# Patient Record
Sex: MALE | Race: BLACK OR AFRICAN AMERICAN | NOT HISPANIC OR LATINO | Employment: FULL TIME | URBAN - METROPOLITAN AREA
[De-identification: names, ages, dates, MRNs, and addresses within clinical notes are randomized per-mention and may not be internally consistent; named-entity substitution may affect disease eponyms.]

---

## 2017-01-09 ENCOUNTER — OFFICE VISIT (OUTPATIENT)
Dept: CARDIOLOGY | Facility: HOSPITAL | Age: 52
End: 2017-01-09

## 2017-01-09 ENCOUNTER — APPOINTMENT (OUTPATIENT)
Dept: LAB | Facility: HOSPITAL | Age: 52
End: 2017-01-09

## 2017-01-09 VITALS
HEIGHT: 69 IN | TEMPERATURE: 97.3 F | BODY MASS INDEX: 28.14 KG/M2 | DIASTOLIC BLOOD PRESSURE: 99 MMHG | SYSTOLIC BLOOD PRESSURE: 139 MMHG | RESPIRATION RATE: 22 BRPM | OXYGEN SATURATION: 97 % | HEART RATE: 70 BPM | WEIGHT: 190 LBS

## 2017-01-09 DIAGNOSIS — N18.3 CKD (CHRONIC KIDNEY DISEASE), STAGE 3 (MODERATE): Primary | ICD-10-CM

## 2017-01-09 DIAGNOSIS — I50.22 CHRONIC SYSTOLIC CONGESTIVE HEART FAILURE (HCC): ICD-10-CM

## 2017-01-09 DIAGNOSIS — I10 ESSENTIAL HYPERTENSION: ICD-10-CM

## 2017-01-09 DIAGNOSIS — I42.8 NONISCHEMIC CARDIOMYOPATHY (HCC): ICD-10-CM

## 2017-01-09 LAB
ANION GAP SERPL CALCULATED.3IONS-SCNC: 7 MMOL/L (ref 3–11)
BUN BLD-MCNC: 29 MG/DL (ref 9–23)
BUN/CREAT SERPL: 14.5 (ref 7–25)
CALCIUM SPEC-SCNC: 9.7 MG/DL (ref 8.7–10.4)
CHLORIDE SERPL-SCNC: 105 MMOL/L (ref 99–109)
CO2 SERPL-SCNC: 34 MMOL/L (ref 20–31)
CREAT BLD-MCNC: 2 MG/DL (ref 0.6–1.3)
GFR SERPL CREATININE-BSD FRML MDRD: 43 ML/MIN/1.73
GLUCOSE BLD-MCNC: 114 MG/DL (ref 70–100)
POTASSIUM BLD-SCNC: 4.6 MMOL/L (ref 3.5–5.5)
SODIUM BLD-SCNC: 146 MMOL/L (ref 132–146)

## 2017-01-09 PROCEDURE — 36415 COLL VENOUS BLD VENIPUNCTURE: CPT | Performed by: NURSE PRACTITIONER

## 2017-01-09 PROCEDURE — 80048 BASIC METABOLIC PNL TOTAL CA: CPT | Performed by: NURSE PRACTITIONER

## 2017-01-09 RX ORDER — TORSEMIDE 20 MG/1
20 TABLET ORAL DAILY
COMMUNITY
End: 2017-07-10 | Stop reason: SDUPTHER

## 2017-01-09 NOTE — PROGRESS NOTES
[facility]  Heart [facility] Valve Center      Encounter Date:01/09/2017     Walt Gao  [address] [place] [facility] [address]  [phone]    1965    Mindi Chandra MD    Walt Gao is a 51 y.o. male.      Subjective:     Chief Complaint:  Follow-up (NICM/SHF)       HPI     A with a history of systolic heart failure, nonischemic, chronic kidney disease stage III, hypertension.  Last seen by cardiology 10/18/2016 with elevated creatinine 2.8, up from 2.4.  At that time patient was instructed to continue torsemide 10 mg daily but if he noted he was short of breath he can increased to 20 mg daily, but to use sparingly with his kidney function.  Patient is being followed by nephrology.  Patient states since that office visit he increased his torsemide 20 mg daily.  He has seen nephrology with no change in medications.  Patient denies chest pain, pressure, palpitations, worsening dyspnea or fatigue, edema, weight gain, orthopnea.  He is able to do his daily work in physical activity requirements without significant limitations.  Reports intermittent mild-mod dyspnea and fatigue with exertion.  Improved rapidly with rest.        No Known Allergies      Current Outpatient Prescriptions:   •  amLODIPine (NORVASC) 5 MG tablet, Take 1 tablet by mouth daily., Disp: 30 tablet, Rfl: 3  •  aspirin 81 MG chewable tablet, Chew 81 mg. 1-2 times daily prior to Bidil, Disp: , Rfl:   •  carvedilol (COREG) 25 MG tablet, Take 1 tablet by mouth 2 (two) times a day with meals for 360 days., Disp: 180 tablet, Rfl: 3  •  isosorbide-hydrALAZINE (BIDIL) 20-37.5 MG per tablet, Take 1 tablet by mouth 3 (three) times a day for 360 days., Disp: 270 tablet, Rfl: 3  •  potassium chloride (K-DUR) 10 MEQ CR tablet, Take 1 tablet by mouth daily for 360 days., Disp: 90 tablet, Rfl: 3  •  torsemide (DEMADEX) 20 MG tablet, Take 20 mg by mouth Daily., Disp: , Rfl:     The following portions of the patient's  history were reviewed and updated as appropriate: allergies, current medications, past family history, past medical history, past social history, past surgical history and problem list.    Review of Systems   Constitution: Positive for chills and weight gain. Negative for decreased appetite, diaphoresis, fever, weakness, malaise/fatigue, night sweats and weight loss (5 lbs).   HENT: Positive for congestion. Negative for headaches and nosebleeds.    Eyes: Negative for blurred vision, visual disturbance and visual halos.   Cardiovascular: Negative for chest pain, claudication, cyanosis, dyspnea on exertion, irregular heartbeat, leg swelling, near-syncope, orthopnea, palpitations, paroxysmal nocturnal dyspnea and syncope.   Respiratory: Positive for snoring and wheezing. Negative for cough, hemoptysis, shortness of breath, sleep disturbances due to breathing and sputum production.    Endocrine: Negative for cold intolerance, heat intolerance, polydipsia, polyphagia and polyuria.   Hematologic/Lymphatic: Does not bruise/bleed easily.   Skin: Positive for dry skin and itching. Negative for rash.   Musculoskeletal: Negative for falls, joint pain, joint swelling, muscle weakness and myalgias.   Gastrointestinal: Negative for bloating, abdominal pain, constipation, diarrhea, dysphagia, heartburn, melena, nausea and vomiting.   Genitourinary: Positive for nocturia. Negative for dysuria, flank pain and hematuria.   Neurological: Positive for dizziness. Negative for difficulty with concentration, excessive daytime sleepiness and loss of balance.   Psychiatric/Behavioral: Negative for altered mental status and depression. The patient is not nervous/anxious.    Allergic/Immunologic: Negative for environmental allergies.       Objective:     Vitals:    01/09/17 1018 01/09/17 1019   BP: 150/96 139/99   BP Location: Right arm Right arm   Patient Position: Sitting Standing   Cuff Size: Large Adult    Pulse: 71 70   Resp: 22    Temp:  "97.3 °F (36.3 °C)    TempSrc: Temporal Artery     SpO2: 97%    Weight: 190 lb (86.2 kg)    Height: 69\" (175.3 cm)          Physical Exam   Constitutional: He is oriented to person, place, and time. He appears well-developed and well-nourished. No distress.   HENT:   Head: Normocephalic and atraumatic.   Mouth/Throat: Oropharynx is clear and moist.   Eyes: Conjunctivae are normal. Pupils are equal, round, and reactive to light. No scleral icterus.   Neck: No hepatojugular reflux and no JVD present. Carotid bruit is not present.   Cardiovascular: Normal rate, regular rhythm, normal heart sounds and intact distal pulses.  Exam reveals no friction rub.    No murmur heard.  Pulmonary/Chest: Effort normal and breath sounds normal.   Abdominal: Soft. Bowel sounds are normal. He exhibits no distension. There is no tenderness.   Musculoskeletal: He exhibits no edema.   Lymphadenopathy:     He has no cervical adenopathy.   Neurological: He is alert and oriented to person, place, and time.   Skin: Skin is warm, dry and intact. No rash noted. No cyanosis or erythema. No pallor.   Psychiatric: He has a normal mood and affect. His behavior is normal. Thought content normal.   Vitals reviewed.      Lab and Diagnostic Review:  Lab on 10/18/2016   Component Date Value Ref Range Status   • Glucose 10/18/2016 94  70 - 100 mg/dL Final   • BUN 10/18/2016 42* 9 - 23 mg/dL Final   • Creatinine 10/18/2016 2.80* 0.60 - 1.30 mg/dL Final   • Sodium 10/18/2016 142  132 - 146 mmol/L Final   • Potassium 10/18/2016 4.1  3.5 - 5.5 mmol/L Final   • Chloride 10/18/2016 104  99 - 109 mmol/L Final   • CO2 10/18/2016 28.0  20.0 - 31.0 mmol/L Final   • Calcium 10/18/2016 9.5  8.7 - 10.4 mg/dL Final   • eGFR   Amer 10/18/2016 29* >60 mL/min/1.73 Final   • BUN/Creatinine Ratio 10/18/2016 15.0  7.0 - 25.0 Final   • Anion Gap 10/18/2016 10.0  3.0 - 11.0 mmol/L Final     Results for orders placed or performed in visit on 01/09/17   Basic Metabolic " Panel   Result Value Ref Range    Glucose 114 (H) 70 - 100 mg/dL    BUN 29 (H) 9 - 23 mg/dL    Creatinine 2.00 (H) 0.60 - 1.30 mg/dL    Sodium 146 132 - 146 mmol/L    Potassium 4.6 3.5 - 5.5 mmol/L    Chloride 105 99 - 109 mmol/L    CO2 34.0 (H) 20.0 - 31.0 mmol/L    Calcium 9.7 8.7 - 10.4 mg/dL    eGFR  African Amer 43 (L) >60 mL/min/1.73    BUN/Creatinine Ratio 14.5 7.0 - 25.0    Anion Gap 7.0 3.0 - 11.0 mmol/L       Assessment and Plan:         1. Chronic systolic congestive heart failure  NYHA II-III (dyspnea/fatigue)  Scheduled for repeat echocardiogram in February prior to follow-up visit with Mariel Hinojosa.  Currently not on an Ace or ARB due to chronic kidney disease.  Tolerating torsemide 20 mg daily    Review signs and symptoms heart failure worsening [facility] heart failure center when to call.  Follow-up in 6 months or as determined by cardiology and as needed    2. Nonischemic cardiomyopathy      3. Essential hypertension  Elevated initially.  Continue to monitor at this time    4. CKD (chronic kidney disease), stage 3 (moderate)    - Basic Metabolic Panel  Creatinine improved to 0.0 (2.8)      *Please note that portions of this note were completed with a voice recognition program. Efforts were made to edit the dictations, but occasionally words are mistranscribed.

## 2017-01-09 NOTE — MR AVS SNAPSHOT
Walt MEDEROS Murray   1/9/2017 9:30 AM   Office Visit    Dept Phone:  [phone]   Encounter #:  [id number]    Provider:  AFTAB Keller   Department:  [facility]                Your Full Care Plan              Today's Medication Changes          These changes are accurate as of: 1/9/17 10:41 AM.  If you have any questions, ask your nurse or doctor.               Medication(s)that have changed:     torsemide 20 MG tablet   Commonly known as:  DEMADEX   Take 20 mg by mouth Daily.   What changed:  Another medication with the same name was removed. Continue taking this medication, and follow the directions you see here.   Changed by:  AFTAB Keller                  Your Updated Medication List          This list is accurate as of: 1/9/17 10:41 AM.  Always use your most recent med list.                amLODIPine 5 MG tablet   Commonly known as:  NORVASC   Take 1 tablet by mouth daily.       aspirin 81 MG chewable tablet       carvedilol 25 MG tablet   Commonly known as:  COREG   Take 1 tablet by mouth 2 (two) times a day with meals for 360 days.       isosorbide-hydrALAZINE 20-37.5 MG per tablet   Commonly known as:  BIDIL   Take 1 tablet by mouth 3 (three) times a day for 360 days.       potassium chloride 10 MEQ CR tablet   Commonly known as:  K-DUR   Take 1 tablet by mouth daily for 360 days.       torsemide 20 MG tablet   Commonly known as:  DEMADEX               We Performed the Following     Basic Metabolic Panel       You Were Diagnosed With        Codes Comments    CKD (chronic kidney disease), stage 3 (moderate)    -  Primary ICD-10-CM: N18.3  ICD-9-CM: 585.3       Instructions     None    Patient Instructions History      Upcoming Appointments     Visit Type Date Time Department    FOLLOW UP 1/9/2017  9:30 AM [facility] [place]    PHYSICAL 1/16/2017  9:00 AM [facility] [place]    [facility] ECHO 2D COMPLETE WITH CONTRAST VT 2/21/2017  9:30 AM [facility] NONINVASIVE LAB    FOLLOW UP 2017 10:45 AM [facility] CARD [facility]    FOLLOW UP 7/10/2017  9:30 AM [facility] [place]      [employer] Signup     [facility] allows you to send messages to your doctor, view your test results, renew your prescriptions, schedule appointments, and more. To sign up, go to [employer] and click on the Sign Up Now link in the New User? box. Enter your [email or web address] Activation Code exactly as it appears below along with the last four digits of your Social Security Number and your Date of Birth () to complete the sign-up process. If you do not sign up before the expiration date, you must request a new code.    [email or web address] Activation Code: [email or web address]  Expires: 2017 10:41 AM    If you have questions, you can email [email or web address] or call [phone] to talk to our [email or web address] staff. Remember, [email or web address] is NOT to be used for urgent needs. For medical emergencies, dial 911.               Other Info from Your Visit           Your Appointments     2017  9:00 AM EST   Physical with Mindi Chandra MD   [facility] PRIMARY CARE (--)    [address] [place] [address] [address]  [facility] [address] [phone]           Arrive 15 minutes prior to appointment.            2017  9:30 AM EST   ECHOCARDIOGRAM 2D COMPLETE WITH CONTRAST VISIT with KATHERYN ECHO/VASC CART RM1   [facility] NONINVASIVE LAB ([place])    [address] [place] [address] [phone]           Bring your insurance cards with you. Wear comfortable clothing and shoes.            2017 10:45 AM EST   Follow Up with Chandrakant Delgado MD   [facility] CARDIOLOGY (--)    [address] [place] [address] [address]  [facility] [address] [phone]           Arrive 15 minutes prior to appointment.            Jul 10, 2017  9:30 AM EDT   Follow Up with AFTAB Keller   [facility] HEART AND  "[facility] (--)    [address] [address] [phone]           Arrive 15 minutes prior to appointment.              Allergies     No Known Allergies      Reason for Visit     Follow-up           Vital Signs     Blood Pressure Pulse Temperature Respirations Height Weight    139/99 (BP Location: Right arm, Patient Position: Standing) 70 97.3 °F (36.3 °C) (Temporal Artery ) 22 69\" (175.3 cm) 190 lb (86.2 kg)    Oxygen Saturation Body Mass Index Smoking Status             97% 28.06 kg/m2 Never Smoker         Problems and Diagnoses Noted     Chronic kidney disease    -  Primary        "

## 2017-01-10 ENCOUNTER — TELEPHONE (OUTPATIENT)
Dept: CARDIOLOGY | Facility: HOSPITAL | Age: 52
End: 2017-01-10

## 2017-01-10 NOTE — TELEPHONE ENCOUNTER
Reviewed recent lab results [facility]:  BUN 30, creatinine 2.1, sodium 136, potassium 4.6, carbon dioxide 19, chloride 99, calcium 9.5, glucose 117, albumen 4.6, WBC 6.2, hematocrit 38.2, hemoglobin 13.7.    Creatinine 1/9/2017 2.0, baseline.  ----- Message from Beverley Lucero sent at 1/9/2017  3:14 PM EST -----  Requested.    ----- Message -----     From: AFTAB Keller     Sent: 1/9/2017   2:26 PM       To: Beverley Lucero    Request last Labs from [facility] (trending labs if possible) and last note for review if possible.

## 2017-01-29 DIAGNOSIS — I10 ESSENTIAL HYPERTENSION: ICD-10-CM

## 2017-01-30 RX ORDER — AMLODIPINE BESYLATE 5 MG/1
TABLET ORAL
Qty: 30 TABLET | Refills: 5 | Status: SHIPPED | OUTPATIENT
Start: 2017-01-30 | End: 2017-07-10 | Stop reason: SDUPTHER

## 2017-01-30 NOTE — TELEPHONE ENCOUNTER
Last seen on 1/9/17 and will follow up on 7/10/17. Refills for amlodipine 5mg daily sent to [address]    Natasha Montaño PharmD

## 2017-02-21 ENCOUNTER — HOSPITAL ENCOUNTER (OUTPATIENT)
Dept: CARDIOLOGY | Facility: HOSPITAL | Age: 52
Discharge: HOME | End: 2017-02-21
Admitting: NURSE PRACTITIONER

## 2017-02-21 ENCOUNTER — OFFICE VISIT (OUTPATIENT)
Dept: CARDIOLOGY | Facility: CLINIC | Age: 52
End: 2017-02-21

## 2017-02-21 VITALS
WEIGHT: 197.6 LBS | SYSTOLIC BLOOD PRESSURE: 138 MMHG | BODY MASS INDEX: 29.27 KG/M2 | HEIGHT: 69 IN | DIASTOLIC BLOOD PRESSURE: 90 MMHG | HEART RATE: 74 BPM

## 2017-02-21 DIAGNOSIS — I42.8 NONISCHEMIC CARDIOMYOPATHY (HCC): ICD-10-CM

## 2017-02-21 DIAGNOSIS — I50.22 CHRONIC SYSTOLIC CONGESTIVE HEART FAILURE (HCC): Primary | ICD-10-CM

## 2017-02-21 DIAGNOSIS — I10 ESSENTIAL HYPERTENSION: ICD-10-CM

## 2017-02-21 DIAGNOSIS — I50.22 CHRONIC SYSTOLIC CONGESTIVE HEART FAILURE (HCC): ICD-10-CM

## 2017-02-21 PROCEDURE — 99214 OFFICE O/P EST MOD 30 MIN: CPT | Performed by: INTERNAL MEDICINE

## 2017-02-21 PROCEDURE — 25010000002 SULFUR HEXAFLUORIDE MICROSPH 60.7-25 MG RECONSTITUTED SUSPENSION: Performed by: NURSE PRACTITIONER

## 2017-02-21 PROCEDURE — C8929 TTE W OR WO FOL WCON,DOPPLER: HCPCS

## 2017-02-21 PROCEDURE — 93306 TTE W/DOPPLER COMPLETE: CPT | Performed by: INTERNAL MEDICINE

## 2017-02-21 RX ORDER — HYDRALAZINE HYDROCHLORIDE 25 MG/1
37.5 TABLET, FILM COATED ORAL 3 TIMES DAILY
Qty: 405 TABLET | Refills: 3 | Status: SHIPPED | OUTPATIENT
Start: 2017-02-21 | End: 2018-02-28 | Stop reason: SDUPTHER

## 2017-02-21 RX ORDER — ISOSORBIDE DINITRATE 20 MG/1
20 TABLET ORAL 2 TIMES DAILY
Qty: 180 TABLET | Refills: 3 | Status: SHIPPED | OUTPATIENT
Start: 2017-02-21 | End: 2018-02-27 | Stop reason: SDUPTHER

## 2017-02-21 RX ADMIN — SULFUR HEXAFLUORIDE 2 ML: KIT at 14:15

## 2017-02-21 NOTE — ASSESSMENT & PLAN NOTE
The patient has experienced normalization of his LV function with medical therapy.  Presently he has functional class I symptoms.  We should continue medical therapy including carvedilol, afterload reduction with isosorbide and hydralazine.  The patient cannot afford BiDil and therefore we will prescribe the individual component separately.

## 2017-02-21 NOTE — PROGRESS NOTES
Encounter Date:02/21/2017    Patient ID: Walt Gao is a 51 y.o. male who resides in [place], [place].     CC/Reason for visit:  Follow-up (Non-ischemic cardiomyopathy)          Walt Gao returns to the office today in follow-up with his nonischemic cardiomyopathy and systolic heart failure.  Patient's been doing well from a cardiovascular standpoint and denies exertional dyspnea, orthopnea, or PND.  He states his blood pressures have been controlled with systolics ranging in the 120-130.  To his visit, he underwent an echocardiogram which shows normalization of his LV function.  Finally, he is having difficulty affording BiDil and asked for an alternative.     Review of Systems   Constitution: Negative for weakness and malaise/fatigue.   Eyes: Negative for vision loss in left eye and vision loss in right eye.   Cardiovascular: Positive for chest pain. Negative for dyspnea on exertion, near-syncope, orthopnea, palpitations, paroxysmal nocturnal dyspnea and syncope.   Respiratory: Positive for shortness of breath and snoring.    Musculoskeletal: Negative for myalgias.   Neurological: Negative for brief paralysis, excessive daytime sleepiness, focal weakness, numbness and paresthesias.   All other systems reviewed and are negative.      The patient's past medical, social, and family history reviewed in the patient's electronic medical record.    Allergies  Review of patient's allergies indicates no known allergies.     Current Outpatient Prescriptions:   •  amLODIPine (NORVASC) 5 MG tablet, TAKE ONE TABLET BY MOUTH DAILY, Disp: 30 tablet, Rfl: 5  •  carvedilol (COREG) 25 MG tablet, Take 1 tablet by mouth 2 (two) times a day with meals for 360 days., Disp: 180 tablet, Rfl: 3  •  isosorbide-hydrALAZINE (BIDIL) 20-37.5 MG per tablet, Take 1 tablet by mouth 3 (three) times a day for 360 days., Disp: 270 tablet, Rfl: 3  •  potassium chloride (K-DUR) 10 MEQ CR tablet, Take 1 tablet by mouth daily for 360 days.,  "Disp: 90 tablet, Rfl: 3  •  torsemide (DEMADEX) 20 MG tablet, Take 20 mg by mouth Daily., Disp: , Rfl:   •  aspirin 81 MG chewable tablet, Chew 81 mg Daily. 1-2 times daily prior to Bidil, Disp: , Rfl:   •  hydrALAZINE (APRESOLINE) 25 MG tablet, Take 1.5 tablets by mouth 3 (Three) Times a Day for 360 days., Disp: 405 tablet, Rfl: 3  •  isosorbide dinitrate (ISORDIL) 20 MG tablet, Take 1 tablet by mouth 2 (Two) Times a Day., Disp: 180 tablet, Rfl: 3  No current facility-administered medications for this visit.     Facility-Administered Medications Ordered in Other Visits:   •  Sulfur Hexafluoride Microsph 60.7-25 MG reconstituted suspension 2 mL, 2 mL, Intravenous, Once, AFTAB [place]          Blood pressure 138/90, pulse 74, height 69\" (175.3 cm), weight 197 lb 9.6 oz (89.6 kg).  Body mass index is 29.18 kg/(m^2).    Physical Exam   Constitutional: He is oriented to person, place, and time. He appears well-developed and well-nourished.   HENT:   Head: Normocephalic and atraumatic.   Eyes: Pupils are equal, round, and reactive to light. No scleral icterus.   Neck: No JVD present. Carotid bruit is not present. No thyromegaly present.   Cardiovascular: Normal rate, regular rhythm, S1 normal and S2 normal.  Exam reveals no gallop.    No murmur heard.  Pulmonary/Chest: Effort normal and breath sounds normal.   Abdominal: Soft. There is no tenderness.   Neurological: He is alert and oriented to person, place, and time.   Skin: Skin is warm and dry. No cyanosis. Nails show no clubbing.   Psychiatric: He has a normal mood and affect. His behavior is normal.       Data Review:   Procedures         Problem List Items Addressed This Visit        Cardiology Problems    Chronic systolic congestive heart failure - Primary    Overview     · Echo (8/30/2016): Moderate concentric hypertrophy.  EF = 23%. Left ventricular diastolic dysfunction (grade II). Left atrial cavity size is moderate-to-severely dilated. Moderate " MR  · Echo (2/21/2017):  LVEF 50%.  Moderate LVH.         Current Assessment & Plan     The patient has experienced normalization of his LV function with medical therapy.  Presently he has functional class I symptoms.  We should continue medical therapy including carvedilol, afterload reduction with isosorbide and hydralazine.  The patient cannot afford BiDil and therefore we will prescribe the individual component separately.         Relevant Medications    hydrALAZINE (APRESOLINE) 25 MG tablet    isosorbide dinitrate (ISORDIL) 20 MG tablet    Nonischemic cardiomyopathy    Overview     · Cardiac catheterization revealing nonobstructive CAD, 2013  · LVEF 30% on heart catheterization, 2013  · Echo (8/29/16): Moderate LVH. Estimated EF = 23%. Left ventricular diastolic dysfunction (grade II) consistent with pseudonormalization.  Moderate mitral valve regurgitation  · Echo (2/21/2017):  LVEF 50%         Relevant Medications    hydrALAZINE (APRESOLINE) 25 MG tablet    isosorbide dinitrate (ISORDIL) 20 MG tablet    Essential hypertension    Current Assessment & Plan     · Blood pressure is presently controlled         Relevant Medications    hydrALAZINE (APRESOLINE) 25 MG tablet               · Discontinue Bidil   · Begin Isosorbide 20 mg and Hydralazine 37.5 mg tid.  · Return to the clinic in 1 year or as needed.      I agree with the above as scribed.  My edits have been integrated into the note.    Chandrakant Delgado MD  2/21/2017     Scribed for Chandrakant Delgado MD by Lisa Castaneda. 2/21/2017  4:41 PM

## 2017-02-22 LAB
BH CV ECHO MEAS - AO MAX PG: 10 MMHG
BH CV ECHO MEAS - AO MEAN PG: 4.7 MMHG
BH CV ECHO MEAS - AO V2 MAX: 158 CM/SEC
BH CV ECHO MEAS - AO V2 MEAN: 98.6 CM/SEC
BH CV ECHO MEAS - AO V2 VTI: 30.2 CM
BH CV ECHO MEAS - BSA(HAYCOCK): 2.1 M^2
BH CV ECHO MEAS - BSA: 2.1 M^2
BH CV ECHO MEAS - BZI_BMI: 29.1 KILOGRAMS/M^2
BH CV ECHO MEAS - BZI_METRIC_HEIGHT: 175.3 CM
BH CV ECHO MEAS - BZI_METRIC_WEIGHT: 89.4 KG
BH CV ECHO MEAS - CONTRAST EF (2CH): 58.8 ML/M^2
BH CV ECHO MEAS - CONTRAST EF 4CH: 54.8 ML/M^2
BH CV ECHO MEAS - EDV(CUBED): 155.7 ML
BH CV ECHO MEAS - EDV(MOD-SP2): 153 ML
BH CV ECHO MEAS - EDV(MOD-SP4): 186 ML
BH CV ECHO MEAS - EDV(TEICH): 140.1 ML
BH CV ECHO MEAS - EF(CUBED): 79.8 %
BH CV ECHO MEAS - EF(MOD-SP2): 58.8 %
BH CV ECHO MEAS - EF(MOD-SP4): 54.8 %
BH CV ECHO MEAS - EF(TEICH): 71.8 %
BH CV ECHO MEAS - ESV(CUBED): 31.4 ML
BH CV ECHO MEAS - ESV(MOD-SP2): 63 ML
BH CV ECHO MEAS - ESV(MOD-SP4): 84 ML
BH CV ECHO MEAS - ESV(TEICH): 39.5 ML
BH CV ECHO MEAS - FS: 41.4 %
BH CV ECHO MEAS - IVS/LVPW: 0.98
BH CV ECHO MEAS - IVSD: 1.3 CM
BH CV ECHO MEAS - LA DIMENSION: 3.7 CM
BH CV ECHO MEAS - LAT PEAK E' VEL: 8.3 CM/SEC
BH CV ECHO MEAS - LV DIASTOLIC VOL/BSA (35-75): 90.6 ML/M^2
BH CV ECHO MEAS - LV MASS(C)D: 301.1 GRAMS
BH CV ECHO MEAS - LV MASS(C)DI: 146.7 GRAMS/M^2
BH CV ECHO MEAS - LV SYSTOLIC VOL/BSA (12-30): 40.9 ML/M^2
BH CV ECHO MEAS - LVIDD: 5.4 CM
BH CV ECHO MEAS - LVIDS: 3.2 CM
BH CV ECHO MEAS - LVLD AP2: 8.7 CM
BH CV ECHO MEAS - LVLD AP4: 8.7 CM
BH CV ECHO MEAS - LVLS AP2: 7.3 CM
BH CV ECHO MEAS - LVLS AP4: 7.2 CM
BH CV ECHO MEAS - LVOT AREA (M): 2.8 CM^2
BH CV ECHO MEAS - LVOT AREA: 3 CM^2
BH CV ECHO MEAS - LVOT DIAM: 1.9 CM
BH CV ECHO MEAS - LVPWD: 1.3 CM
BH CV ECHO MEAS - MED PEAK E' VEL: 10.3 CM/SEC
BH CV ECHO MEAS - MV A MAX VEL: 85.9 CM/SEC
BH CV ECHO MEAS - MV DEC TIME: 0.24 SEC
BH CV ECHO MEAS - MV E MAX VEL: 68.6 CM/SEC
BH CV ECHO MEAS - MV E/A: 0.8
BH CV ECHO MEAS - PA ACC SLOPE: 721.5 CM/SEC^2
BH CV ECHO MEAS - PA ACC TIME: 0.12 SEC
BH CV ECHO MEAS - PA MAX PG: 4.6 MMHG
BH CV ECHO MEAS - PA PR(ACCEL): 25.1 MMHG
BH CV ECHO MEAS - PA V2 MAX: 107.5 CM/SEC
BH CV ECHO MEAS - RAP SYSTOLE: 3 MMHG
BH CV ECHO MEAS - RVDD: 3.2 CM
BH CV ECHO MEAS - RVSP: 24 MMHG
BH CV ECHO MEAS - SI(CUBED): 60.6 ML/M^2
BH CV ECHO MEAS - SI(MOD-SP2): 43.9 ML/M^2
BH CV ECHO MEAS - SI(MOD-SP4): 49.7 ML/M^2
BH CV ECHO MEAS - SI(TEICH): 49 ML/M^2
BH CV ECHO MEAS - SV(CUBED): 124.3 ML
BH CV ECHO MEAS - SV(MOD-SP2): 90 ML
BH CV ECHO MEAS - SV(MOD-SP4): 102 ML
BH CV ECHO MEAS - SV(TEICH): 100.5 ML
BH CV ECHO MEAS - TAPSE (>1.6): 2 CM2
BH CV ECHO MEAS - TR MAX VEL: 217.2 CM/SEC
BH CV XLRA - RV BASE: 4.2 CM
BH CV XLRA - RV LENGTH: 7.7 CM
BH CV XLRA - RV MID: 3.3 CM
BH CV XLRA - TDI S': 11.1 CM/SEC
LEFT ATRIUM VOLUME INDEX: 30.2 ML/M2
LEFT ATRIUM VOLUME: 62 CM3
LV EF 2D ECHO EST: 55 %

## 2017-07-10 ENCOUNTER — OFFICE VISIT (OUTPATIENT)
Dept: CARDIOLOGY | Facility: HOSPITAL | Age: 52
End: 2017-07-10

## 2017-07-10 ENCOUNTER — APPOINTMENT (OUTPATIENT)
Dept: LAB | Facility: HOSPITAL | Age: 52
End: 2017-07-10

## 2017-07-10 VITALS
OXYGEN SATURATION: 96 % | DIASTOLIC BLOOD PRESSURE: 118 MMHG | HEART RATE: 75 BPM | HEIGHT: 69 IN | BODY MASS INDEX: 30.18 KG/M2 | TEMPERATURE: 97.8 F | SYSTOLIC BLOOD PRESSURE: 168 MMHG | RESPIRATION RATE: 18 BRPM | WEIGHT: 203.8 LBS

## 2017-07-10 DIAGNOSIS — I10 ESSENTIAL HYPERTENSION: Primary | ICD-10-CM

## 2017-07-10 DIAGNOSIS — N18.30 CHRONIC RENAL IMPAIRMENT, STAGE 3 (MODERATE) (HCC): ICD-10-CM

## 2017-07-10 DIAGNOSIS — I42.8 NONISCHEMIC CARDIOMYOPATHY (HCC): ICD-10-CM

## 2017-07-10 LAB
ANION GAP SERPL CALCULATED.3IONS-SCNC: 8 MMOL/L (ref 3–11)
BUN BLD-MCNC: 17 MG/DL (ref 9–23)
BUN/CREAT SERPL: 10.6 (ref 7–25)
CALCIUM SPEC-SCNC: 10.2 MG/DL (ref 8.7–10.4)
CHLORIDE SERPL-SCNC: 104 MMOL/L (ref 99–109)
CO2 SERPL-SCNC: 28 MMOL/L (ref 20–31)
CREAT BLD-MCNC: 1.6 MG/DL (ref 0.6–1.3)
GFR SERPL CREATININE-BSD FRML MDRD: 55 ML/MIN/1.73
GLUCOSE BLD-MCNC: 104 MG/DL (ref 70–100)
POTASSIUM BLD-SCNC: 4.8 MMOL/L (ref 3.5–5.5)
SODIUM BLD-SCNC: 140 MMOL/L (ref 132–146)

## 2017-07-10 PROCEDURE — 36415 COLL VENOUS BLD VENIPUNCTURE: CPT | Performed by: NURSE PRACTITIONER

## 2017-07-10 PROCEDURE — 80048 BASIC METABOLIC PNL TOTAL CA: CPT | Performed by: NURSE PRACTITIONER

## 2017-07-10 PROCEDURE — 99213 OFFICE O/P EST LOW 20 MIN: CPT | Performed by: NURSE PRACTITIONER

## 2017-07-10 RX ORDER — POTASSIUM CHLORIDE 750 MG/1
10 TABLET, FILM COATED, EXTENDED RELEASE ORAL DAILY
Qty: 90 TABLET | Refills: 2 | Status: SHIPPED | OUTPATIENT
Start: 2017-07-10 | End: 2018-02-27 | Stop reason: SDUPTHER

## 2017-07-10 RX ORDER — CARVEDILOL 25 MG/1
25 TABLET ORAL 2 TIMES DAILY WITH MEALS
Qty: 180 TABLET | Refills: 2 | Status: SHIPPED | OUTPATIENT
Start: 2017-07-10 | End: 2018-02-27 | Stop reason: SDUPTHER

## 2017-07-10 RX ORDER — AMLODIPINE BESYLATE 5 MG/1
5 TABLET ORAL DAILY
Qty: 90 TABLET | Refills: 2 | Status: SHIPPED | OUTPATIENT
Start: 2017-07-10 | End: 2018-02-27 | Stop reason: SDUPTHER

## 2017-07-10 RX ORDER — TORSEMIDE 20 MG/1
20 TABLET ORAL DAILY
Qty: 90 TABLET | Refills: 2 | Status: SHIPPED | OUTPATIENT
Start: 2017-07-10 | End: 2018-02-27 | Stop reason: SDUPTHER

## 2017-07-10 NOTE — PROGRESS NOTES
[facility]  [facility]      Encounter Date:07/10/2017     Walt Gao  [address] [place] [facility] [id number]  [phone]    1965    Mindi Chandra MD    Walt Gao is a 52 y.o. male.      Subjective:     Chief Complaint:  Follow-up (NICM, HTN)       Hypertension   This is a chronic problem. The current episode started 1 to 4 weeks ago. The problem has been waxing and waning since onset. Condition status: elevated today.  Has been without meds today.  Needs refills, but also forgot to take meds. Pertinent negatives include no blurred vision, chest pain, headaches, malaise/fatigue, orthopnea, palpitations, PND or shortness of breath. Risk factors for coronary artery disease include family history, obesity and male gender. Treatments tried: norvasc, Coreg, Hydralazine, Isordil, Demadex. The current treatment provides significant (when taking medications as scheduled) improvement. Hypertensive end-organ damage includes kidney disease and heart failure (hx of NICM, Last EF improved 2/2017).        No problems updated.    Past Surgical History:   Procedure Laterality Date   • HERNIA REPAIR      as child       No Known Allergies      Current Outpatient Prescriptions:   •  amLODIPine (NORVASC) 5 MG tablet, Take 1 tablet by mouth Daily., Disp: 90 tablet, Rfl: 2  •  aspirin 81 MG chewable tablet, Chew 81 mg Daily. 1-2 times daily prior to Bidil, Disp: , Rfl:   •  carvedilol (COREG) 25 MG tablet, Take 1 tablet by mouth 2 (Two) Times a Day With Meals for 360 days., Disp: 180 tablet, Rfl: 2  •  hydrALAZINE (APRESOLINE) 25 MG tablet, Take 1.5 tablets by mouth 3 (Three) Times a Day for 360 days., Disp: 405 tablet, Rfl: 3  •  isosorbide dinitrate (ISORDIL) 20 MG tablet, Take 1 tablet by mouth 2 (Two) Times a Day., Disp: 180 tablet, Rfl: 3  •  potassium chloride (K-DUR) 10 MEQ CR tablet, Take 1 tablet by mouth Daily for 360 days., Disp: 90 tablet, Rfl: 2  •  torsemide  (DEMADEX) 20 MG tablet, Take 1 tablet by mouth Daily., Disp: 90 tablet, Rfl: 2    The following portions of the patient's history were reviewed and updated as appropriate: allergies, current medications, past family history, past medical history, past social history, past surgical history and problem list.    Review of Systems   Constitution: Negative for chills, decreased appetite, diaphoresis, fever, weakness, malaise/fatigue, night sweats, weight gain and weight loss.   HENT: Negative for congestion, headaches and nosebleeds.    Eyes: Negative for blurred vision, visual disturbance and visual halos.   Cardiovascular: Positive for leg swelling. Negative for chest pain, claudication, cyanosis, dyspnea on exertion, irregular heartbeat, near-syncope, orthopnea, palpitations, paroxysmal nocturnal dyspnea and syncope.   Respiratory: Negative for cough, hemoptysis, shortness of breath, sleep disturbances due to breathing, snoring, sputum production and wheezing.    Endocrine: Negative for cold intolerance, heat intolerance, polydipsia, polyphagia and polyuria.   Hematologic/Lymphatic: Does not bruise/bleed easily.   Skin: Negative for dry skin, itching and rash.   Musculoskeletal: Positive for muscle cramps. Negative for falls, joint pain, joint swelling, muscle weakness and myalgias.   Gastrointestinal: Negative for bloating, abdominal pain, constipation, diarrhea, dysphagia, heartburn, melena, nausea and vomiting.   Genitourinary: Negative for dysuria, flank pain, hematuria and nocturia.   Neurological: Negative for difficulty with concentration, excessive daytime sleepiness, dizziness and loss of balance.   Psychiatric/Behavioral: Negative for altered mental status and depression. The patient is not nervous/anxious.    Allergic/Immunologic: Positive for environmental allergies.       Objective:     Vitals:    07/10/17 0919 07/10/17 0922 07/10/17 0923 07/10/17 0925   BP: (!) 168/117 (!) 171/120 (!) 182/130 (!) 168/118  "  BP Location: Right arm Left arm Left arm Left arm   Patient Position: Sitting Sitting Standing Sitting   Pulse: 65 73 75    Resp: 18      Temp: 97.8 °F (36.6 °C)      TempSrc: Temporal Artery       SpO2: 96%      Weight: 203 lb 12.8 oz (92.4 kg)      Height: 69\" (175.3 cm)            Physical Exam   Constitutional: He is oriented to person, place, and time. He appears well-developed and well-nourished. No distress.   HENT:   Head: Normocephalic and atraumatic.   Mouth/Throat: Oropharynx is clear and moist.   Eyes: Conjunctivae are normal. Pupils are equal, round, and reactive to light. No scleral icterus.   Neck: No hepatojugular reflux and no JVD present. Carotid bruit is not present. No tracheal deviation present. No thyromegaly present.   Cardiovascular: Normal rate, regular rhythm, normal heart sounds and intact distal pulses.  Exam reveals no friction rub.    No murmur heard.  Pulmonary/Chest: Effort normal and breath sounds normal.   Abdominal: Soft. Bowel sounds are normal. He exhibits no distension. There is no tenderness.   Musculoskeletal: He exhibits no edema.   Lymphadenopathy:     He has no cervical adenopathy.   Neurological: He is alert and oriented to person, place, and time.   Skin: Skin is warm, dry and intact. No rash noted. No cyanosis or erythema. No pallor.   Psychiatric: He has a normal mood and affect. His behavior is normal. Thought content normal.   Vitals reviewed.      Lab and Diagnostic Review:  Echocardiogram 2/22/17: EF 55%, mild concentric hypertrophy, diastolic dysfunction, grade 1    01/09/17  Glucose 70 - 100 mg/dL 114 (H)   BUN 9 - 23 mg/dL 29 (H)   Creatinine 0.60 - 1.30 mg/dL 2.00 (H)   Sodium 132 - 146 mmol/L 146   Potassium 3.5 - 5.5 mmol/L 4.6   Chloride 99 - 109 mmol/L 105   CO2 20.0 - 31.0 mmol/L 34.0 (H)   Calcium 8.7 - 10.4 mg/dL 9.7   eGFR  African Amer >60 mL/min/1.73 43 (L)   BUN/Creatinine Ratio 7.0 - 25.0 14.5   Anion Gap 3.0 - 11.0 mmol/L 7.0       Assessment and " Plan:         1. Essential hypertension  Elevated today, has not taken meds    -refill amLODIPine (NORVASC) 5 MG tablet; Take 1 tablet by mouth Daily.  Dispense: 90 tablet; Refill: 2  - refill carvedilol (COREG) 25 MG tablet; Take 1 tablet by mouth 2 (Two) Times a Day With Meals for 360 days.  Dispense: 180 tablet; Refill: 2    2. Nonischemic cardiomyopathy  EF improved, NYHA II    - potassium chloride (K-DUR) 10 MEQ CR tablet; Take 1 tablet by mouth Daily for 360 days.  Dispense: 90 tablet; Refill: 2  - torsemide (DEMADEX) 20 MG tablet; Take 1 tablet by mouth Daily.  Dispense: 90 tablet; Refill: 2    Discussed s/s HF worsening.  Discussed importance of Medication compliance,  Encouraged decrease caffeine and ETOH office.    3. Chronic renal impairment, stage 3 (moderate)    - Basic Metabolic Panel    Encouraged to f/u with nephrology as scheduled    F/u with cardiology as scheduled.  Discussed role of H&V center and when to call.  Will f/u as needed or as determined by cardiology          *Please note that portions of this note were completed with a voice recognition program. Efforts were made to edit the dictations, but occasionally words are mistranscribed.

## 2018-02-27 ENCOUNTER — LAB (OUTPATIENT)
Dept: LAB | Facility: HOSPITAL | Age: 53
End: 2018-02-27

## 2018-02-27 ENCOUNTER — OFFICE VISIT (OUTPATIENT)
Dept: CARDIOLOGY | Facility: CLINIC | Age: 53
End: 2018-02-27

## 2018-02-27 VITALS
BODY MASS INDEX: 29.24 KG/M2 | HEIGHT: 69 IN | HEART RATE: 88 BPM | SYSTOLIC BLOOD PRESSURE: 136 MMHG | WEIGHT: 197.4 LBS | DIASTOLIC BLOOD PRESSURE: 70 MMHG

## 2018-02-27 DIAGNOSIS — N18.30 CHRONIC RENAL IMPAIRMENT, STAGE 3 (MODERATE) (HCC): ICD-10-CM

## 2018-02-27 DIAGNOSIS — E78.2 MIXED HYPERLIPIDEMIA: ICD-10-CM

## 2018-02-27 DIAGNOSIS — I50.22 CHRONIC SYSTOLIC CONGESTIVE HEART FAILURE (HCC): Primary | ICD-10-CM

## 2018-02-27 DIAGNOSIS — I42.8 NONISCHEMIC CARDIOMYOPATHY (HCC): ICD-10-CM

## 2018-02-27 DIAGNOSIS — I10 ESSENTIAL HYPERTENSION: ICD-10-CM

## 2018-02-27 LAB
ALBUMIN SERPL-MCNC: 4.6 G/DL (ref 3.2–4.8)
ALBUMIN/GLOB SERPL: 1.4 G/DL (ref 1.5–2.5)
ALP SERPL-CCNC: 34 U/L (ref 25–100)
ALT SERPL W P-5'-P-CCNC: 26 U/L (ref 7–40)
ANION GAP SERPL CALCULATED.3IONS-SCNC: 8 MMOL/L (ref 3–11)
ARTICHOKE IGE QN: 132 MG/DL (ref 0–130)
AST SERPL-CCNC: 21 U/L (ref 0–33)
BILIRUB SERPL-MCNC: 0.2 MG/DL (ref 0.3–1.2)
BUN BLD-MCNC: 24 MG/DL (ref 9–23)
BUN/CREAT SERPL: 15 (ref 7–25)
CALCIUM SPEC-SCNC: 9.4 MG/DL (ref 8.7–10.4)
CHLORIDE SERPL-SCNC: 101 MMOL/L (ref 99–109)
CHOLEST SERPL-MCNC: 185 MG/DL (ref 0–200)
CO2 SERPL-SCNC: 28 MMOL/L (ref 20–31)
CREAT BLD-MCNC: 1.6 MG/DL (ref 0.6–1.3)
GFR SERPL CREATININE-BSD FRML MDRD: 55 ML/MIN/1.73
GLOBULIN UR ELPH-MCNC: 3.3 GM/DL
GLUCOSE BLD-MCNC: 128 MG/DL (ref 70–100)
HDLC SERPL-MCNC: 44 MG/DL (ref 40–60)
POTASSIUM BLD-SCNC: 4.2 MMOL/L (ref 3.5–5.5)
PROT SERPL-MCNC: 7.9 G/DL (ref 5.7–8.2)
SODIUM BLD-SCNC: 137 MMOL/L (ref 132–146)
TRIGL SERPL-MCNC: 121 MG/DL (ref 0–150)

## 2018-02-27 PROCEDURE — 80061 LIPID PANEL: CPT

## 2018-02-27 PROCEDURE — 80053 COMPREHEN METABOLIC PANEL: CPT

## 2018-02-27 PROCEDURE — 99214 OFFICE O/P EST MOD 30 MIN: CPT | Performed by: NURSE PRACTITIONER

## 2018-02-27 PROCEDURE — 36415 COLL VENOUS BLD VENIPUNCTURE: CPT

## 2018-02-27 RX ORDER — ASPIRIN 81 MG/1
81 TABLET, CHEWABLE ORAL DAILY
Qty: 90 TABLET | Refills: 3 | Status: SHIPPED | OUTPATIENT
Start: 2018-02-27 | End: 2019-02-27

## 2018-02-27 RX ORDER — ISOSORBIDE DINITRATE 20 MG/1
20 TABLET ORAL 2 TIMES DAILY
Qty: 180 TABLET | Refills: 3 | Status: SHIPPED | OUTPATIENT
Start: 2018-02-27 | End: 2019-02-27

## 2018-02-27 RX ORDER — CARVEDILOL 25 MG/1
25 TABLET ORAL 2 TIMES DAILY WITH MEALS
Qty: 180 TABLET | Refills: 2 | Status: SHIPPED | OUTPATIENT
Start: 2018-02-27 | End: 2019-02-22

## 2018-02-27 RX ORDER — TORSEMIDE 20 MG/1
20 TABLET ORAL DAILY
Qty: 90 TABLET | Refills: 3 | Status: SHIPPED | OUTPATIENT
Start: 2018-02-27 | End: 2019-02-27

## 2018-02-27 RX ORDER — POTASSIUM CHLORIDE 750 MG/1
10 TABLET, FILM COATED, EXTENDED RELEASE ORAL DAILY
Qty: 90 TABLET | Refills: 3 | Status: SHIPPED | OUTPATIENT
Start: 2018-02-27 | End: 2019-02-22

## 2018-02-27 RX ORDER — AMLODIPINE BESYLATE 5 MG/1
5 TABLET ORAL DAILY
Qty: 90 TABLET | Refills: 3 | Status: SHIPPED | OUTPATIENT
Start: 2018-02-27 | End: 2019-05-10 | Stop reason: SDUPTHER

## 2018-02-27 NOTE — ASSESSMENT & PLAN NOTE
· Stable NYHA class II symptoms  · Continue carvedilol 25 mg twice a day  · Continue Demadex 20 mg daily along with potassium daily  · Continue aspirin 81 mg daily

## 2018-02-27 NOTE — ASSESSMENT & PLAN NOTE
· Stable NYHA class II symptoms  · Continue carvedilol 25 mg twice a day  · Continue Demadex 20 mg daily along with potassium 20 mEq daily  · Continue isosorbide 20 mg twice a day  · Defer hydralazine for now but will prescribe if patient becomes symptomatic or blood pressure elevates

## 2018-02-27 NOTE — PROGRESS NOTES
Encounter Date:02/27/2018    Patient ID: Walt Gao is a 52 y.o. male who is a  that resides in [place], [place]    CC/Reason for visit:  Congestive Heart Failure            Walt Gao returns today for follow-up for his nonischemic cardiomyopathy, systolic heart failure and hypertension.  Since his last visit he has done well.  His breathing is stable and he is able to work full time without any physical limitations.  At his last visit he was having difficulty affording by DL and asked for an alternative.  He was prescribed isosorbide and hydralazine however for reasons that remain unclear he is not taking the hydralazine but is compliant with the isosorbide..  His blood pressure appears fairly well controlled today with a systolic of 136.  He is currently not on statin therapy and cannot recall the last time his cholesterol levels were checked.  He did follow-up in the heart failure clinic with just 1 Donovan UGALDE last summer.  Labs were drawn and his creatinine has 3 main stable with a baseline of 1.6.  He takes daily Demadex along with potassium.  He admits he does not weigh himself daily.  He does try to follow a low-sodium diet.  His last echocardiogram was one year ago that showed normalization of his LV function with an LVEF of 50%.    Review of Systems   Respiratory: Positive for snoring.    Musculoskeletal: Positive for muscle cramps.   Genitourinary: Positive for frequency.   Allergic/Immunologic: Positive for environmental allergies.   All other systems reviewed and are negative.      The patient's past medical, social, family history and ROS reviewed in the patient's electronic medical record.    Allergies  Review of patient's allergies indicates no known allergies.    Outpatient Prescriptions Marked as Taking for the 2/27/18 encounter (Office Visit) with [facility]   Medication Sig Dispense Refill   • amLODIPine (NORVASC) 5 MG tablet Take 1 tablet by mouth Daily.  "90 tablet 3   • aspirin 81 MG chewable tablet Chew 1 tablet Daily. 1-2 times daily prior to Bidil 90 tablet 3   • carvedilol (COREG) 25 MG tablet Take 1 tablet by mouth 2 (Two) Times a Day With Meals for 360 days. 180 tablet 2   • isosorbide dinitrate (ISORDIL) 20 MG tablet Take 1 tablet by mouth 2 (Two) Times a Day. 180 tablet 3   • potassium chloride (K-DUR) 10 MEQ CR tablet Take 1 tablet by mouth Daily for 360 days. 90 tablet 3   • torsemide (DEMADEX) 20 MG tablet Take 1 tablet by mouth Daily. 90 tablet 3   • [DISCONTINUED] amLODIPine (NORVASC) 5 MG tablet Take 1 tablet by mouth Daily. 90 tablet 2   • [DISCONTINUED] aspirin 81 MG chewable tablet Chew 81 mg Daily. 1-2 times daily prior to Bidil     • [DISCONTINUED] carvedilol (COREG) 25 MG tablet Take 1 tablet by mouth 2 (Two) Times a Day With Meals for 360 days. 180 tablet 2   • [DISCONTINUED] isosorbide dinitrate (ISORDIL) 20 MG tablet Take 1 tablet by mouth 2 (Two) Times a Day. 180 tablet 3   • [DISCONTINUED] potassium chloride (K-DUR) 10 MEQ CR tablet Take 1 tablet by mouth Daily for 360 days. 90 tablet 2   • [DISCONTINUED] torsemide (DEMADEX) 20 MG tablet Take 1 tablet by mouth Daily. 90 tablet 2         Blood pressure 136/70, pulse 88, height 175.3 cm (69\"), weight 89.5 kg (197 lb 6.4 oz).  Body mass index is 29.15 kg/(m^2).    Physical Exam   Constitutional: He is oriented to person, place, and time. He appears well-developed and well-nourished.   HENT:   Head: Normocephalic and atraumatic.   Eyes: Pupils are equal, round, and reactive to light. No scleral icterus.   Neck: No JVD present. Carotid bruit is not present. No thyromegaly present.   Cardiovascular: Normal rate, regular rhythm, S1 normal and S2 normal.  Exam reveals no gallop.    No murmur heard.  Pulmonary/Chest: Effort normal and breath sounds normal.   Abdominal: Soft. There is no hepatosplenomegaly. There is no tenderness.   Neurological: He is alert and oriented to person, place, and time. "   Skin: Skin is warm and dry. No cyanosis. Nails show no clubbing.   Psychiatric: He has a normal mood and affect. His behavior is normal.       Data Review:   Procedures       Problem List Items Addressed This Visit        Cardiovascular and Mediastinum    Chronic systolic congestive heart failure - Primary    Overview     · Echo (8/30/2016): Moderate concentric hypertrophy.  EF = 23%. Left ventricular diastolic dysfunction (grade II). Left atrial cavity size is moderate-to-severely dilated. Moderate MR  · Echo (2/21/2017):  LVEF 50%.  Moderate LVH.         Current Assessment & Plan     · Stable NYHA class II symptoms  · Continue carvedilol 25 mg twice a day  · Continue Demadex 20 mg daily along with potassium 20 mEq daily  · Continue isosorbide 20 mg twice a day  · Defer hydralazine for now but will prescribe if patient becomes symptomatic or blood pressure elevates           Relevant Medications    isosorbide dinitrate (ISORDIL) 20 MG tablet    carvedilol (COREG) 25 MG tablet    amLODIPine (NORVASC) 5 MG tablet    aspirin 81 MG chewable tablet    Essential hypertension    Current Assessment & Plan     · Hypertension is controlled  · Continue amlodipine 5 mg daily         Relevant Medications    torsemide (DEMADEX) 20 MG tablet    carvedilol (COREG) 25 MG tablet    amLODIPine (NORVASC) 5 MG tablet    Nonischemic cardiomyopathy    Overview     · Cardiac catheterization revealing nonobstructive CAD, 2013  · LVEF 30% on heart catheterization, 2013  · Echo (8/29/16): Moderate LVH. Estimated EF = 23%. Left ventricular diastolic dysfunction (grade II) consistent with pseudonormalization.  Moderate mitral valve regurgitation  · Echo (2/21/2017):  LVEF 50%         Current Assessment & Plan     · Stable NYHA class II symptoms  · Continue carvedilol 25 mg twice a day  · Continue Demadex 20 mg daily along with potassium daily  · Continue aspirin 81 mg daily         Relevant Medications    potassium chloride (K-DUR) 10 MEQ CR  tablet    isosorbide dinitrate (ISORDIL) 20 MG tablet    torsemide (DEMADEX) 20 MG tablet    carvedilol (COREG) 25 MG tablet    amLODIPine (NORVASC) 5 MG tablet    Mixed hyperlipidemia    Overview               Current Assessment & Plan     · Currently not on statin therapy  · Obtain CMP and lipid profile today         Relevant Orders    Comprehensive Metabolic Panel    Lipid Panel       Genitourinary    Chronic renal impairment, stage 3 (moderate)    Overview                Current Assessment & Plan     · Obtain CMP today         Relevant Medications    torsemide (DEMADEX) 20 MG tablet    Other Relevant Orders    Comprehensive Metabolic Panel        Patient is doing well from a cardiovascular standpoint.  He has stable NYHA class II heart failure symptoms.  Since his blood pressure appears controlled I will not prescribe the hydralazine for now however if he becomes symptomatic or his blood pressure elevates we will add in hydralazine at 25 mg 3 times a day.  We will continue his current medications.  I will obtain a CMP and lipid profile today.  Follow-up 12 months or sooner if needed       · Continue current medications  · Defer hydralazine for now but will consider addition if becomes symptomatic or SBP greater than 140  · Obtain CMP and lipid profile today  · Follow-up 12 months or sooner if needed    Deanna Crowley, APRN  2/27/2018

## 2018-02-28 DIAGNOSIS — I50.22 CHRONIC SYSTOLIC CONGESTIVE HEART FAILURE (HCC): ICD-10-CM

## 2018-02-28 DIAGNOSIS — E78.5 HYPERLIPIDEMIA LDL GOAL <70: Primary | ICD-10-CM

## 2018-02-28 DIAGNOSIS — I42.8 NONISCHEMIC CARDIOMYOPATHY (HCC): ICD-10-CM

## 2018-02-28 RX ORDER — ATORVASTATIN CALCIUM 20 MG/1
20 TABLET, FILM COATED ORAL DAILY
Qty: 90 TABLET | Refills: 0 | Status: SHIPPED | OUTPATIENT
Start: 2018-02-28 | End: 2019-03-05 | Stop reason: SDUPTHER

## 2018-02-28 RX ORDER — HYDRALAZINE HYDROCHLORIDE 25 MG/1
TABLET, FILM COATED ORAL
Qty: 405 TABLET | Refills: 2 | Status: SHIPPED | OUTPATIENT
Start: 2018-02-28 | End: 2019-03-05 | Stop reason: SDUPTHER

## 2018-02-28 RX ORDER — ATORVASTATIN CALCIUM 20 MG/1
20 TABLET, FILM COATED ORAL DAILY
Qty: 90 TABLET | Refills: 0 | Status: SHIPPED | OUTPATIENT
Start: 2018-02-28 | End: 2018-02-28 | Stop reason: SDUPTHER

## 2019-03-04 NOTE — PROGRESS NOTES
Encounter Date:03/05/2019    Patient ID: Walt Gao is a 53 y.o. male who is a  that resides in [place], [place]    CC/Reason for visit:  Chronic systolic congestive heart failure           Problem List Items Addressed This Visit        Cardiovascular and Mediastinum    Chronic systolic congestive heart failure (CMS/HCC)    Overview     · Echo (8/30/2016): Moderate concentric hypertrophy.  EF = 23%. Left ventricular diastolic dysfunction (grade II). Left atrial cavity size is moderate-to-severely dilated. Moderate MR  · Echo (2/21/2017):  LVEF 55%.  Moderate LVH.         Current Assessment & Plan     · Stable NYHA class I-II symptoms         Relevant Medications    amLODIPine (NORVASC) 5 MG tablet    carvedilol (COREG) 25 MG tablet    isosorbide dinitrate (ISORDIL) 20 MG tablet    hydrALAZINE (APRESOLINE) 25 MG tablet    Nonischemic cardiomyopathy (CMS/HCC) - Primary    Overview     · Cardiac catheterization revealing nonobstructive CAD, 2013  · LVEF 30% on heart catheterization, 2013  · Echo (8/29/16): Moderate LVH. Estimated EF = 23%. Left ventricular diastolic dysfunction (grade II) consistent with pseudonormalization.  Moderate mitral valve regurgitation  · Echo (2/21/2017):  LVEF 50%         Current Assessment & Plan     · Stable NYHA class I-II symptoms  · Continue Coreg 25 mg twice daily  · Continue Demadex 20 mg daily along with potassium 10 mEq daily  · Continue hydralazine and isosorbide         Relevant Medications    amLODIPine (NORVASC) 5 MG tablet    carvedilol (COREG) 25 MG tablet    isosorbide dinitrate (ISORDIL) 20 MG tablet    hydrALAZINE (APRESOLINE) 25 MG tablet    Essential hypertension    Current Assessment & Plan     · Hypertension is controlled  · Continue hydralazine 25 mg 3 times daily  · Continue amlodipine 5 mg daily  · Continue Coreg 25 mg twice daily         Relevant Medications    amLODIPine (NORVASC) 5 MG tablet    carvedilol (COREG) 25 MG tablet    torsemide  (DEMADEX) 20 MG tablet    hydrALAZINE (APRESOLINE) 25 MG tablet    Mixed hyperlipidemia    Overview               Current Assessment & Plan     · Continue Lipitor 20 mg daily  · Obtain CMP and Lipids today         Relevant Medications    atorvastatin (LIPITOR) 20 MG tablet       Genitourinary    Chronic renal impairment, stage 3 (moderate) (CMS/HCC)    Overview                Current Assessment & Plan     · Obtain CMP to reassess renal function         Relevant Medications    torsemide (DEMADEX) 20 MG tablet        The patient has stable NYHA class I-II heart failure symptoms.  He has no signs or symptoms to suggest angina.  We will continue his current medications.  There is some discrepancy on whether he is taking his hydralazine or not.  I will have our nurse contact his pharmacy.  If he is currently not taking hydralazine we can defer as his blood pressure is well controlled.  I will send him for CMP and lipid profile today.  He will follow-up 12 months or sooner if needed       · CMP and lipid profile today  · Contact pharmacy for updated medication list follow-up 12 months or sooner if needed        Walt Gao returns today for follow-up of his nonischemic cardiomyopathy, chronic systolic heart failure and cardiac risk factors.  Since his last visit he has done well.  He denies chest pain, dyspnea, orthopnea, palpitations or syncope.  He continues to work full-time as a  at [employer] in [place].  He has known chronic stage III kidney disease.  He has not had a recent CMP to check renal function since last visit 1 year ago.  His creatinine was stable at his baseline of 1.6 at that time.  He does not follow nephrology.  He is taking low dose Lipitor and tolerating without any reports of myalgias.    Review of Systems   Constitution: Negative for weakness and malaise/fatigue.   Eyes: Negative for vision loss in left eye and vision loss in right eye.   Cardiovascular: Negative for chest pain,  dyspnea on exertion, near-syncope, orthopnea, palpitations, paroxysmal nocturnal dyspnea and syncope.   Musculoskeletal: Negative for myalgias.   Neurological: Negative for brief paralysis, excessive daytime sleepiness, focal weakness, numbness and paresthesias.   All other systems reviewed and are negative.      The patient's past medical, social, family history and ROS reviewed in the patient's electronic medical record.    Allergies  Patient has no known allergies.    Outpatient Medications Marked as Taking for the 3/5/19 encounter (Office Visit) with Jessica Crowley APRN   Medication Sig Dispense Refill   • amLODIPine (NORVASC) 5 MG tablet Take 1 tablet by mouth Daily. 90 tablet 3   • aspirin 81 MG EC tablet Take 1 tablet by mouth Daily. 90 tablet 3   • atorvastatin (LIPITOR) 20 MG tablet Take 1 tablet by mouth Daily. 90 tablet 0   • carvedilol (COREG) 25 MG tablet Take 1 tablet by mouth 2 (Two) Times a Day With Meals. 180 tablet 3   • hydrALAZINE (APRESOLINE) 25 MG tablet Take 1 tablet by mouth 3 (Three) Times a Day. 260 tablet 2   • isosorbide dinitrate (ISORDIL) 20 MG tablet Take 1 tablet by mouth 2 (Two) Times a Day. 90 tablet 3   • potassium chloride (K-DUR) 10 MEQ CR tablet Take 1 tablet by mouth Daily. 90 tablet 3   • torsemide (DEMADEX) 20 MG tablet Take 1 tablet by mouth Daily. 90 tablet 3   • [DISCONTINUED] amLODIPine (NORVASC) 5 MG tablet Take 5 mg by mouth Daily.     • [DISCONTINUED] aspirin 81 MG EC tablet Take 81 mg by mouth Daily.     • [DISCONTINUED] atorvastatin (LIPITOR) 20 MG tablet Take 1 tablet by mouth Daily. 90 tablet 0   • [DISCONTINUED] carvedilol (COREG) 25 MG tablet Take 25 mg by mouth 2 (Two) Times a Day With Meals.     • [DISCONTINUED] hydrALAZINE (APRESOLINE) 25 MG tablet TAKE ONE AND A HALF TABLET BY MOUTH THREE TIMES A  tablet 2   • [DISCONTINUED] isosorbide dinitrate (ISORDIL) 20 MG tablet Take 20 mg by mouth 2 (Two) Times a Day.     • [DISCONTINUED] potassium chloride  "(K-DUR) 10 MEQ CR tablet Take 10 mEq by mouth 2 (Two) Times a Day.     • [DISCONTINUED] torsemide (DEMADEX) 20 MG tablet Take 20 mg by mouth Daily.             Blood pressure 124/74, pulse 82, height 177.8 cm (70\"), weight 85.4 kg (188 lb 3.2 oz).  Body mass index is 27 kg/m².  There were no vitals filed for this visit.    Physical Exam   Constitutional: He is oriented to person, place, and time. He appears well-developed and well-nourished.   HENT:   Head: Normocephalic and atraumatic.   Eyes: Pupils are equal, round, and reactive to light. No scleral icterus.   Neck: No JVD present. Carotid bruit is not present. No thyromegaly present.   Cardiovascular: Normal rate and regular rhythm. Exam reveals no gallop.   No murmur heard.  Pulmonary/Chest: Effort normal and breath sounds normal.   Abdominal: Soft. He exhibits no distension. There is no hepatosplenomegaly.   Musculoskeletal: He exhibits no edema.   Neurological: He is alert and oriented to person, place, and time.   Skin: Skin is warm and dry.   Psychiatric: He has a normal mood and affect. His behavior is normal.       Data Review:     Procedures    Lab Results   Component Value Date    CHOL 185 02/27/2018    TRIG 121 02/27/2018    HDL 44 02/27/2018     (H) 02/27/2018    AST 21 02/27/2018    ALT 26 02/27/2018       Lab Results   Component Value Date    HGBA1C 5.50 08/30/2016           Deanna Crowley, AFTAB  3/5/2019  "

## 2019-03-05 ENCOUNTER — OFFICE VISIT (OUTPATIENT)
Dept: CARDIOLOGY | Facility: CLINIC | Age: 54
End: 2019-03-05

## 2019-03-05 ENCOUNTER — LAB (OUTPATIENT)
Dept: LAB | Facility: HOSPITAL | Age: 54
End: 2019-03-05

## 2019-03-05 VITALS
HEART RATE: 82 BPM | SYSTOLIC BLOOD PRESSURE: 124 MMHG | BODY MASS INDEX: 26.94 KG/M2 | HEIGHT: 70 IN | WEIGHT: 188.2 LBS | DIASTOLIC BLOOD PRESSURE: 74 MMHG

## 2019-03-05 DIAGNOSIS — I42.8 NONISCHEMIC CARDIOMYOPATHY (HCC): Primary | ICD-10-CM

## 2019-03-05 DIAGNOSIS — N18.30 CHRONIC RENAL IMPAIRMENT, STAGE 3 (MODERATE) (HCC): ICD-10-CM

## 2019-03-05 DIAGNOSIS — I10 ESSENTIAL HYPERTENSION: ICD-10-CM

## 2019-03-05 DIAGNOSIS — I50.22 CHRONIC SYSTOLIC CONGESTIVE HEART FAILURE (HCC): ICD-10-CM

## 2019-03-05 DIAGNOSIS — E78.2 MIXED HYPERLIPIDEMIA: ICD-10-CM

## 2019-03-05 DIAGNOSIS — E78.5 HYPERLIPIDEMIA LDL GOAL <70: ICD-10-CM

## 2019-03-05 DIAGNOSIS — E78.5 HYPERLIPIDEMIA LDL GOAL <70: Primary | ICD-10-CM

## 2019-03-05 LAB
ALBUMIN SERPL-MCNC: 4.41 G/DL (ref 3.2–4.8)
ALBUMIN/GLOB SERPL: 1.6 G/DL (ref 1.5–2.5)
ALP SERPL-CCNC: 28 U/L (ref 25–100)
ALT SERPL W P-5'-P-CCNC: 22 U/L (ref 7–40)
ANION GAP SERPL CALCULATED.3IONS-SCNC: 11 MMOL/L (ref 3–11)
ARTICHOKE IGE QN: 123 MG/DL (ref 0–130)
AST SERPL-CCNC: 24 U/L (ref 0–33)
BILIRUB SERPL-MCNC: 0.3 MG/DL (ref 0.3–1.2)
BUN BLD-MCNC: 26 MG/DL (ref 9–23)
BUN/CREAT SERPL: 14.1 (ref 7–25)
CALCIUM SPEC-SCNC: 9.2 MG/DL (ref 8.7–10.4)
CHLORIDE SERPL-SCNC: 106 MMOL/L (ref 99–109)
CHOLEST SERPL-MCNC: 188 MG/DL (ref 0–200)
CO2 SERPL-SCNC: 25 MMOL/L (ref 20–31)
CREAT BLD-MCNC: 1.85 MG/DL (ref 0.6–1.3)
GFR SERPL CREATININE-BSD FRML MDRD: 47 ML/MIN/1.73
GLOBULIN UR ELPH-MCNC: 2.7 GM/DL
GLUCOSE BLD-MCNC: 97 MG/DL (ref 70–100)
HDLC SERPL-MCNC: 47 MG/DL (ref 40–60)
POTASSIUM BLD-SCNC: 4.2 MMOL/L (ref 3.5–5.5)
PROT SERPL-MCNC: 7.1 G/DL (ref 5.7–8.2)
SODIUM BLD-SCNC: 142 MMOL/L (ref 132–146)
TRIGL SERPL-MCNC: 276 MG/DL (ref 0–150)

## 2019-03-05 PROCEDURE — 80061 LIPID PANEL: CPT

## 2019-03-05 PROCEDURE — 99214 OFFICE O/P EST MOD 30 MIN: CPT | Performed by: NURSE PRACTITIONER

## 2019-03-05 PROCEDURE — 80053 COMPREHEN METABOLIC PANEL: CPT

## 2019-03-05 RX ORDER — TORSEMIDE 20 MG/1
20 TABLET ORAL DAILY
Qty: 90 TABLET | Refills: 3 | Status: SHIPPED | OUTPATIENT
Start: 2019-03-05 | End: 2020-03-10 | Stop reason: SDUPTHER

## 2019-03-05 RX ORDER — TORSEMIDE 20 MG/1
20 TABLET ORAL DAILY
COMMUNITY
End: 2019-03-05 | Stop reason: SDUPTHER

## 2019-03-05 RX ORDER — POTASSIUM CHLORIDE 750 MG/1
10 TABLET, FILM COATED, EXTENDED RELEASE ORAL 2 TIMES DAILY
COMMUNITY
End: 2019-03-05 | Stop reason: SDUPTHER

## 2019-03-05 RX ORDER — ASPIRIN 81 MG/1
81 TABLET ORAL DAILY
COMMUNITY
End: 2019-03-05 | Stop reason: SDUPTHER

## 2019-03-05 RX ORDER — CARVEDILOL 25 MG/1
25 TABLET ORAL 2 TIMES DAILY WITH MEALS
Qty: 180 TABLET | Refills: 3 | Status: SHIPPED | OUTPATIENT
Start: 2019-03-05 | End: 2020-03-10 | Stop reason: SDUPTHER

## 2019-03-05 RX ORDER — ISOSORBIDE DINITRATE 20 MG/1
20 TABLET ORAL 2 TIMES DAILY
COMMUNITY
End: 2019-03-05 | Stop reason: SDUPTHER

## 2019-03-05 RX ORDER — POTASSIUM CHLORIDE 750 MG/1
10 TABLET, FILM COATED, EXTENDED RELEASE ORAL DAILY
Qty: 90 TABLET | Refills: 3 | Status: SHIPPED | OUTPATIENT
Start: 2019-03-05 | End: 2020-03-10 | Stop reason: SDUPTHER

## 2019-03-05 RX ORDER — ISOSORBIDE DINITRATE 20 MG/1
20 TABLET ORAL 2 TIMES DAILY
Qty: 90 TABLET | Refills: 3 | Status: SHIPPED | OUTPATIENT
Start: 2019-03-05 | End: 2019-11-15 | Stop reason: SDUPTHER

## 2019-03-05 RX ORDER — ATORVASTATIN CALCIUM 20 MG/1
20 TABLET, FILM COATED ORAL DAILY
Qty: 90 TABLET | Refills: 0 | Status: SHIPPED | OUTPATIENT
Start: 2019-03-05 | End: 2020-03-10 | Stop reason: SDUPTHER

## 2019-03-05 RX ORDER — CARVEDILOL 25 MG/1
25 TABLET ORAL 2 TIMES DAILY WITH MEALS
COMMUNITY
End: 2019-03-05 | Stop reason: SDUPTHER

## 2019-03-05 RX ORDER — HYDRALAZINE HYDROCHLORIDE 25 MG/1
25 TABLET, FILM COATED ORAL 3 TIMES DAILY
Qty: 260 TABLET | Refills: 2 | Status: SHIPPED | OUTPATIENT
Start: 2019-03-05 | End: 2019-07-09 | Stop reason: SDUPTHER

## 2019-03-05 RX ORDER — ASPIRIN 81 MG/1
81 TABLET ORAL DAILY
Qty: 90 TABLET | Refills: 3 | Status: SHIPPED | OUTPATIENT
Start: 2019-03-05 | End: 2022-04-05

## 2019-03-05 RX ORDER — AMLODIPINE BESYLATE 5 MG/1
5 TABLET ORAL DAILY
Qty: 90 TABLET | Refills: 3 | Status: SHIPPED | OUTPATIENT
Start: 2019-03-05 | End: 2020-03-10 | Stop reason: SDUPTHER

## 2019-03-05 RX ORDER — AMLODIPINE BESYLATE 5 MG/1
5 TABLET ORAL DAILY
COMMUNITY
End: 2019-03-05 | Stop reason: SDUPTHER

## 2019-03-05 NOTE — ASSESSMENT & PLAN NOTE
· Stable NYHA class I-II symptoms  · Continue Coreg 25 mg twice daily  · Continue Demadex 20 mg daily along with potassium 10 mEq daily  · Continue hydralazine and isosorbide

## 2019-03-05 NOTE — ASSESSMENT & PLAN NOTE
· Hypertension is controlled  · Continue hydralazine 25 mg 3 times daily  · Continue amlodipine 5 mg daily  · Continue Coreg 25 mg twice daily

## 2019-03-06 DIAGNOSIS — R79.89 ELEVATED SERUM CREATININE: Primary | ICD-10-CM

## 2019-05-10 DIAGNOSIS — I10 ESSENTIAL HYPERTENSION: ICD-10-CM

## 2019-05-10 RX ORDER — AMLODIPINE BESYLATE 5 MG/1
TABLET ORAL
Qty: 90 TABLET | Refills: 3 | Status: SHIPPED | OUTPATIENT
Start: 2019-05-10 | End: 2020-03-10 | Stop reason: SDUPTHER

## 2019-07-09 DIAGNOSIS — I42.8 NONISCHEMIC CARDIOMYOPATHY (HCC): ICD-10-CM

## 2019-07-09 DIAGNOSIS — I50.22 CHRONIC SYSTOLIC CONGESTIVE HEART FAILURE (HCC): ICD-10-CM

## 2019-07-09 RX ORDER — HYDRALAZINE HYDROCHLORIDE 25 MG/1
25 TABLET, FILM COATED ORAL 3 TIMES DAILY
Qty: 260 TABLET | Refills: 2 | Status: SHIPPED | OUTPATIENT
Start: 2019-07-09 | End: 2020-03-10 | Stop reason: SDUPTHER

## 2019-07-09 RX ORDER — HYDRALAZINE HYDROCHLORIDE 25 MG/1
TABLET, FILM COATED ORAL
Qty: 405 TABLET | Refills: 1 | OUTPATIENT
Start: 2019-07-09

## 2019-11-15 RX ORDER — ISOSORBIDE DINITRATE 20 MG/1
20 TABLET ORAL 2 TIMES DAILY
Qty: 180 TABLET | Refills: 3 | Status: SHIPPED | OUTPATIENT
Start: 2019-11-15 | End: 2020-03-10 | Stop reason: SDUPTHER

## 2020-03-10 ENCOUNTER — OFFICE VISIT (OUTPATIENT)
Dept: CARDIOLOGY | Facility: CLINIC | Age: 55
End: 2020-03-10

## 2020-03-10 ENCOUNTER — LAB (OUTPATIENT)
Dept: LAB | Facility: HOSPITAL | Age: 55
End: 2020-03-10

## 2020-03-10 VITALS
OXYGEN SATURATION: 98 % | BODY MASS INDEX: 26.8 KG/M2 | DIASTOLIC BLOOD PRESSURE: 88 MMHG | SYSTOLIC BLOOD PRESSURE: 150 MMHG | HEIGHT: 70 IN | WEIGHT: 187.2 LBS | HEART RATE: 77 BPM

## 2020-03-10 DIAGNOSIS — I50.22 CHRONIC SYSTOLIC CONGESTIVE HEART FAILURE (HCC): ICD-10-CM

## 2020-03-10 DIAGNOSIS — N18.30 CHRONIC RENAL IMPAIRMENT, STAGE 3 (MODERATE) (HCC): ICD-10-CM

## 2020-03-10 DIAGNOSIS — I42.8 NONISCHEMIC CARDIOMYOPATHY (HCC): ICD-10-CM

## 2020-03-10 DIAGNOSIS — I10 ESSENTIAL HYPERTENSION: ICD-10-CM

## 2020-03-10 DIAGNOSIS — N18.30 CHRONIC RENAL IMPAIRMENT, STAGE 3 (MODERATE) (HCC): Primary | ICD-10-CM

## 2020-03-10 LAB
ALBUMIN SERPL-MCNC: 4.4 G/DL (ref 3.5–5.2)
ALBUMIN/GLOB SERPL: 1.3 G/DL
ALP SERPL-CCNC: 27 U/L (ref 39–117)
ALT SERPL W P-5'-P-CCNC: 18 U/L (ref 1–41)
ANION GAP SERPL CALCULATED.3IONS-SCNC: 14.4 MMOL/L (ref 5–15)
AST SERPL-CCNC: 20 U/L (ref 1–40)
BILIRUB SERPL-MCNC: 0.2 MG/DL (ref 0.2–1.2)
BUN BLD-MCNC: 17 MG/DL (ref 6–20)
BUN/CREAT SERPL: 10.6 (ref 7–25)
CALCIUM SPEC-SCNC: 9.2 MG/DL (ref 8.6–10.5)
CHLORIDE SERPL-SCNC: 101 MMOL/L (ref 98–107)
CHOLEST SERPL-MCNC: 193 MG/DL (ref 0–200)
CO2 SERPL-SCNC: 24.6 MMOL/L (ref 22–29)
CREAT BLD-MCNC: 1.61 MG/DL (ref 0.76–1.27)
DEPRECATED RDW RBC AUTO: 43.4 FL (ref 37–54)
ERYTHROCYTE [DISTWIDTH] IN BLOOD BY AUTOMATED COUNT: 12.4 % (ref 12.3–15.4)
GFR SERPL CREATININE-BSD FRML MDRD: 54 ML/MIN/1.73
GLOBULIN UR ELPH-MCNC: 3.3 GM/DL
GLUCOSE BLD-MCNC: 104 MG/DL (ref 65–99)
HCT VFR BLD AUTO: 39.3 % (ref 37.5–51)
HDLC SERPL-MCNC: 53 MG/DL (ref 40–60)
HGB BLD-MCNC: 13.6 G/DL (ref 13–17.7)
LDLC SERPL CALC-MCNC: 113 MG/DL (ref 0–100)
LDLC/HDLC SERPL: 2.13 {RATIO}
MCH RBC QN AUTO: 33.5 PG (ref 26.6–33)
MCHC RBC AUTO-ENTMCNC: 34.6 G/DL (ref 31.5–35.7)
MCV RBC AUTO: 96.8 FL (ref 79–97)
PLATELET # BLD AUTO: 311 10*3/MM3 (ref 140–450)
PMV BLD AUTO: 9.6 FL (ref 6–12)
POTASSIUM BLD-SCNC: 4.2 MMOL/L (ref 3.5–5.2)
PROT SERPL-MCNC: 7.7 G/DL (ref 6–8.5)
RBC # BLD AUTO: 4.06 10*6/MM3 (ref 4.14–5.8)
SODIUM BLD-SCNC: 140 MMOL/L (ref 136–145)
TRIGL SERPL-MCNC: 136 MG/DL (ref 0–150)
VLDLC SERPL-MCNC: 27.2 MG/DL (ref 5–40)
WBC NRBC COR # BLD: 6.28 10*3/MM3 (ref 3.4–10.8)

## 2020-03-10 PROCEDURE — 80053 COMPREHEN METABOLIC PANEL: CPT

## 2020-03-10 PROCEDURE — 85027 COMPLETE CBC AUTOMATED: CPT

## 2020-03-10 PROCEDURE — 80061 LIPID PANEL: CPT

## 2020-03-10 PROCEDURE — 99214 OFFICE O/P EST MOD 30 MIN: CPT | Performed by: INTERNAL MEDICINE

## 2020-03-10 PROCEDURE — 36415 COLL VENOUS BLD VENIPUNCTURE: CPT

## 2020-03-10 RX ORDER — CARVEDILOL 25 MG/1
25 TABLET ORAL 2 TIMES DAILY WITH MEALS
Qty: 180 TABLET | Refills: 3 | Status: SHIPPED | OUTPATIENT
Start: 2020-03-10 | End: 2021-03-16 | Stop reason: SDUPTHER

## 2020-03-10 RX ORDER — POTASSIUM CHLORIDE 750 MG/1
10 TABLET, FILM COATED, EXTENDED RELEASE ORAL DAILY
Qty: 90 TABLET | Refills: 3 | Status: SHIPPED | OUTPATIENT
Start: 2020-03-10 | End: 2021-03-16 | Stop reason: SDUPTHER

## 2020-03-10 RX ORDER — ISOSORBIDE DINITRATE 20 MG/1
20 TABLET ORAL 2 TIMES DAILY
Qty: 180 TABLET | Refills: 3 | Status: SHIPPED | OUTPATIENT
Start: 2020-03-10 | End: 2021-03-16 | Stop reason: SDUPTHER

## 2020-03-10 RX ORDER — HYDRALAZINE HYDROCHLORIDE 50 MG/1
50 TABLET, FILM COATED ORAL 3 TIMES DAILY
Qty: 270 TABLET | Refills: 3 | Status: SHIPPED | OUTPATIENT
Start: 2020-03-10 | End: 2021-03-16 | Stop reason: SDUPTHER

## 2020-03-10 RX ORDER — AMLODIPINE BESYLATE 5 MG/1
5 TABLET ORAL DAILY
Qty: 90 TABLET | Refills: 3 | Status: SHIPPED | OUTPATIENT
Start: 2020-03-10 | End: 2020-08-04 | Stop reason: SDUPTHER

## 2020-03-10 RX ORDER — TORSEMIDE 20 MG/1
20 TABLET ORAL DAILY
Qty: 90 TABLET | Refills: 3 | Status: SHIPPED | OUTPATIENT
Start: 2020-03-10 | End: 2021-03-16 | Stop reason: SDUPTHER

## 2020-03-10 RX ORDER — ATORVASTATIN CALCIUM 20 MG/1
20 TABLET, FILM COATED ORAL DAILY
Qty: 90 TABLET | Refills: 3 | Status: SHIPPED | OUTPATIENT
Start: 2020-03-10 | End: 2021-03-16 | Stop reason: SDUPTHER

## 2020-03-10 NOTE — ASSESSMENT & PLAN NOTE
· HFrEF with normalized LVEF and no heart failure symptoms  · Continue carvedilol  · Increase hydralazine to 50 mg 3 times daily  · No spironolactone due to renal insufficiency

## 2020-03-10 NOTE — PROGRESS NOTES
Let him know that his renal function is stable, but I would still like him to establish care with a kidney doctor

## 2020-03-10 NOTE — PROGRESS NOTES
[facility] at [facility]  Follow-up note    Encounter Date:03/10/2020    Patient ID: Walt Gao is a 55 y.o. male who resides in [place], [place]. She works for [employer]    CC/Reason for visit:  Chronic systolic chf and Hypertension           Problem List Items Addressed This Visit        Cardiology Problems    Chronic systolic congestive heart failure (CMS/HCC)    Overview     · Echo (8/30/2016): Moderate concentric hypertrophy.  EF = 23%. Left ventricular diastolic dysfunction (grade II). Left atrial cavity size is moderate-to-severely dilated. Moderate MR  · Echo (2/21/2017):  LVEF 55%.  Moderate LVH.         Relevant Medications    carvedilol (COREG) 25 MG tablet    torsemide (DEMADEX) 20 MG tablet    hydrALAZINE (APRESOLINE) 50 MG tablet    isosorbide dinitrate (ISORDIL) 20 MG tablet    potassium chloride (K-DUR) 10 MEQ CR tablet    Nonischemic cardiomyopathy (CMS/HCC)    Overview     · Cardiac catheterization revealing nonobstructive CAD, 2013  · LVEF 30% on heart catheterization, 2013  · Echo (8/29/16): Moderate LVH. Estimated EF = 23%. Left ventricular diastolic dysfunction (grade II) consistent with pseudonormalization.  Moderate mitral valve regurgitation  · Echo (2/21/2017):  LVEF 50%         Relevant Medications    carvedilol (COREG) 25 MG tablet    isosorbide dinitrate (ISORDIL) 20 MG tablet    Essential hypertension    Relevant Medications    carvedilol (COREG) 25 MG tablet    torsemide (DEMADEX) 20 MG tablet    hydrALAZINE (APRESOLINE) 50 MG tablet       Other    Chronic renal impairment, stage 3 (moderate) (CMS/HCC) - Primary    Overview                Relevant Medications    torsemide (DEMADEX) 20 MG tablet    Other Relevant Orders    Comprehensive Metabolic Panel    Lipid Panel    Ambulatory Referral to Nephrology    CBC (No Diff)               · Obtain CMP, lipids, CBC today  · Increase hydralazine to 50 mg 3 times daily  · Refer to nephrology for chronic kidney  disease  Return in about 1 year (around 3/10/2021) for Follow-up with [facility] only.            Walt Gao is a 55 y.o. male who returns to my office for follow-up of his nonischemic cardiomyopathy, systolic heart failure, and cardiac risk factors.  The patient has had a stable clinical course since his last visit.  He denies any heart failure symptoms or anginal symptoms.  He has not been checking his blood pressure at home.  He states he is gained about 15 pounds over the last year.      Review of Systems   Constitution: Negative for malaise/fatigue.   Eyes: Negative for vision loss in left eye and vision loss in right eye.   Cardiovascular: Negative for chest pain, dyspnea on exertion, near-syncope, orthopnea, palpitations, paroxysmal nocturnal dyspnea and syncope.   Respiratory: Positive for snoring.    Musculoskeletal: Negative for myalgias.   Neurological: Negative for brief paralysis, excessive daytime sleepiness, focal weakness, numbness, paresthesias and weakness.   All other systems reviewed and are negative.      The patient's past medical, social, family history and ROS reviewed in the patient's electronic medical record.    Allergies  Patient has no known allergies.    Outpatient Medications Marked as Taking for the 3/10/20 encounter (Office Visit) with Chandrakant Delgado IV, MD   Medication Sig Dispense Refill   • amLODIPine (NORVASC) 5 MG tablet Take 1 tablet by mouth Daily. 90 tablet 3   • aspirin 81 MG EC tablet Take 1 tablet by mouth Daily. 90 tablet 3   • atorvastatin (LIPITOR) 20 MG tablet Take 1 tablet by mouth Daily. 90 tablet 3   • carvedilol (COREG) 25 MG tablet Take 1 tablet by mouth 2 (Two) Times a Day With Meals. 180 tablet 3   • hydrALAZINE (APRESOLINE) 50 MG tablet Take 1 tablet by mouth 3 (Three) Times a Day. 270 tablet 3   • isosorbide dinitrate (ISORDIL) 20 MG tablet Take 1 tablet by mouth 2 (Two) Times a Day. 180 tablet 3   • potassium chloride (K-DUR) 10 MEQ CR tablet Take 1  "tablet by mouth Daily. 90 tablet 3   • torsemide (DEMADEX) 20 MG tablet Take 1 tablet by mouth Daily. 90 tablet 3   • [DISCONTINUED] atorvastatin (LIPITOR) 20 MG tablet Take 1 tablet by mouth Daily. 90 tablet 0   • [DISCONTINUED] carvedilol (COREG) 25 MG tablet Take 1 tablet by mouth 2 (Two) Times a Day With Meals. 180 tablet 3   • [DISCONTINUED] hydrALAZINE (APRESOLINE) 25 MG tablet Take 1 tablet by mouth 3 (Three) Times a Day. 260 tablet 2   • [DISCONTINUED] isosorbide dinitrate (ISORDIL) 20 MG tablet Take 1 tablet by mouth 2 (Two) Times a Day. 180 tablet 3   • [DISCONTINUED] potassium chloride (K-DUR) 10 MEQ CR tablet Take 1 tablet by mouth Daily. 90 tablet 3   • [DISCONTINUED] torsemide (DEMADEX) 20 MG tablet Take 1 tablet by mouth Daily. 90 tablet 3           Blood pressure 150/88, pulse 77, height 177.8 cm (70\"), weight 84.9 kg (187 lb 3.2 oz), SpO2 98 %.  Body mass index is 26.86 kg/m².  Vitals:    03/10/20 0947   Patient Position: Sitting       Physical Exam   Constitutional: He is oriented to person, place, and time. He appears well-developed and well-nourished.   HENT:   Head: Normocephalic and atraumatic.   Eyes: Pupils are equal, round, and reactive to light. No scleral icterus.   Neck: No JVD present. Carotid bruit is not present. No thyromegaly present.   Cardiovascular: Normal rate, regular rhythm, S1 normal and S2 normal. Exam reveals no gallop.   No murmur heard.  Pulmonary/Chest: Effort normal and breath sounds normal.   Abdominal: Soft. He exhibits no mass. There is no hepatosplenomegaly. There is no tenderness.   Neurological: He is alert and oriented to person, place, and time.   Skin: Skin is warm and dry. No cyanosis. Nails show no clubbing.   Psychiatric: He has a normal mood and affect. His behavior is normal.       Data Review (reviewed with patient):     Procedures    Lab Results   Component Value Date    CHOL 188 03/05/2019    TRIG 276 (H) 03/05/2019    HDL 47 03/05/2019     " 03/05/2019    AST 24 03/05/2019    ALT 22 03/05/2019         WONG Delgado MD FACC, [facility]  Interventional and General Cardiology

## 2020-08-04 ENCOUNTER — TELEPHONE (OUTPATIENT)
Dept: CARDIOLOGY | Facility: CLINIC | Age: 55
End: 2020-08-04

## 2020-08-04 DIAGNOSIS — I10 ESSENTIAL HYPERTENSION: ICD-10-CM

## 2020-08-04 RX ORDER — AMLODIPINE BESYLATE 5 MG/1
5 TABLET ORAL DAILY
Qty: 90 TABLET | Refills: 3 | Status: SHIPPED | OUTPATIENT
Start: 2020-08-04 | End: 2021-03-16 | Stop reason: SDUPTHER

## 2020-08-04 NOTE — TELEPHONE ENCOUNTER
Nephrology referral placed after last office visit. I called the patient to follow up if he had seen them but had to leave a message. Called [facility] and they said they had received the referral from our office but the patient no showed the appt. They will need the patient to call and reschedule. Will relay to the patient when he returns call.

## 2020-10-23 DIAGNOSIS — I50.22 CHRONIC SYSTOLIC CONGESTIVE HEART FAILURE (HCC): ICD-10-CM

## 2020-10-23 DIAGNOSIS — I42.8 NONISCHEMIC CARDIOMYOPATHY (HCC): ICD-10-CM

## 2020-10-23 RX ORDER — HYDRALAZINE HYDROCHLORIDE 25 MG/1
TABLET, FILM COATED ORAL
Qty: 260 TABLET | Refills: 1 | Status: SHIPPED | OUTPATIENT
Start: 2020-10-23 | End: 2021-03-16

## 2021-03-15 NOTE — PROGRESS NOTES
"[facility]      Identification: Walt Gao is a 56 y.o. male who resides in [place], [place]. He works for [employer]    Reason for visit:  Systolic heart failure  Nonischemic cardiomyopathy  Hypertension      Subjective      Walt Gao presents to [facility] for followup.    History of Present Illness  The patient reports that he occasionally experiences exertional chest pain when he forgets to take one of his medications that is a green pill. However, the patient reports that if he takes all of his medications as prescribed, he does not experience this chest pain. He denies any dyspnea and also notes that he was unable to have the test that was ordered for his kidneys due to the pandemic, but is hopeful that he can have this test soon. The patient reports that he did not take any of his medications this morning since he did not eat breakfast.    The patient is scheduled to receive his COVID vaccination today, 03/16/2021.    The patient works as a . He reports that he transitioned to a new job in 01/2021 that involves less strenuous physical activity.    Review of Systems   Cardiovascular: Negative.    Respiratory: Negative.        Objective     BP (!) 146/102 (BP Location: Left arm, Patient Position: Sitting)   Pulse 86   Temp 97.1 °F (36.2 °C)   Ht 177.8 cm (70\")   Wt 87.5 kg (193 lb)   SpO2 96%   BMI 27.69 kg/m²       Constitutional:       Appearance: Well-developed.   Eyes:      General: No scleral icterus.     Pupils: Pupils are equal, round, and reactive to light.   HENT:      Head: Normocephalic and atraumatic.   Neck:      Thyroid: No thyromegaly.      Vascular: No carotid bruit or JVD.   Pulmonary:      Effort: Pulmonary effort is normal.      Breath sounds: Normal breath sounds.   Cardiovascular:      Normal rate. Regular rhythm.      Murmurs: There is no murmur.      No gallop.   Abdominal:      Palpations: Abdomen is soft. There is no abdominal " mass.      Tenderness: There is no abdominal tenderness.   Musculoskeletal:      Extremities: No clubbing present.Skin:     General: Skin is warm and dry. There is no cyanosis.   Neurological:      Mental Status: Alert and oriented to person, place, and time.   Psychiatric:         Behavior: Behavior normal.         Result Review :    Lab Results   Component Value Date    GLUCOSE 120 (H) 03/16/2021    BUN 25 (H) 03/16/2021    CREATININE 1.85 (H) 03/16/2021    EGFRIFAFRI 46 (L) 03/16/2021    BCR 13.5 03/16/2021    K 4.4 03/16/2021    CO2 28.3 03/16/2021    CALCIUM 9.3 03/16/2021    ALBUMIN 4.70 03/16/2021    AST 23 03/16/2021    ALT 21 03/16/2021     Lab Results   Component Value Date    WBC 5.65 03/16/2021    HGB 13.9 03/16/2021    HCT 40.7 03/16/2021    MCV 96.9 03/16/2021     03/16/2021     Lab Results   Component Value Date    CHOL 177 03/16/2021    TRIG 77 03/16/2021    HDL 54 03/16/2021     (H) 03/16/2021             Assessment/Plan     Problem List Items Addressed This Visit        Cardiology Problems    Chronic systolic congestive heart failure (CMS/HCC) (Chronic)    Overview     · Echo (8/30/2016): Moderate concentric hypertrophy.  EF 23%. Left ventricular diastolic dysfunction (grade II). Left atrial cavity size is moderate-to-severely dilated. Moderate MR  · Echo (2/21/2017):  LVEF 55%.  Moderate LVH.         Current Assessment & Plan     Stage C HFrEF with normalized LV systolic function  NYHA class I symptoms  Obtain lab work today  Continue beta-blocker, BiDil combination  Consider Entresto if renal function allows         Relevant Medications    carvedilol (COREG) 25 MG tablet    torsemide (DEMADEX) 20 MG tablet    isosorbide dinitrate (ISORDIL) 20 MG tablet    potassium chloride 10 MEQ CR tablet    hydrALAZINE (APRESOLINE) 50 MG tablet    amLODIPine (NORVASC) 5 MG tablet    Other Relevant Orders    Comprehensive Metabolic Panel (Completed)    CBC (No Diff) (Completed)    Ambulatory  Referral to Internal Medicine    Essential hypertension (Chronic)    Overview     • Target blood pressure <130/80 mmHg         Current Assessment & Plan     · Uncontrolled but patient did not take medicines this morning         Relevant Medications    carvedilol (COREG) 25 MG tablet    torsemide (DEMADEX) 20 MG tablet    hydrALAZINE (APRESOLINE) 50 MG tablet    amLODIPine (NORVASC) 5 MG tablet    Other Relevant Orders    Comprehensive Metabolic Panel (Completed)    CBC (No Diff) (Completed)    Ambulatory Referral to Internal Medicine    Mixed hyperlipidemia - Primary (Chronic)    Current Assessment & Plan     · Obtain CMP and lipids today         Relevant Medications    atorvastatin (LIPITOR) 20 MG tablet    Other Relevant Orders    Comprehensive Metabolic Panel (Completed)    Lipid Panel (Completed)    Ambulatory Referral to Internal Medicine       Other    Chronic renal impairment, stage 3 (moderate) (CMS/HCC) (Chronic)    Current Assessment & Plan     · Obtain CMP today         Relevant Medications    torsemide (DEMADEX) 20 MG tablet    Other Relevant Orders    Comprehensive Metabolic Panel (Completed)    CBC (No Diff) (Completed)    Ambulatory Referral to Internal Medicine               · Obtain CMP, CBC, lipids today  · Consider adding Entresto if creatinine will allow    Follow-up   Return in about 1 year (around 3/16/2022) for Follow-up with [facility] only.      Scribed for Chandrakant Delgado IV, MD by Roro Rutherford.  03/16/21   14:39 EDT    I have personally performed the services described in this document as scribed by the above individual, and it is both accurate and complete.  Chandrakant Delgado IV, MD  3/17/2021  09:21 EDT        Larry Delgado MD, [facility], [facility]  3/17/2021

## 2021-03-16 ENCOUNTER — OFFICE VISIT (OUTPATIENT)
Dept: CARDIOLOGY | Facility: CLINIC | Age: 56
End: 2021-03-16

## 2021-03-16 ENCOUNTER — LAB (OUTPATIENT)
Dept: LAB | Facility: HOSPITAL | Age: 56
End: 2021-03-16

## 2021-03-16 VITALS
HEART RATE: 86 BPM | HEIGHT: 70 IN | OXYGEN SATURATION: 96 % | TEMPERATURE: 97.1 F | WEIGHT: 193 LBS | BODY MASS INDEX: 27.63 KG/M2 | SYSTOLIC BLOOD PRESSURE: 146 MMHG | DIASTOLIC BLOOD PRESSURE: 102 MMHG

## 2021-03-16 DIAGNOSIS — N18.31 CHRONIC RENAL IMPAIRMENT, STAGE 3A (HCC): ICD-10-CM

## 2021-03-16 DIAGNOSIS — E78.2 MIXED HYPERLIPIDEMIA: Primary | ICD-10-CM

## 2021-03-16 DIAGNOSIS — I50.22 CHRONIC SYSTOLIC CONGESTIVE HEART FAILURE (HCC): ICD-10-CM

## 2021-03-16 DIAGNOSIS — E78.2 MIXED HYPERLIPIDEMIA: ICD-10-CM

## 2021-03-16 DIAGNOSIS — I10 ESSENTIAL HYPERTENSION: ICD-10-CM

## 2021-03-16 LAB
ALBUMIN SERPL-MCNC: 4.7 G/DL (ref 3.5–5.2)
ALBUMIN/GLOB SERPL: 1.2 G/DL
ALP SERPL-CCNC: 29 U/L (ref 39–117)
ALT SERPL W P-5'-P-CCNC: 21 U/L (ref 1–41)
ANION GAP SERPL CALCULATED.3IONS-SCNC: 9.7 MMOL/L (ref 5–15)
AST SERPL-CCNC: 23 U/L (ref 1–40)
BILIRUB SERPL-MCNC: 0.4 MG/DL (ref 0–1.2)
BUN SERPL-MCNC: 25 MG/DL (ref 6–20)
BUN/CREAT SERPL: 13.5 (ref 7–25)
CALCIUM SPEC-SCNC: 9.3 MG/DL (ref 8.6–10.5)
CHLORIDE SERPL-SCNC: 99 MMOL/L (ref 98–107)
CHOLEST SERPL-MCNC: 177 MG/DL (ref 0–200)
CO2 SERPL-SCNC: 28.3 MMOL/L (ref 22–29)
CREAT SERPL-MCNC: 1.85 MG/DL (ref 0.76–1.27)
DEPRECATED RDW RBC AUTO: 45.5 FL (ref 37–54)
ERYTHROCYTE [DISTWIDTH] IN BLOOD BY AUTOMATED COUNT: 12.9 % (ref 12.3–15.4)
GFR SERPL CREATININE-BSD FRML MDRD: 46 ML/MIN/1.73
GLOBULIN UR ELPH-MCNC: 3.9 GM/DL
GLUCOSE SERPL-MCNC: 120 MG/DL (ref 65–99)
HCT VFR BLD AUTO: 40.7 % (ref 37.5–51)
HDLC SERPL-MCNC: 54 MG/DL (ref 40–60)
HGB BLD-MCNC: 13.9 G/DL (ref 13–17.7)
LDLC SERPL CALC-MCNC: 109 MG/DL (ref 0–100)
LDLC/HDLC SERPL: 1.99 {RATIO}
MCH RBC QN AUTO: 33.1 PG (ref 26.6–33)
MCHC RBC AUTO-ENTMCNC: 34.2 G/DL (ref 31.5–35.7)
MCV RBC AUTO: 96.9 FL (ref 79–97)
PLATELET # BLD AUTO: 353 10*3/MM3 (ref 140–450)
PMV BLD AUTO: 9.3 FL (ref 6–12)
POTASSIUM SERPL-SCNC: 4.4 MMOL/L (ref 3.5–5.2)
PROT SERPL-MCNC: 8.6 G/DL (ref 6–8.5)
RBC # BLD AUTO: 4.2 10*6/MM3 (ref 4.14–5.8)
SODIUM SERPL-SCNC: 137 MMOL/L (ref 136–145)
TRIGL SERPL-MCNC: 77 MG/DL (ref 0–150)
VLDLC SERPL-MCNC: 14 MG/DL (ref 5–40)
WBC # BLD AUTO: 5.65 10*3/MM3 (ref 3.4–10.8)

## 2021-03-16 PROCEDURE — 80061 LIPID PANEL: CPT

## 2021-03-16 PROCEDURE — 99214 OFFICE O/P EST MOD 30 MIN: CPT | Performed by: INTERNAL MEDICINE

## 2021-03-16 PROCEDURE — 85027 COMPLETE CBC AUTOMATED: CPT

## 2021-03-16 PROCEDURE — 80053 COMPREHEN METABOLIC PANEL: CPT

## 2021-03-16 RX ORDER — ATORVASTATIN CALCIUM 20 MG/1
20 TABLET, FILM COATED ORAL DAILY
Qty: 90 TABLET | Refills: 3 | Status: SHIPPED | OUTPATIENT
Start: 2021-03-16 | End: 2022-04-05

## 2021-03-16 RX ORDER — TORSEMIDE 20 MG/1
20 TABLET ORAL DAILY
Qty: 90 TABLET | Refills: 3 | Status: SHIPPED | OUTPATIENT
Start: 2021-03-16 | End: 2022-04-05

## 2021-03-16 RX ORDER — POTASSIUM CHLORIDE 750 MG/1
10 TABLET, FILM COATED, EXTENDED RELEASE ORAL DAILY
Qty: 90 TABLET | Refills: 3 | Status: SHIPPED | OUTPATIENT
Start: 2021-03-16 | End: 2022-04-05

## 2021-03-16 RX ORDER — AMLODIPINE BESYLATE 5 MG/1
5 TABLET ORAL DAILY
Qty: 90 TABLET | Refills: 3 | Status: SHIPPED | OUTPATIENT
Start: 2021-03-16 | End: 2022-04-05

## 2021-03-16 RX ORDER — ISOSORBIDE DINITRATE 20 MG/1
20 TABLET ORAL 2 TIMES DAILY
Qty: 180 TABLET | Refills: 3 | Status: SHIPPED | OUTPATIENT
Start: 2021-03-16 | End: 2022-04-05

## 2021-03-16 RX ORDER — HYDRALAZINE HYDROCHLORIDE 50 MG/1
50 TABLET, FILM COATED ORAL 3 TIMES DAILY
Qty: 270 TABLET | Refills: 3 | Status: SHIPPED | OUTPATIENT
Start: 2021-03-16 | End: 2022-04-05

## 2021-03-16 RX ORDER — CARVEDILOL 25 MG/1
25 TABLET ORAL 2 TIMES DAILY WITH MEALS
Qty: 180 TABLET | Refills: 3 | Status: SHIPPED | OUTPATIENT
Start: 2021-03-16 | End: 2022-04-05

## 2021-03-16 NOTE — ASSESSMENT & PLAN NOTE
Stage C HFrEF with normalized LV systolic function  NYHA class I symptoms  Obtain lab work today  Continue beta-blocker, BiDil combination  Consider Entresto if renal function allows

## 2021-04-12 ENCOUNTER — OFFICE VISIT (OUTPATIENT)
Dept: FAMILY MEDICINE CLINIC | Facility: CLINIC | Age: 56
End: 2021-04-12

## 2021-04-12 VITALS
TEMPERATURE: 97.8 F | OXYGEN SATURATION: 98 % | HEART RATE: 76 BPM | DIASTOLIC BLOOD PRESSURE: 110 MMHG | RESPIRATION RATE: 16 BRPM | BODY MASS INDEX: 29.53 KG/M2 | SYSTOLIC BLOOD PRESSURE: 162 MMHG | WEIGHT: 199.4 LBS | HEIGHT: 69 IN

## 2021-04-12 DIAGNOSIS — I10 ESSENTIAL HYPERTENSION: Chronic | ICD-10-CM

## 2021-04-12 DIAGNOSIS — Z12.11 SCREENING FOR COLON CANCER: ICD-10-CM

## 2021-04-12 DIAGNOSIS — Z12.5 SCREENING FOR PROSTATE CANCER: ICD-10-CM

## 2021-04-12 DIAGNOSIS — N18.30 CHRONIC RENAL IMPAIRMENT, STAGE 3 (MODERATE), UNSPECIFIED WHETHER STAGE 3A OR 3B CKD (HCC): ICD-10-CM

## 2021-04-12 DIAGNOSIS — Z00.00 WELL ADULT EXAM: Primary | ICD-10-CM

## 2021-04-12 PROCEDURE — 85025 COMPLETE CBC W/AUTO DIFF WBC: CPT | Performed by: FAMILY MEDICINE

## 2021-04-12 PROCEDURE — 84443 ASSAY THYROID STIM HORMONE: CPT | Performed by: FAMILY MEDICINE

## 2021-04-12 PROCEDURE — 80053 COMPREHEN METABOLIC PANEL: CPT | Performed by: FAMILY MEDICINE

## 2021-04-12 PROCEDURE — 99386 PREV VISIT NEW AGE 40-64: CPT | Performed by: FAMILY MEDICINE

## 2021-04-12 PROCEDURE — 99203 OFFICE O/P NEW LOW 30 MIN: CPT | Performed by: FAMILY MEDICINE

## 2021-04-12 PROCEDURE — G0103 PSA SCREENING: HCPCS | Performed by: FAMILY MEDICINE

## 2021-04-12 PROCEDURE — 83036 HEMOGLOBIN GLYCOSYLATED A1C: CPT | Performed by: FAMILY MEDICINE

## 2021-04-12 NOTE — PROGRESS NOTES
Walt Gao is a 56 y.o. male who presents today to establish care and complete annual exam.    Chief Complaint   Patient presents with   • Establish Care        Pt was last established with a PCP 10+years ago; pt cannot recall practice or provider name. Pt reffered by [facility] for which he is being followed for congestive heart failure. Pt was referred to [facility] last year for stage 3 kidney failure but did not schedule due to pandemic; he is currently willing to see them. Pt has not seen a dentist in 2-3 years, pt saw his opthalmologist last year, and has never seen a dermatologist. Pt states that his diet is currently not healthy, consisting mostly of red meats and some vegetables. He states he tries to avoid junk food or added salts and sugars. Pt states his physical activity is mostly the walking he gets around his job as a  and currently has no established exercise routine. He gets 6-8 hours of good sleep/night. Last colonoscopy was 10-12 years ago; no abnormal results or polyps found. Pt cannot recall ever having his PSA checked. Pt will have completed his COVID-19 series by today (March-2nd injection today). Pt states he has his Tdap last year at Dr. Delgado's office. Pt states he was given a pneumonia shot by his employer 2 years ago. He states he has never had his Shingles vaccine and is interested in this today. He states he is currently sexually active with female partner in a monogamous relationship; does not use protection.    Pt is on multiple medications for BP control and congestive heart failure management. He states he is adherent to these daily and without issue. He has not taken his BP medication today because he hasn't eaten yet; states he feels well today. He states he feels well controlled and only notices feeling sluggish if he doesn't take his medication on time. He states he has a BP cuff at home but has not checked his BP in 1 year.            Review of Systems   Constitutional: Negative for fever and unexpected weight loss.   HENT: Negative for hearing loss.    Eyes: Negative for visual disturbance.   Respiratory: Negative for chest tightness, shortness of breath and wheezing.    Cardiovascular: Negative for chest pain and palpitations.   Gastrointestinal: Negative for abdominal pain, blood in stool, diarrhea, nausea and vomiting.   Genitourinary: Negative for difficulty urinating and hematuria.   Musculoskeletal: Negative for arthralgias and myalgias.   Skin: Negative for rash.   Neurological: Negative for dizziness, syncope, weakness, light-headedness, numbness, headache and confusion.        PHQ-9 Depression Screening  Little interest or pleasure in doing things? 0   Feeling down, depressed, or hopeless? 0   Trouble falling or staying asleep, or sleeping too much?     Feeling tired or having little energy?     Poor appetite or overeating?     Feeling bad about yourself - or that you are a failure or have let yourself or your family down?     Trouble concentrating on things, such as reading the newspaper or watching television?     Moving or speaking so slowly that other people could have noticed? Or the opposite - being so fidgety or restless that you have been moving around a lot more than usual?     Thoughts that you would be better off dead, or of hurting yourself in some way?     PHQ-9 Total Score 0   If you checked off any problems, how difficult have these problems made it for you to do your work, take care of things at home, or get along with other people?         Past Medical History:   Diagnosis Date   • CHF (congestive heart failure) (CMS/HCC)    • NICM (nonischemic cardiomyopathy) (CMS/HCC)         Past Surgical History:   Procedure Laterality Date   • HERNIA REPAIR      as child        Family History   Problem Relation Age of Onset   • Coronary artery disease Other    • Hypertension Mother    • Diabetes Father    • Hypertension  Father    • Kidney disease Father    • Hyperlipidemia Father    • Other Sister         pre diabetes   • Other Maternal Grandmother         unknown   • Other Maternal Grandfather         unknown   • Heart disease Paternal Grandmother    • Heart disease Paternal Grandfather    • No Known Problems Sister    • No Known Problems Daughter    • No Known Problems Son         Social History     Socioeconomic History   • Marital status: Single     Spouse name: Not on file   • Number of children: Not on file   • Years of education: Not on file   • Highest education level: Not on file   Tobacco Use   • Smoking status: Never Smoker   • Smokeless tobacco: Never Used   Vaping Use   • Vaping Use: Never used   Substance and Sexual Activity   • Alcohol use: Yes     Comment: ON WEEKENDS, 6 PACK.    • Drug use: No   • Sexual activity: Yes     Partners: Female     Birth control/protection: Post-menopausal     Comment: 1 partner in the last 9 years        Current Outpatient Medications on File Prior to Visit   Medication Sig Dispense Refill   • amLODIPine (NORVASC) 5 MG tablet Take 1 tablet by mouth Daily. 90 tablet 3   • aspirin 81 MG EC tablet Take 1 tablet by mouth Daily. 90 tablet 3   • atorvastatin (LIPITOR) 20 MG tablet Take 1 tablet by mouth Daily. 90 tablet 3   • carvedilol (COREG) 25 MG tablet Take 1 tablet by mouth 2 (Two) Times a Day With Meals. 180 tablet 3   • hydrALAZINE (APRESOLINE) 50 MG tablet Take 1 tablet by mouth 3 (Three) Times a Day. 270 tablet 3   • isosorbide dinitrate (ISORDIL) 20 MG tablet Take 1 tablet by mouth 2 (Two) Times a Day. 180 tablet 3   • potassium chloride 10 MEQ CR tablet Take 1 tablet by mouth Daily. 90 tablet 3   • torsemide (DEMADEX) 20 MG tablet Take 1 tablet by mouth Daily. 90 tablet 3     No current facility-administered medications on file prior to visit.       No Known Allergies     Visit Vitals  BP (!) 162/110 (BP Location: Left arm, Patient Position: Sitting, Cuff Size: Adult)   Pulse 76  "  Temp 97.8 °F (36.6 °C) (Temporal)   Resp 16   Ht 175.3 cm (69\")   Wt 90.4 kg (199 lb 6.4 oz)   SpO2 98%   BMI 29.45 kg/m²      Body mass index is 29.45 kg/m².    Physical Exam  Constitutional:       General: He is not in acute distress.     Appearance: Normal appearance. He is normal weight. He is not ill-appearing, toxic-appearing or diaphoretic.   HENT:      Head: Normocephalic and atraumatic.      Right Ear: External ear normal.      Left Ear: External ear normal.      Nose: Nose normal.      Mouth/Throat:      Mouth: Mucous membranes are moist.   Eyes:      Extraocular Movements: Extraocular movements intact.   Cardiovascular:      Rate and Rhythm: Normal rate and regular rhythm.      Pulses: Normal pulses.      Heart sounds: Normal heart sounds. No murmur heard.     Pulmonary:      Effort: Pulmonary effort is normal. No respiratory distress.      Breath sounds: Normal breath sounds. No wheezing, rhonchi or rales.   Abdominal:      General: Bowel sounds are normal.      Palpations: Abdomen is soft. There is no mass.      Tenderness: There is no abdominal tenderness. There is no guarding.      Hernia: No hernia is present.   Musculoskeletal:         General: No signs of injury. Normal range of motion.      Cervical back: Normal range of motion and neck supple. No rigidity or tenderness.      Right lower leg: No edema.      Left lower leg: No edema.   Skin:     General: Skin is warm and dry.      Capillary Refill: Capillary refill takes less than 2 seconds.      Findings: No rash.   Neurological:      General: No focal deficit present.      Mental Status: He is alert and oriented to person, place, and time.   Psychiatric:         Mood and Affect: Mood normal.         Behavior: Behavior normal.          Results for orders placed or performed in visit on 03/16/21   Comprehensive Metabolic Panel    Specimen: Blood   Result Value Ref Range    Glucose 120 (H) 65 - 99 mg/dL    BUN 25 (H) 6 - 20 mg/dL    Creatinine " 1.85 (H) 0.76 - 1.27 mg/dL    Sodium 137 136 - 145 mmol/L    Potassium 4.4 3.5 - 5.2 mmol/L    Chloride 99 98 - 107 mmol/L    CO2 28.3 22.0 - 29.0 mmol/L    Calcium 9.3 8.6 - 10.5 mg/dL    Total Protein 8.6 (H) 6.0 - 8.5 g/dL    Albumin 4.70 3.50 - 5.20 g/dL    ALT (SGPT) 21 1 - 41 U/L    AST (SGOT) 23 1 - 40 U/L    Alkaline Phosphatase 29 (L) 39 - 117 U/L    Total Bilirubin 0.4 0.0 - 1.2 mg/dL    eGFR  African Amer 46 (L) >60 mL/min/1.73    Globulin 3.9 gm/dL    A/G Ratio 1.2 g/dL    BUN/Creatinine Ratio 13.5 7.0 - 25.0    Anion Gap 9.7 5.0 - 15.0 mmol/L   Lipid Panel    Specimen: Blood   Result Value Ref Range    Total Cholesterol 177 0 - 200 mg/dL    Triglycerides 77 0 - 150 mg/dL    HDL Cholesterol 54 40 - 60 mg/dL    LDL Cholesterol  109 (H) 0 - 100 mg/dL    VLDL Cholesterol 14 5 - 40 mg/dL    LDL/HDL Ratio 1.99    CBC (No Diff)    Specimen: Blood   Result Value Ref Range    WBC 5.65 3.40 - 10.80 10*3/mm3    RBC 4.20 4.14 - 5.80 10*6/mm3    Hemoglobin 13.9 13.0 - 17.7 g/dL    Hematocrit 40.7 37.5 - 51.0 %    MCV 96.9 79.0 - 97.0 fL    MCH 33.1 (H) 26.6 - 33.0 pg    MCHC 34.2 31.5 - 35.7 g/dL    RDW 12.9 12.3 - 15.4 %    RDW-SD 45.5 37.0 - 54.0 fl    MPV 9.3 6.0 - 12.0 fL    Platelets 353 140 - 450 10*3/mm3        Problems Addressed this Visit        Cardiac and Vasculature    Essential hypertension (Chronic)     Blood pressure is elevated today but patient has not taken his medications as he did not eat this morning thinking that he was going to have to have fasting labs done.  Patient was instructed to always take his medications and explained to him that it was difficult to assess his blood pressure and make medication adjustments when he has not had his medication.  Patient expresses understanding and will take medication before next visit.  He will come in 3 months to have blood pressure reassessed.  We will continue current medications at this time.         Relevant Orders    CBC & Differential     Comprehensive Metabolic Panel    Hemoglobin A1c       Gastrointestinal Abdominal     Screening for colon cancer    Relevant Orders    Ambulatory Referral For Screening Colonoscopy       Genitourinary and Reproductive     Chronic renal impairment, stage 3 (moderate) (CMS/HCC) (Chronic)     Patient was referred to nephrology for chronic kidney disease but did not keep referral due to pandemic.  New referral was placed.         Relevant Orders    Ambulatory Referral to Nephrology    Comprehensive Metabolic Panel    Screening for prostate cancer    Relevant Orders    PSA Screen       Health Encounters    Well adult exam - Primary     The patient is here for health maintenance visit.  Currently, the patient consumes a unhealthy diet and has an inadequate exercise regimen.  Screening lab work is ordered.  Immunizations were reviewed today.  Advice and education was given regarding nutrition, aerobic exercise, routine dental evaluations, routine eye exams, reproductive health, cardiovascular risk reduction, sunscreen use, self skin examination (annual dermatology evaluations) and seatbelt use (general overall safety).  Further recommendations will be given if needed after lab evaluation.  Annual wellness evaluation is recommended.           Relevant Orders    CBC & Differential    Comprehensive Metabolic Panel    Hemoglobin A1c    PSA Screen    TSH Rfx On Abnormal To Free T4      Diagnoses       Codes Comments    Well adult exam    -  Primary ICD-10-CM: Z00.00  ICD-9-CM: V70.0     Chronic renal impairment, stage 3 (moderate), unspecified whether stage 3a or 3b CKD (CMS/HCC)     ICD-10-CM: N18.30  ICD-9-CM: 585.3     Essential hypertension     ICD-10-CM: I10  ICD-9-CM: 401.9     Screening for colon cancer     ICD-10-CM: Z12.11  ICD-9-CM: V76.51     Screening for prostate cancer     ICD-10-CM: Z12.5  ICD-9-CM: V76.44           Return in about 3 months (around 7/12/2021) for Follow-up HTN, CHD.    Parts of this office note  have been dictated by voice recognition software. Grammatical and/or spelling errors may be present.     Salomon Isaac MD  4/12/2021

## 2021-04-12 NOTE — ASSESSMENT & PLAN NOTE
Patient was referred to nephrology for chronic kidney disease but did not keep referral due to pandemic.  New referral was placed.

## 2021-04-12 NOTE — ASSESSMENT & PLAN NOTE
Blood pressure is elevated today but patient has not taken his medications as he did not eat this morning thinking that he was going to have to have fasting labs done.  Patient was instructed to always take his medications and explained to him that it was difficult to assess his blood pressure and make medication adjustments when he has not had his medication.  Patient expresses understanding and will take medication before next visit.  He will come in 3 months to have blood pressure reassessed.  We will continue current medications at this time.

## 2021-04-13 LAB
ALBUMIN SERPL-MCNC: 5 G/DL (ref 3.5–5.2)
ALBUMIN/GLOB SERPL: 1.4 G/DL
ALP SERPL-CCNC: 29 U/L (ref 39–117)
ALT SERPL W P-5'-P-CCNC: 35 U/L (ref 1–41)
ANION GAP SERPL CALCULATED.3IONS-SCNC: 11.8 MMOL/L (ref 5–15)
AST SERPL-CCNC: 25 U/L (ref 1–40)
BASOPHILS # BLD AUTO: 0.02 10*3/MM3 (ref 0–0.2)
BASOPHILS NFR BLD AUTO: 0.4 % (ref 0–1.5)
BILIRUB SERPL-MCNC: 0.3 MG/DL (ref 0–1.2)
BUN SERPL-MCNC: 19 MG/DL (ref 6–20)
BUN/CREAT SERPL: 13.6 (ref 7–25)
CALCIUM SPEC-SCNC: 9.8 MG/DL (ref 8.6–10.5)
CHLORIDE SERPL-SCNC: 106 MMOL/L (ref 98–107)
CO2 SERPL-SCNC: 26.2 MMOL/L (ref 22–29)
CREAT SERPL-MCNC: 1.4 MG/DL (ref 0.76–1.27)
DEPRECATED RDW RBC AUTO: 45.7 FL (ref 37–54)
EOSINOPHIL # BLD AUTO: 0.13 10*3/MM3 (ref 0–0.4)
EOSINOPHIL NFR BLD AUTO: 2.4 % (ref 0.3–6.2)
ERYTHROCYTE [DISTWIDTH] IN BLOOD BY AUTOMATED COUNT: 12.8 % (ref 12.3–15.4)
GFR SERPL CREATININE-BSD FRML MDRD: 64 ML/MIN/1.73
GLOBULIN UR ELPH-MCNC: 3.6 GM/DL
GLUCOSE SERPL-MCNC: 108 MG/DL (ref 65–99)
HBA1C MFR BLD: 6.31 % (ref 4.8–5.6)
HCT VFR BLD AUTO: 44.4 % (ref 37.5–51)
HGB BLD-MCNC: 15.2 G/DL (ref 13–17.7)
IMM GRANULOCYTES # BLD AUTO: 0.01 10*3/MM3 (ref 0–0.05)
IMM GRANULOCYTES NFR BLD AUTO: 0.2 % (ref 0–0.5)
LYMPHOCYTES # BLD AUTO: 2.02 10*3/MM3 (ref 0.7–3.1)
LYMPHOCYTES NFR BLD AUTO: 37.7 % (ref 19.6–45.3)
MCH RBC QN AUTO: 33.4 PG (ref 26.6–33)
MCHC RBC AUTO-ENTMCNC: 34.2 G/DL (ref 31.5–35.7)
MCV RBC AUTO: 97.6 FL (ref 79–97)
MONOCYTES # BLD AUTO: 0.57 10*3/MM3 (ref 0.1–0.9)
MONOCYTES NFR BLD AUTO: 10.6 % (ref 5–12)
NEUTROPHILS NFR BLD AUTO: 2.61 10*3/MM3 (ref 1.7–7)
NEUTROPHILS NFR BLD AUTO: 48.7 % (ref 42.7–76)
NRBC BLD AUTO-RTO: 0 /100 WBC (ref 0–0.2)
PLATELET # BLD AUTO: 316 10*3/MM3 (ref 140–450)
PMV BLD AUTO: 9.3 FL (ref 6–12)
POTASSIUM SERPL-SCNC: 5 MMOL/L (ref 3.5–5.2)
PROT SERPL-MCNC: 8.6 G/DL (ref 6–8.5)
PSA SERPL-MCNC: 2.27 NG/ML (ref 0–4)
RBC # BLD AUTO: 4.55 10*6/MM3 (ref 4.14–5.8)
SODIUM SERPL-SCNC: 144 MMOL/L (ref 136–145)
TSH SERPL DL<=0.05 MIU/L-ACNC: 0.98 UIU/ML (ref 0.27–4.2)
WBC # BLD AUTO: 5.36 10*3/MM3 (ref 3.4–10.8)

## 2021-04-20 DIAGNOSIS — Z12.11 SCREENING FOR COLON CANCER: Primary | ICD-10-CM

## 2021-04-30 ENCOUNTER — APPOINTMENT (OUTPATIENT)
Dept: PREADMISSION TESTING | Facility: HOSPITAL | Age: 56
End: 2021-04-30

## 2021-06-30 ENCOUNTER — TELEPHONE (OUTPATIENT)
Dept: FAMILY MEDICINE CLINIC | Facility: CLINIC | Age: 56
End: 2021-06-30

## 2022-04-04 NOTE — PROGRESS NOTES
"[facility]      Identification: Walt Gao is a 57 y.o. male who resides in [place], [place]. He works for [employer]    Reason for visit:  · HFrEF with normalized LV systolic function      Subjective      Walt Gao presents to [facility] for followup.    Shimon stopped taking all of his medications.  He states that if he does not eat when he takes his medicines he starts to feel poorly//wiped out.  He denies orthopnea, PND, shortness of breath.  He states he has no issues affording his medications.  He has not been taking meds for about 2 months.    He was arranged to see primary care provider.  Unfortunately, he missed several visits and has been dismissed from the clinic.      Review of Systems   Constitutional: Negative for malaise/fatigue.   Eyes: Negative for vision loss in left eye and vision loss in right eye.   Cardiovascular: Negative for chest pain, dyspnea on exertion, near-syncope, orthopnea, palpitations, paroxysmal nocturnal dyspnea and syncope.   Musculoskeletal: Negative for myalgias.   Neurological: Negative for brief paralysis, excessive daytime sleepiness, focal weakness, numbness, paresthesias and weakness.   All other systems reviewed and are negative.      Objective     /80 (BP Location: Left arm, Patient Position: Sitting)   Pulse 87   Ht 177.8 cm (70\")   Wt 94.8 kg (209 lb)   SpO2 97%   BMI 29.99 kg/m²       Constitutional:       Appearance: Healthy appearance. Well-developed.   Eyes:      General: Lids are normal. No scleral icterus.     Conjunctiva/sclera: Conjunctivae normal.   HENT:      Head: Normocephalic and atraumatic.   Neck:      Thyroid: No thyroid mass or thyromegaly.      Vascular: No carotid bruit or JVD. JVD normal.   Pulmonary:      Effort: Pulmonary effort is normal.      Breath sounds: Normal breath sounds. No wheezing. No rhonchi. No rales.   Cardiovascular:      Normal rate. Regular rhythm.      Murmurs: There is no murmur. "      No gallop. No rub.   Pulses:     Intact distal pulses.   Edema:     Peripheral edema absent.   Abdominal:      General: There is no distension.      Palpations: Abdomen is soft. There is no abdominal mass.   Musculoskeletal:      Extremities: No clubbing present.     Cervical back: Normal range of motion. Skin:     General: Skin is warm and dry. There is no cyanosis.      Findings: No rash.   Neurological:      General: No focal deficit present.      Mental Status: Alert and oriented to person, place, and time.      Gait: Gait is intact.   Psychiatric:         Attention and Perception: Attention normal.         Mood and Affect: Mood normal.         Behavior: Behavior normal. Behavior is cooperative.         Result Review :    Lab Results   Component Value Date    GLUCOSE 108 (H) 04/12/2021    BUN 19 04/12/2021    CREATININE 1.40 (H) 04/12/2021    EGFRIFAFRI 64 04/12/2021    BCR 13.6 04/12/2021    K 5.0 04/12/2021    CO2 26.2 04/12/2021    CALCIUM 9.8 04/12/2021    ALBUMIN 5.00 04/12/2021    AST 25 04/12/2021    ALT 35 04/12/2021     Lab Results   Component Value Date    WBC 5.36 04/12/2021    HGB 15.2 04/12/2021    HCT 44.4 04/12/2021    MCV 97.6 (H) 04/12/2021     04/12/2021     Lab Results   Component Value Date    CHOL 177 03/16/2021    TRIG 77 03/16/2021    HDL 54 03/16/2021     (H) 03/16/2021     Lab Results   Component Value Date    HGBA1C 6.31 (H) 04/12/2021             Assessment     Problem List Items Addressed This Visit        Cardiology Problems    Chronic systolic congestive heart failure (HCC) - Primary (Chronic)    Overview     · Cardiac catheterization revealing nonobstructive CAD, 2013  · LVEF 30% on heart catheterization, 2013  · Echo (8/29/16): Moderate LVH. LVEF 23%. Left ventricular diastolic dysfunction (grade II) consistent with pseudonormalization.  Moderate mitral valve regurgitation  · Echo (2/21/2017):  LVEF 50%           Current Assessment & Plan     · Stage C HFrEF  "with normalized LV systolic function  · Patient stopped all of his medications due to \"feeling poorly\"  · Start metoprolol succinate 25 mg daily  · Start Entresto 24-26 mg twice daily  · Start spironolactone 25 mg daily  · Start empagliflozin 10 mg daily  · BMP in 2 weeks  · Follow-up with Deanna Crowley in 2 weeks for GDMT up titration and labs           Relevant Medications    metoprolol succinate XL (TOPROL-XL) 25 MG 24 hr tablet    sacubitril-valsartan (ENTRESTO) 24-26 MG tablet    spironolactone (ALDACTONE) 25 MG tablet    empagliflozin (Jardiance) 10 MG tablet tablet    torsemide (DEMADEX) 10 MG tablet    Other Relevant Orders    Basic Metabolic Panel    CBC (No Diff)    Essential hypertension (Chronic)    Overview     • Target blood pressure <130/80 mmHg           Relevant Medications    metoprolol succinate XL (TOPROL-XL) 25 MG 24 hr tablet    spironolactone (ALDACTONE) 25 MG tablet    torsemide (DEMADEX) 10 MG tablet    Mixed hyperlipidemia (Chronic)       Other    Chronic renal impairment, stage 3 (moderate) (CMS/HCC) (Chronic)    Relevant Medications    spironolactone (ALDACTONE) 25 MG tablet    torsemide (DEMADEX) 10 MG tablet    Other Relevant Orders    Basic Metabolic Panel          Plan   • Start metoprolol succinate 25 mg daily  • Start Entresto 24-26 mg twice daily  • Start spironolactone 25 mg daily  • Start empagliflozin 10 mg daily  • BMP, CBC in 2 weeks  • Follow-up with Deanna Crowley in 2 weeks for GDMT up titration and lab work      Follow-up   Return in about 2 weeks (around 4/19/2022) for Follow-up with cardiology APRN/PA.        Larry Delgado MD, FACC, [facility]  4/5/2022  "

## 2022-04-05 ENCOUNTER — OFFICE VISIT (OUTPATIENT)
Dept: CARDIOLOGY | Facility: CLINIC | Age: 57
End: 2022-04-05

## 2022-04-05 VITALS
HEART RATE: 87 BPM | OXYGEN SATURATION: 97 % | HEIGHT: 70 IN | SYSTOLIC BLOOD PRESSURE: 154 MMHG | WEIGHT: 209 LBS | BODY MASS INDEX: 29.92 KG/M2 | DIASTOLIC BLOOD PRESSURE: 80 MMHG

## 2022-04-05 DIAGNOSIS — N18.31 CHRONIC RENAL IMPAIRMENT, STAGE 3A: ICD-10-CM

## 2022-04-05 DIAGNOSIS — E78.2 MIXED HYPERLIPIDEMIA: ICD-10-CM

## 2022-04-05 DIAGNOSIS — I50.22 CHRONIC SYSTOLIC CONGESTIVE HEART FAILURE: Primary | ICD-10-CM

## 2022-04-05 DIAGNOSIS — I10 ESSENTIAL HYPERTENSION: ICD-10-CM

## 2022-04-05 PROCEDURE — 99214 OFFICE O/P EST MOD 30 MIN: CPT | Performed by: INTERNAL MEDICINE

## 2022-04-05 RX ORDER — METOPROLOL SUCCINATE 25 MG/1
25 TABLET, EXTENDED RELEASE ORAL DAILY
Qty: 90 TABLET | Refills: 3 | Status: SHIPPED | OUTPATIENT
Start: 2022-04-05 | End: 2022-05-24

## 2022-04-05 RX ORDER — TORSEMIDE 10 MG/1
10 TABLET ORAL DAILY PRN
Qty: 90 TABLET | Refills: 1 | Status: SHIPPED | OUTPATIENT
Start: 2022-04-05 | End: 2022-05-24

## 2022-04-05 RX ORDER — SPIRONOLACTONE 25 MG/1
25 TABLET ORAL DAILY
Qty: 90 TABLET | Refills: 3 | Status: SHIPPED | OUTPATIENT
Start: 2022-04-05

## 2022-04-05 NOTE — ASSESSMENT & PLAN NOTE
"· Stage C HFrEF with normalized LV systolic function  · Patient stopped all of his medications due to \"feeling poorly\"  · Start metoprolol succinate 25 mg daily  · Start Entresto 24-26 mg twice daily  · Start spironolactone 25 mg daily  · Start empagliflozin 10 mg daily  · BMP in 2 weeks  · Follow-up with Deanna Crowley in 2 weeks for GDMT up titration and labs  "

## 2022-04-11 ENCOUNTER — TELEPHONE (OUTPATIENT)
Dept: CARDIOLOGY | Facility: CLINIC | Age: 57
End: 2022-04-11

## 2022-04-11 ENCOUNTER — LAB (OUTPATIENT)
Dept: LAB | Facility: HOSPITAL | Age: 57
End: 2022-04-11

## 2022-04-11 DIAGNOSIS — N18.31 CHRONIC RENAL IMPAIRMENT, STAGE 3A: ICD-10-CM

## 2022-04-11 DIAGNOSIS — I50.22 CHRONIC SYSTOLIC CONGESTIVE HEART FAILURE: ICD-10-CM

## 2022-04-11 LAB
ANION GAP SERPL CALCULATED.3IONS-SCNC: 12.7 MMOL/L (ref 5–15)
BUN SERPL-MCNC: 18 MG/DL (ref 6–20)
BUN/CREAT SERPL: 10.3 (ref 7–25)
CALCIUM SPEC-SCNC: 9.3 MG/DL (ref 8.6–10.5)
CHLORIDE SERPL-SCNC: 99 MMOL/L (ref 98–107)
CO2 SERPL-SCNC: 26.3 MMOL/L (ref 22–29)
CREAT SERPL-MCNC: 1.74 MG/DL (ref 0.76–1.27)
DEPRECATED RDW RBC AUTO: 43.4 FL (ref 37–54)
EGFRCR SERPLBLD CKD-EPI 2021: 45.2 ML/MIN/1.73
ERYTHROCYTE [DISTWIDTH] IN BLOOD BY AUTOMATED COUNT: 12.4 % (ref 12.3–15.4)
GLUCOSE SERPL-MCNC: 111 MG/DL (ref 65–99)
HCT VFR BLD AUTO: 44.1 % (ref 37.5–51)
HGB BLD-MCNC: 15.3 G/DL (ref 13–17.7)
MCH RBC QN AUTO: 33.5 PG (ref 26.6–33)
MCHC RBC AUTO-ENTMCNC: 34.7 G/DL (ref 31.5–35.7)
MCV RBC AUTO: 96.5 FL (ref 79–97)
PLATELET # BLD AUTO: 325 10*3/MM3 (ref 140–450)
PMV BLD AUTO: 10 FL (ref 6–12)
POTASSIUM SERPL-SCNC: 4 MMOL/L (ref 3.5–5.2)
RBC # BLD AUTO: 4.57 10*6/MM3 (ref 4.14–5.8)
SODIUM SERPL-SCNC: 138 MMOL/L (ref 136–145)
WBC NRBC COR # BLD: 7 10*3/MM3 (ref 3.4–10.8)

## 2022-04-11 PROCEDURE — 80048 BASIC METABOLIC PNL TOTAL CA: CPT

## 2022-04-11 PROCEDURE — 85027 COMPLETE CBC AUTOMATED: CPT

## 2022-04-11 PROCEDURE — 36415 COLL VENOUS BLD VENIPUNCTURE: CPT

## 2022-04-11 NOTE — TELEPHONE ENCOUNTER
Patient's Entresto is costing $1500 for a 90 day supply and $100 for Jardiance for 90 days. I confirmed with the pharmacy that it did not need a PA, this is the price after insurance. Can we switch him to something else?

## 2022-04-12 ENCOUNTER — TELEPHONE (OUTPATIENT)
Dept: CARDIOLOGY | Facility: CLINIC | Age: 57
End: 2022-04-12

## 2022-04-12 DIAGNOSIS — I50.22 CHRONIC SYSTOLIC CONGESTIVE HEART FAILURE: Primary | ICD-10-CM

## 2022-04-12 RX ORDER — CANAGLIFLOZIN 100 MG/1
100 TABLET, FILM COATED ORAL DAILY
Qty: 90 TABLET | Refills: 0 | Status: SHIPPED | OUTPATIENT
Start: 2022-04-12 | End: 2022-04-19

## 2022-04-12 RX ORDER — VALSARTAN 80 MG/1
80 TABLET ORAL DAILY
Qty: 90 TABLET | Refills: 0 | Status: SHIPPED | OUTPATIENT
Start: 2022-04-12 | End: 2022-11-29 | Stop reason: SDUPTHER

## 2022-04-12 NOTE — TELEPHONE ENCOUNTER
----- Message from Chandrakant Delgado IV, MD sent at 4/12/2022  8:51 AM EDT -----  Have him recheck his BMP in 1 month

## 2022-04-18 ENCOUNTER — TELEPHONE (OUTPATIENT)
Dept: CARDIOLOGY | Facility: CLINIC | Age: 57
End: 2022-04-18

## 2022-04-18 NOTE — TELEPHONE ENCOUNTER
Patient unable to afford Jardiance. Script sent for Invokana but PA was denied. Did you want him on something else?

## 2022-04-19 RX ORDER — DAPAGLIFLOZIN 10 MG/1
10 TABLET, FILM COATED ORAL DAILY
Qty: 90 TABLET | Refills: 1 | Status: SHIPPED | OUTPATIENT
Start: 2022-04-19 | End: 2022-05-24

## 2022-05-18 NOTE — PROGRESS NOTES
[facility] Cardiology      Identification: Walt Gao is a 57 y.o. male who resides in [place], [place]    Reason for visit:  Nonischemic cardiomyopathy      Subjective      Walt Gao presents to [facility] for followup.    History of Present Illness  Patient is a 57-year-old gentleman who returns today for follow-up of his nonischemic cardiomyopathy, chronic systolic heart failure and cardiac risk factors.  The patient was seen last month and had stopped all of his heart failure medicines.  These were reinitiated and he presents today for reassessment and up titration if possible.  Unfortunately cost of Jardiance and Entresto was not financially feasible for the patient and he was switched to valsartan and Invokana.  However, his insurance denied the Invokana and is now denied Farxiga.  He has not had his morning medications because they make his stomach upset and he did not have time to eat before coming to his appointment.  However, he normally does not take his morning medications to around 11 AM.  He denies any chest pain or shortness of breath.  He does report being compliant with his medications.  He has been using Demadex daily.  He has known chronic kidney disease with last creatinine 1.74.  He has not had a lipid profile for over a year and last one showed normal total cholesterol but mildly elevated LDL of 106.  He does not take statin therapy.        Review of Systems   Constitutional: Negative for malaise/fatigue.   Eyes: Negative for vision loss in left eye and vision loss in right eye.   Cardiovascular: Negative for chest pain, dyspnea on exertion, near-syncope, orthopnea, palpitations, paroxysmal nocturnal dyspnea and syncope.   Musculoskeletal: Negative for myalgias.   Neurological: Negative for brief paralysis, excessive daytime sleepiness, focal weakness, numbness, paresthesias and weakness.   All other systems reviewed and are negative.      Objective     BP  "(!) 160/116 (BP Location: Right arm, Patient Position: Sitting)   Pulse 84   Ht 177.8 cm (70\")   Wt 95.7 kg (211 lb)   SpO2 95%   BMI 30.28 kg/m²       Constitutional:       Appearance: Healthy appearance. Well-developed.   Eyes:      General: Lids are normal. No scleral icterus.     Conjunctiva/sclera: Conjunctivae normal.   HENT:      Head: Normocephalic and atraumatic.   Neck:      Thyroid: No thyromegaly.      Vascular: No carotid bruit or JVD.   Pulmonary:      Effort: Pulmonary effort is normal.      Breath sounds: Normal breath sounds. No wheezing. No rhonchi. No rales.   Cardiovascular:      Normal rate. Regular rhythm.      Murmurs: There is no murmur.      No gallop. No rub.   Pulses:     Intact distal pulses.   Edema:     Peripheral edema absent.   Abdominal:      General: There is no distension.      Palpations: Abdomen is soft. There is no abdominal mass.   Musculoskeletal:      Cervical back: Normal range of motion. Skin:     General: Skin is warm and dry.      Findings: No rash.   Neurological:      General: No focal deficit present.      Mental Status: Alert and oriented to person, place, and time.      Gait: Gait is intact.   Psychiatric:         Attention and Perception: Attention normal.         Mood and Affect: Mood normal.         Behavior: Behavior normal.         Result Review :    Lab Results   Component Value Date    GLUCOSE 111 (H) 04/11/2022    BUN 18 04/11/2022    CREATININE 1.74 (H) 04/11/2022    EGFRIFAFRI 64 04/12/2021    BCR 10.3 04/11/2022    K 4.0 04/11/2022    CO2 26.3 04/11/2022    CALCIUM 9.3 04/11/2022    ALBUMIN 5.00 04/12/2021    AST 25 04/12/2021    ALT 35 04/12/2021     Lab Results   Component Value Date    WBC 7.00 04/11/2022    HGB 15.3 04/11/2022    HCT 44.1 04/11/2022    MCV 96.5 04/11/2022     04/11/2022     Lab Results   Component Value Date    CHOL 177 03/16/2021    TRIG 77 03/16/2021    HDL 54 03/16/2021     (H) 03/16/2021     Lab Results "   Component Value Date    HGBA1C 6.31 (H) 04/12/2021             Assessment     Problem List Items Addressed This Visit        Cardiac and Vasculature    Chronic systolic congestive heart failure (HCC) - Primary (Chronic)    Overview     · Cardiac catheterization revealing nonobstructive CAD, 2013  · LVEF 30% on heart catheterization, 2013  · Echo (8/29/16): Moderate LVH. LVEF 23%. Left ventricular diastolic dysfunction (grade II) consistent with pseudonormalization.  Moderate mitral valve regurgitation  · Echo (2/21/2017):  LVEF 50%           Current Assessment & Plan     · NYHA class II symptoms  · Continue spironolactone 25 mg daily  · Increase metoprolol succinate 50 mg daily  · Continue valsartan 80 mg daily  · Demadex 10 mg as needed           Relevant Medications    metoprolol succinate XL (TOPROL-XL) 25 MG 24 hr tablet    torsemide (DEMADEX) 10 MG tablet    Other Relevant Orders    Adult Transthoracic Echo Complete W/ Cont if Necessary Per Protocol    Non-ischemic cardiomyopathy (HCC)    Overview     · Cardiac catheterization revealing nonobstructive CAD, 2013  · LVEF 30% on heart catheterization, 2013  · Echo (8/29/16): Moderate LVH. LVEF 23%. Left ventricular diastolic dysfunction (grade II) consistent with pseudonormalization.  Moderate mitral valve regurgitation  · Echo (2/21/2017):  LVEF 50%           Current Assessment & Plan     · Increase metoprolol succinate 50 mg daily  · Valsartan 80 mg daily  · Spironolactone 25 mg daily  · Demadex 10 mg daily  · Obtain BMP           Relevant Medications    metoprolol succinate XL (TOPROL-XL) 25 MG 24 hr tablet    Mixed hyperlipidemia (Chronic)    Current Assessment & Plan     · Obtain lipid profile           Relevant Orders    Lipid Panel    Essential hypertension (Chronic)    Overview     • Target blood pressure <130/80 mmHg           Current Assessment & Plan     · Hypertension is not well controlled  · Increase metoprolol succinate to 50 mg daily  · Continue  valsartan 80 mg daily.  Patient cannot financially afford Entresto  · Continue spironolactone 25 mg daily  · Obtain BMP today           Relevant Medications    metoprolol succinate XL (TOPROL-XL) 25 MG 24 hr tablet    torsemide (DEMADEX) 10 MG tablet       Genitourinary and Reproductive     Chronic renal impairment, stage 3 (moderate) (CMS/HCC) (Chronic)    Relevant Medications    torsemide (DEMADEX) 10 MG tablet      Patient denies any chest pain or shortness of breath.  I will increase his metoprolol to 50 mg daily due to uncontrolled hypertension.  I will obtain a BMP and lipid profile today.  He will follow-up 6 months or sooner if needed.    Plan   • Increase metoprolol succinate to 50 mg daily  • Continue spironolactone, Demadex and valsartan  • Obtain BMP today.  If creatinine worsens may need referral to nephrology  • Obtain lipid profile      Follow-up   Return in about 6 months (around 11/24/2022), or if symptoms worsen or fail to improve, for Follow-up with Dr. Delgado next visit.      Jessica Crowley, APRN  5/24/2022

## 2022-05-24 ENCOUNTER — TELEPHONE (OUTPATIENT)
Dept: CARDIOLOGY | Facility: CLINIC | Age: 57
End: 2022-05-24

## 2022-05-24 ENCOUNTER — OFFICE VISIT (OUTPATIENT)
Dept: CARDIOLOGY | Facility: CLINIC | Age: 57
End: 2022-05-24

## 2022-05-24 ENCOUNTER — LAB (OUTPATIENT)
Dept: LAB | Facility: HOSPITAL | Age: 57
End: 2022-05-24

## 2022-05-24 VITALS
SYSTOLIC BLOOD PRESSURE: 160 MMHG | WEIGHT: 211 LBS | HEIGHT: 70 IN | OXYGEN SATURATION: 95 % | DIASTOLIC BLOOD PRESSURE: 116 MMHG | BODY MASS INDEX: 30.21 KG/M2 | HEART RATE: 84 BPM

## 2022-05-24 DIAGNOSIS — I50.22 CHRONIC SYSTOLIC CONGESTIVE HEART FAILURE: Primary | Chronic | ICD-10-CM

## 2022-05-24 DIAGNOSIS — I42.8 NON-ISCHEMIC CARDIOMYOPATHY: ICD-10-CM

## 2022-05-24 DIAGNOSIS — N18.32 CHRONIC RENAL IMPAIRMENT, STAGE 3B: Chronic | ICD-10-CM

## 2022-05-24 DIAGNOSIS — E78.2 MIXED HYPERLIPIDEMIA: Chronic | ICD-10-CM

## 2022-05-24 DIAGNOSIS — I50.22 CHRONIC SYSTOLIC CONGESTIVE HEART FAILURE: ICD-10-CM

## 2022-05-24 DIAGNOSIS — E78.2 MIXED HYPERLIPIDEMIA: Primary | ICD-10-CM

## 2022-05-24 DIAGNOSIS — I10 ESSENTIAL HYPERTENSION: ICD-10-CM

## 2022-05-24 DIAGNOSIS — I10 ESSENTIAL HYPERTENSION: Chronic | ICD-10-CM

## 2022-05-24 LAB
ANION GAP SERPL CALCULATED.3IONS-SCNC: 17 MMOL/L (ref 5–15)
BUN SERPL-MCNC: 23 MG/DL (ref 6–20)
BUN/CREAT SERPL: 13.2 (ref 7–25)
CALCIUM SPEC-SCNC: 9.2 MG/DL (ref 8.6–10.5)
CHLORIDE SERPL-SCNC: 103 MMOL/L (ref 98–107)
CHOLEST SERPL-MCNC: 230 MG/DL (ref 0–200)
CO2 SERPL-SCNC: 20 MMOL/L (ref 22–29)
CREAT SERPL-MCNC: 1.74 MG/DL (ref 0.76–1.27)
EGFRCR SERPLBLD CKD-EPI 2021: 45.2 ML/MIN/1.73
GLUCOSE SERPL-MCNC: 106 MG/DL (ref 65–99)
HDLC SERPL-MCNC: 44 MG/DL (ref 40–60)
LDLC SERPL CALC-MCNC: 158 MG/DL (ref 0–100)
LDLC/HDLC SERPL: 3.54 {RATIO}
POTASSIUM SERPL-SCNC: 4.4 MMOL/L (ref 3.5–5.2)
SODIUM SERPL-SCNC: 140 MMOL/L (ref 136–145)
TRIGL SERPL-MCNC: 152 MG/DL (ref 0–150)
VLDLC SERPL-MCNC: 28 MG/DL (ref 5–40)

## 2022-05-24 PROCEDURE — 36415 COLL VENOUS BLD VENIPUNCTURE: CPT

## 2022-05-24 PROCEDURE — 99214 OFFICE O/P EST MOD 30 MIN: CPT | Performed by: NURSE PRACTITIONER

## 2022-05-24 PROCEDURE — 80048 BASIC METABOLIC PNL TOTAL CA: CPT

## 2022-05-24 PROCEDURE — 80061 LIPID PANEL: CPT

## 2022-05-24 RX ORDER — METOPROLOL SUCCINATE 25 MG/1
50 TABLET, EXTENDED RELEASE ORAL DAILY
Qty: 90 TABLET | Refills: 3 | Status: SHIPPED | OUTPATIENT
Start: 2022-05-24 | End: 2022-11-29 | Stop reason: SDUPTHER

## 2022-05-24 RX ORDER — TORSEMIDE 10 MG/1
10 TABLET ORAL DAILY
Qty: 90 TABLET | Refills: 1 | Status: SHIPPED | OUTPATIENT
Start: 2022-05-24

## 2022-05-24 RX ORDER — ATORVASTATIN CALCIUM 40 MG/1
40 TABLET, FILM COATED ORAL DAILY
Qty: 90 TABLET | Refills: 0 | Status: SHIPPED | OUTPATIENT
Start: 2022-05-24 | End: 2022-11-29 | Stop reason: SDUPTHER

## 2022-05-24 NOTE — ASSESSMENT & PLAN NOTE
· Increase metoprolol succinate 50 mg daily  · Valsartan 80 mg daily  · Spironolactone 25 mg daily  · Demadex 10 mg daily  · Obtain BMP

## 2022-05-24 NOTE — TELEPHONE ENCOUNTER
Per Deanna Crowley, APRN- Call patient with results of lipids.  Needs to Start Lipitor 40 mg daily.

## 2022-05-24 NOTE — ASSESSMENT & PLAN NOTE
· NYHA class II symptoms  · Continue spironolactone 25 mg daily  · Increase metoprolol succinate 50 mg daily  · Continue valsartan 80 mg daily  · Demadex 10 mg as needed

## 2022-05-24 NOTE — ASSESSMENT & PLAN NOTE
· Hypertension is not well controlled  · Increase metoprolol succinate to 50 mg daily  · Continue valsartan 80 mg daily.  Patient cannot financially afford Entresto  · Continue spironolactone 25 mg daily  · Obtain BMP today

## 2022-11-29 ENCOUNTER — HOSPITAL ENCOUNTER (OUTPATIENT)
Dept: CARDIOLOGY | Facility: HOSPITAL | Age: 57
Discharge: HOME | End: 2022-11-29

## 2022-11-29 ENCOUNTER — OFFICE VISIT (OUTPATIENT)
Dept: CARDIOLOGY | Facility: CLINIC | Age: 57
End: 2022-11-29

## 2022-11-29 ENCOUNTER — LAB (OUTPATIENT)
Dept: LAB | Facility: HOSPITAL | Age: 57
End: 2022-11-29

## 2022-11-29 VITALS
OXYGEN SATURATION: 98 % | HEIGHT: 69 IN | SYSTOLIC BLOOD PRESSURE: 164 MMHG | HEART RATE: 79 BPM | WEIGHT: 211.4 LBS | BODY MASS INDEX: 31.31 KG/M2 | DIASTOLIC BLOOD PRESSURE: 100 MMHG

## 2022-11-29 VITALS — WEIGHT: 211 LBS | HEIGHT: 71 IN | BODY MASS INDEX: 29.54 KG/M2

## 2022-11-29 DIAGNOSIS — I50.23 ACUTE ON CHRONIC SYSTOLIC CONGESTIVE HEART FAILURE: ICD-10-CM

## 2022-11-29 DIAGNOSIS — N18.32 CHRONIC RENAL IMPAIRMENT, STAGE 3B: Chronic | ICD-10-CM

## 2022-11-29 DIAGNOSIS — E78.2 MIXED HYPERLIPIDEMIA: Chronic | ICD-10-CM

## 2022-11-29 DIAGNOSIS — I50.22 CHRONIC SYSTOLIC CONGESTIVE HEART FAILURE: Chronic | ICD-10-CM

## 2022-11-29 DIAGNOSIS — I10 ESSENTIAL HYPERTENSION: Chronic | ICD-10-CM

## 2022-11-29 DIAGNOSIS — I50.23 ACUTE ON CHRONIC SYSTOLIC CONGESTIVE HEART FAILURE: Primary | ICD-10-CM

## 2022-11-29 PROBLEM — Z00.00 WELL ADULT EXAM: Status: RESOLVED | Noted: 2021-04-12 | Resolved: 2022-11-29

## 2022-11-29 PROBLEM — Z12.5 SCREENING FOR PROSTATE CANCER: Status: RESOLVED | Noted: 2021-04-12 | Resolved: 2022-11-29

## 2022-11-29 PROBLEM — Z12.11 SCREENING FOR COLON CANCER: Status: RESOLVED | Noted: 2021-04-12 | Resolved: 2022-11-29

## 2022-11-29 LAB
ALBUMIN SERPL-MCNC: 4.5 G/DL (ref 3.5–5.2)
ALBUMIN/GLOB SERPL: 1.3 G/DL
ALP SERPL-CCNC: 29 U/L (ref 39–117)
ALT SERPL W P-5'-P-CCNC: 48 U/L (ref 1–41)
ANION GAP SERPL CALCULATED.3IONS-SCNC: 8.4 MMOL/L (ref 5–15)
ASCENDING AORTA: 3.4 CM
AST SERPL-CCNC: 36 U/L (ref 1–40)
BH CV ECHO MEAS - AO MAX PG: 6.6 MMHG
BH CV ECHO MEAS - AO MEAN PG: 4 MMHG
BH CV ECHO MEAS - AO ROOT AREA (BSA CORRECTED): 1.8 CM2
BH CV ECHO MEAS - AO ROOT DIAM: 3.7 CM
BH CV ECHO MEAS - AO V2 MAX: 128 CM/SEC
BH CV ECHO MEAS - AO V2 VTI: 25.7 CM
BH CV ECHO MEAS - AVA(I,D): 2.1 CM2
BH CV ECHO MEAS - EDV(CUBED): 103.8 ML
BH CV ECHO MEAS - EDV(MOD-SP2): 74.7 ML
BH CV ECHO MEAS - EDV(MOD-SP4): 131 ML
BH CV ECHO MEAS - EF(MOD-SP2): 63.2 %
BH CV ECHO MEAS - EF(MOD-SP4): 41.5 %
BH CV ECHO MEAS - ESV(CUBED): 32.8 ML
BH CV ECHO MEAS - ESV(MOD-SP2): 27.5 ML
BH CV ECHO MEAS - ESV(MOD-SP4): 76.6 ML
BH CV ECHO MEAS - FS: 31.9 %
BH CV ECHO MEAS - IVS/LVPW: 1.17 CM
BH CV ECHO MEAS - IVSD: 1.4 CM
BH CV ECHO MEAS - LA DIMENSION: 4.4 CM
BH CV ECHO MEAS - LAT PEAK E' VEL: 7.8 CM/SEC
BH CV ECHO MEAS - LV DIASTOLIC VOL/BSA (35-75): 61.3 CM2
BH CV ECHO MEAS - LV MASS(C)D: 237.9 GRAMS
BH CV ECHO MEAS - LV MAX PG: 4.2 MMHG
BH CV ECHO MEAS - LV MEAN PG: 2 MMHG
BH CV ECHO MEAS - LV SYSTOLIC VOL/BSA (12-30): 35.9 CM2
BH CV ECHO MEAS - LV V1 MAX: 103 CM/SEC
BH CV ECHO MEAS - LV V1 VTI: 19 CM
BH CV ECHO MEAS - LVIDD: 4.7 CM
BH CV ECHO MEAS - LVIDS: 3.2 CM
BH CV ECHO MEAS - LVOT AREA: 2.8 CM2
BH CV ECHO MEAS - LVOT DIAM: 1.9 CM
BH CV ECHO MEAS - LVPWD: 1.2 CM
BH CV ECHO MEAS - MED PEAK E' VEL: 5.4 CM/SEC
BH CV ECHO MEAS - MV A MAX VEL: 77.2 CM/SEC
BH CV ECHO MEAS - MV DEC SLOPE: 306 CM/SEC2
BH CV ECHO MEAS - MV DEC TIME: 0.19 MSEC
BH CV ECHO MEAS - MV E MAX VEL: 50.5 CM/SEC
BH CV ECHO MEAS - MV E/A: 0.65
BH CV ECHO MEAS - MV MAX PG: 1.97 MMHG
BH CV ECHO MEAS - MV MEAN PG: 1 MMHG
BH CV ECHO MEAS - MV P1/2T: 56.2 MSEC
BH CV ECHO MEAS - MV V2 VTI: 16.1 CM
BH CV ECHO MEAS - MVA(P1/2T): 3.9 CM2
BH CV ECHO MEAS - MVA(VTI): 3.3 CM2
BH CV ECHO MEAS - PA ACC TIME: 0.08 SEC
BH CV ECHO MEAS - PA PR(ACCEL): 43.2 MMHG
BH CV ECHO MEAS - PA V2 MAX: 95.2 CM/SEC
BH CV ECHO MEAS - RAP SYSTOLE: 3 MMHG
BH CV ECHO MEAS - RVSP: 18 MMHG
BH CV ECHO MEAS - SI(MOD-SP2): 22.1 ML/M2
BH CV ECHO MEAS - SI(MOD-SP4): 25.5 ML/M2
BH CV ECHO MEAS - SV(LVOT): 53.9 ML
BH CV ECHO MEAS - SV(MOD-SP2): 47.2 ML
BH CV ECHO MEAS - SV(MOD-SP4): 54.4 ML
BH CV ECHO MEAS - TAPSE (>1.6): 1.64 CM
BH CV ECHO MEAS - TR MAX PG: 14.9 MMHG
BH CV ECHO MEAS - TR MAX VEL: 193 CM/SEC
BH CV ECHO MEASUREMENTS AVERAGE E/E' RATIO: 7.65
BH CV XLRA - RV BASE: 3.3 CM
BH CV XLRA - RV LENGTH: 7.2 CM
BH CV XLRA - RV MID: 2.1 CM
BH CV XLRA - TDI S': 14.2 CM/SEC
BILIRUB SERPL-MCNC: 0.5 MG/DL (ref 0–1.2)
BUN SERPL-MCNC: 17 MG/DL (ref 6–20)
BUN/CREAT SERPL: 9.3 (ref 7–25)
CALCIUM SPEC-SCNC: 9.4 MG/DL (ref 8.6–10.5)
CHLORIDE SERPL-SCNC: 101 MMOL/L (ref 98–107)
CHOLEST SERPL-MCNC: 242 MG/DL (ref 0–200)
CO2 SERPL-SCNC: 27.6 MMOL/L (ref 22–29)
CREAT SERPL-MCNC: 1.83 MG/DL (ref 0.76–1.27)
DEPRECATED RDW RBC AUTO: 46.8 FL (ref 37–54)
EGFRCR SERPLBLD CKD-EPI 2021: 42.5 ML/MIN/1.73
ERYTHROCYTE [DISTWIDTH] IN BLOOD BY AUTOMATED COUNT: 12.6 % (ref 12.3–15.4)
GLOBULIN UR ELPH-MCNC: 3.6 GM/DL
GLUCOSE SERPL-MCNC: 127 MG/DL (ref 65–99)
HCT VFR BLD AUTO: 43.5 % (ref 37.5–51)
HDLC SERPL-MCNC: 45 MG/DL (ref 40–60)
HGB BLD-MCNC: 14.9 G/DL (ref 13–17.7)
IVRT: 119 MSEC
LDLC SERPL CALC-MCNC: 171 MG/DL (ref 0–100)
LDLC/HDLC SERPL: 3.76 {RATIO}
LEFT ATRIUM VOLUME INDEX: 22 ML/M2
LV EF 2D ECHO EST: 65 %
MAXIMAL PREDICTED HEART RATE: 163 BPM
MCH RBC QN AUTO: 34.4 PG (ref 26.6–33)
MCHC RBC AUTO-ENTMCNC: 34.3 G/DL (ref 31.5–35.7)
MCV RBC AUTO: 100.5 FL (ref 79–97)
NT-PROBNP SERPL-MCNC: 210 PG/ML (ref 0–900)
PLATELET # BLD AUTO: 341 10*3/MM3 (ref 140–450)
PMV BLD AUTO: 9.8 FL (ref 6–12)
POTASSIUM SERPL-SCNC: 4.6 MMOL/L (ref 3.5–5.2)
PROT SERPL-MCNC: 8.1 G/DL (ref 6–8.5)
RBC # BLD AUTO: 4.33 10*6/MM3 (ref 4.14–5.8)
SODIUM SERPL-SCNC: 137 MMOL/L (ref 136–145)
STRESS TARGET HR: 139 BPM
T4 FREE SERPL-MCNC: 1.21 NG/DL (ref 0.93–1.7)
TRIGL SERPL-MCNC: 140 MG/DL (ref 0–150)
TSH SERPL DL<=0.05 MIU/L-ACNC: 1.59 UIU/ML (ref 0.27–4.2)
VLDLC SERPL-MCNC: 26 MG/DL (ref 5–40)
WBC NRBC COR # BLD: 6.82 10*3/MM3 (ref 3.4–10.8)

## 2022-11-29 PROCEDURE — 83880 ASSAY OF NATRIURETIC PEPTIDE: CPT

## 2022-11-29 PROCEDURE — 80050 GENERAL HEALTH PANEL: CPT

## 2022-11-29 PROCEDURE — 25010000002 SULFUR HEXAFLUORIDE MICROSPH 60.7-25 MG RECONSTITUTED SUSPENSION: Performed by: NURSE PRACTITIONER

## 2022-11-29 PROCEDURE — 84439 ASSAY OF FREE THYROXINE: CPT

## 2022-11-29 PROCEDURE — 93306 TTE W/DOPPLER COMPLETE: CPT

## 2022-11-29 PROCEDURE — 99214 OFFICE O/P EST MOD 30 MIN: CPT | Performed by: INTERNAL MEDICINE

## 2022-11-29 PROCEDURE — 36415 COLL VENOUS BLD VENIPUNCTURE: CPT

## 2022-11-29 PROCEDURE — 80061 LIPID PANEL: CPT

## 2022-11-29 PROCEDURE — 93306 TTE W/DOPPLER COMPLETE: CPT | Performed by: INTERNAL MEDICINE

## 2022-11-29 PROCEDURE — 93000 ELECTROCARDIOGRAM COMPLETE: CPT | Performed by: INTERNAL MEDICINE

## 2022-11-29 RX ORDER — ATORVASTATIN CALCIUM 40 MG/1
40 TABLET, FILM COATED ORAL DAILY
Qty: 90 TABLET | Refills: 3 | Status: SHIPPED | OUTPATIENT
Start: 2022-11-29 | End: 2022-12-08 | Stop reason: SDUPTHER

## 2022-11-29 RX ORDER — VALSARTAN 80 MG/1
80 TABLET ORAL DAILY
Qty: 90 TABLET | Refills: 0
Start: 2022-11-29 | End: 2022-12-02 | Stop reason: SDUPTHER

## 2022-11-29 RX ORDER — METOPROLOL SUCCINATE 100 MG/1
100 TABLET, EXTENDED RELEASE ORAL DAILY
Qty: 90 TABLET | Refills: 3 | Status: SHIPPED | OUTPATIENT
Start: 2022-11-29

## 2022-11-29 RX ADMIN — SULFUR HEXAFLUORIDE 3 ML: KIT at 09:17

## 2022-11-29 NOTE — PROGRESS NOTES
Cardiology Outpatient Visit      Identification: Walt Gao is a 57 y.o. male who resides in [place], [place].    Reason for visit:  · CHF exacerbation  · History of HFrEF  · Echo prior to visit    Assessment     Problem List Items Addressed This Visit        Cardiology Problems    Essential hypertension (Chronic)    Overview     • Target blood pressure <130/80 mmHg         Current Assessment & Plan     · Uncontrolled  · Increase metoprolol succinate to 100 mg daily  · Obtain lab work today  · If creatinine allows, will increase valsartan 160 mg daily  · Refer to [facility] and hypertension clinic         Relevant Medications    valsartan (DIOVAN) 80 MG tablet    metoprolol succinate XL (TOPROL-XL) 100 MG 24 hr tablet    Other Relevant Orders    TSH+Free T4    Mixed hyperlipidemia (Chronic)    Relevant Medications    atorvastatin (LIPITOR) 40 MG tablet    Other Relevant Orders    Comprehensive Metabolic Panel    Lipid Panel    Acute on chronic systolic congestive heart failure (HCC) - Primary    Overview     · Cardiac catheterization revealing nonobstructive CAD, 2013  · LVEF 30% on heart catheterization, 2013  · Echo (8/29/16): Moderate LVH. LVEF 23%. Left ventricular diastolic dysfunction (grade II) consistent with pseudonormalization.  Moderate mitral valve regurgitation  · Echo (2/21/2017):  LVEF 50%  · Echo (11/29/2022): LVEF 60%.  No significant valvular abnormality         Current Assessment & Plan     · Stage C HFrEF with normalized LV systolic function  · Recent weight gain and NYHA class III heart failure symptoms  · Previously prescribed Entresto and empagliflozin but was not covered by insurance despite AHA/ACC guidelines  · We will reattempt empagliflozin 10 mg daily  · Increase metoprolol succinate to 100 mg daily  · Obtain lab work today to assess creatinine.  May increase valsartan and and/or torsemide         Relevant Medications    valsartan (DIOVAN) 80 MG tablet    metoprolol  "succinate XL (TOPROL-XL) 100 MG 24 hr tablet    empagliflozin (Jardiance) 10 MG tablet tablet    Other Relevant Orders    TSH+Free T4       Other    Chronic renal impairment, stage 3 (moderate) (CMS/HCC) (Chronic)    Relevant Orders    Comprehensive Metabolic Panel       Plan   • Obtain CMP, CBC, lipids, proBNP, today  • Reattempt empagliflozin 10 mg daily  • Increase metoprolol succinate to 100 mg daily  • If creatinine acceptable, will increase valsartan to 160 mg daily      Follow-up   Return for HTN clinic with [facility].        Vivek Robins returns to the office today.  He has noticed some weight gain associated with worsening shortness of breath.  He is tolerating his present medicines.  He tells me that he was unable to afford Entresto and empagliflozin when previously prescribed.  He is presently taking torsemide 10 mg daily for diuretic therapy.    Prior to the visit today, an echocardiogram was performed which showed normal LV systolic function, no significant valvular abnormality, and diastolic dysfunction    Review of Systems   Respiratory: Positive for shortness of breath.        Objective     /100 (BP Location: Right arm, Patient Position: Sitting)   Pulse 79   Ht 175.3 cm (69\")   Wt 95.9 kg (211 lb 6.4 oz)   SpO2 98%   BMI 31.22 kg/m²       Constitutional:       Appearance: Healthy appearance. Well-developed.   Eyes:      General: Lids are normal. No scleral icterus.  HENT:      Head: Normocephalic and atraumatic.   Neck:      Thyroid: No thyroid mass.      Vascular: No carotid bruit or JVD. JVD normal.   Pulmonary:      Effort: Pulmonary effort is normal.      Breath sounds: Normal breath sounds.   Cardiovascular:      Normal rate. Regular rhythm.      Murmurs: There is no murmur.      No gallop.   Musculoskeletal:      Extremities: No clubbing present.Skin:     General: Skin is warm and dry. There is no cyanosis.   Neurological:      General: No focal deficit present.    "   Mental Status: Alert.   Psychiatric:         Attention and Perception: Attention normal.         Behavior: Behavior normal. Behavior is cooperative.         Result Review  (reviewed with patient):        ECG 12 Lead    Date/Time: 11/29/2022 1:06 PM  Performed by: Chandrakant Delgado IV, MD  Authorized by: Chandrakant Delgado IV, MD   Comparison: compared with previous ECG from 8/29/2016  Similar to previous ECG  Rhythm: sinus rhythm  BPM: 78               Lab Results   Component Value Date    GLUCOSE 106 (H) 05/24/2022    BUN 23 (H) 05/24/2022    CREATININE 1.74 (H) 05/24/2022    EGFRIFAFRI 64 04/12/2021    BCR 13.2 05/24/2022    K 4.4 05/24/2022    CO2 20.0 (L) 05/24/2022    CALCIUM 9.2 05/24/2022    ALBUMIN 5.00 04/12/2021    AST 25 04/12/2021    ALT 35 04/12/2021     Lab Results   Component Value Date    WBC 7.00 04/11/2022    HGB 15.3 04/11/2022    HCT 44.1 04/11/2022    MCV 96.5 04/11/2022     04/11/2022     Lab Results   Component Value Date    CHOL 230 (H) 05/24/2022    TRIG 152 (H) 05/24/2022    HDL 44 05/24/2022     (H) 05/24/2022     Lab Results   Component Value Date    HGBA1C 6.31 (H) 04/12/2021         Larry Delgado MD, [facility], [facility]  11/29/2022

## 2022-11-29 NOTE — ASSESSMENT & PLAN NOTE
· Stage C HFrEF with normalized LV systolic function  · Recent weight gain and NYHA class III heart failure symptoms  · Previously prescribed Entresto and empagliflozin but was not covered by insurance despite AHA/ACC guidelines  · We will reattempt empagliflozin 10 mg daily  · Increase metoprolol succinate to 100 mg daily  · Obtain lab work today to assess creatinine.  May increase valsartan and and/or torsemide

## 2022-11-29 NOTE — ASSESSMENT & PLAN NOTE
· Uncontrolled  · Increase metoprolol succinate to 100 mg daily  · Obtain lab work today  · If creatinine allows, will increase valsartan 160 mg daily  · Refer to [facility] and hypertension clinic

## 2022-12-02 ENCOUNTER — TELEPHONE (OUTPATIENT)
Dept: CARDIOLOGY | Facility: CLINIC | Age: 57
End: 2022-12-02

## 2022-12-02 DIAGNOSIS — I10 ESSENTIAL HYPERTENSION: Chronic | ICD-10-CM

## 2022-12-02 DIAGNOSIS — I50.22 CHRONIC SYSTOLIC CONGESTIVE HEART FAILURE: Chronic | ICD-10-CM

## 2022-12-02 RX ORDER — VALSARTAN 80 MG/1
80 TABLET ORAL DAILY
Qty: 90 TABLET | Refills: 1 | Status: SHIPPED | OUTPATIENT
Start: 2022-12-02

## 2022-12-08 ENCOUNTER — TELEPHONE (OUTPATIENT)
Dept: CARDIOLOGY | Facility: CLINIC | Age: 57
End: 2022-12-08

## 2022-12-08 DIAGNOSIS — E78.2 MIXED HYPERLIPIDEMIA: Primary | Chronic | ICD-10-CM

## 2022-12-08 RX ORDER — ATORVASTATIN CALCIUM 80 MG/1
80 TABLET, FILM COATED ORAL DAILY
Qty: 90 TABLET | Refills: 0 | Status: SHIPPED | OUTPATIENT
Start: 2022-12-08

## 2022-12-08 NOTE — PROGRESS NOTES
Labs look stable.  Cholesterol is not well controlled, however.  I recommend he start atorvastatin 40 mg nightly and get a CMP and lipids in 6 weeks.

## 2022-12-08 NOTE — TELEPHONE ENCOUNTER
----- Message from Chandrakant Delgado IV, MD sent at 12/8/2022  9:09 AM EST -----  Labs look stable.  Cholesterol is not well controlled, however.  I recommend he start atorvastatin 40 mg nightly and get a CMP and lipids in 6 weeks.

## 2023-04-12 ENCOUNTER — TELEPHONE (OUTPATIENT)
Dept: CARDIOLOGY | Facility: CLINIC | Age: 58
End: 2023-04-12
Payer: COMMERCIAL

## 2023-04-12 NOTE — TELEPHONE ENCOUNTER
Patient called needing refills. He has switched pharmacies and needs these sent to [facility]. I reminded him we needed labs. He will go either this week or next and get them. Told him we would send refills after we got lab results to make sure no changes to his medication was needed. He verbalized understanding.

## 2023-04-17 ENCOUNTER — LAB (OUTPATIENT)
Dept: LAB | Facility: HOSPITAL | Age: 58
End: 2023-04-17
Payer: COMMERCIAL

## 2023-04-17 DIAGNOSIS — E78.2 MIXED HYPERLIPIDEMIA: Chronic | ICD-10-CM

## 2023-04-17 LAB
ALBUMIN SERPL-MCNC: 4.2 G/DL (ref 3.5–5.2)
ALBUMIN/GLOB SERPL: 1.2 G/DL
ALP SERPL-CCNC: 32 U/L (ref 39–117)
ALT SERPL W P-5'-P-CCNC: 40 U/L (ref 1–41)
ANION GAP SERPL CALCULATED.3IONS-SCNC: 11 MMOL/L (ref 5–15)
AST SERPL-CCNC: 26 U/L (ref 1–40)
BILIRUB SERPL-MCNC: 0.3 MG/DL (ref 0–1.2)
BUN SERPL-MCNC: 16 MG/DL (ref 6–20)
BUN/CREAT SERPL: 9.4 (ref 7–25)
CALCIUM SPEC-SCNC: 8.9 MG/DL (ref 8.6–10.5)
CHLORIDE SERPL-SCNC: 104 MMOL/L (ref 98–107)
CHOLEST SERPL-MCNC: 161 MG/DL (ref 0–200)
CO2 SERPL-SCNC: 22 MMOL/L (ref 22–29)
CREAT SERPL-MCNC: 1.7 MG/DL (ref 0.76–1.27)
EGFRCR SERPLBLD CKD-EPI 2021: 46.2 ML/MIN/1.73
GLOBULIN UR ELPH-MCNC: 3.4 GM/DL
GLUCOSE SERPL-MCNC: 109 MG/DL (ref 65–99)
HDLC SERPL-MCNC: 39 MG/DL (ref 40–60)
LDLC SERPL CALC-MCNC: 96 MG/DL (ref 0–100)
LDLC/HDLC SERPL: 2.37 {RATIO}
POTASSIUM SERPL-SCNC: 4.1 MMOL/L (ref 3.5–5.2)
PROT SERPL-MCNC: 7.6 G/DL (ref 6–8.5)
SODIUM SERPL-SCNC: 137 MMOL/L (ref 136–145)
TRIGL SERPL-MCNC: 147 MG/DL (ref 0–150)
VLDLC SERPL-MCNC: 26 MG/DL (ref 5–40)

## 2023-04-17 PROCEDURE — 80061 LIPID PANEL: CPT

## 2023-04-17 PROCEDURE — 80053 COMPREHEN METABOLIC PANEL: CPT

## 2023-04-18 DIAGNOSIS — I50.22 CHRONIC SYSTOLIC CONGESTIVE HEART FAILURE: Chronic | ICD-10-CM

## 2023-04-18 DIAGNOSIS — I10 ESSENTIAL HYPERTENSION: Chronic | ICD-10-CM

## 2023-04-18 DIAGNOSIS — E78.2 MIXED HYPERLIPIDEMIA: Chronic | ICD-10-CM

## 2023-04-18 RX ORDER — TORSEMIDE 10 MG/1
10 TABLET ORAL DAILY
Qty: 90 TABLET | Refills: 1 | Status: SHIPPED | OUTPATIENT
Start: 2023-04-18

## 2023-04-18 RX ORDER — ATORVASTATIN CALCIUM 80 MG/1
80 TABLET, FILM COATED ORAL DAILY
Qty: 90 TABLET | Refills: 0 | Status: SHIPPED | OUTPATIENT
Start: 2023-04-18

## 2023-04-18 RX ORDER — VALSARTAN 80 MG/1
80 TABLET ORAL DAILY
Qty: 90 TABLET | Refills: 1 | Status: SHIPPED | OUTPATIENT
Start: 2023-04-18

## 2023-04-18 RX ORDER — SPIRONOLACTONE 25 MG/1
25 TABLET ORAL DAILY
Qty: 90 TABLET | Refills: 3 | Status: SHIPPED | OUTPATIENT
Start: 2023-04-18

## 2023-04-18 RX ORDER — METOPROLOL SUCCINATE 100 MG/1
100 TABLET, EXTENDED RELEASE ORAL DAILY
Qty: 90 TABLET | Refills: 3 | Status: SHIPPED | OUTPATIENT
Start: 2023-04-18

## 2023-04-18 NOTE — TELEPHONE ENCOUNTER
Patient called for refills and I had him go for repeat labs prior.     Lab Results   Component Value Date    GLUCOSE 109 (H) 04/17/2023    BUN 16 04/17/2023    CREATININE 1.70 (H) 04/17/2023    EGFR 46.2 (L) 04/17/2023    BCR 9.4 04/17/2023    K 4.1 04/17/2023    CO2 22.0 04/17/2023    CALCIUM 8.9 04/17/2023    ALBUMIN 4.2 04/17/2023    BILITOT 0.3 04/17/2023    AST 26 04/17/2023    ALT 40 04/17/2023     Lab Results   Component Value Date    CHOL 161 04/17/2023    TRIG 147 04/17/2023    HDL 39 (L) 04/17/2023    LDL 96 04/17/2023     He is on:  Atorvastatin 80 mg daily  Valsartan 80 mg daily  Jardiance 10 mg daily  Torsemide 10 mg daily  spironolactone 25 mg daily  Metoprolol 100 mg daily    Creatinine still elevated. Any changes?

## 2023-04-25 DIAGNOSIS — I10 ESSENTIAL HYPERTENSION: Chronic | ICD-10-CM

## 2023-04-25 DIAGNOSIS — E78.2 MIXED HYPERLIPIDEMIA: Chronic | ICD-10-CM

## 2023-04-25 DIAGNOSIS — I50.22 CHRONIC SYSTOLIC CONGESTIVE HEART FAILURE: Chronic | ICD-10-CM

## 2023-04-25 RX ORDER — SPIRONOLACTONE 25 MG/1
25 TABLET ORAL DAILY
Qty: 90 TABLET | Refills: 3 | Status: SHIPPED | OUTPATIENT
Start: 2023-04-25

## 2023-04-25 RX ORDER — METOPROLOL SUCCINATE 100 MG/1
100 TABLET, EXTENDED RELEASE ORAL DAILY
Qty: 90 TABLET | Refills: 3 | Status: SHIPPED | OUTPATIENT
Start: 2023-04-25

## 2023-04-25 RX ORDER — TORSEMIDE 10 MG/1
10 TABLET ORAL DAILY
Qty: 90 TABLET | Refills: 3 | Status: SHIPPED | OUTPATIENT
Start: 2023-04-25

## 2023-04-25 RX ORDER — ATORVASTATIN CALCIUM 80 MG/1
80 TABLET, FILM COATED ORAL DAILY
Qty: 90 TABLET | Refills: 3 | Status: SHIPPED | OUTPATIENT
Start: 2023-04-25

## 2023-04-25 RX ORDER — VALSARTAN 80 MG/1
80 TABLET ORAL DAILY
Qty: 90 TABLET | Refills: 3 | Status: SHIPPED | OUTPATIENT
Start: 2023-04-25

## 2023-04-25 NOTE — TELEPHONE ENCOUNTER
Lab Results   Component Value Date    GLUCOSE 109 (H) 04/17/2023    BUN 16 04/17/2023    CREATININE 1.70 (H) 04/17/2023    EGFR 46.2 (L) 04/17/2023    BCR 9.4 04/17/2023    K 4.1 04/17/2023    CO2 22.0 04/17/2023    CALCIUM 8.9 04/17/2023    ALBUMIN 4.2 04/17/2023    BILITOT 0.3 04/17/2023    AST 26 04/17/2023    ALT 40 04/17/2023     Lab Results   Component Value Date    CHOL 161 04/17/2023    TRIG 147 04/17/2023    HDL 39 (L) 04/17/2023    LDL 96 04/17/2023

## 2023-08-07 NOTE — PROGRESS NOTES
"    Cardiology Outpatient Visit      Identification: Walt Gao is a 58 y.o. male who resides in [place], [place]    Reason for visit:  Chronic systolic congestive heart failure      Subjective      Patient is a 58-year-old gentleman who returns today for follow up of his chronic systolic heart failure, nonischemic cardiomyopathy and cardiac risk factors.  Since his last visit he has done well without any hospitalizations or new medical diagnoses.  At last visit metoprolol was increased due to uncontrolled hypertension.  His blood pressure still not well controlled today at 140/100.  He denies any chest pain or shortness of breath.  He tried Entresto in the past but could not afford it.  At his last visit Jardiance samples were given and prescription sent in but again he could not afford it.    Review of Systems   Constitutional: Negative for malaise/fatigue.   Eyes:  Negative for vision loss in left eye and vision loss in right eye.   Cardiovascular:  Negative for chest pain, dyspnea on exertion, near-syncope, orthopnea, palpitations, paroxysmal nocturnal dyspnea and syncope.   Musculoskeletal:  Negative for myalgias.   Neurological:  Negative for brief paralysis, excessive daytime sleepiness, focal weakness, numbness, paresthesias and weakness.   All other systems reviewed and are negative.    No Known Allergies      Current Outpatient Medications   Medication Instructions    atorvastatin (LIPITOR) 80 mg, Oral, Daily    metoprolol succinate XL (TOPROL-XL) 100 mg, Oral, Daily    spironolactone (ALDACTONE) 25 mg, Oral, Daily    torsemide (DEMADEX) 10 mg, Oral, Daily    valsartan (DIOVAN) 80 mg, Oral, Daily         Objective     /100 (BP Location: Right arm, Patient Position: Sitting)   Pulse 87   Ht 175.3 cm (69\")   Wt 98 kg (216 lb)   SpO2 95%   BMI 31.90 kg/mý       Constitutional:       Appearance: Healthy appearance. Well-developed.   Eyes:      General: Lids are normal. No scleral " icterus.     Conjunctiva/sclera: Conjunctivae normal.   HENT:      Head: Normocephalic and atraumatic.   Neck:      Thyroid: No thyromegaly.      Vascular: No carotid bruit or JVD.   Pulmonary:      Effort: Pulmonary effort is normal.      Breath sounds: Normal breath sounds. No wheezing. No rhonchi. No rales.   Cardiovascular:      Normal rate. Regular rhythm.      Murmurs: There is no murmur.      No gallop.  No rub.   Pulses:     Intact distal pulses.   Edema:     Peripheral edema absent.   Abdominal:      General: There is no distension.      Palpations: Abdomen is soft. There is no abdominal mass.   Musculoskeletal:      Cervical back: Normal range of motion. Skin:     General: Skin is warm and dry.      Findings: No rash.   Neurological:      General: No focal deficit present.      Mental Status: Alert and oriented to person, place, and time.      Gait: Gait is intact.   Psychiatric:         Attention and Perception: Attention normal.         Mood and Affect: Mood normal.         Behavior: Behavior normal.       Result Review  (reviewed with patient):            Lab Results   Component Value Date    GLUCOSE 109 (H) 04/17/2023    BUN 16 04/17/2023    CREATININE 1.70 (H) 04/17/2023    EGFR 46.2 (L) 04/17/2023    BCR 9.4 04/17/2023    K 4.1 04/17/2023    CO2 22.0 04/17/2023    CALCIUM 8.9 04/17/2023    ALBUMIN 4.2 04/17/2023    BILITOT 0.3 04/17/2023    AST 26 04/17/2023    ALT 40 04/17/2023     Lab Results   Component Value Date    WBC 6.82 11/29/2022    HGB 14.9 11/29/2022    HCT 43.5 11/29/2022    .5 (H) 11/29/2022     11/29/2022     Lab Results   Component Value Date    CHOL 161 04/17/2023    TRIG 147 04/17/2023    HDL 39 (L) 04/17/2023    LDL 96 04/17/2023     Lab Results   Component Value Date    HGBA1C 6.31 (H) 04/12/2021           Assessment     Diagnoses and all orders for this visit:    1. Non-ischemic cardiomyopathy (Primary)  Overview:  Cardiac catheterization revealing nonobstructive  CAD, 2013  LVEF 30% on heart catheterization, 2013  Echo (8/29/16): Moderate LVH. LVEF 23%. Left ventricular diastolic dysfunction (grade II) consistent with pseudonormalization.  Moderate mitral valve regurgitation  Echo (2/21/2017):  LVEF 50%    Assessment & Plan:  Stable NYHA class II symptoms  Continue spironolactone 25 mg daily  Continue metoprolol succinate 100 mg daily  Increase valsartan 160 mg daily  BMP in 1 week  Continue Demadex 10 mg daily      2. Chronic systolic congestive heart failure  Overview:  Cardiac catheterization revealing nonobstructive CAD, 2013  LVEF 30% on heart catheterization, 2013  Echo (8/29/16): Moderate LVH. LVEF 23%. Left ventricular diastolic dysfunction (grade II) consistent with pseudonormalization.  Moderate mitral valve regurgitation  Echo (2/21/2017):  LVEF 50%  Echo (11/29/2022): LVEF 60%.  No significant valvular abnormality    Assessment & Plan:  Stable NYHA class II symptoms  Increase valsartan 160 mg daily due to uncontrolled hypertension  BMP in 1 week  Continue current dose of spironolactone, metoprolol and Demadex    Orders:  -     valsartan (DIOVAN) 160 MG tablet; Take 0.5 tablets by mouth Daily.  Dispense: 90 tablet; Refill: 1    3. Mixed hyperlipidemia  Assessment & Plan:  Continue Lipitor 80 mg daily      4. Essential hypertension  Overview:  Target blood pressure <130/80 mmHg    Assessment & Plan:  Hypertension is not well controlled  Increase valsartan 160 mg daily  BMP in 1 week  Continue spirolactone 25 mg daily  Continue metoprolol succinate 100 mg daily  [facility] to [facility]    Orders:  -     valsartan (DIOVAN) 160 MG tablet; Take 0.5 tablets by mouth Daily.  Dispense: 90 tablet; Refill: 1  -     Basic Metabolic Panel; Future          Plan   Increase valsartan 160 mg daily  BMP in 1 week as patient does have CKD.  If unable to tolerate higher dose of valsartan consider starting amlodipine  Arrange consultation with [facility]  [facility]  Continue all other medications as prescribed      Follow-up   Return in about 3 months (around 11/15/2023), or if symptoms worsen or fail to improve, for Follow-up with Dr. Delgado next visit.        Jessica Crowley, APRN  8/15/2023

## 2023-08-15 ENCOUNTER — OFFICE VISIT (OUTPATIENT)
Dept: CARDIOLOGY | Facility: CLINIC | Age: 58
End: 2023-08-15
Payer: COMMERCIAL

## 2023-08-15 VITALS
HEART RATE: 87 BPM | BODY MASS INDEX: 31.99 KG/M2 | DIASTOLIC BLOOD PRESSURE: 100 MMHG | HEIGHT: 69 IN | OXYGEN SATURATION: 95 % | WEIGHT: 216 LBS | SYSTOLIC BLOOD PRESSURE: 140 MMHG

## 2023-08-15 DIAGNOSIS — I50.22 CHRONIC SYSTOLIC CONGESTIVE HEART FAILURE: Chronic | ICD-10-CM

## 2023-08-15 DIAGNOSIS — I10 ESSENTIAL HYPERTENSION: Chronic | ICD-10-CM

## 2023-08-15 DIAGNOSIS — I42.8 NON-ISCHEMIC CARDIOMYOPATHY: Primary | ICD-10-CM

## 2023-08-15 DIAGNOSIS — E78.2 MIXED HYPERLIPIDEMIA: Chronic | ICD-10-CM

## 2023-08-15 PROCEDURE — 99214 OFFICE O/P EST MOD 30 MIN: CPT | Performed by: NURSE PRACTITIONER

## 2023-08-15 RX ORDER — VALSARTAN 160 MG/1
80 TABLET ORAL DAILY
Qty: 90 TABLET | Refills: 1 | Status: SHIPPED | OUTPATIENT
Start: 2023-08-15

## 2023-08-15 NOTE — ASSESSMENT & PLAN NOTE
Stable NYHA class II symptoms  Increase valsartan 160 mg daily due to uncontrolled hypertension  BMP in 1 week  Continue current dose of spironolactone, metoprolol and Demadex

## 2023-08-15 NOTE — ASSESSMENT & PLAN NOTE
Hypertension is not well controlled  Increase valsartan 160 mg daily  BMP in 1 week  Continue spirolactone 25 mg daily  Continue metoprolol succinate 100 mg daily  Refer to [facility]

## 2023-08-15 NOTE — ASSESSMENT & PLAN NOTE
Stable NYHA class II symptoms  Continue spironolactone 25 mg daily  Continue metoprolol succinate 100 mg daily  Increase valsartan 160 mg daily  BMP in 1 week  Continue Demadex 10 mg daily

## 2023-08-28 ENCOUNTER — LAB (OUTPATIENT)
Dept: LAB | Facility: HOSPITAL | Age: 58
End: 2023-08-28
Payer: COMMERCIAL

## 2023-08-28 DIAGNOSIS — I10 ESSENTIAL HYPERTENSION: Chronic | ICD-10-CM

## 2023-08-28 LAB
ANION GAP SERPL CALCULATED.3IONS-SCNC: 13 MMOL/L (ref 5–15)
BUN SERPL-MCNC: 22 MG/DL (ref 6–20)
BUN/CREAT SERPL: 10.8 (ref 7–25)
CALCIUM SPEC-SCNC: 9.3 MG/DL (ref 8.6–10.5)
CHLORIDE SERPL-SCNC: 101 MMOL/L (ref 98–107)
CO2 SERPL-SCNC: 23 MMOL/L (ref 22–29)
CREAT SERPL-MCNC: 2.04 MG/DL (ref 0.76–1.27)
EGFRCR SERPLBLD CKD-EPI 2021: 37.1 ML/MIN/1.73
GLUCOSE SERPL-MCNC: 133 MG/DL (ref 65–99)
POTASSIUM SERPL-SCNC: 4.8 MMOL/L (ref 3.5–5.2)
SODIUM SERPL-SCNC: 137 MMOL/L (ref 136–145)

## 2023-08-28 PROCEDURE — 36415 COLL VENOUS BLD VENIPUNCTURE: CPT

## 2023-08-28 PROCEDURE — 80048 BASIC METABOLIC PNL TOTAL CA: CPT

## 2023-08-30 ENCOUNTER — TELEPHONE (OUTPATIENT)
Dept: CARDIOLOGY | Facility: CLINIC | Age: 58
End: 2023-08-30
Payer: COMMERCIAL

## 2023-08-30 DIAGNOSIS — I50.22 CHRONIC SYSTOLIC CONGESTIVE HEART FAILURE: Chronic | ICD-10-CM

## 2023-08-30 DIAGNOSIS — I10 ESSENTIAL HYPERTENSION: Chronic | ICD-10-CM

## 2023-08-30 RX ORDER — AMLODIPINE BESYLATE 5 MG/1
5 TABLET ORAL DAILY
Qty: 90 TABLET | Refills: 0 | Status: SHIPPED | OUTPATIENT
Start: 2023-08-30

## 2023-08-30 RX ORDER — VALSARTAN 80 MG/1
80 TABLET ORAL DAILY
Qty: 90 TABLET | Refills: 0 | Status: SHIPPED | OUTPATIENT
Start: 2023-08-30

## 2023-08-30 NOTE — TELEPHONE ENCOUNTER
----- Message from AFTAB Hammond sent at 8/30/2023 12:10 PM EDT -----  Regarding: please call  I had increased his valsartan but ask him to decrease back and start on 5 mg amlodipine.  Repeat bmp in 1 week.  He probably needs to see Nephrology    ----- Message -----  From: Lab, Background User  Sent: 8/28/2023   6:17 PM EDT  To: AFTAB Hammond

## 2023-09-11 NOTE — PROGRESS NOTES
Subjective:     Encounter Date:09/28/2023    Patient ID: Walt Gao is a 58 y.o. single -American male from [place], [place], works at [employer].    REFERRING CARDIOLOGIST: Chandrakant Delgado IV, MD, [facility], [facility]    Chief Complaint:   Chief Complaint   Patient presents with    Hypertension     Problem List:  Nonischemic cardiomyopathy  Cardiac catheterization revealing nonobstructive CAD, 2013  LVEF 30% on heart catheterization, 2013  Echo (8/29/16): Moderate LVH. LVEF 23%. Left ventricular diastolic dysfunction (grade II) consistent with pseudonormalization.  Moderate mitral valve regurgitation  Echo (2/21/2017):  LVEF 50%  Echo (11/29/2022): LVEF 60%. No significant valvular abnormality   Chronic systolic heart failure  Uncontrolled hypertension  Diagnosis approximately 2013  LVH on ECG  Hyperlipidemia; on statin therapy  Hyperglycemia  Chronic kidney disease stage III  At risk for sleep apnea with snoring and frequent nocturnal awakening  Alcohol use; 12 beers on the weekend, 2 daily during the week      No Known Allergies      Current Outpatient Medications:     amLODIPine (NORVASC) 5 MG tablet, Take 1 tablet by mouth Daily., Disp: 90 tablet, Rfl: 0    atorvastatin (LIPITOR) 80 MG tablet, Take 1 tablet by mouth Daily., Disp: 90 tablet, Rfl: 3    metoprolol succinate XL (TOPROL-XL) 100 MG 24 hr tablet, Take 1 tablet by mouth Daily., Disp: 90 tablet, Rfl: 3    spironolactone (ALDACTONE) 25 MG tablet, Take 1 tablet by mouth Daily., Disp: 90 tablet, Rfl: 3    torsemide (DEMADEX) 10 MG tablet, Take 1 tablet by mouth Daily., Disp: 90 tablet, Rfl: 3    valsartan (DIOVAN) 80 MG tablet, Take 1 tablet by mouth Daily., Disp: 90 tablet, Rfl: 0    History of Present Illness  This is a 58-year-old -American male who presents to establish care in the hypertension clinic and is a patient of Dr. Delgado.  The patient drinks 12 beers on the weekend and 2 beers when he gets home from  work. The patient denies any excessive NSAID use, use of salt in his diet, smoking history, MIs, CVAs, TIAs, seizures, DVTs, PEs, thyroid dysfunction, claudication, diabetes mellitus, or rheumatic fever.  The patient denies any shortness of breath, palpitations, dizziness, presyncope, syncope, headaches, or visual disturbances.  He occasionally will have some chest discomfort/twinges that is very short-lived and if he stops his activity, it goes away.  The patient is unaware that he has chronic kidney disease.  Both of his parents had hypertension.  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  He has a cuffed blood pressure monitor at home but does not often use it.  He cannot recall what his blood pressures are normally when he goes to see his physicians.    Cardiovascular Disease Risk Factors  Hypertension, hyperlipidemia, obesity, increased age, male gender    Social History     Socioeconomic History    Marital status: Single   Tobacco Use    Smoking status: Never     Passive exposure: Never    Smokeless tobacco: Never   Vaping Use    Vaping Use: Never used   Substance and Sexual Activity    Alcohol use: Yes     Comment: ON WEEKENDS, 12 PACK.    Drug use: Never    Sexual activity: Yes     Partners: Female     Birth control/protection: Post-menopausal     Comment: 1 partner in the last 9 years       Family History   Problem Relation Age of Onset    Coronary artery disease Other     Hypertension Mother     Diabetes Father     Hypertension Father     Kidney disease Father     Hyperlipidemia Father     Other Sister         pre diabetes    Other Maternal Grandmother         unknown    Other Maternal Grandfather         unknown    Heart disease Paternal Grandmother     Heart disease Paternal Grandfather     No Known Problems Sister     No Known Problems Daughter     No Known Problems Son        ROS   Obtained and negative  "except as outlined in problem list and HPI.      ECG 12 Lead    Date/Time: 9/28/2023 9:51 AM  Performed by: Margot Sifuentes APRN  Authorized by: Margot Sifuentes APRN   Rhythm comments: Normal sinus rhythm, voltage criteria for LVH, T wave abnormality, consider anterolateral ischemia, abnormal ECG, 70 bpm,  ms,  ms, QTc 4 3 7 ms, no significant changes from last ECG in November 2022           Objective:       Vitals:    09/28/23 0911 09/28/23 0913 09/28/23 0915 09/28/23 0916   BP: 146/94 138/90 138/92 (!) 142/102   BP Location: Right arm Right arm Left arm Left arm   Patient Position: Sitting Standing Sitting Standing   Cuff Size: Adult Adult Adult Adult   Pulse: 78 75 78 79   SpO2: 97% 96% 96% 96%   Weight: 97.7 kg (215 lb 6.4 oz)      Height: 177.8 cm (70\")        Body mass index is 30.91 kg/m².    Constitutional:       Appearance: Healthy appearance. Not in distress.   Neck:      Vascular: No JVR. JVD normal.   Pulmonary:      Effort: Pulmonary effort is normal.      Breath sounds: Normal breath sounds. No wheezing. No rhonchi. No rales.   Chest:      Chest wall: Not tender to palpatation.   Cardiovascular:      PMI at left midclavicular line. Normal rate. Regular rhythm. Normal S1. Normal S2.       Murmurs: There is no murmur.      No gallop.  No click. No rub.   Pulses:     Intact distal pulses.   Edema:     Peripheral edema absent.   Abdominal:      General: Bowel sounds are normal.      Palpations: Abdomen is soft.      Tenderness: There is no abdominal tenderness.   Musculoskeletal: Normal range of motion.         General: No tenderness. Skin:     General: Skin is warm and dry.   Neurological:      General: No focal deficit present.      Mental Status: Alert and oriented to person, place and time.       Lab Review:   Results for orders placed or performed in visit on 08/28/23   Basic Metabolic Panel    Specimen: Blood   Result Value Ref Range    Glucose 133 (H) 65 - 99 mg/dL    BUN 22 (H) 6 - " 20 mg/dL    Creatinine 2.04 (H) 0.76 - 1.27 mg/dL    Sodium 137 136 - 145 mmol/L    Potassium 4.8 3.5 - 5.2 mmol/L    Chloride 101 98 - 107 mmol/L    CO2 23.0 22.0 - 29.0 mmol/L    Calcium 9.3 8.6 - 10.5 mg/dL    BUN/Creatinine Ratio 10.8 7.0 - 25.0    Anion Gap 13.0 5.0 - 15.0 mmol/L    eGFR 37.1 (L) >60.0 mL/min/1.73               Assessment:    Patient with uncontrolled hypertension in the setting of chronic kidney disease stage III; I will also order a renal artery duplex and labs to rule out renal artery stenosis and pheochromocytoma. Unable to test and rule out hyperaldosteronism with ARR/PRA/aldosterone because patient is already on spironolactone and would have to transition off of this for at least 30 days; may consider in the future once blood pressure better controlled.  He has snoring and frequent nocturnal awakening so I will screen him with a sleep study.  He will increase his amlodipine to 10 mg nightly and monitor his blood pressure for the next 1-1.5 weeks and call back with readings.  I encouraged the patient to decrease alcohol use.     Diagnosis Plan   1. Non-ischemic cardiomyopathy  No recurrent angina pectoris or CHF on current activity schedule; continue current treatment       2. Essential hypertension  Comprehensive Metabolic Panel    Metanephrines, Frac. Free, Plasma    Hemoglobin A1c    Duplex Renal Artery - Bilateral Complete CAR   Increase amlodipine to 10 mg nightly and monitor his blood pressure for the next 1-1.5 weeks and call back with readings.   3. Mixed hyperlipidemia  No new labs to review, continue atorvastatin      4. Chronic systolic congestive heart failure  No recurrent angina pectoris or CHF on current activity schedule; continue current treatment       5. Hyperglycemia  Comprehensive Metabolic Panel, HgbA1C      6. At risk for sleep apnea  Outpatient sleep oximetry study             Plan:       Patient to continue current medications and close follow up with the above  providers.  Tentative cardiology follow up in November 2023 in the hypertension clinic or patient may return sooner PRN.   Renal artery duplex, plasma metanephrines, CMP, hemoglobin A1c  Unable to test and rule out hyperaldosteronism with ARR/PRA/aldosterone because patient is already on spironolactone and would have to transition off of this for at least 30 days; may consider in the future once blood pressure better controlled  Increase amlodipine to 10 mg nightly  Patient to call back in 1.5 weeks with blood pressure readings  Outpatient sleep oximetry study  Encouraged patient to decrease alcohol use    Electronically signed by AFTAB Zheng, 09/28/23, 10:01 AM EDT.

## 2023-09-28 ENCOUNTER — OFFICE VISIT (OUTPATIENT)
Dept: CARDIOLOGY | Facility: CLINIC | Age: 58
End: 2023-09-28
Payer: COMMERCIAL

## 2023-09-28 ENCOUNTER — LAB (OUTPATIENT)
Dept: LAB | Facility: HOSPITAL | Age: 58
End: 2023-09-28
Payer: COMMERCIAL

## 2023-09-28 VITALS
HEIGHT: 70 IN | DIASTOLIC BLOOD PRESSURE: 102 MMHG | HEART RATE: 79 BPM | SYSTOLIC BLOOD PRESSURE: 142 MMHG | WEIGHT: 215.4 LBS | BODY MASS INDEX: 30.84 KG/M2 | OXYGEN SATURATION: 96 %

## 2023-09-28 DIAGNOSIS — R73.9 HYPERGLYCEMIA: ICD-10-CM

## 2023-09-28 DIAGNOSIS — E78.2 MIXED HYPERLIPIDEMIA: Chronic | ICD-10-CM

## 2023-09-28 DIAGNOSIS — I50.22 CHRONIC SYSTOLIC CONGESTIVE HEART FAILURE: ICD-10-CM

## 2023-09-28 DIAGNOSIS — I42.8 NON-ISCHEMIC CARDIOMYOPATHY: Primary | ICD-10-CM

## 2023-09-28 DIAGNOSIS — Z91.89 AT RISK FOR SLEEP APNEA: ICD-10-CM

## 2023-09-28 DIAGNOSIS — I10 ESSENTIAL HYPERTENSION: Chronic | ICD-10-CM

## 2023-09-28 LAB
ALBUMIN SERPL-MCNC: 4.6 G/DL (ref 3.5–5.2)
ALBUMIN/GLOB SERPL: 1.3 G/DL
ALP SERPL-CCNC: 30 U/L (ref 39–117)
ALT SERPL W P-5'-P-CCNC: 27 U/L (ref 1–41)
ANION GAP SERPL CALCULATED.3IONS-SCNC: 8.7 MMOL/L (ref 5–15)
AST SERPL-CCNC: 22 U/L (ref 1–40)
BILIRUB SERPL-MCNC: 0.4 MG/DL (ref 0–1.2)
BUN SERPL-MCNC: 21 MG/DL (ref 6–20)
BUN/CREAT SERPL: 11.6 (ref 7–25)
CALCIUM SPEC-SCNC: 9.2 MG/DL (ref 8.6–10.5)
CHLORIDE SERPL-SCNC: 107 MMOL/L (ref 98–107)
CO2 SERPL-SCNC: 25.3 MMOL/L (ref 22–29)
CREAT SERPL-MCNC: 1.81 MG/DL (ref 0.76–1.27)
EGFRCR SERPLBLD CKD-EPI 2021: 42.8 ML/MIN/1.73
GLOBULIN UR ELPH-MCNC: 3.5 GM/DL
GLUCOSE SERPL-MCNC: 128 MG/DL (ref 65–99)
HBA1C MFR BLD: 7.3 % (ref 4.8–5.6)
POTASSIUM SERPL-SCNC: 4.5 MMOL/L (ref 3.5–5.2)
PROT SERPL-MCNC: 8.1 G/DL (ref 6–8.5)
SODIUM SERPL-SCNC: 141 MMOL/L (ref 136–145)

## 2023-09-28 PROCEDURE — 36415 COLL VENOUS BLD VENIPUNCTURE: CPT

## 2023-09-28 PROCEDURE — 83036 HEMOGLOBIN GLYCOSYLATED A1C: CPT

## 2023-09-28 PROCEDURE — 83835 ASSAY OF METANEPHRINES: CPT

## 2023-09-28 PROCEDURE — 80053 COMPREHEN METABOLIC PANEL: CPT

## 2023-09-28 RX ORDER — AMLODIPINE BESYLATE 10 MG/1
10 TABLET ORAL NIGHTLY
Qty: 90 TABLET | Refills: 3 | Status: SHIPPED | OUTPATIENT
Start: 2023-09-28

## 2023-09-29 ENCOUNTER — DOCUMENTATION (OUTPATIENT)
Dept: CARDIOLOGY | Facility: CLINIC | Age: 58
End: 2023-09-29
Payer: COMMERCIAL

## 2023-09-29 NOTE — PROGRESS NOTES
I was able to review the patient's laboratory testing results.  His creatinine was 1.8 which was better than in August 2023 and it looks like he has been around this level for over a year.  His hemoglobin A1c was 7.3% which is up from his last hemoglobin A1c a couple years ago.  He needs to be seen by his primary care physician as soon as possible to manage his diabetes.  If he does not have a primary care physician, we need to make a referral for him.  Patient needs to stay hydrated and limit carbs/sugar. I will have Eva/JOE Greene call the patient back with results/recommendations.  He also needs to call back in around 1.5 weeks with blood pressure and heart rate readings.  He has an upcoming renal artery duplex.    Electronically signed by AFTAB Zheng, 09/29/23, 8:43 AM EDT.

## 2023-10-03 LAB
METANEPH FREE SERPL-MCNC: <10 PG/ML (ref 0–88)
NORMETANEPHRINE SERPL-MCNC: 58.1 PG/ML (ref 0–244)

## 2023-12-11 ENCOUNTER — HOSPITAL ENCOUNTER (OUTPATIENT)
Dept: CARDIOLOGY | Facility: HOSPITAL | Age: 58
Discharge: HOME | End: 2023-12-11
Admitting: NURSE PRACTITIONER
Payer: COMMERCIAL

## 2023-12-11 VITALS — WEIGHT: 215 LBS | BODY MASS INDEX: 30.78 KG/M2 | HEIGHT: 70 IN

## 2023-12-11 DIAGNOSIS — I10 ESSENTIAL HYPERTENSION: Chronic | ICD-10-CM

## 2023-12-11 LAB
BH CV ECHO MEAS - DIST REN A EDV LEFT: 24.7 CM/S
BH CV ECHO MEAS - DIST REN A PSV LEFT: 58.8 CM/S
BH CV ECHO MEAS - MID REN A EDV LEFT: 23.2 CM/S
BH CV ECHO MEAS - MID REN A PSV LEFT: 73.3 CM/S
BH CV ECHO MEAS - PROX REN A EDV LEFT: 33.6 CM/S
BH CV ECHO MEAS - PROX REN A PSV LEFT: 94.7 CM/S
BH CV VAS BP LEFT ARM: NORMAL MMHG
BH CV VAS RENAL AORTIC MID EDV: 13 CM/S
BH CV VAS RENAL AORTIC MID PSV: 65 CM/S
BH CV VAS RENAL HILUM LEFT EDV: 11.6 CM/S
BH CV VAS RENAL HILUM LEFT PSV: 34.5 CM/S
BH CV VAS RENAL HILUM RIGHT EDV: 17.3 CM/S
BH CV VAS RENAL HILUM RIGHT PSV: 39.1 CM/S
BH CV XLRA MEAS - KID L LEFT: 11.3 CM
BH CV XLRA MEAS DIST REN A EDV RIGHT: 20.2 CM/S
BH CV XLRA MEAS DIST REN A PSV RIGHT: 58.1 CM/S
BH CV XLRA MEAS KID L RIGHT: 11 CM
BH CV XLRA MEAS MID REN A EDV RIGHT: 21.2 CM/S
BH CV XLRA MEAS MID REN A PSV RIGHT: 63.3 CM/S
BH CV XLRA MEAS PROX REN A EDV RIGHT: 46.5 CM/S
BH CV XLRA MEAS PROX REN A PSV RIGHT: 118 CM/S
BH CV XLRA MEAS RAR LEFT: 1.4
BH CV XLRA MEAS RAR RIGHT: 1.8
BH CV XLRA MEAS RENAL A ORG EDV LEFT: 20.1 CM/S
BH CV XLRA MEAS RENAL A ORG EDV RIGHT: 23.3 CM/S
BH CV XLRA MEAS RENAL A ORG PSV LEFT: 55.6 CM/S
BH CV XLRA MEAS RENAL A ORG PSV RIGHT: 61.6 CM/S
LEFT RENAL UPPER PARENCHYMA MAX: 23 CM/S
LEFT RENAL UPPER PARENCHYMA MIN: 8 CM/S
LEFT RENAL UPPER PARENCHYMA RI: 0.65
RIGHT RENAL UPPER PARENCHYMA MAX: 40 CM/S
RIGHT RENAL UPPER PARENCHYMA MIN: 13 CM/S
RIGHT RENAL UPPER PARENCHYMA RI: 0.68

## 2023-12-11 PROCEDURE — 93975 VASCULAR STUDY: CPT

## 2023-12-12 ENCOUNTER — DOCUMENTATION (OUTPATIENT)
Dept: CARDIOLOGY | Facility: CLINIC | Age: 58
End: 2023-12-12
Payer: COMMERCIAL

## 2023-12-12 NOTE — PROGRESS NOTES
I was able to review the patient's artery duplex results which did not show any evidence of renal artery stenosis.  I will have Sophie/Jc call the patient and find out what his blood pressure and heart rate readings have been lately.  Also, need to find out if he has established care with a primary care physician to manage his diabetes.  If not, referral needs to be made.    Electronically signed by AFTAB Zheng, 12/12/23, 1:45 PM EST.

## 2023-12-13 ENCOUNTER — TELEPHONE (OUTPATIENT)
Dept: CARDIOLOGY | Facility: CLINIC | Age: 58
End: 2023-12-13
Payer: COMMERCIAL

## 2023-12-13 DIAGNOSIS — R73.09 ELEVATED HEMOGLOBIN A1C: Primary | ICD-10-CM

## 2023-12-13 NOTE — TELEPHONE ENCOUNTER
Sophie JAMES MA--Called patient to give recommendations and give results. Pt stated he does not have a PCP and he does not have a BP cuff at home to obtain readings. Will send referral to [facility] for PCP scheduling

## 2024-01-08 ENCOUNTER — OFFICE VISIT (OUTPATIENT)
Age: 59
End: 2024-01-08
Payer: COMMERCIAL

## 2024-01-08 VITALS
SYSTOLIC BLOOD PRESSURE: 142 MMHG | OXYGEN SATURATION: 95 % | HEIGHT: 70 IN | TEMPERATURE: 97.9 F | WEIGHT: 213 LBS | BODY MASS INDEX: 30.49 KG/M2 | DIASTOLIC BLOOD PRESSURE: 82 MMHG | HEART RATE: 79 BPM

## 2024-01-08 DIAGNOSIS — G47.19 EXCESSIVE DAYTIME SLEEPINESS: ICD-10-CM

## 2024-01-08 DIAGNOSIS — I50.22 CHRONIC SYSTOLIC CONGESTIVE HEART FAILURE: ICD-10-CM

## 2024-01-08 DIAGNOSIS — Z12.11 SCREENING FOR MALIGNANT NEOPLASM OF COLON: ICD-10-CM

## 2024-01-08 DIAGNOSIS — E11.65 TYPE 2 DIABETES MELLITUS WITH HYPERGLYCEMIA, WITHOUT LONG-TERM CURRENT USE OF INSULIN: Primary | ICD-10-CM

## 2024-01-08 DIAGNOSIS — E78.2 MIXED HYPERLIPIDEMIA: Chronic | ICD-10-CM

## 2024-01-08 DIAGNOSIS — N18.32 STAGE 3B CHRONIC KIDNEY DISEASE: ICD-10-CM

## 2024-01-08 DIAGNOSIS — I42.8 NON-ISCHEMIC CARDIOMYOPATHY: ICD-10-CM

## 2024-01-08 LAB
EXPIRATION DATE: ABNORMAL
HBA1C MFR BLD: 6.5 % (ref 4.5–5.7)
Lab: ABNORMAL

## 2024-01-08 PROCEDURE — 99204 OFFICE O/P NEW MOD 45 MIN: CPT | Performed by: STUDENT IN AN ORGANIZED HEALTH CARE EDUCATION/TRAINING PROGRAM

## 2024-01-08 PROCEDURE — 83036 HEMOGLOBIN GLYCOSYLATED A1C: CPT | Performed by: STUDENT IN AN ORGANIZED HEALTH CARE EDUCATION/TRAINING PROGRAM

## 2024-01-08 NOTE — PROGRESS NOTES
Office Note     Name: Walt Gao    : 1965     MRN: [id number]     Chief Complaint  Annual Exam    Subjective     History of Present Illness:  Walt Gao is a 58 y.o. male who presents today for Initial visit to establish care.     Past Medical History:   Past Medical History:   Diagnosis Date    CHF (congestive heart failure)     NICM (nonischemic cardiomyopathy)     Nonischemic cardiomyopathy 2016    · Cardiac catheterization revealing nonobstructive CAD,  · LVEF 30% on heart catheterization,  · Echo (16): Moderate LVH. LVEF 23%. Left ventricular diastolic dysfunction (grade II) consistent with pseudonormalization.  Moderate mitral valve regurgitation · Echo (2017):  LVEF 50%       Past Surgical History:   Past Surgical History:   Procedure Laterality Date    HERNIA REPAIR      as child       Immunizations:   Immunization History   Administered Date(s) Administered    Flu Vaccine Intradermal Quad 18-64YR 10/13/2014, 10/02/2020    Flucelvax Quad Vial =>4yrs 10/05/2019    Fluzone Quad >6mos (Multi-dose) 2016    Influenza Injectable Mdck Pf Quad 10/02/2020    Influenza Seasonal Injectable 10/13/2014    Influenza, Unspecified 2023    Shingrix 2021    flucelvax quad pfs =>4 YRS 10/01/2020        Medications:     Current Outpatient Medications:     amLODIPine (NORVASC) 10 MG tablet, Take 1 tablet by mouth Every Night., Disp: 90 tablet, Rfl: 3    atorvastatin (LIPITOR) 80 MG tablet, Take 1 tablet by mouth Daily., Disp: 90 tablet, Rfl: 3    empagliflozin (JARDIANCE) 10 MG tablet tablet, Take 1 tablet by mouth Daily., Disp: 14 tablet, Rfl: 0    metoprolol succinate XL (TOPROL-XL) 100 MG 24 hr tablet, Take 1 tablet by mouth Daily., Disp: 90 tablet, Rfl: 3    spironolactone (ALDACTONE) 25 MG tablet, Take 1 tablet by mouth Daily., Disp: 90 tablet, Rfl: 3    torsemide (DEMADEX) 10 MG tablet, Take 1 tablet by mouth Daily., Disp: 90 tablet, Rfl: 3    valsartan  "(DIOVAN) 80 MG tablet, Take 1 tablet by mouth Daily., Disp: 90 tablet, Rfl: 0    empagliflozin (JARDIANCE) 10 MG tablet tablet, Take 1 tablet by mouth Daily., Disp: 30 tablet, Rfl: 5    Allergies:   No Known Allergies    Family History:   Family History   Problem Relation Age of Onset    Coronary artery disease Other     Hypertension Mother     Diabetes Father     Hypertension Father     Kidney disease Father     Hyperlipidemia Father     Other Sister         pre diabetes    Other Maternal Grandmother         unknown    Other Maternal Grandfather         unknown    Heart disease Paternal Grandmother     Heart disease Paternal Grandfather     No Known Problems Sister     No Known Problems Daughter     No Known Problems Son        Social History:   Social History     Socioeconomic History    Marital status: Single   Tobacco Use    Smoking status: Never     Passive exposure: Never    Smokeless tobacco: Never   Vaping Use    Vaping Use: Never used   Substance and Sexual Activity    Alcohol use: Yes     Comment: ON WEEKENDS, 12 PACK.    Drug use: Never    Sexual activity: Yes     Partners: Female     Birth control/protection: Post-menopausal     Comment: 1 partner in the last 9 years         Objective     Vital Signs  /82   Pulse 79   Temp 97.9 °F (36.6 °C)   Ht 177.8 cm (70\")   Wt 96.6 kg (213 lb)   SpO2 95%   BMI 30.56 kg/m²   Estimated body mass index is 30.56 kg/m² as calculated from the following:    Height as of this encounter: 177.8 cm (70\").    Weight as of this encounter: 96.6 kg (213 lb).    BMI is >= 30 and <35. (Class 1 Obesity). The following options were offered after discussion;: nutrition counseling/recommendations      Physical Exam  Constitutional:       General: He is not in acute distress.     Appearance: He is not toxic-appearing.   Cardiovascular:      Rate and Rhythm: Normal rate and regular rhythm.      Heart sounds: No murmur heard.     No friction rub. No gallop.   Pulmonary:      " Effort: Pulmonary effort is normal.      Breath sounds: Normal breath sounds.   Abdominal:      General: Abdomen is flat. There is no distension.   Skin:     General: Skin is warm and dry.   Neurological:      Mental Status: He is alert.   Psychiatric:         Mood and Affect: Mood normal.         Behavior: Behavior normal.          Assessment and Plan     1. Type 2 diabetes mellitus with hyperglycemia, without long-term current use of insulin  New diagnosis. A1c 6.5%, has symptoms of diabetic neuropathy and complication of CKD3b and hyperglycemia  -has notable comorbidity of systolic congestive heart failure and hyperlipidemia  -This patient would greatly benefit from an SGLT-2 inhibitor based on comorbid systolic heart failure, CKD, and diabetes.    -Counseled on self foot examinations and eye exams  - POC Glycosylated Hemoglobin (Hb A1C)  - empagliflozin (JARDIANCE) 10 MG tablet tablet; Take 1 tablet by mouth Daily.  Dispense: 30 tablet; Refill: 5  - empagliflozin (JARDIANCE) 10 MG tablet tablet; Take 1 tablet by mouth Daily.  Dispense: 14 tablet; Refill: 0    2. Excessive daytime sleepiness  Patient works an odd shift between 5 pm and 2 am but still falls asleep during the day despite adequate sleep duration, he notes snoring as well  - Ambulatory Referral to Sleep Medicine    3. Screening for malignant neoplasm of colon  Colonoscopy ordered. Denies ever having a previous colonoscopy  - Ambulatory Referral For Screening Colonoscopy    4. Stage 3b chronic kidney disease  Start jardiance, counseled on avoiding NSAIDs    5. Chronic systolic congestive heart failure  EF recently improved on GDMT, follows with cardiology  Nonischemic in nature, appears euvolemic at this time    6. Mixed hyperlipidemia  Chronic stable on atorvastatin, continue at current dose    7. Non-ischemic cardiomyopathy  Follows with cardiology as above, stable         Counseling was given to patient for the following topics: instructions for  management.    Follow Up  Return in about 3 months (around 4/8/2024) for Follow Up.    MD [facility]  [address] [id number] [phone]

## 2024-01-12 ENCOUNTER — PATIENT ROUNDING (BHMG ONLY) (OUTPATIENT)
Age: 59
End: 2024-01-12
Payer: COMMERCIAL

## 2024-01-12 NOTE — PROGRESS NOTES
My name is Pilar and I am the manager of the [facility] [place] office.  I wanted to take a minute and say hello and welcome you to the practice.      I hope you had a good experience with the office.  If you have any questions or concerns please let me know.    Thank you for choosing [facility] and we look forward to taking care of your health needs.     Pilar Erwin  Practice Manager

## 2024-01-17 NOTE — PROGRESS NOTES
Subjective:     Encounter Date:01/25/2024      Patient ID: Walt Gao is a 58 y.o.  single -American male from [place], [place], works at [employer].     PCP: Duglas Lang MD  REFERRING CARDIOLOGIST: Chandrakant Delgado IV, MD, [facility], [facility].    Chief Complaint:   Chief Complaint   Patient presents with    Hypertension     Problem List:  Nonischemic cardiomyopathy  Cardiac catheterization revealing nonobstructive CAD, 2013  LVEF 30% on heart catheterization, 2013  Echo (8/29/16): Moderate LVH. LVEF 23%. Left ventricular diastolic dysfunction (grade II) consistent with pseudonormalization.  Moderate mitral valve regurgitation  Echo (2/21/2017):  LVEF 50%  Echo (11/29/2022): LVEF 60%. No significant valvular abnormality   Chronic systolic heart failure  Uncontrolled hypertension  Diagnosis approximately 2013  LVH on ECG  Plasma metanephrines WNL September 2023  Renal artery duplex negative for renal artery stenosis December 2023  Hyperlipidemia; on statin therapy  Hyperglycemia  Chronic kidney disease stage III  At risk for sleep apnea with snoring and frequent nocturnal awakening  Alcohol use; 12 beers on the weekend, 2 daily during the week  Type 2 diabetes mellitus; hemoglobin A1c 7.3% September 2023, 6.5% January 2024    No Known Allergies    Current Outpatient Medications   Medication Instructions    amLODIPine (NORVASC) 10 mg, Oral, Nightly    atorvastatin (LIPITOR) 80 mg, Oral, Daily    empagliflozin (JARDIANCE) 10 mg, Oral, Daily    metoprolol succinate XL (TOPROL-XL) 100 mg, Oral, Daily    spironolactone (ALDACTONE) 25 mg, Oral, Daily    torsemide (DEMADEX) 10 mg, Oral, Daily    valsartan (DIOVAN) 80 mg, Oral, Daily         HISTORY OF PRESENT ILLNESS:  The patient is here for 4-month follow-up and is a hypertension clinic patient.  Patient is also a patient of Dr. Delgado.  Unable to test and rule out hyperaldosteronism with ARR/PRA/aldosterone because patient is already  on spironolactone and would have to transition off of this for at least 30 days; may consider in the future once blood pressure better controlled.   His amlodipine was increased to 10 mg nightly.  Renal artery duplex negative for LINDA.  He has a blood pressure cuff to use at home but he has not been checking his blood pressure.  He denies any chest pain, shortness of breath, palpitations, dizziness, presyncope, or syncope.  He has not had any recent labs.  He snores but has never had a sleep study.      ROS   All other systems reviewed and otherwise negative.    Procedures       Objective:       Vitals:    01/25/24 1117 01/25/24 1118 01/25/24 1201 01/25/24 1202   BP: (!) 159/107 (!) 148/109 142/94 140/94   BP Location: Right arm Right arm Left arm Right arm   Patient Position: Sitting Standing Sitting Sitting   Pulse: 79 86     SpO2:       Weight:       Height:         Body mass index is 31.45 kg/m².    Constitutional:       Appearance: Healthy appearance. Not in distress.   Neck:      Vascular: No JVR. JVD normal.   Pulmonary:      Effort: Pulmonary effort is normal.      Breath sounds: Normal breath sounds. No wheezing. No rhonchi. No rales.   Chest:      Chest wall: Not tender to palpatation.   Cardiovascular:      PMI at left midclavicular line. Normal rate. Regular rhythm. Normal S1. Normal S2.       Murmurs: There is no murmur.      No gallop.  No click. No rub.   Pulses:     Intact distal pulses.   Edema:     Peripheral edema absent.   Abdominal:      General: Bowel sounds are normal.      Palpations: Abdomen is soft.      Tenderness: There is no abdominal tenderness.   Musculoskeletal: Normal range of motion.         General: No tenderness. Skin:     General: Skin is warm and dry.   Neurological:      General: No focal deficit present.      Mental Status: Alert and oriented to person, place and time.           Lab Review:   Lab Results   Component Value Date    GLUCOSE 128 (H) 09/28/2023    BUN 21 (H)  09/28/2023    CREATININE 1.81 (H) 09/28/2023    EGFRIFAFRI 64 04/12/2021    BCR 11.6 09/28/2023    CO2 25.3 09/28/2023    CALCIUM 9.2 09/28/2023    ALBUMIN 4.6 09/28/2023    AST 22 09/28/2023    ALT 27 09/28/2023       Lab Results   Component Value Date    WBC 6.82 11/29/2022    HGB 14.9 11/29/2022    HCT 43.5 11/29/2022    .5 (H) 11/29/2022     11/29/2022       Lab Results   Component Value Date    HGBA1C 6.5 (A) 01/08/2024       Lab Results   Component Value Date    TSH 1.590 11/29/2022       Lab Results   Component Value Date    CHOL 161 04/17/2023    CHOL 242 (H) 11/29/2022     Lab Results   Component Value Date    TRIG 147 04/17/2023    TRIG 140 11/29/2022     Lab Results   Component Value Date    HDL 39 (L) 04/17/2023    HDL 45 11/29/2022     Lab Results   Component Value Date    LDL 96 04/17/2023     (H) 11/29/2022           Lab Results   Component Value Date    .0 (H) 09/07/2016     Advance Care Planning   ACP discussion was held with the patient during this visit. Patient does not have an advance directive, information provided.              Assessment:     Overall continued acceptable course with no new interim cardiopulmonary complaints with acceptable functional status. We will defer additional diagnostic or therapeutic intervention from a cardiac perspective at this time.  Echocardiogram June 2022 showed LVEF 60%. No significant valvular abnormality.  The patient has snoring and uncontrolled hypertension so I will screen him for sleep apnea with an outpatient sleep oximetry study.  He will increase his Toprol to 150 mg daily and I will enroll the patient in the hypertension care companion.       Diagnosis Plan   1. Non-ischemic cardiomyopathy  No recurrent angina pectoris or CHF on current activity schedule; continue current treatment       2. Essential hypertension  Uncontrolled, increase Toprol to 150 mg daily, enroll patient in hypertension care companion      3. Mixed  hyperlipidemia  No new labs to review, continue atorvastatin      4. Chronic systolic congestive heart failure  No recurrent angina pectoris or CHF on current activity schedule; continue current treatment       5. Snoring  Outpatient sleep oximetry study             Plan:         Patient to continue current medications and close follow up with the above providers.  Tentative cardiology follow up in June 2024 in the hypertension clinic or patient may return sooner PRN.  Enroll patient in hypertension care companion  Outpatient sleep oximetry study  JAXON,  today  Increase Toprol to 150 mg daily      Electronically signed by AFTAB Zheng, 01/25/24, 12:08 PM EST.

## 2024-01-25 ENCOUNTER — OFFICE VISIT (OUTPATIENT)
Dept: CARDIOLOGY | Facility: CLINIC | Age: 59
End: 2024-01-25
Payer: COMMERCIAL

## 2024-01-25 ENCOUNTER — TELEPHONE (OUTPATIENT)
Age: 59
End: 2024-01-25
Payer: COMMERCIAL

## 2024-01-25 ENCOUNTER — LAB (OUTPATIENT)
Dept: LAB | Facility: HOSPITAL | Age: 59
End: 2024-01-25
Payer: COMMERCIAL

## 2024-01-25 VITALS
SYSTOLIC BLOOD PRESSURE: 140 MMHG | BODY MASS INDEX: 31.55 KG/M2 | HEART RATE: 86 BPM | DIASTOLIC BLOOD PRESSURE: 94 MMHG | WEIGHT: 213 LBS | OXYGEN SATURATION: 97 % | HEIGHT: 69 IN

## 2024-01-25 DIAGNOSIS — E78.2 MIXED HYPERLIPIDEMIA: Chronic | ICD-10-CM

## 2024-01-25 DIAGNOSIS — I50.22 CHRONIC SYSTOLIC CONGESTIVE HEART FAILURE: ICD-10-CM

## 2024-01-25 DIAGNOSIS — R06.83 SNORING: ICD-10-CM

## 2024-01-25 DIAGNOSIS — I10 ESSENTIAL HYPERTENSION: Chronic | ICD-10-CM

## 2024-01-25 DIAGNOSIS — I42.8 NON-ISCHEMIC CARDIOMYOPATHY: Primary | ICD-10-CM

## 2024-01-25 DIAGNOSIS — I42.8 NON-ISCHEMIC CARDIOMYOPATHY: ICD-10-CM

## 2024-01-25 PROCEDURE — 83735 ASSAY OF MAGNESIUM: CPT

## 2024-01-25 PROCEDURE — 80048 BASIC METABOLIC PNL TOTAL CA: CPT

## 2024-01-25 PROCEDURE — 36415 COLL VENOUS BLD VENIPUNCTURE: CPT

## 2024-01-25 RX ORDER — METOPROLOL SUCCINATE 100 MG/1
150 TABLET, EXTENDED RELEASE ORAL DAILY
Qty: 90 TABLET | Refills: 3 | Status: SHIPPED | OUTPATIENT
Start: 2024-01-25

## 2024-01-25 NOTE — TELEPHONE ENCOUNTER
Called and spoke with patient about the price of Jardiance  being too expensive for him. Per Dr. Lnag, Scheduled office visit on 1/29/24 to discuss medication change.

## 2024-01-25 NOTE — TELEPHONE ENCOUNTER
"Caller: Walt Gao \"Shimon\"    Relationship: Self    Best call back number: [phone]    Which medication are you concerned about: JARDIANCE    Who prescribed you this medication: DR SHIRLEY    What are your concerns: THIS MEDICATION IS TOO EXPENSIVE. IS THERE ANOTHER MEDICATION THAT MAYBE INSURANCE WILL COVERT? PLEASE ADVISE      "

## 2024-01-26 LAB
ANION GAP SERPL CALCULATED.3IONS-SCNC: 11 MMOL/L (ref 5–15)
BUN SERPL-MCNC: 19 MG/DL (ref 6–20)
BUN/CREAT SERPL: 10.6 (ref 7–25)
CALCIUM SPEC-SCNC: 9 MG/DL (ref 8.6–10.5)
CHLORIDE SERPL-SCNC: 103 MMOL/L (ref 98–107)
CO2 SERPL-SCNC: 24 MMOL/L (ref 22–29)
CREAT SERPL-MCNC: 1.79 MG/DL (ref 0.76–1.27)
EGFRCR SERPLBLD CKD-EPI 2021: 43.4 ML/MIN/1.73
GLUCOSE SERPL-MCNC: 107 MG/DL (ref 65–99)
MAGNESIUM SERPL-MCNC: 2.5 MG/DL (ref 1.6–2.6)
POTASSIUM SERPL-SCNC: 4 MMOL/L (ref 3.5–5.2)
SODIUM SERPL-SCNC: 138 MMOL/L (ref 136–145)

## 2024-01-29 ENCOUNTER — OFFICE VISIT (OUTPATIENT)
Age: 59
End: 2024-01-29
Payer: COMMERCIAL

## 2024-01-29 VITALS
DIASTOLIC BLOOD PRESSURE: 84 MMHG | WEIGHT: 214 LBS | BODY MASS INDEX: 31.6 KG/M2 | OXYGEN SATURATION: 99 % | TEMPERATURE: 98 F | HEART RATE: 80 BPM | SYSTOLIC BLOOD PRESSURE: 142 MMHG

## 2024-01-29 DIAGNOSIS — I10 ESSENTIAL HYPERTENSION: Chronic | ICD-10-CM

## 2024-01-29 DIAGNOSIS — N18.32 STAGE 3B CHRONIC KIDNEY DISEASE: ICD-10-CM

## 2024-01-29 DIAGNOSIS — E11.65 TYPE 2 DIABETES MELLITUS WITH HYPERGLYCEMIA, WITHOUT LONG-TERM CURRENT USE OF INSULIN: ICD-10-CM

## 2024-01-29 DIAGNOSIS — E11.65 TYPE 2 DIABETES MELLITUS WITH HYPERGLYCEMIA, WITHOUT LONG-TERM CURRENT USE OF INSULIN: Primary | ICD-10-CM

## 2024-01-29 PROCEDURE — 99214 OFFICE O/P EST MOD 30 MIN: CPT | Performed by: STUDENT IN AN ORGANIZED HEALTH CARE EDUCATION/TRAINING PROGRAM

## 2024-01-29 PROCEDURE — 82043 UR ALBUMIN QUANTITATIVE: CPT | Performed by: STUDENT IN AN ORGANIZED HEALTH CARE EDUCATION/TRAINING PROGRAM

## 2024-01-29 NOTE — PROGRESS NOTES
Office Note     Name: Walt Gao    : 1965     MRN: [id number]     Chief Complaint  Diabetes    Subjective     History of Present Illness:  Walt Gao is a 58 y.o. male who presents today for follow up of diabetes and hypertension.  He has no acute complaints, but this visit was made due to his jardiance being too expensive.  He reports his copay was over 600 dollars.  Given his history of systolic heart failure, CKD, and diabetes counseled him that this medicine is fairly important for him. He as agreeable to try and stay on the medicine for now if we can reduce the price.  Will provide samples and try to get him a copay card.  No symptoms of decompensation of heart failure    Past Medical History:   Past Medical History:   Diagnosis Date    CHF (congestive heart failure)     NICM (nonischemic cardiomyopathy)     Nonischemic cardiomyopathy 2016    · Cardiac catheterization revealing nonobstructive CAD,  · LVEF 30% on heart catheterization,  · Echo (16): Moderate LVH. LVEF 23%. Left ventricular diastolic dysfunction (grade II) consistent with pseudonormalization.  Moderate mitral valve regurgitation · Echo (2017):  LVEF 50%       Past Surgical History:   Past Surgical History:   Procedure Laterality Date    HERNIA REPAIR      as child       Immunizations:   Immunization History   Administered Date(s) Administered    Flu Vaccine Intradermal Quad 18-64YR 10/13/2014, 10/02/2020    Flucelvax Quad Vial =>4yrs 10/05/2019    Fluzone Quad >6mos (Multi-dose) 2016    Influenza Injectable Mdck Pf Quad 10/02/2020    Influenza Seasonal Injectable 10/13/2014    Influenza, Unspecified 2023    Shingrix 2021    flucelvax quad pfs =>4 YRS 10/01/2020        Medications:     Current Outpatient Medications:     amLODIPine (NORVASC) 10 MG tablet, Take 1 tablet by mouth Every Night., Disp: 90 tablet, Rfl: 3    atorvastatin (LIPITOR) 80 MG tablet, Take 1 tablet by mouth  "Daily., Disp: 90 tablet, Rfl: 3    empagliflozin (JARDIANCE) 10 MG tablet tablet, Take 1 tablet by mouth Daily., Disp: 28 tablet, Rfl: 0    metoprolol succinate XL (TOPROL-XL) 100 MG 24 hr tablet, Take 1.5 tablets by mouth Daily., Disp: 90 tablet, Rfl: 3    spironolactone (ALDACTONE) 25 MG tablet, Take 1 tablet by mouth Daily., Disp: 90 tablet, Rfl: 3    torsemide (DEMADEX) 10 MG tablet, Take 1 tablet by mouth Daily., Disp: 90 tablet, Rfl: 3    valsartan (DIOVAN) 80 MG tablet, Take 1 tablet by mouth Daily., Disp: 90 tablet, Rfl: 0    Allergies:   No Known Allergies    Family History:   Family History   Problem Relation Age of Onset    Coronary artery disease Other     Hypertension Mother     Diabetes Father     Hypertension Father     Kidney disease Father     Hyperlipidemia Father     Other Sister         pre diabetes    Other Maternal Grandmother         unknown    Other Maternal Grandfather         unknown    Heart disease Paternal Grandmother     Heart disease Paternal Grandfather     No Known Problems Sister     No Known Problems Daughter     No Known Problems Son        Social History:   Social History     Socioeconomic History    Marital status: Single   Tobacco Use    Smoking status: Never     Passive exposure: Never    Smokeless tobacco: Never   Vaping Use    Vaping Use: Never used   Substance and Sexual Activity    Alcohol use: Yes     Comment: ON WEEKENDS, 12 PACK.    Drug use: Never    Sexual activity: Yes     Partners: Female     Birth control/protection: Post-menopausal     Comment: 1 partner in the last 9 years         Objective     Vital Signs  /84   Pulse 80   Temp 98 °F (36.7 °C)   Wt 97.1 kg (214 lb)   SpO2 99%   BMI 31.60 kg/m²   Estimated body mass index is 31.6 kg/m² as calculated from the following:    Height as of 1/25/24: 175.3 cm (69\").    Weight as of this encounter: 97.1 kg (214 lb).            Physical Exam  Constitutional:       General: He is not in acute distress.     " Appearance: He is not toxic-appearing.   Pulmonary:      Effort: Pulmonary effort is normal. No respiratory distress.   Abdominal:      General: Abdomen is flat. There is no distension.   Skin:     General: Skin is warm and dry.   Neurological:      Mental Status: He is alert.   Psychiatric:         Mood and Affect: Mood normal.         Behavior: Behavior normal.          Assessment and Plan     1. Type 2 diabetes mellitus with hyperglycemia, without long-term current use of insulin  Chronic, stable  -Would benefit from SGLT2 due to heart failure and CKD. Jardiance was very expensive, will provide samples for 1 month and try to get him a copay card.    - empagliflozin (JARDIANCE) 10 MG tablet tablet; Take 1 tablet by mouth Daily.  Dispense: 28 tablet; Refill: 0  - MicroAlbumin, Urine, Random - Urine, Clean Catch; Future    2. Stage 3b chronic kidney disease  Chronic, stable, BMP this month with stable to slightly improved renal function  -Checked microalbumin and continue sglt2 as above    3. Essential hypertension  Chronic, uncontrolled  Follows with cardiology, they recently increased his metoprolol, but BP still above goal  -Since he was enrolled in hypertension care program will give this some time to work, however if not improving at next visit will increase either valsartan or aubrey with lab follow up to monitor K       Counseling was given to patient for the following topics: instructions for management.    Follow Up  No follow-ups on file.    Linus Lang MD  [facility]  [address] [facility] [address] [address]  [phone]

## 2024-01-30 ENCOUNTER — TELEPHONE (OUTPATIENT)
Age: 59
End: 2024-01-30
Payer: COMMERCIAL

## 2024-01-30 DIAGNOSIS — E11.65 TYPE 2 DIABETES MELLITUS WITH HYPERGLYCEMIA, WITHOUT LONG-TERM CURRENT USE OF INSULIN: ICD-10-CM

## 2024-01-30 LAB — ALBUMIN UR-MCNC: 33.5 MG/DL

## 2024-01-30 NOTE — TELEPHONE ENCOUNTER
Contacted pt and advised him that we need him to stop by the office and fill out the patient portion of the pt assistance angle. Pt was agreeable and will stop by this week.    Form is in pt  box pt will need to complete 1-3 & sign each box for patient sig. Pt can have a copy of the entire angle (not the script) & please notify clinical once this all has been completed so that the angle can be faxed.

## 2024-01-30 NOTE — TELEPHONE ENCOUNTER
Signed pt up for savings card however with savings card and insurance script is still $408 and pt cannot afford that. Pt assistance application filled out and signed by PCP.     Will fax once pt completes his portion of the angle.

## 2024-02-01 ENCOUNTER — TELEPHONE (OUTPATIENT)
Age: 59
End: 2024-02-01
Payer: COMMERCIAL

## 2024-02-01 NOTE — TELEPHONE ENCOUNTER
Spoke to patient. He no longer needs the sleep medicine referral. He said that another dr he has put him in a different program so he's doing that instead. The referral has been closed.

## 2024-02-02 NOTE — TELEPHONE ENCOUNTER
Patient completed assistance forms and they have been faxed to [facility]. Notes scanned in chart.

## 2024-02-05 NOTE — TELEPHONE ENCOUNTER
Signed pt up for farxiga assistance and provided information to the pharmacy. [employer] will be around $30.87 monthly. Pt notified and that is affordable for him. He will finish the Jardiance samples he has and then start the Farxiga. He had no further questions.

## 2024-02-13 ENCOUNTER — OUTSIDE FACILITY SERVICE (OUTPATIENT)
Dept: GASTROENTEROLOGY | Facility: CLINIC | Age: 59
End: 2024-02-13
Payer: COMMERCIAL

## 2024-03-04 ENCOUNTER — TELEPHONE (OUTPATIENT)
Dept: CARDIOLOGY | Facility: CLINIC | Age: 59
End: 2024-03-04
Payer: COMMERCIAL

## 2024-03-04 NOTE — TELEPHONE ENCOUNTER
Received fax from [employer] stating that company was unable to get a hold of patient to return pulse oximetry device.     Called pt, he states that he still has device and label to send it back. Patient states that he will send device back.

## 2024-05-16 ENCOUNTER — HOSPITAL ENCOUNTER (OUTPATIENT)
Facility: HOSPITAL | Age: 59
Setting detail: OBSERVATION
Discharge: HOME | End: 2024-05-17
Attending: EMERGENCY MEDICINE | Admitting: INTERNAL MEDICINE
Payer: COMMERCIAL

## 2024-05-16 ENCOUNTER — APPOINTMENT (OUTPATIENT)
Dept: CT IMAGING | Facility: HOSPITAL | Age: 59
End: 2024-05-16
Payer: COMMERCIAL

## 2024-05-16 ENCOUNTER — ANESTHESIA EVENT (OUTPATIENT)
Dept: GASTROENTEROLOGY | Facility: HOSPITAL | Age: 59
End: 2024-05-16
Payer: COMMERCIAL

## 2024-05-16 ENCOUNTER — ANESTHESIA (OUTPATIENT)
Dept: GASTROENTEROLOGY | Facility: HOSPITAL | Age: 59
End: 2024-05-16
Payer: COMMERCIAL

## 2024-05-16 DIAGNOSIS — I16.0 HYPERTENSIVE URGENCY: ICD-10-CM

## 2024-05-16 DIAGNOSIS — K92.2 GI BLEED: ICD-10-CM

## 2024-05-16 DIAGNOSIS — I50.22 CHRONIC SYSTOLIC CONGESTIVE HEART FAILURE: ICD-10-CM

## 2024-05-16 DIAGNOSIS — K92.1 HEMATOCHEZIA: Primary | ICD-10-CM

## 2024-05-16 DIAGNOSIS — D62 ACUTE BLOOD LOSS ANEMIA: ICD-10-CM

## 2024-05-16 DIAGNOSIS — K92.2 GASTROINTESTINAL HEMORRHAGE, UNSPECIFIED GASTROINTESTINAL HEMORRHAGE TYPE: ICD-10-CM

## 2024-05-16 DIAGNOSIS — K29.70 GASTRITIS: ICD-10-CM

## 2024-05-16 LAB
ABO GROUP BLD: NORMAL
ABO GROUP BLD: NORMAL
ALBUMIN SERPL-MCNC: 4.2 G/DL (ref 3.5–5.2)
ALBUMIN/GLOB SERPL: 1.2 G/DL
ALP SERPL-CCNC: 30 U/L (ref 39–117)
ALT SERPL W P-5'-P-CCNC: 19 U/L (ref 1–41)
ANION GAP SERPL CALCULATED.3IONS-SCNC: 11 MMOL/L (ref 5–15)
AST SERPL-CCNC: 21 U/L (ref 1–40)
BASOPHILS # BLD AUTO: 0.01 10*3/MM3 (ref 0–0.2)
BASOPHILS # BLD AUTO: 0.02 10*3/MM3 (ref 0–0.2)
BASOPHILS NFR BLD AUTO: 0.1 % (ref 0–1.5)
BASOPHILS NFR BLD AUTO: 0.3 % (ref 0–1.5)
BILIRUB SERPL-MCNC: 0.3 MG/DL (ref 0–1.2)
BLD GP AB SCN SERPL QL: NEGATIVE
BUN BLDA-MCNC: 23 MG/DL (ref 8–26)
BUN SERPL-MCNC: 21 MG/DL (ref 6–20)
BUN/CREAT SERPL: 11.7 (ref 7–25)
CA-I BLDA-SCNC: 1.22 MMOL/L (ref 1.2–1.32)
CALCIUM SPEC-SCNC: 9.2 MG/DL (ref 8.6–10.5)
CHLORIDE BLDA-SCNC: 100 MMOL/L (ref 98–109)
CHLORIDE SERPL-SCNC: 104 MMOL/L (ref 98–107)
CO2 BLDA-SCNC: 27 MMOL/L (ref 24–29)
CO2 SERPL-SCNC: 24 MMOL/L (ref 22–29)
CREAT BLDA-MCNC: 1.8 MG/DL (ref 0.6–1.3)
CREAT SERPL-MCNC: 1.79 MG/DL (ref 0.76–1.27)
D-LACTATE SERPL-SCNC: 0.7 MMOL/L (ref 0.5–2)
DEPRECATED RDW RBC AUTO: 56.4 FL (ref 37–54)
DEPRECATED RDW RBC AUTO: 57.2 FL (ref 37–54)
DEVELOPER EXPIRATION DATE: ABNORMAL
DEVELOPER LOT NUMBER: ABNORMAL
EGFRCR SERPLBLD CKD-EPI 2021: 42.8 ML/MIN/1.73
EGFRCR SERPLBLD CKD-EPI 2021: 43.1 ML/MIN/1.73
EOSINOPHIL # BLD AUTO: 0.13 10*3/MM3 (ref 0–0.4)
EOSINOPHIL # BLD AUTO: 0.15 10*3/MM3 (ref 0–0.4)
EOSINOPHIL NFR BLD AUTO: 1.8 % (ref 0.3–6.2)
EOSINOPHIL NFR BLD AUTO: 2.2 % (ref 0.3–6.2)
ERYTHROCYTE [DISTWIDTH] IN BLOOD BY AUTOMATED COUNT: 12.8 % (ref 12.3–15.4)
ERYTHROCYTE [DISTWIDTH] IN BLOOD BY AUTOMATED COUNT: 13 % (ref 12.3–15.4)
EXPIRATION DATE: ABNORMAL
FECAL OCCULT BLOOD SCREEN, POC: POSITIVE
GLOBULIN UR ELPH-MCNC: 3.4 GM/DL
GLUCOSE BLDC GLUCOMTR-MCNC: 118 MG/DL (ref 70–130)
GLUCOSE SERPL-MCNC: 118 MG/DL (ref 65–99)
HCT VFR BLD AUTO: 33.9 % (ref 37.5–51)
HCT VFR BLD AUTO: 36 % (ref 37.5–51)
HCT VFR BLDA CALC: 38 % (ref 38–51)
HGB BLD-MCNC: 11.7 G/DL (ref 13–17.7)
HGB BLD-MCNC: 12.7 G/DL (ref 13–17.7)
HGB BLDA-MCNC: 12.9 G/DL (ref 12–17)
HOLD SPECIMEN: NORMAL
IMM GRANULOCYTES # BLD AUTO: 0.01 10*3/MM3 (ref 0–0.05)
IMM GRANULOCYTES # BLD AUTO: 0.02 10*3/MM3 (ref 0–0.05)
IMM GRANULOCYTES NFR BLD AUTO: 0.1 % (ref 0–0.5)
IMM GRANULOCYTES NFR BLD AUTO: 0.3 % (ref 0–0.5)
INR PPP: 1 (ref 0.89–1.12)
LYMPHOCYTES # BLD AUTO: 2.2 10*3/MM3 (ref 0.7–3.1)
LYMPHOCYTES # BLD AUTO: 2.3 10*3/MM3 (ref 0.7–3.1)
LYMPHOCYTES NFR BLD AUTO: 31.3 % (ref 19.6–45.3)
LYMPHOCYTES NFR BLD AUTO: 33.2 % (ref 19.6–45.3)
Lab: ABNORMAL
MACROCYTES BLD QL SMEAR: NORMAL
MAGNESIUM SERPL-MCNC: 1.9 MG/DL (ref 1.6–2.6)
MCH RBC QN AUTO: 41.1 PG (ref 26.6–33)
MCH RBC QN AUTO: 41.9 PG (ref 26.6–33)
MCHC RBC AUTO-ENTMCNC: 34.5 G/DL (ref 31.5–35.7)
MCHC RBC AUTO-ENTMCNC: 35.3 G/DL (ref 31.5–35.7)
MCV RBC AUTO: 118.8 FL (ref 79–97)
MCV RBC AUTO: 118.9 FL (ref 79–97)
MONOCYTES # BLD AUTO: 0.46 10*3/MM3 (ref 0.1–0.9)
MONOCYTES # BLD AUTO: 0.53 10*3/MM3 (ref 0.1–0.9)
MONOCYTES NFR BLD AUTO: 6.5 % (ref 5–12)
MONOCYTES NFR BLD AUTO: 7.6 % (ref 5–12)
NEGATIVE CONTROL: NEGATIVE
NEUTROPHILS NFR BLD AUTO: 3.92 10*3/MM3 (ref 1.7–7)
NEUTROPHILS NFR BLD AUTO: 4.21 10*3/MM3 (ref 1.7–7)
NEUTROPHILS NFR BLD AUTO: 56.6 % (ref 42.7–76)
NEUTROPHILS NFR BLD AUTO: 60 % (ref 42.7–76)
NRBC BLD AUTO-RTO: 0 /100 WBC (ref 0–0.2)
NRBC BLD AUTO-RTO: 0 /100 WBC (ref 0–0.2)
PLAT MORPH BLD: NORMAL
PLATELET # BLD AUTO: 220 10*3/MM3 (ref 140–450)
PLATELET # BLD AUTO: 229 10*3/MM3 (ref 140–450)
PMV BLD AUTO: 9.5 FL (ref 6–12)
PMV BLD AUTO: 9.5 FL (ref 6–12)
POSITIVE CONTROL: POSITIVE
POTASSIUM BLDA-SCNC: 3.8 MMOL/L (ref 3.5–4.9)
POTASSIUM SERPL-SCNC: 4.3 MMOL/L (ref 3.5–5.2)
PROT SERPL-MCNC: 7.6 G/DL (ref 6–8.5)
PROTHROMBIN TIME: 13.3 SECONDS (ref 12.2–14.5)
QT INTERVAL: 430 MS
QTC INTERVAL: 490 MS
RBC # BLD AUTO: 2.85 10*6/MM3 (ref 4.14–5.8)
RBC # BLD AUTO: 3.03 10*6/MM3 (ref 4.14–5.8)
RH BLD: POSITIVE
RH BLD: POSITIVE
SODIUM BLD-SCNC: 137 MMOL/L (ref 138–146)
SODIUM SERPL-SCNC: 139 MMOL/L (ref 136–145)
T&S EXPIRATION DATE: NORMAL
WBC MORPH BLD: NORMAL
WBC NRBC COR # BLD AUTO: 6.93 10*3/MM3 (ref 3.4–10.8)
WBC NRBC COR # BLD AUTO: 7.03 10*3/MM3 (ref 3.4–10.8)
WHOLE BLOOD HOLD COAG: NORMAL
WHOLE BLOOD HOLD SPECIMEN: NORMAL

## 2024-05-16 PROCEDURE — G0378 HOSPITAL OBSERVATION PER HR: HCPCS

## 2024-05-16 PROCEDURE — 88342 IMHCHEM/IMCYTCHM 1ST ANTB: CPT | Performed by: INTERNAL MEDICINE

## 2024-05-16 PROCEDURE — 99222 1ST HOSP IP/OBS MODERATE 55: CPT | Performed by: FAMILY MEDICINE

## 2024-05-16 PROCEDURE — 74178 CT ABD&PLV WO CNTR FLWD CNTR: CPT

## 2024-05-16 PROCEDURE — 25010000002 PROPOFOL 10 MG/ML EMULSION: Performed by: NURSE ANESTHETIST, CERTIFIED REGISTERED

## 2024-05-16 PROCEDURE — 96365 THER/PROPH/DIAG IV INF INIT: CPT

## 2024-05-16 PROCEDURE — 86923 COMPATIBILITY TEST ELECTRIC: CPT

## 2024-05-16 PROCEDURE — 25010000002 ESMOLOL 2500 MG/250ML SOLUTION: Performed by: EMERGENCY MEDICINE

## 2024-05-16 PROCEDURE — 85014 HEMATOCRIT: CPT | Performed by: EMERGENCY MEDICINE

## 2024-05-16 PROCEDURE — 99291 CRITICAL CARE FIRST HOUR: CPT

## 2024-05-16 PROCEDURE — 80047 BASIC METABLC PNL IONIZED CA: CPT | Performed by: EMERGENCY MEDICINE

## 2024-05-16 PROCEDURE — 85007 BL SMEAR W/DIFF WBC COUNT: CPT | Performed by: EMERGENCY MEDICINE

## 2024-05-16 PROCEDURE — 86901 BLOOD TYPING SEROLOGIC RH(D): CPT

## 2024-05-16 PROCEDURE — 25510000001 IOPAMIDOL PER 1 ML: Performed by: EMERGENCY MEDICINE

## 2024-05-16 PROCEDURE — 83605 ASSAY OF LACTIC ACID: CPT | Performed by: EMERGENCY MEDICINE

## 2024-05-16 PROCEDURE — 99214 OFFICE O/P EST MOD 30 MIN: CPT | Performed by: PHYSICIAN ASSISTANT

## 2024-05-16 PROCEDURE — 85025 COMPLETE CBC W/AUTO DIFF WBC: CPT | Performed by: EMERGENCY MEDICINE

## 2024-05-16 PROCEDURE — 82270 OCCULT BLOOD FECES: CPT | Performed by: EMERGENCY MEDICINE

## 2024-05-16 PROCEDURE — 88305 TISSUE EXAM BY PATHOLOGIST: CPT | Performed by: INTERNAL MEDICINE

## 2024-05-16 PROCEDURE — 93010 ELECTROCARDIOGRAM REPORT: CPT | Performed by: INTERNAL MEDICINE

## 2024-05-16 PROCEDURE — 86900 BLOOD TYPING SEROLOGIC ABO: CPT

## 2024-05-16 PROCEDURE — 85610 PROTHROMBIN TIME: CPT | Performed by: EMERGENCY MEDICINE

## 2024-05-16 PROCEDURE — 93005 ELECTROCARDIOGRAM TRACING: CPT | Performed by: ANESTHESIOLOGY

## 2024-05-16 PROCEDURE — 99285 EMERGENCY DEPT VISIT HI MDM: CPT

## 2024-05-16 PROCEDURE — 36415 COLL VENOUS BLD VENIPUNCTURE: CPT

## 2024-05-16 PROCEDURE — 25010000002 HYDRALAZINE PER 20 MG: Performed by: ANESTHESIOLOGY

## 2024-05-16 PROCEDURE — 96375 TX/PRO/DX INJ NEW DRUG ADDON: CPT

## 2024-05-16 PROCEDURE — 43239 EGD BIOPSY SINGLE/MULTIPLE: CPT | Performed by: INTERNAL MEDICINE

## 2024-05-16 PROCEDURE — 80053 COMPREHEN METABOLIC PANEL: CPT | Performed by: EMERGENCY MEDICINE

## 2024-05-16 PROCEDURE — 86901 BLOOD TYPING SEROLOGIC RH(D): CPT | Performed by: EMERGENCY MEDICINE

## 2024-05-16 PROCEDURE — 86850 RBC ANTIBODY SCREEN: CPT | Performed by: EMERGENCY MEDICINE

## 2024-05-16 PROCEDURE — 83735 ASSAY OF MAGNESIUM: CPT | Performed by: FAMILY MEDICINE

## 2024-05-16 PROCEDURE — 86900 BLOOD TYPING SEROLOGIC ABO: CPT | Performed by: EMERGENCY MEDICINE

## 2024-05-16 DEVICE — DEV CLIP ENDO RESOLUTION360 CONTRL ROT 235CM: Type: IMPLANTABLE DEVICE | Site: STOMACH | Status: FUNCTIONAL

## 2024-05-16 RX ORDER — TORSEMIDE 10 MG/1
10 TABLET ORAL DAILY
Status: DISCONTINUED | OUTPATIENT
Start: 2024-05-16 | End: 2024-05-17 | Stop reason: HOSPADM

## 2024-05-16 RX ORDER — HYDRALAZINE HYDROCHLORIDE 25 MG/1
25 TABLET, FILM COATED ORAL EVERY 8 HOURS PRN
Status: DISCONTINUED | OUTPATIENT
Start: 2024-05-16 | End: 2024-05-17

## 2024-05-16 RX ORDER — PANTOPRAZOLE SODIUM 40 MG/10ML
80 INJECTION, POWDER, LYOPHILIZED, FOR SOLUTION INTRAVENOUS ONCE
Status: COMPLETED | OUTPATIENT
Start: 2024-05-16 | End: 2024-05-16

## 2024-05-16 RX ORDER — ATORVASTATIN CALCIUM 40 MG/1
80 TABLET, FILM COATED ORAL DAILY
Status: DISCONTINUED | OUTPATIENT
Start: 2024-05-16 | End: 2024-05-17 | Stop reason: HOSPADM

## 2024-05-16 RX ORDER — VALSARTAN 80 MG/1
80 TABLET ORAL DAILY
Status: DISCONTINUED | OUTPATIENT
Start: 2024-05-16 | End: 2024-05-17

## 2024-05-16 RX ORDER — SODIUM CHLORIDE 0.9 % (FLUSH) 0.9 %
10 SYRINGE (ML) INJECTION AS NEEDED
Status: DISCONTINUED | OUTPATIENT
Start: 2024-05-16 | End: 2024-05-16

## 2024-05-16 RX ORDER — BISACODYL 5 MG/1
20 TABLET, DELAYED RELEASE ORAL ONCE
Status: COMPLETED | OUTPATIENT
Start: 2024-05-16 | End: 2024-05-16

## 2024-05-16 RX ORDER — SPIRONOLACTONE 25 MG/1
25 TABLET ORAL DAILY
Status: DISCONTINUED | OUTPATIENT
Start: 2024-05-16 | End: 2024-05-17 | Stop reason: HOSPADM

## 2024-05-16 RX ORDER — ONDANSETRON 2 MG/ML
4 INJECTION INTRAMUSCULAR; INTRAVENOUS ONCE AS NEEDED
Status: DISCONTINUED | OUTPATIENT
Start: 2024-05-16 | End: 2024-05-16 | Stop reason: HOSPADM

## 2024-05-16 RX ORDER — ACETAMINOPHEN 160 MG/5ML
650 SOLUTION ORAL EVERY 4 HOURS PRN
Status: DISCONTINUED | OUTPATIENT
Start: 2024-05-16 | End: 2024-05-17 | Stop reason: HOSPADM

## 2024-05-16 RX ORDER — SODIUM CHLORIDE 9 MG/ML
40 INJECTION, SOLUTION INTRAVENOUS AS NEEDED
Status: DISCONTINUED | OUTPATIENT
Start: 2024-05-16 | End: 2024-05-17 | Stop reason: HOSPADM

## 2024-05-16 RX ORDER — ONDANSETRON 4 MG/1
4 TABLET, ORALLY DISINTEGRATING ORAL EVERY 6 HOURS PRN
Status: DISCONTINUED | OUTPATIENT
Start: 2024-05-16 | End: 2024-05-17 | Stop reason: HOSPADM

## 2024-05-16 RX ORDER — PEG-3350, SODIUM SULFATE, SODIUM CHLORIDE, POTASSIUM CHLORIDE, SODIUM ASCORBATE AND ASCORBIC ACID 7.5-2.691G
1000 KIT ORAL
Status: COMPLETED | OUTPATIENT
Start: 2024-05-16 | End: 2024-05-16

## 2024-05-16 RX ORDER — SODIUM CHLORIDE 9 MG/ML
INJECTION, SOLUTION INTRAVENOUS CONTINUOUS PRN
Status: DISCONTINUED | OUTPATIENT
Start: 2024-05-16 | End: 2024-05-16 | Stop reason: SURG

## 2024-05-16 RX ORDER — SODIUM CHLORIDE 0.9 % (FLUSH) 0.9 %
10 SYRINGE (ML) INJECTION EVERY 12 HOURS SCHEDULED
Status: DISCONTINUED | OUTPATIENT
Start: 2024-05-16 | End: 2024-05-17 | Stop reason: HOSPADM

## 2024-05-16 RX ORDER — ACETAMINOPHEN 325 MG/1
650 TABLET ORAL EVERY 4 HOURS PRN
Status: DISCONTINUED | OUTPATIENT
Start: 2024-05-16 | End: 2024-05-17 | Stop reason: HOSPADM

## 2024-05-16 RX ORDER — LIDOCAINE HYDROCHLORIDE 10 MG/ML
INJECTION, SOLUTION EPIDURAL; INFILTRATION; INTRACAUDAL; PERINEURAL AS NEEDED
Status: DISCONTINUED | OUTPATIENT
Start: 2024-05-16 | End: 2024-05-16 | Stop reason: SURG

## 2024-05-16 RX ORDER — ACETAMINOPHEN 650 MG/1
650 SUPPOSITORY RECTAL EVERY 4 HOURS PRN
Status: DISCONTINUED | OUTPATIENT
Start: 2024-05-16 | End: 2024-05-17 | Stop reason: HOSPADM

## 2024-05-16 RX ORDER — PEG-3350, SODIUM SULFATE, SODIUM CHLORIDE, POTASSIUM CHLORIDE, SODIUM ASCORBATE AND ASCORBIC ACID 7.5-2.691G
1000 KIT ORAL
Status: COMPLETED | OUTPATIENT
Start: 2024-05-17 | End: 2024-05-17

## 2024-05-16 RX ORDER — HYDRALAZINE HYDROCHLORIDE 20 MG/ML
5 INJECTION INTRAMUSCULAR; INTRAVENOUS ONCE
Status: COMPLETED | OUTPATIENT
Start: 2024-05-16 | End: 2024-05-16

## 2024-05-16 RX ORDER — IPRATROPIUM BROMIDE AND ALBUTEROL SULFATE 2.5; .5 MG/3ML; MG/3ML
3 SOLUTION RESPIRATORY (INHALATION) ONCE AS NEEDED
Status: DISCONTINUED | OUTPATIENT
Start: 2024-05-16 | End: 2024-05-16 | Stop reason: HOSPADM

## 2024-05-16 RX ORDER — PANTOPRAZOLE SODIUM 40 MG/1
40 TABLET, DELAYED RELEASE ORAL
Status: DISCONTINUED | OUTPATIENT
Start: 2024-05-17 | End: 2024-05-17 | Stop reason: HOSPADM

## 2024-05-16 RX ORDER — METOPROLOL SUCCINATE 25 MG/1
100 TABLET, EXTENDED RELEASE ORAL ONCE
Status: COMPLETED | OUTPATIENT
Start: 2024-05-16 | End: 2024-05-16

## 2024-05-16 RX ORDER — ESMOLOL HYDROCHLORIDE 10 MG/ML
25-300 INJECTION, SOLUTION INTRAVENOUS
Status: DISCONTINUED | OUTPATIENT
Start: 2024-05-16 | End: 2024-05-16

## 2024-05-16 RX ORDER — AMLODIPINE BESYLATE 5 MG/1
10 TABLET ORAL NIGHTLY
Status: DISCONTINUED | OUTPATIENT
Start: 2024-05-16 | End: 2024-05-17 | Stop reason: HOSPADM

## 2024-05-16 RX ORDER — METOPROLOL SUCCINATE 50 MG/1
150 TABLET, EXTENDED RELEASE ORAL DAILY
Status: DISCONTINUED | OUTPATIENT
Start: 2024-05-16 | End: 2024-05-17 | Stop reason: HOSPADM

## 2024-05-16 RX ORDER — ONDANSETRON 2 MG/ML
4 INJECTION INTRAMUSCULAR; INTRAVENOUS EVERY 6 HOURS PRN
Status: DISCONTINUED | OUTPATIENT
Start: 2024-05-16 | End: 2024-05-17 | Stop reason: HOSPADM

## 2024-05-16 RX ORDER — SODIUM CHLORIDE 0.9 % (FLUSH) 0.9 %
10 SYRINGE (ML) INJECTION AS NEEDED
Status: DISCONTINUED | OUTPATIENT
Start: 2024-05-16 | End: 2024-05-17 | Stop reason: HOSPADM

## 2024-05-16 RX ORDER — PROPOFOL 10 MG/ML
VIAL (ML) INTRAVENOUS AS NEEDED
Status: DISCONTINUED | OUTPATIENT
Start: 2024-05-16 | End: 2024-05-16 | Stop reason: SURG

## 2024-05-16 RX ADMIN — IOPAMIDOL 85 ML: 755 INJECTION, SOLUTION INTRAVENOUS at 11:30

## 2024-05-16 RX ADMIN — ESMOLOL HYDROCHLORIDE 25 MCG/KG/MIN: 10 INJECTION INTRAVENOUS at 12:37

## 2024-05-16 RX ADMIN — AMLODIPINE BESYLATE 10 MG: 5 TABLET ORAL at 21:35

## 2024-05-16 RX ADMIN — PROPOFOL 50 MG: 10 INJECTION, EMULSION INTRAVENOUS at 14:37

## 2024-05-16 RX ADMIN — HYDRALAZINE HYDROCHLORIDE 5 MG: 20 INJECTION INTRAMUSCULAR; INTRAVENOUS at 14:03

## 2024-05-16 RX ADMIN — ATORVASTATIN CALCIUM 80 MG: 40 TABLET, FILM COATED ORAL at 16:14

## 2024-05-16 RX ADMIN — METOPROLOL SUCCINATE 150 MG: 50 TABLET, EXTENDED RELEASE ORAL at 16:14

## 2024-05-16 RX ADMIN — PROPOFOL 100 MG: 10 INJECTION, EMULSION INTRAVENOUS at 14:26

## 2024-05-16 RX ADMIN — HYDRALAZINE HYDROCHLORIDE 5 MG: 20 INJECTION INTRAMUSCULAR; INTRAVENOUS at 13:31

## 2024-05-16 RX ADMIN — Medication 10 ML: at 21:34

## 2024-05-16 RX ADMIN — LIDOCAINE HYDROCHLORIDE 100 MG: 10 INJECTION, SOLUTION EPIDURAL; INFILTRATION; INTRACAUDAL; PERINEURAL at 14:26

## 2024-05-16 RX ADMIN — BISACODYL 20 MG: 5 TABLET, COATED ORAL at 16:15

## 2024-05-16 RX ADMIN — PROPOFOL 100 MG: 10 INJECTION, EMULSION INTRAVENOUS at 14:32

## 2024-05-16 RX ADMIN — PEG-3350, SODIUM SULFATE, SODIUM CHLORIDE, POTASSIUM CHLORIDE, SODIUM ASCORBATE AND ASCORBIC ACID 1000 ML: KIT at 18:09

## 2024-05-16 RX ADMIN — SPIRONOLACTONE 25 MG: 25 TABLET ORAL at 16:14

## 2024-05-16 RX ADMIN — METOPROLOL SUCCINATE 100 MG: 25 TABLET, EXTENDED RELEASE ORAL at 11:00

## 2024-05-16 RX ADMIN — VALSARTAN 80 MG: 80 TABLET, FILM COATED ORAL at 16:14

## 2024-05-16 RX ADMIN — SODIUM CHLORIDE: 9 INJECTION, SOLUTION INTRAVENOUS at 14:24

## 2024-05-16 RX ADMIN — PANTOPRAZOLE SODIUM 80 MG: 40 INJECTION, POWDER, FOR SOLUTION INTRAVENOUS at 12:30

## 2024-05-16 NOTE — PLAN OF CARE
Problem: Adult Inpatient Plan of Care  Goal: Plan of Care Review  Outcome: Ongoing, Progressing  Goal: Patient-Specific Goal (Individualized)  Outcome: Ongoing, Progressing  Goal: Absence of Hospital-Acquired Illness or Injury  Outcome: Ongoing, Progressing  Goal: Optimal Comfort and Wellbeing  Outcome: Ongoing, Progressing  Goal: Readiness for Transition of Care  Outcome: Ongoing, Progressing  Intervention: Mutually Develop Transition Plan  Recent Flowsheet Documentation  Taken 5/16/2024 1537 by Melodie Mathew, RN  Transportation Anticipated: car, drives self  Patient/Family Anticipated Services at Transition: none  Patient/Family Anticipates Transition to: home  Taken 5/16/2024 1536 by Melodie Mathew, RN  Equipment Currently Used at Home: none   Goal Outcome Evaluation:

## 2024-05-16 NOTE — ANESTHESIA PREPROCEDURE EVALUATION
Anesthesia Evaluation     Patient summary reviewed and Nursing notes reviewed   NPO Solid Status: > 8 hours  NPO Liquid Status: > 2 hours           Airway   Mallampati: II  TM distance: >3 FB  Neck ROM: full  Possible difficult intubation  Dental    (+) upper dentures and edentulous    Pulmonary    (+) ,sleep apnea  (-) asthma, shortness of breath, not a smoker  Cardiovascular     ECG reviewed  Patient on routine beta blocker    (+) hypertension, CHF (NI CM) Diastolic >=55%, hyperlipidemia  (-) past MI, dysrhythmias, angina, cardiac stents    ROS comment: ECG NSR LAD LVH with repolarization abnormality Prolonged QT    ECHO EF = 65% moderate concentric LVH  valves normal.      Neuro/Psych  (-) seizures, CVA  GI/Hepatic/Renal/Endo    (+) GI bleeding lower , renal disease (Creat 1.8)- CRI, diabetes mellitus (not taking meds) type 2 poorly controlled  (-) liver disease, no thyroid disorder    Musculoskeletal     Abdominal    Substance History   (+) alcohol use     OB/GYN          Other        ROS/Med Hx Other: Bloody stools   HCT 33  PLTs normal   No Nausea no hematemesis   Ate Chilli 0430 (night shift)   HTN Esmolol drip PO atenolol- added IV hydrallazine                 Anesthesia Plan    ASA 3     general and MAC     (PFL (ETT ready) )  intravenous induction     Anesthetic plan, risks, benefits, and alternatives have been provided, discussed and informed consent has been obtained with: patient.    Plan discussed with CRNA.    CODE STATUS:

## 2024-05-16 NOTE — ANESTHESIA POSTPROCEDURE EVALUATION
Patient: Walt Gao    Procedure Summary       Date: 05/16/24 Room / Location:  [facility]    Anesthesia Start: 1422 Anesthesia Stop: 1442    Procedure: ESOPHAGOGASTRODUODENOSCOPY Diagnosis:     Surgeons: Duglas Zhou MD Provider: Dirk Lilly MD    Anesthesia Type: general, MAC ASA Status: 3            Anesthesia Type: general, MAC    Vitals  No vitals data found for the desired time range.          Post Anesthesia Care and Evaluation    Patient location during evaluation: PACU  Patient participation: waiting for patient participation  Level of consciousness: sleepy but conscious  Pain management: adequate    Airway patency: patent  Anesthetic complications: No anesthetic complications  PONV Status: none  Cardiovascular status: hemodynamically stable and acceptable  Respiratory status: nonlabored ventilation and acceptable  Hydration status: acceptable

## 2024-05-16 NOTE — CASE MANAGEMENT/SOCIAL WORK
Discharge Planning Assessment  [facility]     Patient Name: Walt Gao  MRN: [id number]  Today's Date: 5/16/2024    Admit Date: 5/16/2024    Plan: Home   Discharge Needs Assessment       Row Name 05/16/24 1355       Living Environment    People in Home alone    Current Living Arrangements apartment    Primary Care Provided by self    Provides Primary Care For no one    Family Caregiver if Needed sibling(s);significant other    Family Caregiver Names Patient stated that he can call on his girlfriend if needed; Guera Gao, sister    Quality of Family Relationships helpful;involved;supportive    Able to Return to Prior Arrangements yes       Transition Planning    Patient/Family Anticipates Transition to home    Patient/Family Anticipated Services at Transition        Discharge Needs Assessment    Equipment Currently Used at Home none    Concerns to be Addressed denies needs/concerns at this time    Discharge Coordination/Progress Lives in an apartment in [place] alone, can call on his girlfriend if needed.  No DME, history of home health or advanced directives.                   Discharge Plan       Row Name 05/16/24 4812       Plan    Plan Home    Patient/Family in Agreement with Plan yes    Plan Comments I spoke with Mr Gao at bedside.  Mr Gao resides in an apartment in [place] alone, but can call on his girlfriend if needed.  He does not use any DME, has not had home health in the past, and does not have any advanced directives.  His insurance is [employer], and there have not been any issues or lapses in coverage.  His insurance helps to cover prescriptions.  His PCP is Duglas Lang, and he gets his prescriptions filled at [facility] on the corner of [address] and [address]. At this time, there are no discharge needs.    Final Discharge Disposition Code 01 - home or self-care                  Continued Care and Services - Admitted Since 5/16/2024    No active  coordination exists for this encounter.          Demographic Summary    No documentation.                  Functional Status       Row Name 05/16/24 8895       Functional Status    Usual Activity Tolerance good    Current Activity Tolerance good       Functional Status, IADL    Medications independent    Meal Preparation independent    Housekeeping independent    Laundry independent    Shopping independent       Mental Status    General Appearance WDL WDL                   Psychosocial    No documentation.                  Abuse/Neglect    No documentation.                  Legal    No documentation.                  Substance Abuse    No documentation.                  Patient Forms    No documentation.                     Rekha Maldonado RN

## 2024-05-16 NOTE — Clinical Note
Level of Care: Telemetry [5]   Diagnosis: GI bleed [643364]   Admitting Physician: GISSEL HOLM [[id number]]   Bed Request Comments: CDU bed

## 2024-05-16 NOTE — PLAN OF CARE
Problem: Adult Inpatient Plan of Care  Goal: Plan of Care Review  5/16/2024 1720 by Melodie Mathew RN  Outcome: Ongoing, Progressing  5/16/2024 1540 by Melodie Mathew RN  Outcome: Ongoing, Progressing  Goal: Patient-Specific Goal (Individualized)  5/16/2024 1720 by Melodie Mathew RN  Outcome: Ongoing, Progressing  5/16/2024 1540 by Melodie Mathew RN  Outcome: Ongoing, Progressing  Goal: Absence of Hospital-Acquired Illness or Injury  5/16/2024 1720 by Melodie Mathew RN  Outcome: Ongoing, Progressing  5/16/2024 1540 by Melodie Mathew RN  Outcome: Ongoing, Progressing  Intervention: Identify and Manage Fall Risk  Recent Flowsheet Documentation  Taken 5/16/2024 1540 by Melodie Mathew RN  Safety Promotion/Fall Prevention:   activity supervised   safety round/check completed   nonskid shoes/slippers when out of bed   lighting adjusted  Intervention: Prevent Skin Injury  Recent Flowsheet Documentation  Taken 5/16/2024 1540 by Melodie Mathew RN  Body Position: position changed independently  Skin Protection:   adhesive use limited   transparent dressing maintained   tubing/devices free from skin contact  Intervention: Prevent and Manage VTE (Venous Thromboembolism) Risk  Recent Flowsheet Documentation  Taken 5/16/2024 1540 by Melodie Mathew RN  Activity Management: activity encouraged  VTE Prevention/Management: sequential compression devices off  Range of Motion: active ROM (range of motion) encouraged  Intervention: Prevent Infection  Recent Flowsheet Documentation  Taken 5/16/2024 1540 by Melodie Mathew RN  Infection Prevention:   hand hygiene promoted   single patient room provided  Goal: Optimal Comfort and Wellbeing  5/16/2024 1720 by Melodie Mathew RN  Outcome: Ongoing, Progressing  5/16/2024 1540 by Melodie Mathew RN  Outcome: Ongoing, Progressing  Intervention: Provide Person-Centered Care  Recent Flowsheet Documentation  Taken 5/16/2024 1540 by Melodie Mathew RN  Trust  Relationship/Rapport:   care explained   questions encouraged   questions answered  Goal: Readiness for Transition of Care  5/16/2024 1720 by Melodie Mathew, RN  Outcome: Ongoing, Progressing  5/16/2024 1540 by Melodie Mathew, RN  Outcome: Ongoing, Progressing  Intervention: Mutually Develop Transition Plan  Recent Flowsheet Documentation  Taken 5/16/2024 1537 by Melodie Mathew, RN  Transportation Anticipated: car, drives self  Patient/Family Anticipated Services at Transition: none  Patient/Family Anticipates Transition to: home  Taken 5/16/2024 1536 by Melodie Mathew, RN  Equipment Currently Used at Home: none   Goal Outcome Evaluation:

## 2024-05-16 NOTE — ED NOTES
" Walt Gao    Nursing Report ED to Floor:  Mental status: a&o x4  Ambulatory status: bedrest, normally ad rocio  Oxygen Therapy:  room air  Cardiac Rhythm: nsr  Admitted from: ED  Safety Concerns:  fall risk  Social Issues: n/a  ED Room #:  23    ED Nurse Phone Extension - [phone] or may call [phone].      HPI:   Chief Complaint   Patient presents with    Black or Bloody Stool       Past Medical History:  Past Medical History:   Diagnosis Date    CHF (congestive heart failure)     NICM (nonischemic cardiomyopathy)     Nonischemic cardiomyopathy 8/29/2016    · Cardiac catheterization revealing nonobstructive CAD, 2013 · LVEF 30% on heart catheterization, 2013 · Echo (8/29/16): Moderate LVH. LVEF 23%. Left ventricular diastolic dysfunction (grade II) consistent with pseudonormalization.  Moderate mitral valve regurgitation · Echo (2/21/2017):  LVEF 50%        Past Surgical History:  Past Surgical History:   Procedure Laterality Date    HERNIA REPAIR      as child        Admitting Doctor:   Carline Porter MD    Consulting Provider(s):  Consults       No orders found from 4/17/2024 to 5/17/2024.             Admitting Diagnosis:   The primary encounter diagnosis was Gastrointestinal hemorrhage, unspecified gastrointestinal hemorrhage type. Diagnoses of Acute blood loss anemia and Hypertensive urgency were also pertinent to this visit.    Most Recent Vitals:   Vitals:    05/16/24 0956 05/16/24 1038 05/16/24 1100 05/16/24 1230   BP: (!) 193/136 (!) 167/112 (!) 168/125 (!) 190/138   BP Location: Right arm   Right arm   Patient Position: Sitting   Lying   Pulse: 92 92 84 84   Resp: 18      Temp: 98.8 °F (37.1 °C)      TempSrc: Oral      SpO2: 96% 97%  96%   Weight: 95.3 kg (210 lb)      Height: 175.3 cm (69\")          Active LDAs/IV Access:   Lines, Drains & Airways       Active LDAs       Name Placement date Placement time Site Days    Peripheral IV 05/16/24 1036 Right Antecubital 05/16/24  1036  Antecubital  less than 1 "                    Labs (abnormal labs have a star):   Labs Reviewed   COMPREHENSIVE METABOLIC PANEL - Abnormal; Notable for the following components:       Result Value    Glucose 118 (*)     BUN 21 (*)     Creatinine 1.79 (*)     Alkaline Phosphatase 30 (*)     eGFR 43.1 (*)     All other components within normal limits    Narrative:     GFR Normal >60  Chronic Kidney Disease <60  Kidney Failure <15     CBC WITH AUTO DIFFERENTIAL - Abnormal; Notable for the following components:    RBC 3.03 (*)     Hemoglobin 12.7 (*)     Hematocrit 36.0 (*)     .8 (*)     MCH 41.9 (*)     RDW-SD 56.4 (*)     All other components within normal limits    Narrative:     Verified by repeat analysis.    Appended report. These results have been appended to a previously verified report.   POCT OCCULT BLOOD STOOL (ED ONLY) - Abnormal; Notable for the following components:    Fecal Occult Blood Positive (*)     All other components within normal limits   POCT CHEM 8 - Abnormal; Notable for the following components:    Creatinine 1.80 (*)     Sodium 137 (*)     eGFR 42.8 (*)     All other components within normal limits   LACTIC ACID, PLASMA - Normal   PROTIME-INR - Normal   RAINBOW DRAW    Narrative:     The following orders were created for panel order [facility] Draw.  Procedure                               Abnormality         Status                     ---------                               -----------         ------                     Green Top (Gel)[[id number]]                                  Final result               Lavender Top[[id number]]                                     Final result               Gold Top - SST[[id number]]                                   Final result               Morris Top[[id number]]                                         Final result               Light Blue Top[[id number]]                                   Final result                 Please view results for these tests on the individual orders.    SCAN SLIDE   SLIDE REVIEW, HEMATOLOGY   CBC WITH AUTO DIFFERENTIAL   TYPE AND SCREEN   ABORH 2ND SPECIMEN VERIFICATION   PREPARE RBC   CBC AND DIFFERENTIAL    Narrative:     The following orders were created for panel order CBC & Differential.  Procedure                               Abnormality         Status                     ---------                               -----------         ------                     CBC Auto Differential[[id number]]        Abnormal            Final result               Scan Slide[[id number]]                                       Edited Result - FINAL        Please view results for these tests on the individual orders.   GREEN TOP   LAVENDER TOP   GOLD TOP - SST   GRAY TOP   LIGHT BLUE TOP       Meds Given in ED:   Medications   sodium chloride 0.9 % flush 10 mL (has no administration in time range)   pantoprazole (PROTONIX) 40 mg in sodium chloride 0.9 % 100 mL (0.4 mg/mL) MBP (has no administration in time range)   esmolol (BREVIBLOC) 2500 mg in 250 mL NS infusion (25 mcg/kg/min × 95.3 kg Intravenous New Bag 5/16/24 1237)   metoprolol succinate XL (TOPROL-XL) 24 hr tablet 100 mg (100 mg Oral Given 5/16/24 1100)   iopamidol (ISOVUE-370) 76 % injection 100 mL (85 mL Intravenous Given 5/16/24 1130)   pantoprazole (PROTONIX) injection 80 mg (80 mg Intravenous Given 5/16/24 1230)     esmolol,  mcg/kg/min, Last Rate: 25 mcg/kg/min (05/16/24 1237)  pantoprazole, 8 mg/hr         Last NIH score:                                                          Dysphagia screening results:        Page Coma Scale:  No data recorded     CIWA:        Restraint Type:            Isolation Status:  No active isolations

## 2024-05-16 NOTE — H&P
[facility] Medicine Services  HISTORY AND PHYSICAL    Patient Name: Walt Gao  : 1965  MRN: [id number]  Primary Care Physician: Duglas Lang MD  Date of admission: 2024      Subjective   Subjective     Chief Complaint:  Gi bleed    HPI:  Walt Gao is a 59 y.o. male who presented to the ED with several episodes of dark red melena since yesterday. This is the first time he has had this happen. He reports significant alcohol use at least 6-12 beers most days and usually has higher amounts on the weekends. No history of cirrhosis. He does report hx of heart failure and uncontrolled high blood pressure. In the ED his BP was significantly elevated and he was treated with iv esmolol. He was started on iv protonix and GI consulted. GI is planning EGD today.       Personal History     Past Medical History:   Diagnosis Date    CHF (congestive heart failure)     NICM (nonischemic cardiomyopathy)     Nonischemic cardiomyopathy 2016    · Cardiac catheterization revealing nonobstructive CAD,  · LVEF 30% on heart catheterization,  · Echo (16): Moderate LVH. LVEF 23%. Left ventricular diastolic dysfunction (grade II) consistent with pseudonormalization.  Moderate mitral valve regurgitation · Echo (2017):  LVEF 50%           Past Surgical History:   Procedure Laterality Date    HERNIA REPAIR      as child       Family History: family history includes Coronary artery disease in an other family member; Diabetes in his father; Heart disease in his paternal grandfather and paternal grandmother; Hyperlipidemia in his father; Hypertension in his father and mother; Kidney disease in his father; No Known Problems in his daughter, sister, and son; Other in his maternal grandfather, maternal grandmother, and sister.     Social History:  reports that he has never smoked. He has never been exposed to tobacco smoke. He has never used smokeless tobacco. He reports  current alcohol use. He reports that he does not use drugs.  Social History     Social History Narrative    Patient drinks 6-7 servings of caffeine (tea) per day. Patient lives with sister.       Medications:  Available home medication information reviewed.  atorvastatin, dapagliflozin Propanediol, metoprolol succinate XL, spironolactone, and torsemide    No Known Allergies    Objective   Objective     Vital Signs:   Temp:  [98.2 °F (36.8 °C)-98.8 °F (37.1 °C)] 98.2 °F (36.8 °C)  Heart Rate:  [] 79  Resp:  [18] 18  BP: (163-221)/(112-151) 163/126       Physical Exam   Constitutional: No acute distress, awake, alert, nontoxic, normal body habitus  Eyes: Pupils equal, sclerae anicteric, no conjunctival injection  HENT: NCAT, mucous membranes moist  Neck: Supple, no thyromegaly, no lymphadenopathy  Respiratory: Clear to auscultation bilaterally, good effort, nonlabored respirations   Cardiovascular: RRR  Gastrointestinal: Positive bowel sounds, soft, nontender, mildly distended  Musculoskeletal: No peripheral edema, normal muscle tone for age  Psychiatric: Appropriate affect, good insight and judgement, cooperative  Neurologic: Oriented x 3, movements symmetric BUE and BLE, Cranial Nerves grossly intact, speech clear and fluent  Skin: No rashes, no jaundice, no petechiae, no mottling      Result Review:  I have personally reviewed the results from the time of this admission to 5/16/2024 14:20 EDT and agree with these findings:  [x]  Laboratory list / accordion  []  Microbiology  [x]  Radiology  []  EKG/Telemetry   []  Cardiology/Vascular   []  Pathology  []  Old records  []  Other:  Most notable findings include: WBC 7, hbg 11.7 (prior 14.9), Cr 1.8 (baseline)      LAB RESULTS:      Lab 05/16/24  1229 05/16/24  1215 05/16/24  1036   WBC 7.03  --  6.93   HEMOGLOBIN 11.7*  --  12.7*   HEMOGLOBIN, POC  --  12.9  --    HEMATOCRIT 33.9*  --  36.0*   HEMATOCRIT POC  --  38  --    PLATELETS 220  --  229   NEUTROS ABS  4.21  --  3.92   IMMATURE GRANS (ABS) 0.02  --  0.01   LYMPHS ABS 2.20  --  2.30   MONOS ABS 0.46  --  0.53   EOS ABS 0.13  --  0.15   .9*  --  118.8*   LACTATE  --   --  0.7   PROTIME  --   --  13.3   INR  --   --  1.00         Lab 05/16/24  1215 05/16/24  1036   SODIUM  --  139   POTASSIUM  --  4.3   CHLORIDE  --  104   CO2  --  24.0   ANION GAP  --  11.0   BUN  --  21*   CREATININE 1.80* 1.79*   EGFR 42.8* 43.1*   GLUCOSE  --  118*   CALCIUM  --  9.2         Lab 05/16/24  1036   TOTAL PROTEIN 7.6   ALBUMIN 4.2   GLOBULIN 3.4   ALT (SGPT) 19   AST (SGOT) 21   BILIRUBIN 0.3   ALK PHOS 30*                 Lab 05/16/24  1047   ABO TYPING B   RH TYPING Positive   ANTIBODY SCREEN Negative             Microbiology Results (last 10 days)       ** No results found for the last 240 hours. **            CT Abdomen Pelvis With & Without Contrast    Result Date: 5/16/2024  CT ABDOMEN PELVIS W WO CONTRAST Date of Exam: 5/16/2024 11:28 AM EDT Indication: GI bleed protocol. Comparison: None available. Technique: Axial CT images were obtained of the abdomen and pelvis before and after the uneventful intravenous administration of 85 mL Isovue-370. Sagittal and coronal reconstructions were performed.  Automated exposure control and iterative reconstruction methods were used. Findings: Lower thorax: No focal consolidation. Calcified mediastinal nodes consistent with healed granulomatous disease. Heart appears enlarged. Liver: Normal morphology of the liver. No focal hepatic lesion. Gallbladder and bile ducts: Gallbladder is unremarkable. No biliary ductal dilatation. Pancreas: No pancreatic duct dilation. No surrounding inflammation. Spleen: Normal in size. Adrenal glands: No discrete adrenal nodule. Kidneys: Symmetric in size and enhancement. No hydronephrosis. No nephroureterolithiasis. Small renal cysts, including a hemorrhagic/proteinaceous cyst in the right upper pole. Normal excretion of contrast on delayed phase.  Urinary bladder: Mildly distended. Reproductive organs: Prostatomegaly. Stomach and bowel: No evidence of acute gastrointestinal hemorrhage. No evidence of bowel obstruction. Colonic diverticulosis. Normal appendix. Lymph nodes: No pathologically enlarged lymph nodes. Vessels: No abdominal aortic aneurysm. Major vasculature is patent. Peritoneum and retroperitoneum: No free air or free fluid. Soft tissues: Small fat-containing umbilical hernia. Osseous structures: No acute or suspicious osseous lesions.     Impression: Impression: No evidence of acute gastrointestinal hemorrhage or other acute abdominopelvic process. Colonic diverticulosis. Ancillary findings as above. Electronically Signed: Crescencio Squires MD  5/16/2024 11:53 AM EDT  Workstation ID: [id number]     Results for orders placed during the hospital encounter of 11/29/22    Adult Transthoracic Echo Complete W/ Cont if Necessary Per Protocol    Interpretation Summary    Left ventricular systolic function is normal. Estimated left ventricular EF = 65%    Left ventricular wall thickness is consistent with mild to moderate concentric hypertrophy.    The cardiac valves are anatomically and functionally normal.      Assessment & Plan   Assessment & Plan       GI bleed    Essential hypertension    Chronic renal impairment, stage 3 (moderate)    Non-ischemic cardiomyopathy    Alcohol abuse    Mixed hyperlipidemia        Pt is a 60 yo with past history of HTN, non-ischemic cardiomyopathy, hyperlipidemia, CKD stage 3 and alcohol abuse who presented to the ED with complaints of dark red melena over the past 2 days. GI is consulted and planning EGD 5/16/24.    GI bleed  Hx alcohol abuse  -monitor h/h  -consulted GI, plans EGD today  -continue protonix  -monitor labs for liver enzymes/function, further workup per GI    Hypertensive urgency  Uncontrolled HTN  -started on esmolol in ED  -continue home meds   -add hydralazine as needed    CKD, stage 3  -continue to  monitor    Non-ischemic cardiomyopathy  -continue home meds    Hyperlipidemia  -continue statin        DVT prophylaxis:  Mechanical DVT prophylaxis orders are signed and held.            CODE STATUS:    There are no questions and answers to display.       Expected Discharge   Expected discharge date/ time has not been documented.     Carline Porter MD  05/16/24

## 2024-05-16 NOTE — CONSULTS
[facility] [place] Gastroenterology Consult    Referring Provider: Carline Porter MD     PCP: Duglas Lang MD    Reason for Consultation: GI bleeding     Chief complaint: Maroon stools     History of present illness:    Walt Gao is a 59 y.o. male who is admitted with acute gastrointestinal bleeding.  He describes acute onset of dark maroon stool overnight.   He denies abdominal pain, nausea nor vomiting.  He denies any NSAID use nor anticoagulation.       He underwent a screening colonoscopy in February 2024 with suboptimal prep and diverticulosis.      Allergies:  Patient has no known allergies.    Scheduled Meds:        Infusions:  esmolol,  mcg/kg/min, Last Rate: 75 mcg/kg/min (05/16/24 1254)  pantoprazole, 8 mg/hr      PRN Meds:    sodium chloride    Home Meds:  (Not in a hospital admission)    ROS: Review of Systems   Constitutional:  Positive for fatigue. Negative for chills.   Respiratory: Negative.     Cardiovascular: Negative.    Gastrointestinal:  Positive for blood in stool. Negative for nausea and vomiting.   Musculoskeletal: Negative.    Skin: Negative.    Neurological:  Negative for dizziness and light-headedness.   Hematological: Negative.    Psychiatric/Behavioral: Negative.       PAST MED HX:  Past Medical History:   Diagnosis Date    CHF (congestive heart failure)     NICM (nonischemic cardiomyopathy)     Nonischemic cardiomyopathy 8/29/2016    · Cardiac catheterization revealing nonobstructive CAD, 2013 · LVEF 30% on heart catheterization, 2013 · Echo (8/29/16): Moderate LVH. LVEF 23%. Left ventricular diastolic dysfunction (grade II) consistent with pseudonormalization.  Moderate mitral valve regurgitation · Echo (2/21/2017):  LVEF 50%     PAST SURG HX:  Past Surgical History:   Procedure Laterality Date    HERNIA REPAIR      as child     FAM HX:  Family History   Problem Relation Age of Onset    Coronary artery disease Other     Hypertension Mother     Diabetes Father     Hypertension  "Father     Kidney disease Father     Hyperlipidemia Father     Other Sister         pre diabetes    Other Maternal Grandmother         unknown    Other Maternal Grandfather         unknown    Heart disease Paternal Grandmother     Heart disease Paternal Grandfather     No Known Problems Sister     No Known Problems Daughter     No Known Problems Son      SOC HX:  Social History     Socioeconomic History    Marital status: Single   Tobacco Use    Smoking status: Never     Passive exposure: Never    Smokeless tobacco: Never   Vaping Use    Vaping status: Never Used   Substance and Sexual Activity    Alcohol use: Yes     Comment: ON WEEKENDS, 12 PACK.    Drug use: Never    Sexual activity: Yes     Partners: Female     Birth control/protection: Post-menopausal     Comment: 1 partner in the last 9 years     PHYSICAL EXAM  BP (!) 196/136   Pulse 81   Temp 98.8 °F (37.1 °C) (Oral)   Resp 18   Ht 175.3 cm (69\")   Wt 95.3 kg (210 lb)   SpO2 97%   BMI 31.01 kg/m²   Wt Readings from Last 3 Encounters:   05/16/24 95.3 kg (210 lb)   01/29/24 97.1 kg (214 lb)   01/25/24 96.6 kg (213 lb)   ,body mass index is 31.01 kg/m².  Physical Exam  HENT:      Head: Normocephalic and atraumatic.   Eyes:      General: No scleral icterus.  Cardiovascular:      Rate and Rhythm: Normal rate and regular rhythm.   Pulmonary:      Effort: Pulmonary effort is normal. No respiratory distress.   Abdominal:      General: Bowel sounds are normal. There is no distension.      Palpations: Abdomen is soft.      Tenderness: There is no abdominal tenderness. There is no guarding.   Skin:     General: Skin is warm and dry.   Neurological:      Mental Status: He is alert and oriented to person, place, and time.       Results Review:   I reviewed the patient's new clinical results.    Lab Results   Component Value Date    WBC 7.03 05/16/2024    HGB 11.7 (L) 05/16/2024    HGB 12.9 05/16/2024    HGB 12.7 (L) 05/16/2024    HCT 33.9 (L) 05/16/2024    MCV " 118.9 (H) 05/16/2024     05/16/2024     Lab Results   Component Value Date    INR 1.00 05/16/2024     Lab Results   Component Value Date    GLUCOSE 118 (H) 05/16/2024    BUN 21 (H) 05/16/2024    CREATININE 1.80 (H) 05/16/2024    EGFRIFAFRI 64 04/12/2021    BCR 11.7 05/16/2024     05/16/2024    K 4.3 05/16/2024    CO2 24.0 05/16/2024    CALCIUM 9.2 05/16/2024    ALBUMIN 4.2 05/16/2024    ALKPHOS 30 (L) 05/16/2024    BILITOT 0.3 05/16/2024    ALT 19 05/16/2024    AST 21 05/16/2024     CT Abdomen/Pelvis with and without contrast:  Findings:   Lower thorax: No focal consolidation. Calcified mediastinal nodes consistent with healed granulomatous disease. Heart appears enlarged.   Liver: Normal morphology of the liver. No focal hepatic lesion.   Gallbladder and bile ducts: Gallbladder is unremarkable. No biliary ductal dilatation.    Pancreas: No pancreatic duct dilation. No surrounding inflammation.    Spleen: Normal in size.    Adrenal glands: No discrete adrenal nodule.    Kidneys: Symmetric in size and enhancement. No hydronephrosis. No nephroureterolithiasis. Small renal cysts, including a hemorrhagic/proteinaceous cyst in the right upper pole. Normal excretion of contrast on delayed phase.   Urinary bladder: Mildly distended.   Reproductive organs: Prostatomegaly.   Stomach and bowel: No evidence of acute gastrointestinal hemorrhage. No evidence of bowel obstruction. Colonic diverticulosis. Normal appendix.   Lymph nodes: No pathologically enlarged lymph nodes.    Vessels: No abdominal aortic aneurysm. Major vasculature is patent.    Peritoneum and retroperitoneum: No free air or free fluid.    Soft tissues: Small fat-containing umbilical hernia.   Osseous structures: No acute or suspicious osseous lesions.    IMPRESSION:  Impression:   No evidence of acute gastrointestinal hemorrhage or other acute abdominopelvic process.   Colonic diverticulosis.   Ancillary findings as  above.    ASSESSMENTS/PLANS    Gastrointestinal bleeding with melena   Acute blood loss anemia, mild   Macrocytosis, patient voices 2-3 beers daily but no history of alcohol dependence/withdrawal   Diverticulosis   CKD     >> Recommend EGD today  >> IV Protonix 40 mg BID  >> Serial H&H.  Type and screen.    >> If EGD is unremarkable, recommend colonoscopy tomorrow.   Differential would include diverticular bleed.       I discussed the patient's findings and my recommendations with patient and his hospitalist Dr. Porter.       AD Esquivel  05/16/24  13:03 EDT

## 2024-05-16 NOTE — PLAN OF CARE
Problem: Adult Inpatient Plan of Care  Goal: Plan of Care Review  5/16/2024 1721 by Melodie Mathew RN  Outcome: Ongoing, Progressing  5/16/2024 1720 by Melodie Mathew RN  Outcome: Ongoing, Progressing  5/16/2024 1540 by Melodie Mathew RN  Outcome: Ongoing, Progressing  Goal: Patient-Specific Goal (Individualized)  5/16/2024 1721 by Melodie Mathew RN  Outcome: Ongoing, Progressing  5/16/2024 1720 by Melodie Mathew RN  Outcome: Ongoing, Progressing  5/16/2024 1540 by Melodie Mathew RN  Outcome: Ongoing, Progressing  Goal: Absence of Hospital-Acquired Illness or Injury  5/16/2024 1721 by Melodie Mathew RN  Outcome: Ongoing, Progressing  5/16/2024 1720 by Melodie Mathew RN  Outcome: Ongoing, Progressing  5/16/2024 1540 by Mleodie Mathew RN  Outcome: Ongoing, Progressing  Intervention: Identify and Manage Fall Risk  Recent Flowsheet Documentation  Taken 5/16/2024 1540 by Melodie Mathew RN  Safety Promotion/Fall Prevention:   activity supervised   safety round/check completed   nonskid shoes/slippers when out of bed   lighting adjusted  Intervention: Prevent Skin Injury  Recent Flowsheet Documentation  Taken 5/16/2024 1540 by Melodie Mathew RN  Body Position: position changed independently  Skin Protection:   adhesive use limited   transparent dressing maintained   tubing/devices free from skin contact  Intervention: Prevent and Manage VTE (Venous Thromboembolism) Risk  Recent Flowsheet Documentation  Taken 5/16/2024 1540 by Melodie Mathew RN  Activity Management: activity encouraged  VTE Prevention/Management: sequential compression devices off  Range of Motion: active ROM (range of motion) encouraged  Intervention: Prevent Infection  Recent Flowsheet Documentation  Taken 5/16/2024 1540 by Melodie Mathew RN  Infection Prevention:   hand hygiene promoted   single patient room provided  Goal: Optimal Comfort and Wellbeing  5/16/2024 1721 by Melodie Mathew RN  Outcome: Ongoing,  Progressing  5/16/2024 1720 by Melodie Mathew RN  Outcome: Ongoing, Progressing  5/16/2024 1540 by Melodie Mathew RN  Outcome: Ongoing, Progressing  Intervention: Provide Person-Centered Care  Recent Flowsheet Documentation  Taken 5/16/2024 1540 by Meloide Mathew RN  Trust Relationship/Rapport:   care explained   questions encouraged   questions answered  Goal: Readiness for Transition of Care  5/16/2024 1721 by Melodie Mathew RN  Outcome: Ongoing, Progressing  5/16/2024 1720 by Melodie Mathew RN  Outcome: Ongoing, Progressing  5/16/2024 1540 by Melodie Mathew RN  Outcome: Ongoing, Progressing  Intervention: Mutually Develop Transition Plan  Recent Flowsheet Documentation  Taken 5/16/2024 1537 by Melodie Mathew, RN  Transportation Anticipated: car, drives self  Patient/Family Anticipated Services at Transition: none  Patient/Family Anticipates Transition to: home  Taken 5/16/2024 1536 by Melodie Mathew, RN  Equipment Currently Used at Home: none   Goal Outcome Evaluation:

## 2024-05-16 NOTE — ED PROVIDER NOTES
Subjective   History of Present Illness  Patient is a pleasant 59-year-old male who presents to the emergency department with rectal bleeding.  States that noticed the bleeding this morning is a blackish color.  Denies abdominal pain.  Denies history of GI bleeds.  Denies current anticoagulation.  Denies other acute complaints.  Does have a history of hypertension and diabetes.  Admits that he has not taken his morning medications yet.    Denies fever, chills, chest pain, shortness of breath, abdominal pain, vomiting, or other acute complaints.      Review of Systems   All other systems reviewed and are negative.      Past Medical History:   Diagnosis Date    CHF (congestive heart failure)     NICM (nonischemic cardiomyopathy)     Nonischemic cardiomyopathy 8/29/2016    · Cardiac catheterization revealing nonobstructive CAD, 2013 · LVEF 30% on heart catheterization, 2013 · Echo (8/29/16): Moderate LVH. LVEF 23%. Left ventricular diastolic dysfunction (grade II) consistent with pseudonormalization.  Moderate mitral valve regurgitation · Echo (2/21/2017):  LVEF 50%       No Known Allergies    Past Surgical History:   Procedure Laterality Date    HERNIA REPAIR      as child       Family History   Problem Relation Age of Onset    Coronary artery disease Other     Hypertension Mother     Diabetes Father     Hypertension Father     Kidney disease Father     Hyperlipidemia Father     Other Sister         pre diabetes    Other Maternal Grandmother         unknown    Other Maternal Grandfather         unknown    Heart disease Paternal Grandmother     Heart disease Paternal Grandfather     No Known Problems Sister     No Known Problems Daughter     No Known Problems Son        Social History     Socioeconomic History    Marital status: Single   Tobacco Use    Smoking status: Never     Passive exposure: Never    Smokeless tobacco: Never   Vaping Use    Vaping status: Never Used   Substance and Sexual Activity    Alcohol use:  Yes     Comment: ON WEEKENDS, 12 PACK.    Drug use: Never    Sexual activity: Yes     Partners: Female     Birth control/protection: Post-menopausal     Comment: 1 partner in the last 9 years           Objective   Physical Exam  Vitals and nursing note reviewed.   Constitutional:       General: He is not in acute distress.     Appearance: Normal appearance.   HENT:      Head: Normocephalic and atraumatic.   Eyes:      Conjunctiva/sclera: Conjunctivae normal.      Pupils: Pupils are equal, round, and reactive to light.   Cardiovascular:      Rate and Rhythm: Normal rate and regular rhythm.      Heart sounds: Normal heart sounds.   Pulmonary:      Effort: Pulmonary effort is normal. No respiratory distress.      Breath sounds: Normal breath sounds.   Abdominal:      General: Bowel sounds are normal. There is no distension.      Palpations: Abdomen is soft. There is no mass.      Tenderness: There is no abdominal tenderness. There is no rebound.   Musculoskeletal:         General: Normal range of motion.      Cervical back: Normal range of motion and neck supple.   Skin:     General: Skin is warm and dry.      Capillary Refill: Capillary refill takes less than 2 seconds.   Neurological:      General: No focal deficit present.      Mental Status: He is alert and oriented to person, place, and time.   Psychiatric:         Behavior: Behavior normal.         Thought Content: Thought content normal.         Critical Care    Performed by: Elijah Pearce DO  Authorized by: Elijah Pearce DO    Critical care provider statement:     Critical care time (minutes):  35    Critical care time was exclusive of:  Separately billable procedures and treating other patients    Critical care was necessary to treat or prevent imminent or life-threatening deterioration of the following conditions:  Circulatory failure    Critical care was time spent personally by me on the following activities:  Ordering and performing treatments and  interventions, ordering and review of laboratory studies, ordering and review of radiographic studies, pulse oximetry, re-evaluation of patient's condition, review of old charts, obtaining history from patient or surrogate, examination of patient, evaluation of patient's response to treatment, discussions with consultants and development of treatment plan with patient or surrogate    I assumed direction of critical care for this patient from another provider in my specialty: no      Care discussed with: admitting provider               ED Course  ED Course as of 05/16/24 2230   Thu May 16, 2024   1203 Patient a large bloody bowel movement.  We will repeat H&H at this time.  Prepared 1 unit packed red blood cells his H&H is currently not requiring transfusion.  Given the amount of blood loss and bowel movement, there is a high probability of needing transfusion during this admission. [CP]   1212 Although I am hesitant to treat patient's significant hypertension given his GI bleed, despite the bleeding his blood pressure is remaining significantly elevated with a diastolic close to 130.  Will start esmolol drip as should his pressure drop either from the GI bleed or medication, given the short half-life, we can discontinue the drip. [CP]      ED Course User Index  [CP] Elijah Pearce DO      Recent Results (from the past 24 hour(s))   Comprehensive Metabolic Panel    Collection Time: 05/16/24 10:36 AM    Specimen: Blood   Result Value Ref Range    Glucose 118 (H) 65 - 99 mg/dL    BUN 21 (H) 6 - 20 mg/dL    Creatinine 1.79 (H) 0.76 - 1.27 mg/dL    Sodium 139 136 - 145 mmol/L    Potassium 4.3 3.5 - 5.2 mmol/L    Chloride 104 98 - 107 mmol/L    CO2 24.0 22.0 - 29.0 mmol/L    Calcium 9.2 8.6 - 10.5 mg/dL    Total Protein 7.6 6.0 - 8.5 g/dL    Albumin 4.2 3.5 - 5.2 g/dL    ALT (SGPT) 19 1 - 41 U/L    AST (SGOT) 21 1 - 40 U/L    Alkaline Phosphatase 30 (L) 39 - 117 U/L    Total Bilirubin 0.3 0.0 - 1.2 mg/dL    Globulin 3.4  gm/dL    A/G Ratio 1.2 g/dL    BUN/Creatinine Ratio 11.7 7.0 - 25.0    Anion Gap 11.0 5.0 - 15.0 mmol/L    eGFR 43.1 (L) >60.0 mL/min/1.73   Lactic Acid, Plasma    Collection Time: 05/16/24 10:36 AM    Specimen: Blood   Result Value Ref Range    Lactate 0.7 0.5 - 2.0 mmol/L   Green Top (Gel)    Collection Time: 05/16/24 10:36 AM   Result Value Ref Range    Extra Tube Hold for add-ons.    Lavender Top    Collection Time: 05/16/24 10:36 AM   Result Value Ref Range    Extra Tube hold for add-on    Gold Top - SST    Collection Time: 05/16/24 10:36 AM   Result Value Ref Range    Extra Tube Hold for add-ons.    Gray Top    Collection Time: 05/16/24 10:36 AM   Result Value Ref Range    Extra Tube Hold for add-ons.    Light Blue Top    Collection Time: 05/16/24 10:36 AM   Result Value Ref Range    Extra Tube Hold for add-ons.    CBC Auto Differential    Collection Time: 05/16/24 10:36 AM    Specimen: Blood   Result Value Ref Range    WBC 6.93 3.40 - 10.80 10*3/mm3    RBC 3.03 (L) 4.14 - 5.80 10*6/mm3    Hemoglobin 12.7 (L) 13.0 - 17.7 g/dL    Hematocrit 36.0 (L) 37.5 - 51.0 %    .8 (H) 79.0 - 97.0 fL    MCH 41.9 (H) 26.6 - 33.0 pg    MCHC 35.3 31.5 - 35.7 g/dL    RDW 12.8 12.3 - 15.4 %    RDW-SD 56.4 (H) 37.0 - 54.0 fl    MPV 9.5 6.0 - 12.0 fL    Platelets 229 140 - 450 10*3/mm3    Neutrophil % 56.6 42.7 - 76.0 %    Lymphocyte % 33.2 19.6 - 45.3 %    Monocyte % 7.6 5.0 - 12.0 %    Eosinophil % 2.2 0.3 - 6.2 %    Basophil % 0.3 0.0 - 1.5 %    Immature Grans % 0.1 0.0 - 0.5 %    Neutrophils, Absolute 3.92 1.70 - 7.00 10*3/mm3    Lymphocytes, Absolute 2.30 0.70 - 3.10 10*3/mm3    Monocytes, Absolute 0.53 0.10 - 0.90 10*3/mm3    Eosinophils, Absolute 0.15 0.00 - 0.40 10*3/mm3    Basophils, Absolute 0.02 0.00 - 0.20 10*3/mm3    Immature Grans, Absolute 0.01 0.00 - 0.05 10*3/mm3    nRBC 0.0 0.0 - 0.2 /100 WBC   Protime-INR    Collection Time: 05/16/24 10:36 AM    Specimen: Blood   Result Value Ref Range    Protime 13.3  12.2 - 14.5 Seconds    INR 1.00 0.89 - 1.12   Scan Slide    Collection Time: 05/16/24 10:36 AM    Specimen: Blood   Result Value Ref Range    Macrocytes Mod/2+ None Seen    WBC Morphology Normal Normal    Platelet Morphology Normal Normal   Magnesium    Collection Time: 05/16/24 10:36 AM    Specimen: Blood   Result Value Ref Range    Magnesium 1.9 1.6 - 2.6 mg/dL   Type & Screen    Collection Time: 05/16/24 10:47 AM    Specimen: Blood   Result Value Ref Range    ABO Type B     RH type Positive     Antibody Screen Negative     T&S Expiration Date 5/19/2024 11:59:59 PM    POC Occult Blood Stool    Collection Time: 05/16/24 10:47 AM    Specimen: Stool   Result Value Ref Range    Fecal Occult Blood Positive (A)     Lot Number [id number]     Expiration Date 2/26     DEVELOPER LOT NUMBER [id number]     DEVELOPER EXPIRATION DATE 8/2,027     Positive Control Positive     Negative Control Negative    POC Chem 8    Collection Time: 05/16/24 12:15 PM    Specimen: Blood   Result Value Ref Range    Glucose 118 70 - 130 mg/dL    BUN 23 8 - 26 mg/dL    Creatinine 1.80 (H) 0.60 - 1.30 mg/dL    Sodium 137 (L) 138 - 146 mmol/L    POC Potassium 3.8 3.5 - 4.9 mmol/L    Chloride 100 98 - 109 mmol/L    Total CO2 27 24 - 29 mmol/L    Hemoglobin 12.9 12.0 - 17.0 g/dL    Hematocrit 38 38 - 51 %    Ionized Calcium 1.22 1.20 - 1.32 mmol/L    eGFR 42.8 (L) >60.0 mL/min/1.73   CBC Auto Differential    Collection Time: 05/16/24 12:29 PM    Specimen: Blood   Result Value Ref Range    WBC 7.03 3.40 - 10.80 10*3/mm3    RBC 2.85 (L) 4.14 - 5.80 10*6/mm3    Hemoglobin 11.7 (L) 13.0 - 17.7 g/dL    Hematocrit 33.9 (L) 37.5 - 51.0 %    .9 (H) 79.0 - 97.0 fL    MCH 41.1 (H) 26.6 - 33.0 pg    MCHC 34.5 31.5 - 35.7 g/dL    RDW 13.0 12.3 - 15.4 %    RDW-SD 57.2 (H) 37.0 - 54.0 fl    MPV 9.5 6.0 - 12.0 fL    Platelets 220 140 - 450 10*3/mm3    Neutrophil % 60.0 42.7 - 76.0 %    Lymphocyte % 31.3 19.6 - 45.3 %    Monocyte % 6.5 5.0 - 12.0 %    Eosinophil % 1.8  0.3 - 6.2 %    Basophil % 0.1 0.0 - 1.5 %    Immature Grans % 0.3 0.0 - 0.5 %    Neutrophils, Absolute 4.21 1.70 - 7.00 10*3/mm3    Lymphocytes, Absolute 2.20 0.70 - 3.10 10*3/mm3    Monocytes, Absolute 0.46 0.10 - 0.90 10*3/mm3    Eosinophils, Absolute 0.13 0.00 - 0.40 10*3/mm3    Basophils, Absolute 0.01 0.00 - 0.20 10*3/mm3    Immature Grans, Absolute 0.02 0.00 - 0.05 10*3/mm3    nRBC 0.0 0.0 - 0.2 /100 WBC   ECG 12 Lead Pre-Op / Pre-Procedure    Collection Time: 05/16/24  1:30 PM   Result Value Ref Range    QT Interval 430 ms    QTC Interval 490 ms   ABO RH Specimen Verification    Collection Time: 05/16/24  1:59 PM    Specimen: Blood   Result Value Ref Range    ABO Type B     RH type Positive    Prepare RBC, 1 Units    Collection Time: 05/16/24  3:11 PM   Result Value Ref Range    Product Code E0109W96     Unit Number [id number]     UNIT  ABO B     UNIT  RH POS     Crossmatch Interpretation Compatible     Dispense Status XM     Blood Expiration Date [id number]     Blood Type Barcode 7300      Note: In addition to lab results from this visit, the labs listed above may include labs taken at another facility or during a different encounter within the last 24 hours. Please correlate lab times with ED admission and discharge times for further clarification of the services performed during this visit.    CT Abdomen Pelvis With & Without Contrast   Final Result   Impression:      No evidence of acute gastrointestinal hemorrhage or other acute abdominopelvic process.      Colonic diverticulosis.      Ancillary findings as above.         Electronically Signed: Crescencio Squires MD     5/16/2024 11:53 AM EDT     Workstation ID: [id number]        Vitals:    05/16/24 1510 05/16/24 1515 05/16/24 1552 05/16/24 2135   BP: 127/91 135/95 144/95 (!) 161/114   BP Location:   Left arm    Patient Position:   Sitting    Pulse: 85 85 80 73   Resp:   18    Temp:   97.9 °F (36.6 °C)    TempSrc:   Oral    SpO2: 92% 93% 96%    Weight:        Height:         Medications   amLODIPine (NORVASC) tablet 10 mg (10 mg Oral Given 5/16/24 2135)   atorvastatin (LIPITOR) tablet 80 mg (80 mg Oral Given 5/16/24 1614)   metoprolol succinate XL (TOPROL-XL) 24 hr tablet 150 mg (150 mg Oral Given 5/16/24 1614)   spironolactone (ALDACTONE) tablet 25 mg (25 mg Oral Given 5/16/24 1614)   torsemide (DEMADEX) tablet 10 mg ( Oral Dose Auto Held 5/24/24 0900)   valsartan (DIOVAN) tablet 80 mg (80 mg Oral Given 5/16/24 1614)   sodium chloride 0.9 % flush 10 mL (10 mL Intravenous Given 5/16/24 2134)   sodium chloride 0.9 % flush 10 mL (has no administration in time range)   sodium chloride 0.9 % infusion 40 mL (has no administration in time range)   acetaminophen (TYLENOL) tablet 650 mg (has no administration in time range)     Or   acetaminophen (TYLENOL) 160 MG/5ML oral solution 650 mg (has no administration in time range)     Or   acetaminophen (TYLENOL) suppository 650 mg (has no administration in time range)   ondansetron ODT (ZOFRAN-ODT) disintegrating tablet 4 mg (has no administration in time range)     Or   ondansetron (ZOFRAN) injection 4 mg (has no administration in time range)   hydrALAZINE (APRESOLINE) tablet 25 mg (has no administration in time range)   pantoprazole (PROTONIX) EC tablet 40 mg (has no administration in time range)   PEG-KCl-NaCl-NaSulf-Na Asc-C (MOVIPREP) powder 1,000 mL (1,000 mL Oral Given 5/16/24 1809)     Followed by   PEG-KCl-NaCl-NaSulf-Na Asc-C (MOVIPREP) powder 1,000 mL (has no administration in time range)   metoprolol succinate XL (TOPROL-XL) 24 hr tablet 100 mg (100 mg Oral Given 5/16/24 1100)   iopamidol (ISOVUE-370) 76 % injection 100 mL (85 mL Intravenous Given 5/16/24 1130)   pantoprazole (PROTONIX) injection 80 mg (80 mg Intravenous Given 5/16/24 1230)   hydrALAZINE (APRESOLINE) injection 5 mg (5 mg Intravenous Given 5/16/24 1331)   hydrALAZINE (APRESOLINE) injection 5 mg (5 mg Intravenous Given 5/16/24 1403)   bisacodyl  (DULCOLAX) EC tablet 20 mg (20 mg Oral Given 5/16/24 1615)     ECG/EMG Results (last 24 hours)       ** No results found for the last 24 hours. **          ECG 12 Lead Pre-Op / Pre-Procedure   Preliminary Result   Test Reason : Pre-Op / Pre-Procedure   Blood Pressure :   */*   mmHG   Vent. Rate :  78 BPM     Atrial Rate :  78 BPM      P-R Int : 160 ms          QRS Dur : 112 ms       QT Int : 430 ms       P-R-T Axes :  48 -37 214 degrees      QTc Int : 490 ms      ** Poor data quality, interpretation may be adversely affected   Normal sinus rhythm   Left axis deviation   Left ventricular hypertrophy with repolarization abnormality   Prolonged QT   Abnormal ECG   When compared with ECG of 29-AUG-2016 15:04,   T wave inversion more evident in Inferior leads      Referred By: NIGEL TOMAS           Confirmed By:                                                  Medical Decision Making  Problems Addressed:  Acute blood loss anemia: complicated acute illness or injury  Gastrointestinal hemorrhage, unspecified gastrointestinal hemorrhage type: complicated acute illness or injury  Hypertensive urgency: complicated acute illness or injury    Amount and/or Complexity of Data Reviewed  External Data Reviewed: notes.  Labs: ordered. Decision-making details documented in ED Course.  Radiology: ordered and independent interpretation performed. Decision-making details documented in ED Course.    Risk  Prescription drug management.  Decision regarding hospitalization.        Final diagnoses:   Gastrointestinal hemorrhage, unspecified gastrointestinal hemorrhage type   Acute blood loss anemia   Hypertensive urgency       ED Disposition  ED Disposition       ED Disposition   Decision to Admit    Condition   --    Comment   Level of Care: Telemetry [5]   Diagnosis: GI bleed [611658]   Admitting Physician: GISSEL HOLM [[id number]   Bed Request Comments: CDU bed                  Elijah Pearce DO  05/16/24 2364

## 2024-05-17 ENCOUNTER — ANESTHESIA EVENT (OUTPATIENT)
Dept: GASTROENTEROLOGY | Facility: HOSPITAL | Age: 59
End: 2024-05-17
Payer: COMMERCIAL

## 2024-05-17 ENCOUNTER — ANESTHESIA (OUTPATIENT)
Dept: GASTROENTEROLOGY | Facility: HOSPITAL | Age: 59
End: 2024-05-17
Payer: COMMERCIAL

## 2024-05-17 ENCOUNTER — READMISSION MANAGEMENT (OUTPATIENT)
Dept: CALL CENTER | Facility: HOSPITAL | Age: 59
End: 2024-05-17
Payer: COMMERCIAL

## 2024-05-17 VITALS
BODY MASS INDEX: 31.55 KG/M2 | HEIGHT: 69 IN | SYSTOLIC BLOOD PRESSURE: 143 MMHG | TEMPERATURE: 98.1 F | WEIGHT: 213 LBS | RESPIRATION RATE: 16 BRPM | HEART RATE: 79 BPM | DIASTOLIC BLOOD PRESSURE: 107 MMHG | OXYGEN SATURATION: 93 %

## 2024-05-17 PROBLEM — K92.1 MELENA: Status: ACTIVE | Noted: 2024-05-17

## 2024-05-17 PROBLEM — K92.1 MELENA: Status: RESOLVED | Noted: 2024-05-17 | Resolved: 2024-05-17

## 2024-05-17 LAB
ALBUMIN SERPL-MCNC: 4 G/DL (ref 3.5–5.2)
ALBUMIN/GLOB SERPL: 1.2 G/DL
ALP SERPL-CCNC: 25 U/L (ref 39–117)
ALT SERPL W P-5'-P-CCNC: 17 U/L (ref 1–41)
ANION GAP SERPL CALCULATED.3IONS-SCNC: 12 MMOL/L (ref 5–15)
AST SERPL-CCNC: 17 U/L (ref 1–40)
BASOPHILS # BLD AUTO: 0.03 10*3/MM3 (ref 0–0.2)
BASOPHILS NFR BLD AUTO: 0.4 % (ref 0–1.5)
BILIRUB SERPL-MCNC: 0.7 MG/DL (ref 0–1.2)
BUN SERPL-MCNC: 18 MG/DL (ref 6–20)
BUN/CREAT SERPL: 10.5 (ref 7–25)
CALCIUM SPEC-SCNC: 9.2 MG/DL (ref 8.6–10.5)
CHLORIDE SERPL-SCNC: 105 MMOL/L (ref 98–107)
CO2 SERPL-SCNC: 22 MMOL/L (ref 22–29)
CREAT SERPL-MCNC: 1.72 MG/DL (ref 0.76–1.27)
DEPRECATED RDW RBC AUTO: 54.6 FL (ref 37–54)
EGFRCR SERPLBLD CKD-EPI 2021: 45.2 ML/MIN/1.73
EOSINOPHIL # BLD AUTO: 0.1 10*3/MM3 (ref 0–0.4)
EOSINOPHIL NFR BLD AUTO: 1.4 % (ref 0.3–6.2)
ERYTHROCYTE [DISTWIDTH] IN BLOOD BY AUTOMATED COUNT: 12.7 % (ref 12.3–15.4)
GLOBULIN UR ELPH-MCNC: 3.4 GM/DL
GLUCOSE SERPL-MCNC: 98 MG/DL (ref 65–99)
HCT VFR BLD AUTO: 34.7 % (ref 37.5–51)
HGB BLD-MCNC: 12.4 G/DL (ref 13–17.7)
IMM GRANULOCYTES # BLD AUTO: 0.03 10*3/MM3 (ref 0–0.05)
IMM GRANULOCYTES NFR BLD AUTO: 0.4 % (ref 0–0.5)
LYMPHOCYTES # BLD AUTO: 2.78 10*3/MM3 (ref 0.7–3.1)
LYMPHOCYTES NFR BLD AUTO: 37.9 % (ref 19.6–45.3)
MCH RBC QN AUTO: 41.5 PG (ref 26.6–33)
MCHC RBC AUTO-ENTMCNC: 35.7 G/DL (ref 31.5–35.7)
MCV RBC AUTO: 116.1 FL (ref 79–97)
MONOCYTES # BLD AUTO: 0.59 10*3/MM3 (ref 0.1–0.9)
MONOCYTES NFR BLD AUTO: 8 % (ref 5–12)
NEUTROPHILS NFR BLD AUTO: 3.81 10*3/MM3 (ref 1.7–7)
NEUTROPHILS NFR BLD AUTO: 51.9 % (ref 42.7–76)
NRBC BLD AUTO-RTO: 0 /100 WBC (ref 0–0.2)
PLATELET # BLD AUTO: 230 10*3/MM3 (ref 140–450)
PMV BLD AUTO: 9.8 FL (ref 6–12)
POTASSIUM SERPL-SCNC: 4 MMOL/L (ref 3.5–5.2)
PROT SERPL-MCNC: 7.4 G/DL (ref 6–8.5)
RBC # BLD AUTO: 2.99 10*6/MM3 (ref 4.14–5.8)
SODIUM SERPL-SCNC: 139 MMOL/L (ref 136–145)
WBC NRBC COR # BLD AUTO: 7.34 10*3/MM3 (ref 3.4–10.8)

## 2024-05-17 PROCEDURE — 25810000003 SODIUM CHLORIDE 0.9 % SOLUTION: Performed by: ANESTHESIOLOGY

## 2024-05-17 PROCEDURE — G0378 HOSPITAL OBSERVATION PER HR: HCPCS

## 2024-05-17 PROCEDURE — 85025 COMPLETE CBC W/AUTO DIFF WBC: CPT | Performed by: FAMILY MEDICINE

## 2024-05-17 PROCEDURE — 25010000002 PROPOFOL 10 MG/ML EMULSION: Performed by: NURSE ANESTHETIST, CERTIFIED REGISTERED

## 2024-05-17 PROCEDURE — 99239 HOSP IP/OBS DSCHRG MGMT >30: CPT | Performed by: INTERNAL MEDICINE

## 2024-05-17 PROCEDURE — 25010000002 THIAMINE PER 100 MG: Performed by: INTERNAL MEDICINE

## 2024-05-17 PROCEDURE — 45382 COLONOSCOPY W/CONTROL BLEED: CPT | Performed by: INTERNAL MEDICINE

## 2024-05-17 PROCEDURE — 45385 COLONOSCOPY W/LESION REMOVAL: CPT | Performed by: INTERNAL MEDICINE

## 2024-05-17 PROCEDURE — 88305 TISSUE EXAM BY PATHOLOGIST: CPT | Performed by: INTERNAL MEDICINE

## 2024-05-17 PROCEDURE — 80053 COMPREHEN METABOLIC PANEL: CPT | Performed by: FAMILY MEDICINE

## 2024-05-17 RX ORDER — FOLIC ACID 1 MG/1
1 TABLET ORAL DAILY
Status: DISCONTINUED | OUTPATIENT
Start: 2024-05-17 | End: 2024-05-17 | Stop reason: HOSPADM

## 2024-05-17 RX ORDER — HYDROCORTISONE ACETATE 25 MG/1
25 SUPPOSITORY RECTAL 2 TIMES DAILY
Status: DISCONTINUED | OUTPATIENT
Start: 2024-05-17 | End: 2024-05-17 | Stop reason: HOSPADM

## 2024-05-17 RX ORDER — MIDAZOLAM HYDROCHLORIDE 1 MG/ML
2 INJECTION INTRAMUSCULAR; INTRAVENOUS
Status: DISCONTINUED | OUTPATIENT
Start: 2024-05-17 | End: 2024-05-17 | Stop reason: HOSPADM

## 2024-05-17 RX ORDER — LORAZEPAM 1 MG/1
1 TABLET ORAL
Status: DISCONTINUED | OUTPATIENT
Start: 2024-05-17 | End: 2024-05-17

## 2024-05-17 RX ORDER — THIAMINE HYDROCHLORIDE 100 MG/ML
200 INJECTION, SOLUTION INTRAMUSCULAR; INTRAVENOUS EVERY 8 HOURS SCHEDULED
Status: DISCONTINUED | OUTPATIENT
Start: 2024-05-17 | End: 2024-05-17 | Stop reason: HOSPADM

## 2024-05-17 RX ORDER — DROPERIDOL 2.5 MG/ML
0.62 INJECTION, SOLUTION INTRAMUSCULAR; INTRAVENOUS ONCE AS NEEDED
Status: DISCONTINUED | OUTPATIENT
Start: 2024-05-17 | End: 2024-05-17 | Stop reason: HOSPADM

## 2024-05-17 RX ORDER — METOPROLOL SUCCINATE 100 MG/1
150 TABLET, EXTENDED RELEASE ORAL DAILY
Qty: 45 TABLET | Refills: 0 | Status: ON HOLD | OUTPATIENT
Start: 2024-05-18 | End: 2024-06-17

## 2024-05-17 RX ORDER — VALSARTAN 160 MG/1
160 TABLET ORAL DAILY
Status: DISCONTINUED | OUTPATIENT
Start: 2024-05-17 | End: 2024-05-17

## 2024-05-17 RX ORDER — VALSARTAN 80 MG/1
80 TABLET ORAL DAILY
Qty: 30 TABLET | Refills: 0 | Status: ON HOLD | OUTPATIENT
Start: 2024-05-18

## 2024-05-17 RX ORDER — MIDAZOLAM HYDROCHLORIDE 1 MG/ML
4 INJECTION INTRAMUSCULAR; INTRAVENOUS
Status: DISCONTINUED | OUTPATIENT
Start: 2024-05-17 | End: 2024-05-17 | Stop reason: HOSPADM

## 2024-05-17 RX ORDER — LIDOCAINE HYDROCHLORIDE 10 MG/ML
INJECTION, SOLUTION EPIDURAL; INFILTRATION; INTRACAUDAL; PERINEURAL AS NEEDED
Status: DISCONTINUED | OUTPATIENT
Start: 2024-05-17 | End: 2024-05-17 | Stop reason: SURG

## 2024-05-17 RX ORDER — FENTANYL CITRATE 50 UG/ML
50 INJECTION, SOLUTION INTRAMUSCULAR; INTRAVENOUS
Status: DISCONTINUED | OUTPATIENT
Start: 2024-05-17 | End: 2024-05-17 | Stop reason: HOSPADM

## 2024-05-17 RX ORDER — POLYETHYLENE GLYCOL 3350 17 G/17G
17 POWDER, FOR SOLUTION ORAL DAILY
Qty: 119 G | Refills: 0 | Status: ON HOLD | OUTPATIENT
Start: 2024-05-17

## 2024-05-17 RX ORDER — HYDROMORPHONE HYDROCHLORIDE 1 MG/ML
0.5 INJECTION, SOLUTION INTRAMUSCULAR; INTRAVENOUS; SUBCUTANEOUS
Status: DISCONTINUED | OUTPATIENT
Start: 2024-05-17 | End: 2024-05-17 | Stop reason: HOSPADM

## 2024-05-17 RX ORDER — SODIUM CHLORIDE 9 MG/ML
50 INJECTION, SOLUTION INTRAVENOUS CONTINUOUS
Status: DISCONTINUED | OUTPATIENT
Start: 2024-05-17 | End: 2024-05-17 | Stop reason: HOSPADM

## 2024-05-17 RX ORDER — HYDROCORTISONE ACETATE 25 MG/1
25 SUPPOSITORY RECTAL 2 TIMES DAILY
Qty: 10 SUPPOSITORY | Refills: 0 | Status: SHIPPED | OUTPATIENT
Start: 2024-05-17 | End: 2024-05-22

## 2024-05-17 RX ORDER — VALSARTAN 80 MG/1
80 TABLET ORAL DAILY
Status: DISCONTINUED | OUTPATIENT
Start: 2024-05-17 | End: 2024-05-17 | Stop reason: HOSPADM

## 2024-05-17 RX ORDER — LORAZEPAM 1 MG/1
2 TABLET ORAL
Status: DISCONTINUED | OUTPATIENT
Start: 2024-05-17 | End: 2024-05-17 | Stop reason: HOSPADM

## 2024-05-17 RX ORDER — HYDRALAZINE HYDROCHLORIDE 25 MG/1
25 TABLET, FILM COATED ORAL EVERY 8 HOURS PRN
Status: DISCONTINUED | OUTPATIENT
Start: 2024-05-17 | End: 2024-05-17

## 2024-05-17 RX ORDER — PROPOFOL 10 MG/ML
VIAL (ML) INTRAVENOUS AS NEEDED
Status: DISCONTINUED | OUTPATIENT
Start: 2024-05-17 | End: 2024-05-17 | Stop reason: SURG

## 2024-05-17 RX ORDER — AMLODIPINE BESYLATE 10 MG/1
10 TABLET ORAL NIGHTLY
Qty: 90 TABLET | Refills: 3 | Status: ON HOLD | OUTPATIENT
Start: 2024-05-17

## 2024-05-17 RX ADMIN — ATORVASTATIN CALCIUM 80 MG: 40 TABLET, FILM COATED ORAL at 10:14

## 2024-05-17 RX ADMIN — LIDOCAINE HYDROCHLORIDE 50 MG: 10 INJECTION, SOLUTION EPIDURAL; INFILTRATION; INTRACAUDAL; PERINEURAL at 13:39

## 2024-05-17 RX ADMIN — PEG-3350, SODIUM SULFATE, SODIUM CHLORIDE, POTASSIUM CHLORIDE, SODIUM ASCORBATE AND ASCORBIC ACID 1000 ML: KIT at 05:21

## 2024-05-17 RX ADMIN — PROPOFOL 150 MG: 10 INJECTION, EMULSION INTRAVENOUS at 13:39

## 2024-05-17 RX ADMIN — THIAMINE HYDROCHLORIDE 200 MG: 100 INJECTION, SOLUTION INTRAMUSCULAR; INTRAVENOUS at 10:14

## 2024-05-17 RX ADMIN — FOLIC ACID 1 MG: 1 TABLET ORAL at 10:14

## 2024-05-17 RX ADMIN — SPIRONOLACTONE 25 MG: 25 TABLET ORAL at 10:14

## 2024-05-17 RX ADMIN — SODIUM CHLORIDE 50 ML/HR: 9 INJECTION, SOLUTION INTRAVENOUS at 13:21

## 2024-05-17 RX ADMIN — PROPOFOL 140 MCG/KG/MIN: 10 INJECTION, EMULSION INTRAVENOUS at 13:40

## 2024-05-17 RX ADMIN — METOPROLOL SUCCINATE 150 MG: 50 TABLET, EXTENDED RELEASE ORAL at 10:15

## 2024-05-17 RX ADMIN — VALSARTAN 80 MG: 80 TABLET, FILM COATED ORAL at 10:14

## 2024-05-17 RX ADMIN — AVOBENZONE, HOMOSALATE, OCTISALATE, OCTOCRYLENE, AND OXYBENZONE 1 PACKET: 29.4; 147; 49; 25.4; 58.8 LOTION TOPICAL at 16:21

## 2024-05-17 RX ADMIN — THIAMINE HYDROCHLORIDE 200 MG: 100 INJECTION, SOLUTION INTRAMUSCULAR; INTRAVENOUS at 16:21

## 2024-05-17 RX ADMIN — PANTOPRAZOLE SODIUM 40 MG: 40 TABLET, DELAYED RELEASE ORAL at 05:20

## 2024-05-17 NOTE — PROGRESS NOTES
Continued Stay Note  [facility]     Patient Name: Walt Gao  MRN: [id number]  Today's Date: 5/17/2024    Admit Date: 5/16/2024    Plan: Home   Discharge Plan       Row Name 05/17/24 1611       Plan    Final Discharge Disposition Code 01 - home or self-care    Final Note Mr. Gao will be discharged home from the hospital on Friday and no needs identified.                   Discharge Codes    No documentation.                 Expected Discharge Date and Time       Expected Discharge Date Expected Discharge Time    May 17, 2024               LATISHA Layne

## 2024-05-17 NOTE — ANESTHESIA PREPROCEDURE EVALUATION
Anesthesia Evaluation     Patient summary reviewed and Nursing notes reviewed   NPO Solid Status: > 8 hours  NPO Liquid Status: > 2 hours           Airway   Mallampati: I  TM distance: >3 FB  Neck ROM: full  No difficulty expected  Dental    (+) upper dentures    Pulmonary     breath sounds clear to auscultation  Cardiovascular     ECG reviewed  Rhythm: regular  Rate: normal    (+) hypertension, hyperlipidemia      Neuro/Psych  GI/Hepatic/Renal/Endo    (+) GI bleeding , renal disease- CRI    Musculoskeletal     Abdominal    Substance History   (+) alcohol use     OB/GYN          Other        ROS/Med Hx Other:   Left ventricular systolic function is normal. Estimated left ventricular EF = 65%  ·  Left ventricular wall thickness is consistent with mild to moderate concentric hypertrophy.  ·  The cardiac valves are anatomically and functionally normal.                  Anesthesia Plan    ASA 3     general     intravenous induction     Anesthetic plan, risks, benefits, and alternatives have been provided, discussed and informed consent has been obtained with: patient.    Plan discussed with CRNA.    CODE STATUS:

## 2024-05-17 NOTE — DISCHARGE SUMMARY
Psychiatric Medicine Services  DISCHARGE SUMMARY    Patient Name: Walt Gao  : 1965  MRN: [id number]    Date of Admission: 2024 10:22 AM  Date of Discharge:  2024  Primary Care Physician: Duglas Lang MD    Consults       Date and Time Order Name Status Description    2024  3:48 PM Inpatient Gastroenterology Consult              Hospital Course     Presenting Problem: dark bowel movement    Active Hospital Problems    Diagnosis  POA    Mixed hyperlipidemia [E78.2]  Yes    Essential hypertension [I10]  Yes    Non-ischemic cardiomyopathy [I42.8]  Yes    Alcohol abuse [F10.10]  Yes    Chronic renal impairment, stage 3 (moderate) [N18.30]  Yes      Resolved Hospital Problems    Diagnosis Date Resolved POA    **Melena [K92.1] 2024 Yes          Hospital Course:  Walt Gao is a 59 y.o. male w heavy alcohol use, CKDIIIa, uncontrolled HTN who presented with dark bowel movements x 2 days concerning for GI bleed.  H&H stable without further episodes here. EGD  nonrevealing except stomach nodule s/p biopsy and gastritis, colonoscopy  w internal hemorrhoids and non-bleeding colonic angiodysplastic lesion x1 treated w ABC.  GI recommendations for anusol suppository x 5 days, daily fiber + miralax, and colonoscopy in 7-10 years.  I d/w patient his heavy alcohol use - >20 beers/week - and how this increases his risk of gastritis/GI bleed and also is not helping his hard to control blood pressures.  Refilled home BP meds, following with cardiology and PCP for further titration as OP    Discharge Follow Up Recommendations for outpatient labs/diagnostics:   F/u PCP 1 week    Day of Discharge     HPI:   Doing fine this morning, tolerated prep well  Open to the idea of cutting back on etoh use - denies people around him being concerned about his use or any withdrawal sx    Vital Signs:   Temp:  [97.2 °F (36.2 °C)-98.2 °F (36.8 °C)] 98.1 °F (36.7  °C)  Heart Rate:  [73-81] 79  Resp:  [16-22] 16  BP: (112-161)/() 143/107      Physical Exam:  Constitutional: No acute distress, awake, alert  HENT: NCAT, mucous membranes moist  Respiratory: Clear to auscultation bilaterally, respiratory effort normal   Cardiovascular: RRR, no murmurs, rubs, or gallops  Gastrointestinal: Soft, nontender, nondistended  Musculoskeletal: Muscle tone within normal limits, no joint effusions appreciated  Psychiatric: Appropriate affect, cooperative  Neurologic: Alert and oriented, facial movements symmetric and spontaneous movement of all 4 extremities grossly equal bilaterally, no tremors, speech clear  Skin: No rashes    Pertinent  and/or Most Recent Results     LAB RESULTS:      Lab 05/17/24  0521 05/16/24  1229 05/16/24 1215 05/16/24  1036   WBC 7.34 7.03  --  6.93   HEMOGLOBIN 12.4* 11.7*  --  12.7*   HEMOGLOBIN, POC  --   --  12.9  --    HEMATOCRIT 34.7* 33.9*  --  36.0*   HEMATOCRIT POC  --   --  38  --    PLATELETS 230 220  --  229   NEUTROS ABS 3.81 4.21  --  3.92   IMMATURE GRANS (ABS) 0.03 0.02  --  0.01   LYMPHS ABS 2.78 2.20  --  2.30   MONOS ABS 0.59 0.46  --  0.53   EOS ABS 0.10 0.13  --  0.15   .1* 118.9*  --  118.8*   LACTATE  --   --   --  0.7   PROTIME  --   --   --  13.3         Lab 05/17/24  0521 05/16/24  1215 05/16/24  1036   SODIUM 139  --  139   POTASSIUM 4.0  --  4.3   CHLORIDE 105  --  104   CO2 22.0  --  24.0   ANION GAP 12.0  --  11.0   BUN 18  --  21*   CREATININE 1.72* 1.80* 1.79*   EGFR 45.2* 42.8* 43.1*   GLUCOSE 98  --  118*   CALCIUM 9.2  --  9.2   MAGNESIUM  --   --  1.9         Lab 05/17/24  0521 05/16/24  1036   TOTAL PROTEIN 7.4 7.6   ALBUMIN 4.0 4.2   GLOBULIN 3.4 3.4   ALT (SGPT) 17 19   AST (SGOT) 17 21   BILIRUBIN 0.7 0.3   ALK PHOS 25* 30*         Lab 05/16/24  1036   PROTIME 13.3   INR 1.00             Lab 05/16/24  1359 05/16/24  1047   ABO TYPING B B   RH TYPING Positive Positive   ANTIBODY SCREEN  --  Negative         Brief  Urine Lab Results       None          Microbiology Results (last 10 days)       ** No results found for the last 240 hours. **            CT Abdomen Pelvis With & Without Contrast    Result Date: 5/16/2024  CT ABDOMEN PELVIS W WO CONTRAST Date of Exam: 5/16/2024 11:28 AM EDT Indication: GI bleed protocol. Comparison: None available. Technique: Axial CT images were obtained of the abdomen and pelvis before and after the uneventful intravenous administration of 85 mL Isovue-370. Sagittal and coronal reconstructions were performed.  Automated exposure control and iterative reconstruction methods were used. Findings: Lower thorax: No focal consolidation. Calcified mediastinal nodes consistent with healed granulomatous disease. Heart appears enlarged. Liver: Normal morphology of the liver. No focal hepatic lesion. Gallbladder and bile ducts: Gallbladder is unremarkable. No biliary ductal dilatation. Pancreas: No pancreatic duct dilation. No surrounding inflammation. Spleen: Normal in size. Adrenal glands: No discrete adrenal nodule. Kidneys: Symmetric in size and enhancement. No hydronephrosis. No nephroureterolithiasis. Small renal cysts, including a hemorrhagic/proteinaceous cyst in the right upper pole. Normal excretion of contrast on delayed phase. Urinary bladder: Mildly distended. Reproductive organs: Prostatomegaly. Stomach and bowel: No evidence of acute gastrointestinal hemorrhage. No evidence of bowel obstruction. Colonic diverticulosis. Normal appendix. Lymph nodes: No pathologically enlarged lymph nodes. Vessels: No abdominal aortic aneurysm. Major vasculature is patent. Peritoneum and retroperitoneum: No free air or free fluid. Soft tissues: Small fat-containing umbilical hernia. Osseous structures: No acute or suspicious osseous lesions.     Impression: No evidence of acute gastrointestinal hemorrhage or other acute abdominopelvic process. Colonic diverticulosis. Ancillary findings as above. Electronically  Signed: Crescencio Squires MD  5/16/2024 11:53 AM EDT  Workstation ID: [id number]     Results for orders placed during the hospital encounter of 12/11/23    Duplex Renal Artery - Bilateral Complete CAR    Interpretation Summary  EXAM:  DUPLEX RENAL ARTERY BILATERAL COMPLETE CAR    DATE: 12/11/2023    COMPARISON: None    INDICATION: Hypertension    TECHNIQUE: Grayscale, color Doppler and spectral Doppler examination for evaluation of kidneys and renal arteries was performed with representative examples saved to PACS.    FINDINGS:  Aorta: Peak systolic velocity: 65 cm/sec.  No aneurysm    RIGHT KIDNEY:  The right kidney measures 11 cm in length.  The right kidney is normal size and contour with good corticomedullary differentiation. There are no shadowing calculi or cortical deforming solid or cystic masses. No hydronephrosis.    RIGHT RENAL DOPPLER:  Right renal artery maximum peak systolic velocity: 118 cm/sec at proximal renal artery.  Right Renal aortic ratio: 1.8  Renal vein: Patent.    Right Intrarenal:  Resistive Index:  Upper pole: 0.65.  Mid: 0.64.  Inferior pole: 0.67.    Highest intrarenal Acceleration time = 50 ms.  No tardus parvus waveform.    LEFT KIDNEY:  The left kidney measures 11.3 cm in length.  The left kidney is of normal size and contour with good corticomedullary differentiation. There are no shadowing calculi or cortical deforming solid or cystic masses. No hydronephrosis.    LEFT RENAL DOPPLER:  Left renal artery maximum Peak systolic velocity: 94.7 cm/sec at proximal renal artery.  Left Renal aortic ratio: 1.4  Left Renal vein: Patent.    Left Intrarenal:  Resistive Index:  Upper pole: 0.65.  Mid: 0.65.  Inferior pole: 0.66.    Highest intrarenal Acceleration time = 56 ms. No tardus parvus waveform.    Urinary Bladder: The urinary bladder wall is within normal limits (<3 mm)..    Impression  1. Negative for renal artery stenosis.  2. Symmetric grayscale appearance of the kidneys. Negative for  hydronephrosis.    Electronically Signed: Jesus Manuel Limon MD  12/11/2023 4:10 PM EST  Workstation ID: [id number]      Results for orders placed during the hospital encounter of 12/11/23    Duplex Renal Artery - Bilateral Complete CAR    Interpretation Summary  EXAM:  DUPLEX RENAL ARTERY BILATERAL COMPLETE CAR    DATE: 12/11/2023    COMPARISON: None    INDICATION: Hypertension    TECHNIQUE: Grayscale, color Doppler and spectral Doppler examination for evaluation of kidneys and renal arteries was performed with representative examples saved to PACS.    FINDINGS:  Aorta: Peak systolic velocity: 65 cm/sec.  No aneurysm    RIGHT KIDNEY:  The right kidney measures 11 cm in length.  The right kidney is normal size and contour with good corticomedullary differentiation. There are no shadowing calculi or cortical deforming solid or cystic masses. No hydronephrosis.    RIGHT RENAL DOPPLER:  Right renal artery maximum peak systolic velocity: 118 cm/sec at proximal renal artery.  Right Renal aortic ratio: 1.8  Renal vein: Patent.    Right Intrarenal:  Resistive Index:  Upper pole: 0.65.  Mid: 0.64.  Inferior pole: 0.67.    Highest intrarenal Acceleration time = 50 ms.  No tardus parvus waveform.    LEFT KIDNEY:  The left kidney measures 11.3 cm in length.  The left kidney is of normal size and contour with good corticomedullary differentiation. There are no shadowing calculi or cortical deforming solid or cystic masses. No hydronephrosis.    LEFT RENAL DOPPLER:  Left renal artery maximum Peak systolic velocity: 94.7 cm/sec at proximal renal artery.  Left Renal aortic ratio: 1.4  Left Renal vein: Patent.    Left Intrarenal:  Resistive Index:  Upper pole: 0.65.  Mid: 0.65.  Inferior pole: 0.66.    Highest intrarenal Acceleration time = 56 ms. No tardus parvus waveform.    Urinary Bladder: The urinary bladder wall is within normal limits (<3 mm)..    Impression  1. Negative for renal artery stenosis.  2. Symmetric grayscale  appearance of the kidneys. Negative for hydronephrosis.    Electronically Signed: Jesus Manuel Limon MD  12/11/2023 4:10 PM EST  Workstation ID: [id number]      Results for orders placed during the hospital encounter of 11/29/22    Adult Transthoracic Echo Complete W/ Cont if Necessary Per Protocol    Interpretation Summary    Left ventricular systolic function is normal. Estimated left ventricular EF = 65%    Left ventricular wall thickness is consistent with mild to moderate concentric hypertrophy.    The cardiac valves are anatomically and functionally normal.      Plan for Follow-up of Pending Labs/Results: per GI  Pending Labs       Order Current Status    Tissue Pathology Exam Collected (05/17/24 [id number])    Tissue Pathology Exam In process          Discharge Details        Discharge Medications        New Medications        Instructions Start Date   hydrocortisone 25 MG suppository  Commonly known as: ANUSOL-HC   25 mg, Rectal, 2 Times Daily      polyethylene glycol 17 GM/SCOOP powder  Commonly known as: MIRALAX   17 g, Oral, Daily      Psyllium 51.7 % packet  Commonly known as: METAMUCIL MULTIHEALTH FIBER   1 packet, Oral, Daily             Changes to Medications        Instructions Start Date   metoprolol succinate  MG 24 hr tablet  Commonly known as: TOPROL-XL  What changed: how much to take   150 mg, Oral, Daily   Start Date: May 18, 2024            Continue These Medications        Instructions Start Date   amLODIPine 10 MG tablet  Commonly known as: NORVASC   10 mg, Oral, Nightly      atorvastatin 80 MG tablet  Commonly known as: LIPITOR   80 mg, Oral, Daily      dapagliflozin Propanediol 10 MG tablet  Commonly known as: Farxiga   10 mg, Oral, Daily      spironolactone 25 MG tablet  Commonly known as: ALDACTONE   25 mg, Oral, Daily      torsemide 10 MG tablet  Commonly known as: DEMADEX   10 mg, Oral, Daily      valsartan 80 MG tablet  Commonly known as: DIOVAN   80 mg, Oral, Daily   Start Date: May 18,  2024              No Known Allergies      Discharge Disposition:  Home or Self Care    Diet:  Hospital:  Diet Order   Procedures    NPO Diet NPO Type: Sips with Meds            Activity:      Restrictions or Other Recommendations:         CODE STATUS:    There are no questions and answers to display.       Future Appointments   Date Time Provider Department Center   5/22/2024 11:30 AM Duglas Lang MD [facility]   9/24/2024  1:30 PM Chandrakant Delgado IV, MD [facility]       Additional Instructions for the Follow-ups that You Need to Schedule       Discharge Follow-up with PCP   As directed       Currently Documented PCP:    Duglas Lang MD    PCP Phone Number:    [phone]     Follow Up Details: 1-2 weeks                      Kari Olmstead MD  05/17/24      Time Spent on Discharge:  I spent  35  minutes on this discharge activity which included: face-to-face encounter with the patient, reviewing the data in the system, coordination of the care with the nursing staff as well as consultants, documentation, and entering orders.  D/w Dr Zhou

## 2024-05-17 NOTE — PLAN OF CARE
Problem: Adult Inpatient Plan of Care  Goal: Plan of Care Review  Outcome: Ongoing, Progressing  Goal: Patient-Specific Goal (Individualized)  Outcome: Ongoing, Progressing  Goal: Absence of Hospital-Acquired Illness or Injury  Outcome: Ongoing, Progressing  Intervention: Identify and Manage Fall Risk  Recent Flowsheet Documentation  Taken 5/17/2024 0600 by Vanesa Case RN  Safety Promotion/Fall Prevention: activity supervised  Taken 5/17/2024 0400 by Vanesa Case RN  Safety Promotion/Fall Prevention:   activity supervised   safety round/check completed   nonskid shoes/slippers when out of bed   lighting adjusted   clutter free environment maintained  Taken 5/17/2024 0200 by Vanesa Case RN  Safety Promotion/Fall Prevention:   activity supervised   room organization consistent   safety round/check completed   lighting adjusted  Taken 5/17/2024 0000 by Vanesa Case RN  Safety Promotion/Fall Prevention:   lighting adjusted   activity supervised  Taken 5/16/2024 2200 by Vanesa Case RN  Safety Promotion/Fall Prevention:   activity supervised   safety round/check completed   nonskid shoes/slippers when out of bed   lighting adjusted   clutter free environment maintained  Taken 5/16/2024 2000 by Vanesa Case RN  Safety Promotion/Fall Prevention:   activity supervised   safety round/check completed   room organization consistent   clutter free environment maintained  Intervention: Prevent Skin Injury  Recent Flowsheet Documentation  Taken 5/17/2024 0600 by Vanesa Case RN  Body Position: position changed independently  Taken 5/17/2024 0400 by Vanesa Case RN  Body Position: position changed independently  Skin Protection: adhesive use limited  Taken 5/17/2024 0200 by Vanesa Case RN  Body Position: position changed independently  Taken 5/17/2024 0000 by Vanesa Case RN  Body Position: position changed independently  Taken 5/16/2024 2200 by Vanesa Case RN  Body Position: position changed independently  Skin Protection:  adhesive use limited  Taken 5/16/2024 2000 by Vanesa Case RN  Body Position: position changed independently  Skin Protection:   adhesive use limited   protective footwear used  Intervention: Prevent and Manage VTE (Venous Thromboembolism) Risk  Recent Flowsheet Documentation  Taken 5/17/2024 0600 by Vanesa Case RN  Activity Management: activity encouraged  Taken 5/17/2024 0400 by Vanesa Case RN  Activity Management: activity encouraged  Taken 5/17/2024 0200 by Vanesa Case RN  Activity Management: activity encouraged  Taken 5/17/2024 0000 by Vanesa Case RN  Activity Management: activity encouraged  Taken 5/16/2024 2200 by Vanesa Case RN  Activity Management: activity encouraged  Taken 5/16/2024 2000 by Vanesa Case RN  Activity Management: dorsiflexion/plantar flexion performed  VTE Prevention/Management: patient refused intervention  Intervention: Prevent Infection  Recent Flowsheet Documentation  Taken 5/17/2024 0600 by Vanesa Case RN  Infection Prevention:   single patient room provided   rest/sleep promoted  Taken 5/17/2024 0400 by Vanesa Case RN  Infection Prevention:   single patient room provided   rest/sleep promoted  Taken 5/17/2024 0200 by Vanesa Case RN  Infection Prevention: rest/sleep promoted  Taken 5/17/2024 0000 by Vanesa Case RN  Infection Prevention:   single patient room provided   rest/sleep promoted  Taken 5/16/2024 2200 by Vanesa Case RN  Infection Prevention: rest/sleep promoted  Taken 5/16/2024 2000 by Vanesa Case RN  Infection Prevention: rest/sleep promoted  Goal: Optimal Comfort and Wellbeing  Outcome: Ongoing, Progressing  Intervention: Provide Person-Centered Care  Recent Flowsheet Documentation  Taken 5/16/2024 2000 by Vanesa Case RN  Trust Relationship/Rapport:   care explained   choices provided   emotional support provided   empathic listening provided   questions answered   questions encouraged   reassurance provided   thoughts/feelings acknowledged  Goal: Readiness for  Transition of Care  Outcome: Ongoing, Progressing     Problem: Adjustment to Illness (Gastrointestinal Bleeding)  Goal: Optimal Coping with Acute Illness  Outcome: Ongoing, Progressing  Intervention: Optimize Psychosocial Response  Recent Flowsheet Documentation  Taken 5/16/2024 2000 by Vanesa Case RN  Supportive Measures:   active listening utilized   decision-making supported   relaxation techniques promoted   self-care encouraged     Problem: Bleeding (Gastrointestinal Bleeding)  Goal: Hemostasis  Outcome: Ongoing, Progressing  Intervention: Manage Gastrointestinal Bleeding  Recent Flowsheet Documentation  Taken 5/16/2024 2000 by Vanesa Case RN  Environmental Support:   calm environment promoted   environmental consistency promoted   rest periods encouraged     Problem: Adult Inpatient Plan of Care  Goal: Plan of Care Review  Outcome: Ongoing, Progressing  Goal: Patient-Specific Goal (Individualized)  Outcome: Ongoing, Progressing  Goal: Absence of Hospital-Acquired Illness or Injury  Outcome: Ongoing, Progressing  Intervention: Identify and Manage Fall Risk  Recent Flowsheet Documentation  Taken 5/17/2024 0600 by Vanesa Case RN  Safety Promotion/Fall Prevention: activity supervised  Taken 5/17/2024 0400 by Vanesa Case RN  Safety Promotion/Fall Prevention:   activity supervised   safety round/check completed   nonskid shoes/slippers when out of bed   lighting adjusted   clutter free environment maintained  Taken 5/17/2024 0200 by Vanesa Case RN  Safety Promotion/Fall Prevention:   activity supervised   room organization consistent   safety round/check completed   lighting adjusted  Taken 5/17/2024 0000 by Vanesa Case RN  Safety Promotion/Fall Prevention:   lighting adjusted   activity supervised  Taken 5/16/2024 2200 by Vanesa Case RN  Safety Promotion/Fall Prevention:   activity supervised   safety round/check completed   nonskid shoes/slippers when out of bed   lighting adjusted   clutter free  environment maintained  Taken 5/16/2024 2000 by Vanesa Case RN  Safety Promotion/Fall Prevention:   activity supervised   safety round/check completed   room organization consistent   clutter free environment maintained  Intervention: Prevent Skin Injury  Recent Flowsheet Documentation  Taken 5/17/2024 0600 by Vanesa Case RN  Body Position: position changed independently  Taken 5/17/2024 0400 by Vanesa Case RN  Body Position: position changed independently  Skin Protection: adhesive use limited  Taken 5/17/2024 0200 by Vanesa Case RN  Body Position: position changed independently  Taken 5/17/2024 0000 by Vanesa Case RN  Body Position: position changed independently  Taken 5/16/2024 2200 by Vanesa Case RN  Body Position: position changed independently  Skin Protection: adhesive use limited  Taken 5/16/2024 2000 by Vanesa Case RN  Body Position: position changed independently  Skin Protection:   adhesive use limited   protective footwear used  Intervention: Prevent and Manage VTE (Venous Thromboembolism) Risk  Recent Flowsheet Documentation  Taken 5/17/2024 0600 by Vanesa Case RN  Activity Management: activity encouraged  Taken 5/17/2024 0400 by Vanesa Case RN  Activity Management: activity encouraged  Taken 5/17/2024 0200 by Vanesa Case RN  Activity Management: activity encouraged  Taken 5/17/2024 0000 by Vanesa Case RN  Activity Management: activity encouraged  Taken 5/16/2024 2200 by Vanesa Case RN  Activity Management: activity encouraged  Taken 5/16/2024 2000 by Vanesa Case RN  Activity Management: dorsiflexion/plantar flexion performed  VTE Prevention/Management: patient refused intervention  Intervention: Prevent Infection  Recent Flowsheet Documentation  Taken 5/17/2024 0600 by Vanesa Case RN  Infection Prevention:   single patient room provided   rest/sleep promoted  Taken 5/17/2024 0400 by Vanesa Case RN  Infection Prevention:   single patient room provided   rest/sleep promoted  Taken  5/17/2024 0200 by Vanesa Case RN  Infection Prevention: rest/sleep promoted  Taken 5/17/2024 0000 by Vanesa Case RN  Infection Prevention:   single patient room provided   rest/sleep promoted  Taken 5/16/2024 2200 by Vanesa Case RN  Infection Prevention: rest/sleep promoted  Taken 5/16/2024 2000 by Vanesa Case RN  Infection Prevention: rest/sleep promoted  Goal: Optimal Comfort and Wellbeing  Outcome: Ongoing, Progressing  Intervention: Provide Person-Centered Care  Recent Flowsheet Documentation  Taken 5/16/2024 2000 by Vanesa Case RN  Trust Relationship/Rapport:   care explained   choices provided   emotional support provided   empathic listening provided   questions answered   questions encouraged   reassurance provided   thoughts/feelings acknowledged  Goal: Readiness for Transition of Care  Outcome: Ongoing, Progressing     Problem: Adjustment to Illness (Gastrointestinal Bleeding)  Goal: Optimal Coping with Acute Illness  Outcome: Ongoing, Progressing  Intervention: Optimize Psychosocial Response  Recent Flowsheet Documentation  Taken 5/16/2024 2000 by Vanesa Case RN  Supportive Measures:   active listening utilized   decision-making supported   relaxation techniques promoted   self-care encouraged     Problem: Bleeding (Gastrointestinal Bleeding)  Goal: Hemostasis  Outcome: Ongoing, Progressing  Intervention: Manage Gastrointestinal Bleeding  Recent Flowsheet Documentation  Taken 5/16/2024 2000 by Vanesa Case RN  Environmental Support:   calm environment promoted   environmental consistency promoted   rest periods encouraged   Goal Outcome Evaluation:

## 2024-05-17 NOTE — OUTREACH NOTE
Prep Survey      Flowsheet Row Responses   [facility] patient discharged from? [place]   Is LACE score < 7 ? Yes   Eligibility [facility]   Date of Admission 05/16/24   Date of Discharge 05/17/24   Discharge Disposition Home or Self Care   Discharge diagnosis *Melena   Does the patient have one of the following disease processes/diagnoses(primary or secondary)? Other   Does the patient have Home health ordered? No   Is there a DME ordered? No   Prep survey completed? Yes            ARTIS JACKSON - Registered Nurse

## 2024-05-17 NOTE — ANESTHESIA POSTPROCEDURE EVALUATION
Patient: Walt Gao    Procedure Summary       Date: 05/17/24 Room / Location:  [facility]    Anesthesia Start: 1334 Anesthesia Stop: 1406    Procedure: COLONOSCOPY Diagnosis:     Surgeons: Duglas Zhou MD Provider: Gurinder Salgado MD    Anesthesia Type: general ASA Status: 3            Anesthesia Type: general    Vitals  Vitals Value Taken Time   BP     Temp     Pulse 80 05/17/24 1405   Resp     SpO2 93 % 05/17/24 1405   Vitals shown include unfiled device data.        Post Anesthesia Care and Evaluation    Patient location during evaluation: PACU  Patient participation: complete - patient participated  Level of consciousness: awake and alert  Pain management: adequate    Airway patency: patent  Anesthetic complications: No anesthetic complications  PONV Status: none  Cardiovascular status: hemodynamically stable and acceptable  Respiratory status: nonlabored ventilation, acceptable and nasal cannula  Hydration status: acceptable    Comments: 128/94 97.0 [id number]

## 2024-05-19 LAB
BH BB BLOOD EXPIRATION DATE: NORMAL
BH BB BLOOD TYPE BARCODE: 7300
BH BB DISPENSE STATUS: NORMAL
BH BB PRODUCT CODE: NORMAL
BH BB UNIT NUMBER: NORMAL
CROSSMATCH INTERPRETATION: NORMAL
UNIT  ABO: NORMAL
UNIT  RH: NORMAL

## 2024-05-20 ENCOUNTER — TRANSITIONAL CARE MANAGEMENT TELEPHONE ENCOUNTER (OUTPATIENT)
Dept: CALL CENTER | Facility: HOSPITAL | Age: 59
End: 2024-05-20
Payer: COMMERCIAL

## 2024-05-20 NOTE — OUTREACH NOTE
Call Center TCM Note      Flowsheet Row Responses   [facility] [place] patient discharged from? [place]   Does the patient have one of the following disease processes/diagnoses(primary or secondary)? Other   TCM attempt successful? No   Unsuccessful attempts Attempt 1            Saira Pereyra RN    5/20/2024, 08:57 EDT

## 2024-05-20 NOTE — OUTREACH NOTE
Call Center TCM Note      Flowsheet Row Responses   [facility] patient discharged from? [place]   Does the patient have one of the following disease processes/diagnoses(primary or secondary)? Other   TCM attempt successful? No   Unsuccessful attempts Attempt 2            Saira Pereyra RN    5/20/2024, 15:14 EDT

## 2024-05-21 ENCOUNTER — TRANSITIONAL CARE MANAGEMENT TELEPHONE ENCOUNTER (OUTPATIENT)
Dept: CALL CENTER | Facility: HOSPITAL | Age: 59
End: 2024-05-21
Payer: COMMERCIAL

## 2024-05-21 LAB
CYTO UR: NORMAL
CYTO UR: NORMAL
LAB AP CASE REPORT: NORMAL
LAB AP CASE REPORT: NORMAL
LAB AP CLINICAL INFORMATION: NORMAL
LAB AP CLINICAL INFORMATION: NORMAL
PATH REPORT.FINAL DX SPEC: NORMAL
PATH REPORT.FINAL DX SPEC: NORMAL
PATH REPORT.GROSS SPEC: NORMAL
PATH REPORT.GROSS SPEC: NORMAL

## 2024-05-21 NOTE — PROGRESS NOTES
Please ensure patient has follow up in GI office with Dr. Marlow in the next 2-3 months.   Pathology from EGD and colonoscopy as follows:    Biopsies from the stomach nodule that was removed and from the stomach show benign mild inflammation.  There was no evidence of H pylori.  There was evidence of intestinal metaplasia with no dysplasia.     The polyp I removed from the colon was a benign hyperplastic polyp.  These types of polyps do not convey any increased risk of developing into cancer.     Repeat colonoscopy recommended in 10 years for screening purposes.     I have requested follow up in GI office.

## 2024-05-21 NOTE — OUTREACH NOTE
Call Center TCM Note      Flowsheet Row Responses   [facility] patient discharged from? [place]   Does the patient have one of the following disease processes/diagnoses(primary or secondary)? Other   TCM attempt successful? Yes   Call start time 1334   Call end time 1338   Discharge diagnosis *Melena   Person spoke with today (if not patient) and relationship patient   Meds reviewed with patient/caregiver? Yes   Is the patient having any side effects they believe may be caused by any medication additions or changes? No   Does the patient have all medications ordered at discharge? Yes   Is the patient taking all medications as directed (includes completed medication regime)? Yes   Comments Patient has a hospital followup scheduled with Dr. Lang tomorrow on 5/22/24 at 1130. Patient confirmed appt.   Does the patient have an appointment with their PCP within 7-14 days of discharge? Yes   Psychosocial issues? No   Did the patient receive a copy of their discharge instructions? Yes   Nursing interventions Reviewed instructions with patient   What is the patient's perception of their health status since discharge? Improving   Is the patient/caregiver able to teach back signs and symptoms related to disease process for when to call PCP? Yes   Is the patient/caregiver able to teach back signs and symptoms related to disease process for when to call 911? Yes   Is the patient/caregiver able to teach back the hierarchy of who to call/visit for symptoms/problems? PCP, Specialist, Home health nurse, Urgent Care, ED, 911 Yes   If the patient is a current smoker, are they able to teach back resources for cessation? Not a smoker   TCM call completed? Yes   Wrap up additional comments Doing better. No needs at this time.   Call end time 1338   Would this patient benefit from a Referral to Amb Social Work? No   Is the patient interested in additional calls from an ambulatory ? No            Daniel Santamaria,  RN    5/21/2024, 13:38 EDT

## 2024-05-22 ENCOUNTER — OFFICE VISIT (OUTPATIENT)
Age: 59
End: 2024-05-22
Payer: COMMERCIAL

## 2024-05-22 VITALS
OXYGEN SATURATION: 96 % | HEART RATE: 81 BPM | DIASTOLIC BLOOD PRESSURE: 70 MMHG | SYSTOLIC BLOOD PRESSURE: 124 MMHG | WEIGHT: 201.6 LBS | BODY MASS INDEX: 29.77 KG/M2

## 2024-05-22 DIAGNOSIS — E11.65 TYPE 2 DIABETES MELLITUS WITH HYPERGLYCEMIA, WITHOUT LONG-TERM CURRENT USE OF INSULIN: ICD-10-CM

## 2024-05-22 DIAGNOSIS — I10 ESSENTIAL HYPERTENSION: ICD-10-CM

## 2024-05-22 DIAGNOSIS — Z23 NEED FOR VACCINATION: Primary | ICD-10-CM

## 2024-05-22 DIAGNOSIS — K62.5 RECTAL BLEEDING: ICD-10-CM

## 2024-05-22 DIAGNOSIS — I50.22 CHRONIC SYSTOLIC CONGESTIVE HEART FAILURE: ICD-10-CM

## 2024-05-22 LAB
EXPIRATION DATE: ABNORMAL
HBA1C MFR BLD: 6.3 % (ref 4.5–5.7)
Lab: ABNORMAL

## 2024-05-22 RX ORDER — DAPAGLIFLOZIN 10 MG/1
10 TABLET, FILM COATED ORAL DAILY
Qty: 30 TABLET | Refills: 5 | Status: SHIPPED | OUTPATIENT
Start: 2024-05-22

## 2024-05-22 NOTE — PROGRESS NOTES
Transitional Care Follow Up Visit  Subjective     Walt Gao is a 59 y.o. male who presents for a transitional care management visit.    Within 48 business hours after discharge our office contacted him via telephone to coordinate his care and needs.      I reviewed and discussed the details of that call along with the discharge summary, hospital problems, inpatient lab results, inpatient diagnostic studies, and consultation reports with Walt.     Current outpatient and discharge medications have been reconciled for the patient.  Reviewed by: Duglas Lang MD          5/17/2024     6:30 PM   Date of TCM Phone Call   [facility]   Date of Admission 5/16/2024   Date of Discharge 5/17/2024   Discharge Disposition Home or Self Care     Risk for Readmission (LACE) Score: 5 (5/17/2024  6:00 AM)      Diabetes    Constipation  Pertinent negatives include no fever.      Course During Hospital Stay (taken from 5/17 DC summary with edits if necessary.):  Walt Gao is a 59 y.o. male w heavy alcohol use, CKDIIIa, uncontrolled HTN who presented with dark bowel movements x 2 days concerning for GI bleed.  H&H stable without further episodes here. EGD 5/16 nonrevealing except stomach nodule s/p biopsy and gastritis, colonoscopy 5/17 w internal hemorrhoids and non-bleeding colonic angiodysplastic lesion x1 treated w ABC.  GI recommendations for anusol suppository x 5 days, daily fiber + miralax, and colonoscopy in 7-10 years.  I d/w patient his heavy alcohol use - >20 beers/week - and how this increases his risk of gastritis/GI bleed and also is not helping his hard to control blood pressures.  Refilled home BP meds, following with cardiology and PCP for further titration as OP     The following portions of the patient's history were reviewed and updated as appropriate: allergies, current medications, past family history, past medical history, past social history, past surgical history, and  problem list.    Review of Systems   Constitutional:  Negative for chills and fever.   Respiratory:  Negative for shortness of breath.    Gastrointestinal:  Positive for constipation. Negative for blood in stool.       Objective   /70   Pulse 81   Wt 91.4 kg (201 lb 9.6 oz)   SpO2 96%   BMI 29.77 kg/m²   Physical Exam  Constitutional:       General: He is not in acute distress.     Appearance: He is not toxic-appearing.   Cardiovascular:      Rate and Rhythm: Normal rate and regular rhythm.      Heart sounds: No murmur heard.     No friction rub. No gallop.   Pulmonary:      Effort: Pulmonary effort is normal.      Breath sounds: Normal breath sounds.   Abdominal:      General: Abdomen is flat. There is no distension.   Skin:     General: Skin is warm and dry.   Neurological:      Mental Status: He is alert.   Psychiatric:         Mood and Affect: Mood normal.         Behavior: Behavior normal.         Assessment & Plan   1. Type 2 diabetes mellitus with hyperglycemia, without long-term current use of insulin  Chronic stable  A1c at goal at 6.3%, Patient is out of his jardiance, will try to resend farxiga or jardiance depending on coverage  - POC Glycosylated Hemoglobin (Hb A1C)    2. Need for vaccination  Prevnar 20 given  - Pneumococcal Conjugate Vaccine 20-Valent (PCV20)    3. Rectal bleeding  Resolved  -Likely due to hemorrhoids, not taking anusol, counseled him on how to use miralax as he still has some constipation  -Check CBC to ensure no drop in hb  - CBC Auto Differential; Future    4. Essential hypertension  Chronic stable, continue current medicines without dose change    5. Chronic systolic congestive heart failure  Chronic stable, follows with cardiology  -Rx farxiga given comorbid diabetes  - dapagliflozin Propanediol (Farxiga) 10 MG tablet; Take 10 mg by mouth Daily.  Dispense: 30 tablet; Refill: 5

## 2024-05-22 NOTE — LETTER
[facility]  Vaccine Consent Form    Patient Name:  Walt Gao  Patient :  1965     Vaccine(s) Ordered    Pneumococcal Conjugate Vaccine 20-Valent (PCV20)        Screening Checklist  The following questions should be completed prior to vaccination. If you answer “yes” to any question, it does not necessarily mean you should not be vaccinated. It just means we may need to clarify or ask more questions. If a question is unclear, please ask your healthcare provider to explain it.    Yes No   Any fever or moderate to severe illness today (mild illness and/or antibiotic treatment are not contraindications)?     Do you have a history of a serious reaction to any previous vaccinations, such as anaphylaxis, encephalopathy within 7 days, Guillain-Oglala syndrome within 6 weeks, seizure?     Have you received any live vaccine(s) (e.g MMR, NANCY) or any other vaccines in the last month (to ensure duplicate doses aren't given)?     Do you have an anaphylactic allergy to latex (DTaP, DTaP-IPV, Hep A, Hep B, MenB, RV, Td, Tdap), baker’s yeast (Hep B, HPV), polysorbates (RSV, nirsevimab, PCV 20, Rotavirrus, Tdap, Shingrix), or gelatin (NANCY, MMR)?     Do you have an anaphylactic allergy to neomycin (Rabies, NANCY, MMR, IPV, Hep A), polymyxin B (IPV), or streptomycin (IPV)?      Any cancer, leukemia, AIDS, or other immune system disorder? (NANCY, MMR, RV)     Do you have a parent, brother, or sister with an immune system problem (if immune competence of vaccine recipient clinically verified, can proceed)? (MMR, NANCY)     Any recent steroid treatments for >2 weeks, chemotherapy, or radiation treatment? (NANCY, MMR)     Have you received antibody-containing blood transfusions or IVIG in the past 11 months (recommended interval is dependent on product)? (MMR, NANCY)     Have you taken antiviral drugs (acyclovir, famciclovir, valacyclovir for NANCY) in the last 24 or 48 hours, respectively?      Are you pregnant or planning to  "become pregnant within 1 month? (NANCY, MMR, HPV, IPV, MenB, Abrexvy; For Hep B- refer to Engerix-B; For RSV - Abrysvo is indicated for 32-36 weeks of pregnancy from September to January)     For infants, have you ever been told your child has had intussusception or a medical emergency involving obstruction of the intestine (Rotavirus)? If not for an infant, can skip this question.         *Ordering Physicians/APC should be consulted if \"yes\" is checked by the patient or guardian above.  I have received, read, and understand the Vaccine Information Statement (VIS) for each vaccine ordered.  I have considered my or my child's health status as well as the health status of my close contacts.  I have taken the opportunity to discuss my vaccine questions with my or my child's health care provider.   I have requested that the ordered vaccine(s) be given to me or my child.  I understand the benefits and risks of the vaccines.  I understand that I should remain in the clinic for 15 minutes after receiving the vaccine(s).  _________________________________________________________  Signature of Patient or Parent/Legal Guardian ____________________  Date     "

## 2024-05-29 ENCOUNTER — TELEPHONE (OUTPATIENT)
Dept: GASTROENTEROLOGY | Facility: CLINIC | Age: 59
End: 2024-05-29
Payer: COMMERCIAL

## 2024-06-02 ENCOUNTER — APPOINTMENT (OUTPATIENT)
Dept: MRI IMAGING | Facility: HOSPITAL | Age: 59
End: 2024-06-02
Payer: COMMERCIAL

## 2024-06-02 ENCOUNTER — APPOINTMENT (OUTPATIENT)
Dept: CT IMAGING | Facility: HOSPITAL | Age: 59
End: 2024-06-02
Payer: COMMERCIAL

## 2024-06-02 ENCOUNTER — HOSPITAL ENCOUNTER (INPATIENT)
Facility: HOSPITAL | Age: 59
LOS: 3 days | Discharge: NURSING, REHAB OR LONG-TERM CARE | End: 2024-06-06
Attending: EMERGENCY MEDICINE | Admitting: STUDENT IN AN ORGANIZED HEALTH CARE EDUCATION/TRAINING PROGRAM
Payer: COMMERCIAL

## 2024-06-02 DIAGNOSIS — E11.9 TYPE 2 DIABETES MELLITUS WITHOUT COMPLICATION, WITHOUT LONG-TERM CURRENT USE OF INSULIN: Chronic | ICD-10-CM

## 2024-06-02 DIAGNOSIS — R47.1 DYSARTHRIA: ICD-10-CM

## 2024-06-02 DIAGNOSIS — R29.898 WEAKNESS OF LEFT UPPER EXTREMITY: ICD-10-CM

## 2024-06-02 DIAGNOSIS — I63.9 CEREBROVASCULAR ACCIDENT (CVA), UNSPECIFIED MECHANISM: ICD-10-CM

## 2024-06-02 DIAGNOSIS — R93.0 ABNORMAL CT OF THE HEAD: ICD-10-CM

## 2024-06-02 DIAGNOSIS — R29.810 FACIAL DROOP: Primary | ICD-10-CM

## 2024-06-02 DIAGNOSIS — R51.9 ACUTE NONINTRACTABLE HEADACHE, UNSPECIFIED HEADACHE TYPE: ICD-10-CM

## 2024-06-02 LAB
BACTERIA UR QL AUTO: NORMAL /HPF
BILIRUB UR QL STRIP: NEGATIVE
BUN BLDA-MCNC: 17 MG/DL (ref 8–26)
CA-I BLDA-SCNC: 1.12 MMOL/L (ref 1.2–1.32)
CHLORIDE BLDA-SCNC: 102 MMOL/L (ref 98–109)
CLARITY UR: CLEAR
CO2 BLDA-SCNC: 25 MMOL/L (ref 24–29)
COLOR UR: YELLOW
CREAT BLDA-MCNC: 1.7 MG/DL (ref 0.6–1.3)
EGFRCR SERPLBLD CKD-EPI 2021: 45.9 ML/MIN/1.73
GLUCOSE BLDC GLUCOMTR-MCNC: 104 MG/DL (ref 70–130)
GLUCOSE BLDC GLUCOMTR-MCNC: 118 MG/DL (ref 70–130)
GLUCOSE UR STRIP-MCNC: NEGATIVE MG/DL
HCT VFR BLDA CALC: 41 % (ref 38–51)
HGB BLDA-MCNC: 13.9 G/DL (ref 12–17)
HGB UR QL STRIP.AUTO: NEGATIVE
HOLD SPECIMEN: NORMAL
HYALINE CASTS UR QL AUTO: NORMAL /LPF
INR PPP: 1.1 (ref 0.8–1.2)
KETONES UR QL STRIP: NEGATIVE
LEUKOCYTE ESTERASE UR QL STRIP.AUTO: NEGATIVE
NITRITE UR QL STRIP: NEGATIVE
PH UR STRIP.AUTO: 5.5 [PH] (ref 5–8)
POTASSIUM BLDA-SCNC: 4.3 MMOL/L (ref 3.5–4.9)
PROT UR QL STRIP: ABNORMAL
PROTHROMBIN TIME: 13.6 SECONDS (ref 12.8–15.2)
RBC # UR STRIP: NORMAL /HPF
REF LAB TEST METHOD: NORMAL
SODIUM BLD-SCNC: 138 MMOL/L (ref 138–146)
SP GR UR STRIP: 1.02 (ref 1–1.03)
SQUAMOUS #/AREA URNS HPF: NORMAL /HPF
UROBILINOGEN UR QL STRIP: ABNORMAL
WBC # UR STRIP: NORMAL /HPF
WHOLE BLOOD HOLD COAG: NORMAL
WHOLE BLOOD HOLD SPECIMEN: NORMAL

## 2024-06-02 PROCEDURE — 70450 CT HEAD/BRAIN W/O DYE: CPT

## 2024-06-02 PROCEDURE — 70553 MRI BRAIN STEM W/O & W/DYE: CPT

## 2024-06-02 PROCEDURE — 85610 PROTHROMBIN TIME: CPT

## 2024-06-02 PROCEDURE — 80047 BASIC METABLC PNL IONIZED CA: CPT

## 2024-06-02 PROCEDURE — 85014 HEMATOCRIT: CPT

## 2024-06-02 PROCEDURE — G0378 HOSPITAL OBSERVATION PER HR: HCPCS

## 2024-06-02 PROCEDURE — 70498 CT ANGIOGRAPHY NECK: CPT

## 2024-06-02 PROCEDURE — 0042T HC CT CEREBRAL PERFUSION W/WO CONTRAST: CPT

## 2024-06-02 PROCEDURE — 99222 1ST HOSP IP/OBS MODERATE 55: CPT

## 2024-06-02 PROCEDURE — 99221 1ST HOSP IP/OBS SF/LOW 40: CPT | Performed by: FAMILY MEDICINE

## 2024-06-02 PROCEDURE — 25510000001 IOPAMIDOL PER 1 ML: Performed by: EMERGENCY MEDICINE

## 2024-06-02 PROCEDURE — 82948 REAGENT STRIP/BLOOD GLUCOSE: CPT

## 2024-06-02 PROCEDURE — 81001 URINALYSIS AUTO W/SCOPE: CPT | Performed by: FAMILY MEDICINE

## 2024-06-02 PROCEDURE — 0 GADOBENATE DIMEGLUMINE 529 MG/ML SOLUTION: Performed by: FAMILY MEDICINE

## 2024-06-02 PROCEDURE — A9577 INJ MULTIHANCE: HCPCS | Performed by: FAMILY MEDICINE

## 2024-06-02 PROCEDURE — 25010000002 THIAMINE HCL 200 MG/2ML SOLUTION: Performed by: FAMILY MEDICINE

## 2024-06-02 PROCEDURE — 70496 CT ANGIOGRAPHY HEAD: CPT

## 2024-06-02 PROCEDURE — 82607 VITAMIN B-12: CPT | Performed by: PHYSICIAN ASSISTANT

## 2024-06-02 PROCEDURE — 99291 CRITICAL CARE FIRST HOUR: CPT

## 2024-06-02 RX ORDER — SODIUM CHLORIDE 9 MG/ML
40 INJECTION, SOLUTION INTRAVENOUS AS NEEDED
Status: DISCONTINUED | OUTPATIENT
Start: 2024-06-02 | End: 2024-06-06 | Stop reason: HOSPADM

## 2024-06-02 RX ORDER — THIAMINE HYDROCHLORIDE 100 MG/ML
200 INJECTION, SOLUTION INTRAMUSCULAR; INTRAVENOUS EVERY 8 HOURS SCHEDULED
Status: DISCONTINUED | OUTPATIENT
Start: 2024-06-02 | End: 2024-06-06 | Stop reason: HOSPADM

## 2024-06-02 RX ORDER — ASPIRIN 300 MG/1
300 SUPPOSITORY RECTAL DAILY
Status: DISCONTINUED | OUTPATIENT
Start: 2024-06-03 | End: 2024-06-03

## 2024-06-02 RX ORDER — SODIUM CHLORIDE 0.9 % (FLUSH) 0.9 %
10 SYRINGE (ML) INJECTION AS NEEDED
Status: DISCONTINUED | OUTPATIENT
Start: 2024-06-02 | End: 2024-06-06 | Stop reason: HOSPADM

## 2024-06-02 RX ORDER — ATORVASTATIN CALCIUM 40 MG/1
80 TABLET, FILM COATED ORAL NIGHTLY
Status: DISCONTINUED | OUTPATIENT
Start: 2024-06-02 | End: 2024-06-06 | Stop reason: HOSPADM

## 2024-06-02 RX ORDER — SODIUM CHLORIDE 0.9 % (FLUSH) 0.9 %
10 SYRINGE (ML) INJECTION EVERY 12 HOURS SCHEDULED
Status: DISCONTINUED | OUTPATIENT
Start: 2024-06-02 | End: 2024-06-06 | Stop reason: HOSPADM

## 2024-06-02 RX ORDER — FOLIC ACID 1 MG/1
1 TABLET ORAL DAILY
Status: DISCONTINUED | OUTPATIENT
Start: 2024-06-02 | End: 2024-06-06 | Stop reason: HOSPADM

## 2024-06-02 RX ORDER — ASPIRIN 325 MG
325 TABLET ORAL ONCE
Status: COMPLETED | OUTPATIENT
Start: 2024-06-02 | End: 2024-06-02

## 2024-06-02 RX ORDER — ASPIRIN 81 MG/1
81 TABLET, CHEWABLE ORAL DAILY
Status: DISCONTINUED | OUTPATIENT
Start: 2024-06-03 | End: 2024-06-06 | Stop reason: HOSPADM

## 2024-06-02 RX ORDER — ASPIRIN 300 MG/1
300 SUPPOSITORY RECTAL ONCE
Status: COMPLETED | OUTPATIENT
Start: 2024-06-02 | End: 2024-06-02

## 2024-06-02 RX ADMIN — IOPAMIDOL 115 ML: 755 INJECTION, SOLUTION INTRAVENOUS at 14:15

## 2024-06-02 RX ADMIN — ASPIRIN 300 MG: 300 SUPPOSITORY RECTAL at 15:18

## 2024-06-02 RX ADMIN — THIAMINE HYDROCHLORIDE 200 MG: 100 INJECTION, SOLUTION INTRAMUSCULAR; INTRAVENOUS at 21:47

## 2024-06-02 RX ADMIN — Medication 10 ML: at 21:47

## 2024-06-02 RX ADMIN — GADOBENATE DIMEGLUMINE 18 ML: 529 INJECTION, SOLUTION INTRAVENOUS at 21:03

## 2024-06-02 NOTE — H&P
[facility] Medicine Services  HISTORY AND PHYSICAL    Patient Name: Walt Gao  : 1965  MRN: [id number]  Primary Care Physician: Duglas Lang MD  Date of admission: 2024      Subjective   Subjective     Chief Complaint:  Left hand and left facial numbness     HPI:  Walt Gao is a 59 y.o. male with PMH of CHF, CKD, HLD, HTN and ETOH abuse presented with a one day history of left hand numbness and several hours of facial droop and left facial numbness. The patient denies any pain. He had a recent admission for lower gi bleed and states that has resolved. He has had urinary frequency, urgency and trouble with stream for the past month. He denies chest pain or shortness of breath. His last drink was 3 beers last night. He drinks 2-3 beers daily and has not gone without drinking to know if he has any DT/seizures  In the ED, CT head showed no hemorrhage and to hypodensity within the right parietal  and occipital lobe that is CVA vs mets.   She will be admitted to the hospitalist service for further treatment and evaluation.     Personal History     Past Medical History:   Diagnosis Date    CHF (congestive heart failure)     NICM (nonischemic cardiomyopathy)     Nonischemic cardiomyopathy 2016    · Cardiac catheterization revealing nonobstructive CAD,  · LVEF 30% on heart catheterization,  · Echo (16): Moderate LVH. LVEF 23%. Left ventricular diastolic dysfunction (grade II) consistent with pseudonormalization.  Moderate mitral valve regurgitation · Echo (2017):  LVEF 50%           Past Surgical History:   Procedure Laterality Date    COLONOSCOPY N/A 2024    Procedure: COLONOSCOPY;  Surgeon: Duglas Zhou MD;  Location:  [facility];  Service: Gastroenterology;  Laterality: N/A;    ENDOSCOPY N/A 2024    Procedure: ESOPHAGOGASTRODUODENOSCOPY;  Surgeon: Duglas Zhou MD;  Location: [facility];  Service: Gastroenterology;   Laterality: N/A;    HERNIA REPAIR      as child       Family History: family history includes Coronary artery disease in an other family member; Diabetes in his father; Heart disease in his paternal grandfather and paternal grandmother; Hyperlipidemia in his father; Hypertension in his father and mother; Kidney disease in his father; No Known Problems in his daughter, sister, and son; Other in his maternal grandfather, maternal grandmother, and sister.     Social History:  reports that he has never smoked. He has never been exposed to tobacco smoke. He has never used smokeless tobacco. He reports current alcohol use. He reports that he does not use drugs.  Social History     Social History Narrative    Patient drinks 6-7 servings of caffeine (tea) per day. Patient lives with sister.       Medications:  Available home medication information reviewed.  Psyllium, amLODIPine, atorvastatin, dapagliflozin Propanediol, metoprolol succinate XL, polyethylene glycol, spironolactone, torsemide, and valsartan    No Known Allergies    Objective   Objective     Vital Signs:   Temp:  [98.2 °F (36.8 °C)-98.4 °F (36.9 °C)] 98.2 °F (36.8 °C)  Heart Rate:  [91-98] 95  Resp:  [16] 16  BP: (142-181)/(107-115) 150/109  Total (NIH Stroke Scale): 5    Physical Exam   Constitutional: No acute distress, awake, alert  HENT: NCAT, mucous membranes moist  Respiratory: Clear to auscultation bilaterally, respiratory effort normal   Cardiovascular: RRR, no murmurs, rubs, or gallops  Gastrointestinal: non distended  Musculoskeletal: No bilateral ankle edema  Psychiatric: Appropriate affect, cooperative  Neurologic: Oriented x 3, +left sided facial droop   Skin: No rashes      Result Review:  I have personally reviewed the results from the time of this admission to 6/2/2024 19:41 EDT and agree with these findings:  []  Laboratory list / accordion  []  Microbiology  []  Radiology  []  EKG/Telemetry   []  Cardiology/Vascular   []  Pathology  []  Old  records  []  Other:  Most notable findings include:       LAB RESULTS:      Lab 06/02/24  1407 06/02/24  1405   HEMOGLOBIN, POC 13.9  --    HEMATOCRIT POC 41  --    PROTIME  --  13.6   INR  --  1.1         Lab 06/02/24  1407   CREATININE 1.70*   EGFR 45.9*                             Microbiology Results (last 10 days)       ** No results found for the last 240 hours. **            CT Angiogram Head w AI Analysis of LVO    Result Date: 6/2/2024  CT ANGIOGRAM HEAD W AI ANALYSIS OF LVO, CT ANGIOGRAM NECK Date of Exam: 6/2/2024 2:01 PM EDT Indication: stroke. Comparison: CT head from earlier today Technique: CTA of the head and neck was performed after the uneventful intravenous administration of 75 mL Isovue-370. Reconstructed coronal and sagittal images were also obtained. In addition, a 3-D volume rendered image was created for interpretation. Automated exposure control and iterative reconstruction methods were used. Findings: There is a two-vessel aortic arch with common origin of the innominate and left common carotid artery, normal variant. The right common carotid artery is widely patent. The right carotid bifurcation is widely patent. The right internal carotid artery is widely patent. The left common carotid artery is widely patent. The left carotid bifurcation is widely patent. The left internal carotid artery is widely patent. The bilateral vertebral arteries are widely patent with mild plaque along their V4 segments.  The left vertebral artery is slightly dominant. There is focal moderate stenosis along the proximal M2 branch of the right middle cerebral artery and otherwise the artery is widely patent. The left middle cerebral and bilateral anterior cerebral arteries are widely patent. The anterior communicating artery is widely patent. The basilar and bilateral posterior cerebral arteries are widely patent. No definite posterior communicating artery is identified on either side. No intracranial aneurysm  is identified. The visualized portions of the bilateral superior cerebellar, anteroinferior cerebellar, and posterior inferior cerebellar arteries are unremarkable.     Impression: Impression: 1.No evidence of hemodynamically significant stenosis along bilateral carotid bifurcations. 2.Focal moderate stenosis along right MCA, but no evidence of acute thrombus or aneurysm. Electronically Signed: Neo Mendez MD  6/2/2024 2:31 PM EDT  Workstation ID: [id number]    CT Angiogram Neck    Result Date: 6/2/2024  CT ANGIOGRAM HEAD W AI ANALYSIS OF LVO, CT ANGIOGRAM NECK Date of Exam: 6/2/2024 2:01 PM EDT Indication: stroke. Comparison: CT head from earlier today Technique: CTA of the head and neck was performed after the uneventful intravenous administration of 75 mL Isovue-370. Reconstructed coronal and sagittal images were also obtained. In addition, a 3-D volume rendered image was created for interpretation. Automated exposure control and iterative reconstruction methods were used. Findings: There is a two-vessel aortic arch with common origin of the innominate and left common carotid artery, normal variant. The right common carotid artery is widely patent. The right carotid bifurcation is widely patent. The right internal carotid artery is widely patent. The left common carotid artery is widely patent. The left carotid bifurcation is widely patent. The left internal carotid artery is widely patent. The bilateral vertebral arteries are widely patent with mild plaque along their V4 segments.  The left vertebral artery is slightly dominant. There is focal moderate stenosis along the proximal M2 branch of the right middle cerebral artery and otherwise the artery is widely patent. The left middle cerebral and bilateral anterior cerebral arteries are widely patent. The anterior communicating artery is widely patent. The basilar and bilateral posterior cerebral arteries are widely patent. No definite posterior communicating  artery is identified on either side. No intracranial aneurysm is identified. The visualized portions of the bilateral superior cerebellar, anteroinferior cerebellar, and posterior inferior cerebellar arteries are unremarkable.     Impression: Impression: 1.No evidence of hemodynamically significant stenosis along bilateral carotid bifurcations. 2.Focal moderate stenosis along right MCA, but no evidence of acute thrombus or aneurysm. Electronically Signed: Neo Mendez MD  6/2/2024 2:31 PM EDT  Workstation ID: [id number]    CT CEREBRAL PERFUSION WITH & WITHOUT CONTRAST    Result Date: 6/2/2024  CT CEREBRAL PERFUSION W WO CONTRAST Date of Exam: 6/2/2024 2:01 PM EDT Indication: stroke.  Comparison: CT head from earlier today Technique: Axial CT images of the brain were obtained prior to and after the administration of 50 mL Isovue-370. Core blood volume, core blood flow, mean transit time, and Tmax images were obtained utilizing the Rapid software protocol. A limited CT angiogram of the head was also performed to measure the blood vessel density. The radiation dose reduction device was turned on for each scan per the ALARA (As Low as Reasonably Achievable) protocol. Findings: The cerebral blood flow appears normal. There is 4 mL of abnormal Tmax perfusion corresponding to the right frontal lobe.     Impression: Impression: 1.No evidence of completed infarct. 2.4 mm of abnormal Tmax perfusion corresponding to right frontal lobe. This is not in the areas of hypodensity seen on recent prior CT head that were in the right parietal and right occipital lobes. Whether this represents artifact versus tissue at risk within the right frontal lobe is unclear. Electronically Signed: Neo Mendez MD  6/2/2024 2:22 PM EDT  Workstation ID: [id number]    CT Head Without Contrast Stroke Protocol    Result Date: 6/2/2024  CT HEAD WO CONTRAST STROKE PROTOCOL Date of Exam: 6/2/2024 1:58 PM EDT Indication: stroke. Comparison: None  available. Technique: Axial CT images were obtained of the head without contrast administration.  Reconstructed coronal images were also obtained. Automated exposure control and iterative construction methods were used. Scan Time: 13:56 Results discussed with stroke navigator at 14:04. Findings: No acute intracranial hemorrhage or extra-axial collection is identified. The ventricles appear normal in caliber, with no midline shift. The basal cisterns appear patent. There are 2 focal hypodensities within the right parietal and right occipital lobes. Scattered foci of periventricular and subcortical white matter hypodensities are nonspecific, but likely the sequela of mild chronic small vessel ischemic disease. The calvarium appears intact. There is mild mucosal disease in the maxillary sinuses. The mastoid air cells are well aerated.     Impression: Impression: 1.No acute intracranial hemorrhage. 2.Two focal hypodensities within right parietal and right occipital lobes, which could represent infarcts versus metastasis. Consider evaluation with MRI brain with and without IV contrast. 3.Mild mucosal disease within maxillary sinuses. 4.Findings suggestive of mild chronic small vessel ischemic disease. Electronically Signed: Neo Mendez MD  6/2/2024 2:07 PM EDT  Workstation ID: [id number]     Results for orders placed during the hospital encounter of 11/29/22    Adult Transthoracic Echo Complete W/ Cont if Necessary Per Protocol    Interpretation Summary    Left ventricular systolic function is normal. Estimated left ventricular EF = 65%    Left ventricular wall thickness is consistent with mild to moderate concentric hypertrophy.    The cardiac valves are anatomically and functionally normal.      Assessment & Plan   Assessment & Plan       CVA (cerebral vascular accident)      Left sided weakness  CVA vs mets  -CT head shows no acute finding   -CT perfusion /CTA shows no completed infact, right frontal lobe abnormal  perfusion, no stenosis, moderate along right mca but no acute thrombus or aneurysm   -MRI pending  -stroke team following   -SLP to see, npo   -ASA, statin     Etoh abuse  -ciwa     CHF  HTN   -compensated, cont home meds (torsemide, spironolactone and farxiga) when able to tolerate po  -allow permissive HTN for now     CKD III  -cr 1.72, baseline               DVT prophylaxis:  Mechanical DVT prophylaxis orders are present.          CODE STATUS:    Code Status and Medical Interventions:   Ordered at: 06/02/24 5389     Level Of Support Discussed With:    Patient     Code Status (Patient has no pulse and is not breathing):    CPR (Attempt to Resuscitate)     Medical Interventions (Patient has pulse or is breathing):    Full Support       Expected Discharge   Expected discharge date/ time has not been documented.     Tiffany Acuña,   06/02/24
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
## 2024-06-02 NOTE — ED PROVIDER NOTES
Subjective   History of Present Illness  Patient is a pleasant 59-year-old male who presents to the emergency department with left hand weakness and headache.  Says that yesterday evening before he went to bed, before 9 PM, developed headache and noticed left hand weakness.  This persisted through the night prompting his visit to the emergency department today.  Denies similar episodes in the past.  States that the headache is actually almost resolved at this time currently rated as a 2 or 3 out of 10.  Denies fever, chills, chest pain, shortness of breath, abdominal pain, vomiting, diarrhea, or other acute complaints.      Review of Systems   All other systems reviewed and are negative.      Past Medical History:   Diagnosis Date    CHF (congestive heart failure)     NICM (nonischemic cardiomyopathy)     Nonischemic cardiomyopathy 8/29/2016    · Cardiac catheterization revealing nonobstructive CAD, 2013 · LVEF 30% on heart catheterization, 2013 · Echo (8/29/16): Moderate LVH. LVEF 23%. Left ventricular diastolic dysfunction (grade II) consistent with pseudonormalization.  Moderate mitral valve regurgitation · Echo (2/21/2017):  LVEF 50%       No Known Allergies    Past Surgical History:   Procedure Laterality Date    COLONOSCOPY N/A 5/17/2024    Procedure: COLONOSCOPY;  Surgeon: Duglas Zhou MD;  Location:  [facility];  Service: Gastroenterology;  Laterality: N/A;    ENDOSCOPY N/A 5/16/2024    Procedure: ESOPHAGOGASTRODUODENOSCOPY;  Surgeon: Duglas Zhou MD;  Location:  [facility];  Service: Gastroenterology;  Laterality: N/A;    HERNIA REPAIR      as child       Family History   Problem Relation Age of Onset    Coronary artery disease Other     Hypertension Mother     Diabetes Father     Hypertension Father     Kidney disease Father     Hyperlipidemia Father     Other Sister         pre diabetes    Other Maternal Grandmother         unknown    Other Maternal Grandfather         unknown    Heart  disease Paternal Grandmother     Heart disease Paternal Grandfather     No Known Problems Sister     No Known Problems Daughter     No Known Problems Son        Social History     Socioeconomic History    Marital status: Single   Tobacco Use    Smoking status: Never     Passive exposure: Never    Smokeless tobacco: Never   Vaping Use    Vaping status: Never Used   Substance and Sexual Activity    Alcohol use: Yes     Comment: 6 beers a week    Drug use: Never    Sexual activity: Yes     Partners: Female     Birth control/protection: Post-menopausal     Comment: 1 partner in the last 9 years           Objective   Physical Exam  Vitals and nursing note reviewed.   Constitutional:       General: He is not in acute distress.  HENT:      Head: Normocephalic and atraumatic.   Eyes:      Conjunctiva/sclera: Conjunctivae normal.      Pupils: Pupils are equal, round, and reactive to light.   Cardiovascular:      Rate and Rhythm: Normal rate and regular rhythm.      Heart sounds: Normal heart sounds.   Pulmonary:      Effort: No respiratory distress.      Breath sounds: Normal breath sounds.   Abdominal:      Palpations: Abdomen is soft.      Tenderness: There is no abdominal tenderness.   Musculoskeletal:         General: Normal range of motion.      Cervical back: Normal range of motion and neck supple.   Skin:     General: Skin is warm and dry.      Capillary Refill: Capillary refill takes less than 2 seconds.   Neurological:      General: No focal deficit present.      Mental Status: He is alert and oriented to person, place, and time.      Cranial Nerves: No cranial nerve deficit.      Sensory: No sensory deficit.      Comments: NIH of 1 secondary to left facial droop.   is symmetric at this time without upper extremity or lower extremity drift.   Psychiatric:         Behavior: Behavior normal.         Thought Content: Thought content normal.         Critical Care    Performed by: Elijah Pearce DO  Authorized by:  Elijah Pearce DO    Critical care provider statement:     Critical care time (minutes):  35    Critical care time was exclusive of:  Separately billable procedures and treating other patients    Critical care was necessary to treat or prevent imminent or life-threatening deterioration of the following conditions:  CNS failure or compromise    Critical care was time spent personally by me on the following activities:  Ordering and performing treatments and interventions, ordering and review of laboratory studies, ordering and review of radiographic studies, pulse oximetry, re-evaluation of patient's condition, review of old charts, obtaining history from patient or surrogate, examination of patient, evaluation of patient's response to treatment, discussions with consultants and development of treatment plan with patient or surrogate    I assumed direction of critical care for this patient from another provider in my specialty: no      Care discussed with: admitting provider               ED Course      Recent Results (from the past 24 hour(s))   POC Protime / INR    Collection Time: 06/02/24  2:05 PM    Specimen: Blood   Result Value Ref Range    Protime 13.6 12.8 - 15.2 seconds    INR 1.1 0.8 - 1.2   POC CHEM 8    Collection Time: 06/02/24  2:07 PM    Specimen: Blood   Result Value Ref Range    Glucose 118 70 - 130 mg/dL    BUN 17 8 - 26 mg/dL    Creatinine 1.70 (H) 0.60 - 1.30 mg/dL    Sodium 138 138 - 146 mmol/L    POC Potassium 4.3 3.5 - 4.9 mmol/L    Chloride 102 98 - 109 mmol/L    Total CO2 25 24 - 29 mmol/L    Hemoglobin 13.9 12.0 - 17.0 g/dL    Hematocrit 41 38 - 51 %    Ionized Calcium 1.12 (L) 1.20 - 1.32 mmol/L    eGFR 45.9 (L) >60.0 mL/min/1.73     Note: In addition to lab results from this visit, the labs listed above may include labs taken at another facility or during a different encounter within the last 24 hours. Please correlate lab times with ED admission and discharge times for further  clarification of the services performed during this visit.    MRI Brain With & Without Contrast   Final Result   Impression:   Multifocal areas of both acute and subacute infarct are present including within a watershed distribution on the right, as well as a more focal area of acute cortical and subcortical infarct within the right occipital lobe. Areas of mixed subacute and    chronic lacunar infarct are also noted bilaterally. There is no evidence of associated hemorrhage or significant mass effect.      Areas of acute infarct on the right demonstrate some associated enhancement, likely representing cortical laminar necrosis. Neoplastic or metastatic process is considered less likely, however consider follow-up imaging to confirm expected evolution of    multifocal infarcts.            Electronically Signed: Ernst Freire MD     6/3/2024 6:45 AM EDT     Workstation ID: [id number]      CT CEREBRAL PERFUSION WITH & WITHOUT CONTRAST   Final Result   Impression:   1.No evidence of completed infarct.   2.4 mm of abnormal Tmax perfusion corresponding to right frontal lobe. This is not in the areas of hypodensity seen on recent prior CT head that were in the right parietal and right occipital lobes. Whether this represents artifact versus tissue at risk    within the right frontal lobe is unclear.            Electronically Signed: Neo Mendez MD     6/2/2024 2:22 PM EDT     Workstation ID: [id number]      CT Angiogram Head w AI Analysis of LVO   Final Result   Impression:   1.No evidence of hemodynamically significant stenosis along bilateral carotid bifurcations.   2.Focal moderate stenosis along right MCA, but no evidence of acute thrombus or aneurysm.            Electronically Signed: Neo Mendez MD     6/2/2024 2:31 PM EDT     Workstation ID: [id number]      CT Angiogram Neck   Final Result   Impression:   1.No evidence of hemodynamically significant stenosis along bilateral carotid bifurcations.   2.Focal  "moderate stenosis along right MCA, but no evidence of acute thrombus or aneurysm.            Electronically Signed: Neo Mendez MD     6/2/2024 2:31 PM EDT     Workstation ID: [id number]      CT Head Without Contrast Stroke Protocol   Final Result   Impression:   1.No acute intracranial hemorrhage.   2.Two focal hypodensities within right parietal and right occipital lobes, which could represent infarcts versus metastasis. Consider evaluation with MRI brain with and without IV contrast.   3.Mild mucosal disease within maxillary sinuses.   4.Findings suggestive of mild chronic small vessel ischemic disease.            Electronically Signed: Neo Mendez MD     6/2/2024 2:07 PM EDT     Workstation ID: [id number]        Vitals:    06/02/24 1350   BP: (!) 163/115   BP Location: Right arm   Patient Position: Sitting   Pulse: 92   Resp: 16   Temp: 98.4 °F (36.9 °C)   TempSrc: Oral   SpO2: 97%   Weight: 97.5 kg (215 lb)   Height: 175.3 cm (69\")     Medications   aspirin tablet 325 mg (has no administration in time range)     Or   aspirin suppository 300 mg (has no administration in time range)   iopamidol (ISOVUE-370) 76 % injection 150 mL (115 mL Intravenous Given 6/2/24 1415)     ECG/EMG Results (last 24 hours)       ** No results found for the last 24 hours. **          No orders to display                                              Medical Decision Making  Pt was not a candidate for TNK due to onset time greater than 4.5 hours.  He was seen by the neurology stroke team while in the ED.  CAse discussed with internal medicine attending and pt will be admitted for further evaluation and management.    Problems Addressed:  Abnormal CT of the head: complicated acute illness or injury  Acute nonintractable headache, unspecified headache type: complicated acute illness or injury  Facial droop: complicated acute illness or injury  Weakness of left upper extremity: complicated acute illness or injury    Amount and/or " Complexity of Data Reviewed  External Data Reviewed: notes.  Labs: ordered. Decision-making details documented in ED Course.  Radiology: ordered and independent interpretation performed. Decision-making details documented in ED Course.    Risk  OTC drugs.  Prescription drug management.  Decision regarding hospitalization.        Final diagnoses:   Facial droop   Weakness of left upper extremity   Acute nonintractable headache, unspecified headache type   Abnormal CT of the head       ED Disposition  ED Disposition       ED Disposition   Decision to Admit    Condition   --    Comment   Level of Care: Telemetry [5]   Diagnosis: CVA (cerebral vascular accident) [587208]   Admitting Physician: LEANNA CURRAN [[id number]]   Attending Physician: LEANNA CURRAN [[id number]]                      Elijah Pearce DO  06/06/24 0001

## 2024-06-02 NOTE — ED NOTES
Walt Gao    Nursing Report ED to Floor:  Mental status: A&O x 4  Ambulatory status: up with assist  Oxygen Therapy:  room air   Cardiac Rhythm: normal sinus  Admitted from: ED  Safety Concerns:  none  Social Issues: none  ED Room #:  30    ED Nurse Phone Extension - [phone] or may call [phone].      HPI:   Chief Complaint   Patient presents with    Numbness    Headache    Weakness - Generalized       Past Medical History:  Past Medical History:   Diagnosis Date    CHF (congestive heart failure)     NICM (nonischemic cardiomyopathy)     Nonischemic cardiomyopathy 8/29/2016    · Cardiac catheterization revealing nonobstructive CAD, 2013 · LVEF 30% on heart catheterization, 2013 · Echo (8/29/16): Moderate LVH. LVEF 23%. Left ventricular diastolic dysfunction (grade II) consistent with pseudonormalization.  Moderate mitral valve regurgitation · Echo (2/21/2017):  LVEF 50%        Past Surgical History:  Past Surgical History:   Procedure Laterality Date    COLONOSCOPY N/A 5/17/2024    Procedure: COLONOSCOPY;  Surgeon: Duglas Zhou MD;  Location:  [facility];  Service: Gastroenterology;  Laterality: N/A;    ENDOSCOPY N/A 5/16/2024    Procedure: ESOPHAGOGASTRODUODENOSCOPY;  Surgeon: Duglas Zhou MD;  Location: [facility];  Service: Gastroenterology;  Laterality: N/A;    HERNIA REPAIR      as child        Admitting Doctor:   Tiffany Acuña DO    Consulting Provider(s):  Consults       No orders found from 5/4/2024 to 6/3/2024.             Admitting Diagnosis:   The primary encounter diagnosis was Facial droop. Diagnoses of Weakness of left upper extremity, Acute nonintractable headache, unspecified headache type, and Abnormal CT of the head were also pertinent to this visit.    Most Recent Vitals:   Vitals:    06/02/24 1350 06/02/24 1445   BP: (!) 163/115 (!) 181/114   BP Location: Right arm    Patient Position: Sitting    Pulse: 92 91   Resp: 16    Temp: 98.4 °F (36.9 °C)    TempSrc: Oral   "  SpO2: 97% 97%   Weight: 97.5 kg (215 lb)    Height: 175.3 cm (69\")        Active LDAs/IV Access:   Lines, Drains & Airways       Active LDAs       Name Placement date Placement time Site Days    Peripheral IV 06/02/24 1412 Right Antecubital 06/02/24  1412  Antecubital  less than 1                    Labs (abnormal labs have a star):   Labs Reviewed   POCT CHEM 8 - Abnormal; Notable for the following components:       Result Value    Creatinine 1.70 (*)     Ionized Calcium 1.12 (*)     eGFR 45.9 (*)     All other components within normal limits   POCT PROTIME - INR - Normal   RAINBOW DRAW    Narrative:     The following orders were created for panel order [place] Draw.  Procedure                               Abnormality         Status                     ---------                               -----------         ------                     Green Top (Gel)[[id number]]                                  Final result               Lavender Top[[id number]]                                     Final result               Gold Top - SST[[id number]]                                   Final result               Gray Top[[id number]]                                         Final result               Light Blue Top[[id number]]                                   Final result                 Please view results for these tests on the individual orders.   GREEN TOP   LAVENDER TOP   GOLD TOP - SST   GRAY TOP   LIGHT BLUE TOP       Meds Given in ED:   Medications   iopamidol (ISOVUE-370) 76 % injection 150 mL (115 mL Intravenous Given 6/2/24 1415)   aspirin tablet 325 mg ( Oral Not Given:  See Alt 6/2/24 1518)     Or   aspirin suppository 300 mg (300 mg Rectal Given 6/2/24 1518)     No current facility-administered medications for this encounter.       Last NIH score:                                                          Dysphagia screening results:  Patient Factors Component (Dysphagia:Stroke or Rule-out)  Best Eye Response: 4-->(E4) " spontaneous (06/02/24 1512)  Best Motor Response: 6-->(M6) obeys commands (06/02/24 1512)  Best Verbal Response: 5-->(V5) oriented (06/02/24 1512)  [place] Coma Scale Score: 15 (06/02/24 1512)  Is there Facial Asymmetry/Weakness?: Yes (06/02/24 1512)  Patient Assessment Result: (!) Fail (06/02/24 1512)     [place] Coma Scale:  No data recorded     CIWA:        Restraint Type:            Isolation Status:  No active isolations

## 2024-06-02 NOTE — Clinical Note
Level of Care: Telemetry [5]   Diagnosis: CVA (cerebral vascular accident) [856188]   Admitting Physician: LEANNA CURRAN [[id number]]   Attending Physician: LEANNA CURRAN [[id number]]

## 2024-06-03 ENCOUNTER — APPOINTMENT (OUTPATIENT)
Dept: CARDIOLOGY | Facility: HOSPITAL | Age: 59
End: 2024-06-03
Payer: COMMERCIAL

## 2024-06-03 LAB
ALBUMIN SERPL-MCNC: 4.3 G/DL (ref 3.5–5.2)
ALBUMIN/GLOB SERPL: 1.2 G/DL
ALP SERPL-CCNC: 29 U/L (ref 39–117)
ALT SERPL W P-5'-P-CCNC: 15 U/L (ref 1–41)
AMORPH URATE CRY URNS QL MICRO: NORMAL /HPF
ANION GAP SERPL CALCULATED.3IONS-SCNC: 10 MMOL/L (ref 5–15)
ASCENDING AORTA: 3.7 CM
AST SERPL-CCNC: 16 U/L (ref 1–40)
BACTERIA UR QL AUTO: NORMAL /HPF
BH CV ECHO LEFT VENTRICLE GLOBAL LONGITUDINAL STRAIN: -12.4 %
BH CV ECHO MEAS - AO MAX PG: 5 MMHG
BH CV ECHO MEAS - AO ROOT DIAM: 3.5 CM
BH CV ECHO MEAS - AO V2 MAX: 112.4 CM/SEC
BH CV ECHO MEAS - EF(MOD-BP): 42.3 %
BH CV ECHO MEAS - IVS/LVPW: 1 CM
BH CV ECHO MEAS - IVSD: 1.5 CM
BH CV ECHO MEAS - LA DIMENSION: 4.1 CM
BH CV ECHO MEAS - LAT PEAK E' VEL: 6 CM/SEC
BH CV ECHO MEAS - LV MAX PG: 3.7 MMHG
BH CV ECHO MEAS - LV V1 MAX: 96 CM/SEC
BH CV ECHO MEAS - LVIDD: 4.8 CM
BH CV ECHO MEAS - LVIDS: 3.4 CM
BH CV ECHO MEAS - LVOT DIAM: 2 CM
BH CV ECHO MEAS - LVPWD: 1.5 CM
BH CV ECHO MEAS - MED PEAK E' VEL: 5.2 CM/SEC
BH CV ECHO MEAS - MR MAX PG: 94.7 MMHG
BH CV ECHO MEAS - MR MAX VEL: 486.6 CM/SEC
BH CV ECHO MEAS - MV A MAX VEL: 53.1 CM/SEC
BH CV ECHO MEAS - MV E MAX VEL: 39.3 CM/SEC
BH CV ECHO MEAS - MV E/A: 0.74
BH CV ECHO MEAS - MV MAX PG: 1.88 MMHG
BH CV ECHO MEAS - PA ACC TIME: 0.1 SEC
BH CV ECHO MEAS - RAP SYSTOLE: 3 MMHG
BH CV ECHO MEAS - RVSP: 26.2 MMHG
BH CV ECHO MEAS - TAPSE (>1.6): 1.72 CM
BH CV ECHO MEAS - TR MAX PG: 23.2 MMHG
BH CV ECHO MEAS - TR MAX VEL: 227 CM/SEC
BH CV ECHO MEASUREMENTS AVERAGE E/E' RATIO: 7.02
BH CV ECHO SHUNT ASSESSMENT PERFORMED (HIDDEN SCRIPTING): 1
BH CV XLRA - RV BASE: 3.9 CM
BH CV XLRA - RV LENGTH: 7.59 CM
BH CV XLRA - RV MID: 3.21 CM
BH CV XLRA - TDI S': 11.6 CM/SEC
BILIRUB SERPL-MCNC: 0.5 MG/DL (ref 0–1.2)
BILIRUB UR QL STRIP: NEGATIVE
BUN SERPL-MCNC: 13 MG/DL (ref 6–20)
BUN/CREAT SERPL: 9.9 (ref 7–25)
CALCIUM SPEC-SCNC: 9 MG/DL (ref 8.6–10.5)
CHLORIDE SERPL-SCNC: 104 MMOL/L (ref 98–107)
CHOLEST SERPL-MCNC: 197 MG/DL (ref 0–200)
CLARITY UR: CLEAR
CO2 SERPL-SCNC: 25 MMOL/L (ref 22–29)
COLOR UR: YELLOW
CREAT SERPL-MCNC: 1.31 MG/DL (ref 0.76–1.27)
DEPRECATED RDW RBC AUTO: 52.3 FL (ref 37–54)
EGFRCR SERPLBLD CKD-EPI 2021: 62.7 ML/MIN/1.73
ERYTHROCYTE [DISTWIDTH] IN BLOOD BY AUTOMATED COUNT: 12.6 % (ref 12.3–15.4)
GLOBULIN UR ELPH-MCNC: 3.5 GM/DL
GLUCOSE BLDC GLUCOMTR-MCNC: 106 MG/DL (ref 70–130)
GLUCOSE BLDC GLUCOMTR-MCNC: 91 MG/DL (ref 70–130)
GLUCOSE BLDC GLUCOMTR-MCNC: 97 MG/DL (ref 70–130)
GLUCOSE SERPL-MCNC: 108 MG/DL (ref 65–99)
GLUCOSE UR STRIP-MCNC: ABNORMAL MG/DL
HBA1C MFR BLD: 6.2 % (ref 4.8–5.6)
HCT VFR BLD AUTO: 34.8 % (ref 37.5–51)
HDLC SERPL-MCNC: 47 MG/DL (ref 40–60)
HGB BLD-MCNC: 12.4 G/DL (ref 13–17.7)
HGB UR QL STRIP.AUTO: NEGATIVE
HYALINE CASTS UR QL AUTO: NORMAL /LPF
KETONES UR QL STRIP: NEGATIVE
LDLC SERPL CALC-MCNC: 134 MG/DL (ref 0–100)
LDLC/HDLC SERPL: 2.81 {RATIO}
LEFT ATRIUM VOLUME INDEX: 24.6 ML/M2
LEUKOCYTE ESTERASE UR QL STRIP.AUTO: NEGATIVE
LV EF 2D ECHO EST: 50 %
MAGNESIUM SERPL-MCNC: 2.2 MG/DL (ref 1.6–2.6)
MCH RBC QN AUTO: 40.4 PG (ref 26.6–33)
MCHC RBC AUTO-ENTMCNC: 35.6 G/DL (ref 31.5–35.7)
MCV RBC AUTO: 113.4 FL (ref 79–97)
NITRITE UR QL STRIP: NEGATIVE
PH UR STRIP.AUTO: 5.5 [PH] (ref 5–8)
PLATELET # BLD AUTO: 201 10*3/MM3 (ref 140–450)
PMV BLD AUTO: 9.4 FL (ref 6–12)
POTASSIUM SERPL-SCNC: 4.1 MMOL/L (ref 3.5–5.2)
PROT SERPL-MCNC: 7.8 G/DL (ref 6–8.5)
PROT UR QL STRIP: ABNORMAL
QT INTERVAL: 398 MS
QTC INTERVAL: 473 MS
RBC # BLD AUTO: 3.07 10*6/MM3 (ref 4.14–5.8)
RBC # UR STRIP: NORMAL /HPF
REF LAB TEST METHOD: NORMAL
SODIUM SERPL-SCNC: 139 MMOL/L (ref 136–145)
SP GR UR STRIP: 1.02 (ref 1–1.03)
SQUAMOUS #/AREA URNS HPF: NORMAL /HPF
TRIGL SERPL-MCNC: 90 MG/DL (ref 0–150)
UROBILINOGEN UR QL STRIP: ABNORMAL
VIT B12 BLD-MCNC: <150 PG/ML (ref 211–946)
VLDLC SERPL-MCNC: 16 MG/DL (ref 5–40)
WBC # UR STRIP: NORMAL /HPF
WBC NRBC COR # BLD AUTO: 5.84 10*3/MM3 (ref 3.4–10.8)

## 2024-06-03 PROCEDURE — 99233 SBSQ HOSP IP/OBS HIGH 50: CPT | Performed by: STUDENT IN AN ORGANIZED HEALTH CARE EDUCATION/TRAINING PROGRAM

## 2024-06-03 PROCEDURE — 97161 PT EVAL LOW COMPLEX 20 MIN: CPT

## 2024-06-03 PROCEDURE — 81001 URINALYSIS AUTO W/SCOPE: CPT | Performed by: INTERNAL MEDICINE

## 2024-06-03 PROCEDURE — 80053 COMPREHEN METABOLIC PANEL: CPT | Performed by: INTERNAL MEDICINE

## 2024-06-03 PROCEDURE — 82948 REAGENT STRIP/BLOOD GLUCOSE: CPT

## 2024-06-03 PROCEDURE — 92610 EVALUATE SWALLOWING FUNCTION: CPT

## 2024-06-03 PROCEDURE — 93010 ELECTROCARDIOGRAM REPORT: CPT | Performed by: INTERNAL MEDICINE

## 2024-06-03 PROCEDURE — 83036 HEMOGLOBIN GLYCOSYLATED A1C: CPT

## 2024-06-03 PROCEDURE — 80061 LIPID PANEL: CPT

## 2024-06-03 PROCEDURE — 93306 TTE W/DOPPLER COMPLETE: CPT | Performed by: INTERNAL MEDICINE

## 2024-06-03 PROCEDURE — 99232 SBSQ HOSP IP/OBS MODERATE 35: CPT | Performed by: INTERNAL MEDICINE

## 2024-06-03 PROCEDURE — 97166 OT EVAL MOD COMPLEX 45 MIN: CPT

## 2024-06-03 PROCEDURE — 25010000002 HEPARIN (PORCINE) PER 1000 UNITS: Performed by: STUDENT IN AN ORGANIZED HEALTH CARE EDUCATION/TRAINING PROGRAM

## 2024-06-03 PROCEDURE — 93005 ELECTROCARDIOGRAM TRACING: CPT | Performed by: INTERNAL MEDICINE

## 2024-06-03 PROCEDURE — 85027 COMPLETE CBC AUTOMATED: CPT | Performed by: INTERNAL MEDICINE

## 2024-06-03 PROCEDURE — 83735 ASSAY OF MAGNESIUM: CPT | Performed by: INTERNAL MEDICINE

## 2024-06-03 PROCEDURE — 92523 SPEECH SOUND LANG COMPREHEN: CPT

## 2024-06-03 PROCEDURE — 25010000002 THIAMINE HCL 200 MG/2ML SOLUTION: Performed by: FAMILY MEDICINE

## 2024-06-03 PROCEDURE — 93356 MYOCRD STRAIN IMG SPCKL TRCK: CPT

## 2024-06-03 PROCEDURE — 93356 MYOCRD STRAIN IMG SPCKL TRCK: CPT | Performed by: INTERNAL MEDICINE

## 2024-06-03 PROCEDURE — 93306 TTE W/DOPPLER COMPLETE: CPT

## 2024-06-03 RX ORDER — MIDAZOLAM HYDROCHLORIDE 1 MG/ML
2 INJECTION INTRAMUSCULAR; INTRAVENOUS
Status: DISCONTINUED | OUTPATIENT
Start: 2024-06-03 | End: 2024-06-05

## 2024-06-03 RX ORDER — MIDAZOLAM HYDROCHLORIDE 1 MG/ML
4 INJECTION INTRAMUSCULAR; INTRAVENOUS
Status: DISCONTINUED | OUTPATIENT
Start: 2024-06-03 | End: 2024-06-05

## 2024-06-03 RX ORDER — INSULIN LISPRO 100 [IU]/ML
2-7 INJECTION, SOLUTION INTRAVENOUS; SUBCUTANEOUS
Status: DISCONTINUED | OUTPATIENT
Start: 2024-06-03 | End: 2024-06-06 | Stop reason: HOSPADM

## 2024-06-03 RX ORDER — NICOTINE POLACRILEX 4 MG
15 LOZENGE BUCCAL
Status: DISCONTINUED | OUTPATIENT
Start: 2024-06-03 | End: 2024-06-06 | Stop reason: HOSPADM

## 2024-06-03 RX ORDER — VALSARTAN 80 MG/1
80 TABLET ORAL DAILY
Status: DISCONTINUED | OUTPATIENT
Start: 2024-06-03 | End: 2024-06-06 | Stop reason: HOSPADM

## 2024-06-03 RX ORDER — LORAZEPAM 1 MG/1
2 TABLET ORAL
Status: DISCONTINUED | OUTPATIENT
Start: 2024-06-03 | End: 2024-06-05

## 2024-06-03 RX ORDER — CLOPIDOGREL BISULFATE 75 MG/1
75 TABLET ORAL DAILY
Status: DISCONTINUED | OUTPATIENT
Start: 2024-06-03 | End: 2024-06-03

## 2024-06-03 RX ORDER — SPIRONOLACTONE 25 MG/1
25 TABLET ORAL DAILY
Status: DISCONTINUED | OUTPATIENT
Start: 2024-06-03 | End: 2024-06-06 | Stop reason: HOSPADM

## 2024-06-03 RX ORDER — TORSEMIDE 10 MG/1
10 TABLET ORAL DAILY
Status: DISCONTINUED | OUTPATIENT
Start: 2024-06-03 | End: 2024-06-06 | Stop reason: HOSPADM

## 2024-06-03 RX ORDER — SPIRONOLACTONE 25 MG/1
25 TABLET ORAL DAILY
Status: DISCONTINUED | OUTPATIENT
Start: 2024-06-03 | End: 2024-06-03

## 2024-06-03 RX ORDER — DEXTROSE MONOHYDRATE 25 G/50ML
25 INJECTION, SOLUTION INTRAVENOUS
Status: DISCONTINUED | OUTPATIENT
Start: 2024-06-03 | End: 2024-06-06 | Stop reason: HOSPADM

## 2024-06-03 RX ORDER — HEPARIN SODIUM 5000 [USP'U]/ML
5000 INJECTION, SOLUTION INTRAVENOUS; SUBCUTANEOUS EVERY 12 HOURS SCHEDULED
Status: DISCONTINUED | OUTPATIENT
Start: 2024-06-03 | End: 2024-06-06 | Stop reason: HOSPADM

## 2024-06-03 RX ORDER — LORAZEPAM 1 MG/1
1 TABLET ORAL
Status: DISCONTINUED | OUTPATIENT
Start: 2024-06-03 | End: 2024-06-05

## 2024-06-03 RX ORDER — TORSEMIDE 10 MG/1
10 TABLET ORAL DAILY
Status: DISCONTINUED | OUTPATIENT
Start: 2024-06-03 | End: 2024-06-03

## 2024-06-03 RX ORDER — IBUPROFEN 600 MG/1
1 TABLET ORAL
Status: DISCONTINUED | OUTPATIENT
Start: 2024-06-03 | End: 2024-06-06 | Stop reason: HOSPADM

## 2024-06-03 RX ORDER — VALSARTAN 80 MG/1
80 TABLET ORAL DAILY
Status: DISCONTINUED | OUTPATIENT
Start: 2024-06-03 | End: 2024-06-03

## 2024-06-03 RX ADMIN — SPIRONOLACTONE 25 MG: 25 TABLET ORAL at 13:49

## 2024-06-03 RX ADMIN — ASPIRIN 81 MG: 81 TABLET, CHEWABLE ORAL at 09:59

## 2024-06-03 RX ADMIN — FOLIC ACID 1 MG: 1 TABLET ORAL at 20:35

## 2024-06-03 RX ADMIN — Medication 10 ML: at 20:36

## 2024-06-03 RX ADMIN — HEPARIN SODIUM 5000 UNITS: 5000 INJECTION INTRAVENOUS; SUBCUTANEOUS at 13:49

## 2024-06-03 RX ADMIN — VALSARTAN 80 MG: 80 TABLET, FILM COATED ORAL at 13:49

## 2024-06-03 RX ADMIN — ATORVASTATIN CALCIUM 80 MG: 40 TABLET, FILM COATED ORAL at 20:36

## 2024-06-03 RX ADMIN — THIAMINE HYDROCHLORIDE 200 MG: 100 INJECTION, SOLUTION INTRAMUSCULAR; INTRAVENOUS at 05:15

## 2024-06-03 RX ADMIN — EMPAGLIFLOZIN 25 MG: 25 TABLET, FILM COATED ORAL at 13:49

## 2024-06-03 RX ADMIN — THIAMINE HYDROCHLORIDE 200 MG: 100 INJECTION, SOLUTION INTRAMUSCULAR; INTRAVENOUS at 20:35

## 2024-06-03 RX ADMIN — THIAMINE HYDROCHLORIDE 200 MG: 100 INJECTION, SOLUTION INTRAMUSCULAR; INTRAVENOUS at 13:49

## 2024-06-03 RX ADMIN — HEPARIN SODIUM 5000 UNITS: 5000 INJECTION INTRAVENOUS; SUBCUTANEOUS at 20:35

## 2024-06-03 RX ADMIN — TORSEMIDE 10 MG: 10 TABLET ORAL at 13:49

## 2024-06-03 NOTE — THERAPY EVALUATION
Patient Name: Walt Gao  : 1965    MRN: [id number]                              Today's Date: 6/3/2024       Admit Date: 2024    Visit Dx:     ICD-10-CM ICD-9-CM   1. Facial droop  R29.810 781.94   2. Weakness of left upper extremity  R29.898 729.89   3. Acute nonintractable headache, unspecified headache type  R51.9 784.0   4. Abnormal CT of the head  R93.0 793.0   5. Dysarthria  R47.1 784.51     Patient Active Problem List   Diagnosis    Chronic systolic congestive heart failure    Essential hypertension    Chronic renal impairment, stage 3 (moderate)    Non-ischemic cardiomyopathy    Alcohol abuse    Mixed hyperlipidemia    At risk for sleep apnea    Snoring    CVA (cerebral vascular accident)     Past Medical History:   Diagnosis Date    CHF (congestive heart failure)     NICM (nonischemic cardiomyopathy)     Nonischemic cardiomyopathy 2016    · Cardiac catheterization revealing nonobstructive CAD,  · LVEF 30% on heart catheterization,  · Echo (16): Moderate LVH. LVEF 23%. Left ventricular diastolic dysfunction (grade II) consistent with pseudonormalization.  Moderate mitral valve regurgitation · Echo (2017):  LVEF 50%     Past Surgical History:   Procedure Laterality Date    COLONOSCOPY N/A 2024    Procedure: COLONOSCOPY;  Surgeon: Duglas Zhou MD;  Location: [facility];  Service: Gastroenterology;  Laterality: N/A;    ENDOSCOPY N/A 2024    Procedure: ESOPHAGOGASTRODUODENOSCOPY;  Surgeon: Duglas Zhou MD;  Location: [facility];  Service: Gastroenterology;  Laterality: N/A;    HERNIA REPAIR      as child      General Information       Row Name 24 1544          OT Time and Intention    Document Type evaluation  -AN     Mode of Treatment occupational therapy  -AN       Row Name 24 1544          General Information    Patient Profile Reviewed yes  -AN     Prior Level of Function independent:;all household mobility;community  mobility;gait;ADL's;work;driving  -AN     Existing Precautions/Restrictions fall;other (see comments)  L inattention, L sided weakness UE>LE, impaired L peripheral vision  -AN     Barriers to Rehab medically complex  -[facility] Name 06/03/24 1544          Living Environment    People in Home alone;other (see comments)  S.O stays on the weekends  -[facility] Name 06/03/24 1544          Home Main Entrance    Number of Stairs, Main Entrance three  -AN     Stair Railings, Main Entrance none  -AN       Row Name 06/03/24 1544          Stairs Within Home, Primary    Number of Stairs, Within Home, Primary seven  -AN     Stair Railings, Within Home, Primary railing on left side (ascending)  -AN       Row Name 06/03/24 1544          Cognition    Orientation Status (Cognition) oriented x 4  -AN       Row Name 06/03/24 1544          Safety Issues, Functional Mobility    Safety Issues Affecting Function (Mobility) safety precaution awareness;safety precautions follow-through/compliance;insight into deficits/self-awareness;judgment;awareness of need for assistance;problem-solving  -AN     Impairments Affecting Function (Mobility) balance;postural/trunk control;motor control;endurance/activity tolerance;strength;cognition;visual/perceptual  -AN     Cognitive Impairments, Mobility Safety/Performance awareness, need for assistance;safety precaution follow-through;sequencing abilities;insight into deficits/self-awareness;judgment;problem-solving/reasoning;safety precaution awareness  -AN               User Key  (r) = Recorded By, (t) = Taken By, (c) = Cosigned By      Initials Name Provider Type    AN Tawnya Mata OT Occupational Therapist                     Mobility/ADL's       Row Name 06/03/24 1546          Bed Mobility    Comment, (Bed Mobility) UI upon arrival  -AN       Row Name 06/03/24 1545          Transfers    Transfers sit-stand transfer;stand-sit transfer  -AN       Row Name 06/03/24 1545          Sit-Stand  Transfer    Sit-Stand [place] (Transfers) contact guard  -AN       Row Name 06/03/24 1545          Stand-Sit Transfer    Stand-Sit [place] (Transfers) contact guard  -AN       Row Name 06/03/24 1545          Functional Mobility    Functional Mobility- Ind. Level verbal cues required;minimum assist (75% patient effort);1 person  -AN     Functional Mobility-Distance (Feet) --  household distance  -AN     Functional Mobility- Safety Issues sequencing ability decreased;step length decreased  -AN     Functional Mobility- Comment Pt ambulated household distance initially with CGA progressing to Min A demonstrating L foot drag and L lean requiring cues to correct and cues to attend L.  -AN       Row Name 06/03/24 1545          Activities of Daily Living    BADL Assessment/Intervention upper body dressing;lower body dressing  -AN       Row Name 06/03/24 1545          Upper Body Dressing Assessment/Training    [place] Level (Upper Body Dressing) don;pajama/robe;minimum assist (75% patient effort)  -AN     Position (Upper Body Dressing) unsupported sitting  -AN       Row Name 06/03/24 1545          Lower Body Dressing Assessment/Training    [place] Level (Lower Body Dressing) don;socks;maximum assist (25% patient effort)  -AN     Position (Lower Body Dressing) supported sitting  -AN               User Key  (r) = Recorded By, (t) = Taken By, (c) = Cosigned By      Initials Name Provider Type    Tawnya Khan OT Occupational Therapist                   Obj/Interventions       Row Name 06/03/24 1547          Sensory Assessment (Somatosensory)    Sensory Assessment (Somatosensory) left UE  -AN     Left UE Sensory Assessment general sensation  -AN       Row Name 06/03/24 1547          Vision Assessment/Intervention    Visual Impairment/Limitations peripheral vision impaired left  -AN       Row Name 06/03/24 1547          Range of Motion Comprehensive    General Range of Motion bilateral upper extremity  ROM WFL  -AN       Row Name 06/03/24 1547          Strength Comprehensive (MMT)    General Manual Muscle Testing (MMT) Assessment upper extremity strength deficits identified  -AN     Comment, General Manual Muscle Testing (MMT) Assessment RUE grossly 4/5, LUE grossly 3+/5  -AN       Row Name 06/03/24 1547          Motor Skills    Motor Skills coordination  -AN     Coordination fine motor deficit;gross motor deficit;left;upper extremity;moderate impairment;dysdiadochokinesia;bimanual skills  -AN       Row Name 06/03/24 1547          Balance    Balance Assessment sitting static balance;sitting dynamic balance;sit to stand dynamic balance;standing static balance;standing dynamic balance  -AN     Static Sitting Balance standby assist  -AN     Dynamic Sitting Balance standby assist  -AN     Position, Sitting Balance sitting in chair  -AN     Sit to Stand Dynamic Balance verbal cues;contact guard  -AN     Static Standing Balance contact guard  -AN     Dynamic Standing Balance minimal assist;1-person assist  -AN     Position/Device Used, Standing Balance supported  -AN     Balance Interventions standing;sit to stand;supported;static;minimal challenge;occupation based/functional task  -AN               User Key  (r) = Recorded By, (t) = Taken By, (c) = Cosigned By      Initials Name Provider Type    AN Tawnya Mata OT Occupational Therapist                   Goals/Plan       Row Name 06/03/24 1559          Bed Mobility Goal 1 (OT)    Activity/Assistive Device (Bed Mobility Goal 1, OT) sit to supine/supine to sit  -AN     [place] Level/Cues Needed (Bed Mobility Goal 1, OT) supervision required  -AN     Time Frame (Bed Mobility Goal 1, OT) short term goal (STG);2 days  -AN       Row Name 06/03/24 1559          Transfer Goal 1 (OT)    Activity/Assistive Device (Transfer Goal 1, OT) sit-to-stand/stand-to-sit;toilet  -AN     [place] Level/Cues Needed (Transfer Goal 1, OT) contact guard required  -AN     Time  Frame (Transfer Goal 1, OT) long term goal (LTG);2 weeks  -AN       Row Name 06/03/24 1559          Dressing Goal 1 (OT)    Activity/Device (Dressing Goal 1, OT) upper body dressing  -AN     [place]/Cues Needed (Dressing Goal 1, OT) contact guard required  -AN     Time Frame (Dressing Goal 1, OT) long term goal (LTG);2 weeks  -AN       Row Name 06/03/24 1559          Toileting Goal 1 (OT)    Activity/Device (Toileting Goal 1, OT) adjust/manage clothing;perform perineal hygiene;commode  -AN     [place] Level/Cues Needed (Toileting Goal 1, OT) contact guard required  -AN     Time Frame (Toileting Goal 1, OT) long term goal (LTG);2 weeks  -AN       Row Name 06/03/24 1769          Therapy Assessment/Plan (OT)    Planned Therapy Interventions (OT) activity tolerance training;adaptive equipment training;BADL retraining;cognitive/visual perception retraining;functional balance retraining;transfer/mobility retraining;occupation/activity based interventions;strengthening exercise;patient/caregiver education/training  -AN               User Key  (r) = Recorded By, (t) = Taken By, (c) = Cosigned By      Initials Name Provider Type    AN Tawnya Mata OT Occupational Therapist                   Clinical Impression       Row Name 06/03/24 8567          Pain Assessment    Pretreatment Pain Rating 0/10 - no pain  -AN     Posttreatment Pain Rating 0/10 - no pain  -AN     Pre/Posttreatment Pain Comment asymptomatic  -AN     Pain Intervention(s) Repositioned;Ambulation/increased activity  -AN       Row Name 06/03/24 8546          Plan of Care Review    Plan of Care Reviewed With patient;significant other  -AN     Progress no change  -AN     Outcome Evaluation Pt presents below his functional baseline with deficits including L sided weakness, impaired L peripheral vision, delayed processing, and impaired mobility and ADLs warranting skilled OT services. Pt performed functional trasnfers with CGA and ambulated short  distance with CGA-Min A for balance. Rec IPR at dc for best functional outcomes.  -AN       Row Name 06/03/24 1557          Therapy Assessment/Plan (OT)    Patient/Family Therapy Goal Statement (OT) Return to PLOF  -AN     Rehab Potential (OT) good, to achieve stated therapy goals  -AN     Criteria for Skilled Therapeutic Interventions Met (OT) yes;skilled treatment is necessary  -AN     Therapy Frequency (OT) daily  -AN     Predicted Duration of Therapy Intervention (OT) 4 days  -AN       Row Name 06/03/24 1557          Therapy Plan Review/Discharge Plan (OT)    Anticipated Discharge Disposition (OT) inpatient rehabilitation facility  -AN       Row Name 06/03/24 1557          Vital Signs    Pre Patient Position Sitting  -AN     Intra Patient Position Standing  -AN     Post Patient Position Sitting  -AN       Row Name 06/03/24 1557          Positioning and Restraints    Pre-Treatment Position sitting in chair/recliner  -AN     Post Treatment Position chair  -AN     In Chair notified nsg;reclined;call light within reach;encouraged to call for assist;exit alarm on;with family/caregiver;waffle cushion;legs elevated  -AN               User Key  (r) = Recorded By, (t) = Taken By, (c) = Cosigned By      Initials Name Provider Type    AN Tawnya Mata, AGGIE Occupational Therapist                   Outcome Measures       Row Name 06/03/24 1600          How much help from another is currently needed...    Putting on and taking off regular lower body clothing? 2  -AN     Bathing (including washing, rinsing, and drying) 2  -AN     Toileting (which includes using toilet bed pan or urinal) 2  -AN     Putting on and taking off regular upper body clothing 3  -AN     Taking care of personal grooming (such as brushing teeth) 3  -AN     Eating meals 3  -AN     AM-PAC 6 Clicks Score (OT) 15  -AN       Row Name 06/03/24 1157          How much help from another person do you currently need...    Turning from your back to your side  while in flat bed without using bedrails? 4  -CK (r) HM (t) CK (c)     Moving from lying on back to sitting on the side of a flat bed without bedrails? 4  -CK (r) HM (t) CK (c)     Moving to and from a bed to a chair (including a wheelchair)? 3  -CK (r) HM (t) CK (c)     Standing up from a chair using your arms (e.g., wheelchair, bedside chair)? 3  -CK (r) HM (t) CK (c)     Climbing 3-5 steps with a railing? 3  -CK (r) HM (t) CK (c)     To walk in hospital room? 3  -CK (r) HM (t) CK (c)     AM-PAC 6 Clicks Score (PT) 20  -CK (r) HM (t)     Highest Level of Mobility Goal 6 --> Walk 10 steps or more  -CK (r) HM (t)       Row Name 06/03/24 1600 06/03/24 1157       Modified [place] Scale    Pre-Stroke Modified [place] Scale 6 - Unable to determine (UTD) from the medical record documentation  -AN 6 - Unable to determine (UTD) from the medical record documentation  -CK (r) HM (t) CK (c)    Modified [place] Scale 3 - Moderate disability.  Requiring some help, but able to walk without assistance.  -AN 3 - Moderate disability.  Requiring some help, but able to walk without assistance.  -CK (r) HM (t) CK (c)      Row Name 06/03/24 1600 06/03/24 1157       Functional Assessment    Outcome Measure Options AM-PAC 6 Clicks Daily Activity (OT);Modified [place]  -AN AM-PAC 6 Clicks Basic Mobility (PT);Modified [place]  -CK (r) HM (t) CK (c)              User Key  (r) = Recorded By, (t) = Taken By, (c) = Cosigned By      Initials Name Provider Type    Tawnya Khan, OT Occupational Therapist    Therese Rosales, PT Physical Therapist    HM Shae Aldridge, PT Student PT Student                    Occupational Therapy Education       Title: PT OT SLP Therapies (In Progress)       Topic: Occupational Therapy (In Progress)       Point: ADL training (Done)       Description:   Instruct learner(s) on proper safety adaptation and remediation techniques during self care or transfers.   Instruct in proper use of assistive devices.                   Learning Progress Summary             Patient Acceptance, E, VU by AN at 6/3/2024 1600   Significant Other Acceptance, E, VU by AN at 6/3/2024 1600                         Point: Home exercise program (Not Started)       Description:   Instruct learner(s) on appropriate technique for monitoring, assisting and/or progressing therapeutic exercises/activities.                  Learner Progress:  Not documented in this visit.              Point: Precautions (Done)       Description:   Instruct learner(s) on prescribed precautions during self-care and functional transfers.                  Learning Progress Summary             Patient Acceptance, E, VU by AN at 6/3/2024 1600   Significant Other Acceptance, E, VU by AN at 6/3/2024 1600                         Point: Body mechanics (Done)       Description:   Instruct learner(s) on proper positioning and spine alignment during self-care, functional mobility activities and/or exercises.                  Learning Progress Summary             Patient Acceptance, E, VU by AN at 6/3/2024 1600   Significant Other Acceptance, E, VU by AN at 6/3/2024 1600                                         User Key       Initials Effective Dates Name Provider Type Discipline     09/21/21 -  Tawnya Mata OT Occupational Therapist OT                  OT Recommendation and Plan  Planned Therapy Interventions (OT): activity tolerance training, adaptive equipment training, BADL retraining, cognitive/visual perception retraining, functional balance retraining, transfer/mobility retraining, occupation/activity based interventions, strengthening exercise, patient/caregiver education/training  Therapy Frequency (OT): daily  Plan of Care Review  Plan of Care Reviewed With: patient, significant other  Progress: no change  Outcome Evaluation: Pt presents below his functional baseline with deficits including L sided weakness, impaired L peripheral vision, delayed processing, and  impaired mobility and ADLs warranting skilled OT services. Pt performed functional trasnfers with CGA and ambulated short distance with CGA-Min A for balance. Rec IPR at dc for best functional outcomes.     Time Calculation:   Evaluation Complexity (OT)  Review Occupational Profile/Medical/Therapy History Complexity: expanded/moderate complexity  Assessment, Occupational Performance/Identification of Deficit Complexity: 3-5 performance deficits  Clinical Decision Making Complexity (OT): detailed assessment/moderate complexity  Overall Complexity of Evaluation (OT): moderate complexity     Time Calculation- OT       Row Name 06/03/24 1601             Time Calculation- OT    OT Start Time 1500  -AN      OT Received On 06/03/24  -AN      OT Goal Re-Cert Due Date 06/13/24  -AN         Untimed Charges    OT Eval/Re-eval Minutes 46  -AN         Total Minutes    Untimed Charges Total Minutes 46  -AN       Total Minutes 46  -AN                User Key  (r) = Recorded By, (t) = Taken By, (c) = Cosigned By      Initials Name Provider Type    AN Tawnya Mata, OT Occupational Therapist                  Therapy Charges for Today       Code Description Service Date Service Provider Modifiers Qty    [id number]  OT EVAL MOD COMPLEXITY 4 6/3/2024 Tawnya Mata OT GO 1                 Tawnya Mata OT  6/3/2024

## 2024-06-03 NOTE — PLAN OF CARE
Goal Outcome Evaluation:  Plan of Care Reviewed With: patient, significant other        Progress: no change  Outcome Evaluation: Pt presents below his functional baseline with deficits including L sided weakness, impaired L peripheral vision, delayed processing, and impaired mobility and ADLs warranting skilled OT services. Pt performed functional trasnfers with CGA and ambulated short distance with CGA-Min A for balance. Rec IPR at dc for best functional outcomes.      Anticipated Discharge Disposition (OT): inpatient rehabilitation facility

## 2024-06-03 NOTE — PLAN OF CARE
Goal Outcome Evaluation:  Plan of Care Reviewed With: patient             Anticipated Discharge Disposition (SLP): home with OP services    SLP Diagnosis: mild, dysarthria (06/03/24 0815)  SLP Diagnosis Comments: Higher level cognitive-communication assessment to follow next session. (06/03/24 0815)  SLP Swallowing Diagnosis: swallow WFL/no suspected pharyngeal impairment (06/03/24 0815)

## 2024-06-03 NOTE — PROGRESS NOTES
[facility] Medicine Services  PROGRESS NOTE    Patient Name: Walt Gao  : 1965  MRN: [id number]    Date of Admission: 2024  Primary Care Physician: Duglas Lang MD    Subjective   Subjective     CC:  stroke    HPI:  Left arm weakness and leg discoordinatoin  No dyspnea  No chest pain      Objective   Objective     Vital Signs:   Temp:  [96.8 °F (36 °C)-98.4 °F (36.9 °C)] 96.8 °F (36 °C)  Heart Rate:  [] 85  Resp:  [16] 16  BP: (142-181)/() 165/114     Physical Exam:  Constitutional:Alert, oriented x 3, nontoxic appearing  Psych:Normal/appropriate affect  HEENT:NCAT, oropharynx clear  Neck: neck supple, full range of motion  Neuro: Face symmetric, speech clear, decreased left ; gait not assessed  Cardiac: RRR; No pretibial pitting edema  Resp: CTAB, normal effort  GI: abd soft, nontender  Skin: No extremity rash  Musculoskeletal/extremities: no cyanosis of extremities; no significant ankle edema          Results Reviewed:  LAB RESULTS:      Lab 24  0659 24  1407 24  1405   WBC 5.84  --   --    HEMOGLOBIN 12.4*  --   --    HEMOGLOBIN, POC  --  13.9  --    HEMATOCRIT 34.8*  --   --    HEMATOCRIT POC  --  41  --    PLATELETS 201  --   --    .4*  --   --    PROTIME  --   --  13.6         Lab 24  0659 24  1407   SODIUM 139  --    POTASSIUM 4.1  --    CHLORIDE 104  --    CO2 25.0  --    ANION GAP 10.0  --    BUN 13  --    CREATININE 1.31* 1.70*   EGFR 62.7 45.9*   GLUCOSE 108*  --    CALCIUM 9.0  --    MAGNESIUM 2.2  --    HEMOGLOBIN A1C 6.20*  --          Lab 24  0659   TOTAL PROTEIN 7.8   ALBUMIN 4.3   GLOBULIN 3.5   ALT (SGPT) 15   AST (SGOT) 16   BILIRUBIN 0.5   ALK PHOS 29*         Lab 24  1405   PROTIME 13.6   INR 1.1         Lab 24  0659   CHOLESTEROL 197   LDL CHOL 134*   HDL CHOL 47   TRIGLYCERIDES 90         Lab 24  1821   VITAMIN B 12 <150*         Brief Urine Lab Results  (Last result  in the past 365 days)        Color   Clarity   Blood   Leuk Est   Nitrite   Protein   CREAT   Urine HCG        06/02/24 2204 Yellow   Clear   Negative   Negative   Negative   100 mg/dL (2+)                   Microbiology Results Abnormal       None            MRI Brain With & Without Contrast    Result Date: 6/3/2024  MRI BRAIN W WO CONTRAST Date of Exam: 6/2/2024 8:25 PM EDT Indication: Stroke, follow up CTH hypodensity/ Acute stroke, possible Mets.  Comparison: CT earlier the same day. Technique:  Routine multiplanar/multisequence sequence images of the brain were obtained before and after the uneventful administration of 18 mL Multihance. Findings: Areas of diffusion restriction are present on the right consistent with acute infarct involving watershed distribution within the frontal and parietal lobes, as well as a somewhat more confluent area of cortical infarct within the right occipital lobe. Midline structures are normal and the craniocervical junction appears satisfactory. Areas of infarct are associated with mild localized edema, otherwise without evidence of hemorrhage or significant mass effect. Age-related changes are present with minimal volume loss and mild to moderate typical T2 hyperintense subcortical and pontine white matter changes, nonspecific but favored to reflect sequela of chronic microvascular ischemia. Some focal FLAIR hyperintensity is present within the left splenium of the corpus callosum with the appearance of probable early chronic infarct. The ventricles are normal in size and configuration. Some areas of scattered susceptibility artifact are present likely reflecting chronic microhemorrhage. Areas of acute infarct, most notably within the right occipital lobe demonstrates some irregular enhancement, likely representing evolving cortical laminar necrosis.     Impression: Impression: Multifocal areas of both acute and subacute infarct are present including within a watershed  distribution on the right, as well as a more focal area of acute cortical and subcortical infarct within the right occipital lobe. Areas of mixed subacute and chronic lacunar infarct are also noted bilaterally. There is no evidence of associated hemorrhage or significant mass effect. Areas of acute infarct on the right demonstrate some associated enhancement, likely representing cortical laminar necrosis. Neoplastic or metastatic process is considered less likely, however consider follow-up imaging to confirm expected evolution of multifocal infarcts. Electronically Signed: Ernst Freire MD  6/3/2024 6:45 AM EDT  Workstation ID: [id number]    CT Angiogram Head w AI Analysis of LVO    Result Date: 6/2/2024  CT ANGIOGRAM HEAD W AI ANALYSIS OF LVO, CT ANGIOGRAM NECK Date of Exam: 6/2/2024 2:01 PM EDT Indication: stroke. Comparison: CT head from earlier today Technique: CTA of the head and neck was performed after the uneventful intravenous administration of 75 mL Isovue-370. Reconstructed coronal and sagittal images were also obtained. In addition, a 3-D volume rendered image was created for interpretation. Automated exposure control and iterative reconstruction methods were used. Findings: There is a two-vessel aortic arch with common origin of the innominate and left common carotid artery, normal variant. The right common carotid artery is widely patent. The right carotid bifurcation is widely patent. The right internal carotid artery is widely patent. The left common carotid artery is widely patent. The left carotid bifurcation is widely patent. The left internal carotid artery is widely patent. The bilateral vertebral arteries are widely patent with mild plaque along their V4 segments.  The left vertebral artery is slightly dominant. There is focal moderate stenosis along the proximal M2 branch of the right middle cerebral artery and otherwise the artery is widely patent. The left middle cerebral and bilateral  anterior cerebral arteries are widely patent. The anterior communicating artery is widely patent. The basilar and bilateral posterior cerebral arteries are widely patent. No definite posterior communicating artery is identified on either side. No intracranial aneurysm is identified. The visualized portions of the bilateral superior cerebellar, anteroinferior cerebellar, and posterior inferior cerebellar arteries are unremarkable.     Impression: Impression: 1.No evidence of hemodynamically significant stenosis along bilateral carotid bifurcations. 2.Focal moderate stenosis along right MCA, but no evidence of acute thrombus or aneurysm. Electronically Signed: Neo Mendez MD  6/2/2024 2:31 PM EDT  Workstation ID: [id number]    CT Angiogram Neck    Result Date: 6/2/2024  CT ANGIOGRAM HEAD W AI ANALYSIS OF LVO, CT ANGIOGRAM NECK Date of Exam: 6/2/2024 2:01 PM EDT Indication: stroke. Comparison: CT head from earlier today Technique: CTA of the head and neck was performed after the uneventful intravenous administration of 75 mL Isovue-370. Reconstructed coronal and sagittal images were also obtained. In addition, a 3-D volume rendered image was created for interpretation. Automated exposure control and iterative reconstruction methods were used. Findings: There is a two-vessel aortic arch with common origin of the innominate and left common carotid artery, normal variant. The right common carotid artery is widely patent. The right carotid bifurcation is widely patent. The right internal carotid artery is widely patent. The left common carotid artery is widely patent. The left carotid bifurcation is widely patent. The left internal carotid artery is widely patent. The bilateral vertebral arteries are widely patent with mild plaque along their V4 segments.  The left vertebral artery is slightly dominant. There is focal moderate stenosis along the proximal M2 branch of the right middle cerebral artery and otherwise the  artery is widely patent. The left middle cerebral and bilateral anterior cerebral arteries are widely patent. The anterior communicating artery is widely patent. The basilar and bilateral posterior cerebral arteries are widely patent. No definite posterior communicating artery is identified on either side. No intracranial aneurysm is identified. The visualized portions of the bilateral superior cerebellar, anteroinferior cerebellar, and posterior inferior cerebellar arteries are unremarkable.     Impression: Impression: 1.No evidence of hemodynamically significant stenosis along bilateral carotid bifurcations. 2.Focal moderate stenosis along right MCA, but no evidence of acute thrombus or aneurysm. Electronically Signed: Neo Mendez MD  6/2/2024 2:31 PM EDT  Workstation ID: [id number]    CT CEREBRAL PERFUSION WITH & WITHOUT CONTRAST    Result Date: 6/2/2024  CT CEREBRAL PERFUSION W WO CONTRAST Date of Exam: 6/2/2024 2:01 PM EDT Indication: stroke.  Comparison: CT head from earlier today Technique: Axial CT images of the brain were obtained prior to and after the administration of 50 mL Isovue-370. Core blood volume, core blood flow, mean transit time, and Tmax images were obtained utilizing the Rapid software protocol. A limited CT angiogram of the head was also performed to measure the blood vessel density. The radiation dose reduction device was turned on for each scan per the ALARA (As Low as Reasonably Achievable) protocol. Findings: The cerebral blood flow appears normal. There is 4 mL of abnormal Tmax perfusion corresponding to the right frontal lobe.     Impression: Impression: 1.No evidence of completed infarct. 2.4 mm of abnormal Tmax perfusion corresponding to right frontal lobe. This is not in the areas of hypodensity seen on recent prior CT head that were in the right parietal and right occipital lobes. Whether this represents artifact versus tissue at risk within the right frontal lobe is unclear.  Electronically Signed: Neo Mendez MD  6/2/2024 2:22 PM EDT  Workstation ID: [id number]    CT Head Without Contrast Stroke Protocol    Result Date: 6/2/2024  CT HEAD WO CONTRAST STROKE PROTOCOL Date of Exam: 6/2/2024 1:58 PM EDT Indication: stroke. Comparison: None available. Technique: Axial CT images were obtained of the head without contrast administration.  Reconstructed coronal images were also obtained. Automated exposure control and iterative construction methods were used. Scan Time: 13:56 Results discussed with stroke navigator at 14:04. Findings: No acute intracranial hemorrhage or extra-axial collection is identified. The ventricles appear normal in caliber, with no midline shift. The basal cisterns appear patent. There are 2 focal hypodensities within the right parietal and right occipital lobes. Scattered foci of periventricular and subcortical white matter hypodensities are nonspecific, but likely the sequela of mild chronic small vessel ischemic disease. The calvarium appears intact. There is mild mucosal disease in the maxillary sinuses. The mastoid air cells are well aerated.     Impression: Impression: 1.No acute intracranial hemorrhage. 2.Two focal hypodensities within right parietal and right occipital lobes, which could represent infarcts versus metastasis. Consider evaluation with MRI brain with and without IV contrast. 3.Mild mucosal disease within maxillary sinuses. 4.Findings suggestive of mild chronic small vessel ischemic disease. Electronically Signed: Neo Mendez MD  6/2/2024 2:07 PM EDT  Workstation ID: [id number]     Results for orders placed during the hospital encounter of 11/29/22    Adult Transthoracic Echo Complete W/ Cont if Necessary Per Protocol    Interpretation Summary    Left ventricular systolic function is normal. Estimated left ventricular EF = 65%    Left ventricular wall thickness is consistent with mild to moderate concentric hypertrophy.    The cardiac valves  are anatomically and functionally normal.      Current medications:  Scheduled Meds:aspirin, 81 mg, Oral, Daily  atorvastatin, 80 mg, Oral, Nightly  empagliflozin, 25 mg, Oral, Daily  folic acid, 1 mg, Oral, Daily  heparin (porcine), 5,000 Units, Subcutaneous, Q12H  insulin lispro, 2-7 Units, Subcutaneous, TID With Meals  sodium chloride, 10 mL, Intravenous, Q12H  spironolactone, 25 mg, Oral, Daily  thiamine (B-1) IV, 200 mg, Intravenous, Q8H   Followed by  [START ON 6/8/2024] thiamine, 100 mg, Oral, Daily  torsemide, 10 mg, Oral, Daily  valsartan, 80 mg, Oral, Daily      Continuous Infusions:   PRN Meds:.  dextrose    dextrose    glucagon (human recombinant)    LORazepam **OR** midazolam **OR** LORazepam **OR** midazolam **OR** midazolam **OR** midazolam    Magnesium Standard Dose Replacement - Follow Nurse / BPA Driven Protocol    sodium chloride    sodium chloride    Assessment & Plan   Assessment & Plan     Active Hospital Problems    Diagnosis  POA    **CVA (cerebral vascular accident) [I63.9]  Yes      Resolved Hospital Problems   No resolved problems to display.        Brief Hospital Course to date:  Walt Gao is a 59 y.o. male w hx etoh abuse (2-3 beer daily), NICM (ef 30% 2013, improved to 50% 2015), ckd 3 (baseline cr ~1.8-2.0), htn, hl. Recently admitted w/ melena/gi bleed and underwent EGD (5//16/24) and Colonoscopy (5/17/24) which showed gastritis, stomach nodule (biopsy negative) as well as internal hemorroids, diverticulosis, sngle angiodysplastic lesion (s/p APC) and polyp (removed, biopsy negative).   Patient presented `1-2 days of left hand nubmness, left facial droop and facial numbness. Ct imaging revealed no hemorrhage and noted hypodensities within right parietal and right occipital lobe possibly representing CVA's vs metastatic dz. CTA h&n revealed focal moderate stenosis along right MCA. Patient was admitted to hospitalist service and seen by neuro-stroke service.initiated on asa.   Mri brain w & wo contrast showed multifocal areas of acute and subacute infarcts withni right frontal, parietal, occipital lobe    Multiple, acute & subacute, right mca territory CVA's  -atheroembolic vs cardioembolic  HTN  HL  -neuro-stroke following: asa 81, high dose statin. Holding plavix due to recent gi bleed  -permissive htn x next 24 hours  -holter monitor upon discharge (order placed)  -pt/ot evals pending  -f/u stroke clinic 2 weeks    Etoh abuse  -ciwa checks; prn ativan  -admits to 2-3 beer daily  -discussed importance of cessation    Hx NICM  -previous EF 35% 2013 -->50% 2017  -current echo pending  -follows w/ [facility]  -restart daily torsemide, aldactone, valsartan, sglt2 inh (farxiga at home, jardiance here)  -holding amlodipine currently to allow some permissive htn (restart 6/4 is plan)    Ckd 3 (baseline cr ~1.8-2.0)  -stable; monitor as restarting cardiac meds (including arb, diuretics)    Borderline DM2, A1c 6.2  -on farxiga at home; jardiance here, ssi      Am labs: cbc,bmp,mag      Expected Discharge Location and Transportation: tbd (pt/ot evals pending)  Expected Discharge   Expected Discharge Date: 6/5/2024; Expected Discharge Time:      DVT prophylaxis:  Medical and mechanical DVT prophylaxis orders are present.         AM-PAC 6 Clicks Score (PT): (P) 20 (06/03/24 7780)    CODE STATUS:   Code Status and Medical Interventions:   Ordered at: 06/02/24 3700     Level Of Support Discussed With:    Patient     Code Status (Patient has no pulse and is not breathing):    CPR (Attempt to Resuscitate)     Medical Interventions (Patient has pulse or is breathing):    Full Support       Michele Guerrier MD  06/03/24

## 2024-06-03 NOTE — PLAN OF CARE
Goal Outcome Evaluation:  Plan of Care Reviewed With: patient, significant other           Outcome Evaluation: (P) Pt was able to complete 270' of ambulation this session with CGA and no AD but presented with unsteady balance and reports of mild dizziness throughout. Pt reports to hospital below baseline with decreased strength in L LE and impaired balance increasing the risk of falls. IPPT is appropriate for patient during admission with future sessions addressing stairs. PT D/C recommendation to IRF.      Anticipated Discharge Disposition (PT): (P) inpatient rehabilitation facility

## 2024-06-03 NOTE — CONSULTS
"Diabetes Education  Assessment/Teaching    Patient Name:  Walt Gao  YOB: 1965  MRN: [id number]  Admit Date:  6/2/2024      Assessment Date:  6/3/2024  Flowsheet Row Most Recent Value   General Information     Referral From: A1c   Height 175.3 cm (69.02\")   Height Method Stated   Weight 104 kg (229 lb 4.5 oz)   Weight Method Bed scale   Are you currently involved in an activity/exercise program?  No   Do you have high blood pressure? yes   Is patient pregnant? no   Pregnancy Assessment    Diabetes History    What type of diabetes do you have? Type 2  [Patient states that he is not diabetic, chart indicates A1c was 7.3 in 2023.]   Length of Diabetes Diagnosis 1 - 5 years   Current DM knowledge poor   Have you had diabetes education/teaching in the past? no   Do you test your blood sugar at home? no   Have you had low blood sugar? (<70mg/dl) no   Have you had high blood sugar? (>140mg/dl) no   How would you rate your diabetes control? fair   Has diabetes caused a problem in your life (work/school/family/friends, etc.)? no   Education Preferences    What areas of diabetes would you like to learn about? avoiding high blood sugar, avoiding low blood sugar, diabetes complications, exercise information, diet information, understanding diabetes, stress/coping skills, resources on diabetes   Nutrition Information    Assessment Topics    Healthy Eating - Assessment Needs education   Being Active - Assessment Needs education   Taking Medication - Assessment N/A-unable to assess   Problem Solving - Assessment Needs education   Reducing Risk - Assessment Needs education   Healthy Coping - Assessment Needs education   Monitoring - Assessment Needs education   DM Goals             Flowsheet Row Most Recent Value   DM Education Needs    Meter --  [Patient states that his doctor told him he did not need to check his BG.]   Blood Glucose Target Range Target ranges per ADA guidelines   Medication Oral, Side " "effects, Administration   Problem Solving Hypoglycemia, Hyperglycemia, Signs, Symptoms, Treatment   Reducing Risks A1C testing, Lipids, Blood pressure   Physical Activity Walking   Physical Activity Frequency Occasionally   Healthy Coping Appropriate   Motivation Moderate   Teaching Method Explanation, Discussion, Handouts   Patient Response Needs reinforcement          Total time spent reviewing chart, preparing education/materials, providing education at bedside, and coordinating care approx 30 minutes.    Consult for diabetes education received per stroke protocol. Chart reviewed. Mr. Gao was seen at bedside today with his girlfriend of 13 years. Permission given for visit.     Discussed and reviewed concepts about type 2 diabetes self-management, risk factors, and importance of blood glucose control to reduce complications. Target blood glucose readings and A1c goals per ADA were reviewed. Reviewed with patient current A1c 6.3 and discussed its significance. Signs, symptoms, and treatment of hyperglycemia and hypoglycemia were discussed.   Patient states he has never had symptoms of low blood sugar. Discussed Rule of 15 for hypoglycemia, and reviewed s/s of hyperglycemia, and actions he could take to help decrease BG levels.     Patient's home medications include: Farxiga. Patient states that he was on Jardiance but his PCP switched him to Farxiga to mitigate the cost. Patient denies any issues in acquiring his medications. Patient states he thought Farxiga was causing dizziness but realized it was possibly the stroke-like symptoms he was experiencing.    Lifestyle changes such as physical activity with MD approval and healthy eating were encouraged. Encouraged to follow up appointment with PCP. Provided patient with copy of [employer]'s \"What is Diabetes\" handout, \"Blood Glucose Goals\" handout, \"What is A1c\" handout, and [employer]'s \"A Balanced Plate\" handout.     Patient has been scheduled for outpatient " diabetes education follow up visit on 7/10/24 at 2:30 PM via phone. Outpatient staff will provide reminder call prior to appointment. Patient was given reminder card with date and time of appointment and our contact information.     Thank you for this consult.         Electronically signed by:  Rita Fay RN  06/03/24 12:40 EDT

## 2024-06-03 NOTE — CASE MANAGEMENT/SOCIAL WORK
Discharge Planning Assessment  [facility]     Patient Name: Walt Gao  MRN: [id number]  Today's Date: 6/3/2024    Admit Date: 6/2/2024    Plan: Home   Discharge Needs Assessment       Row Name 06/03/24 1450       Living Environment    People in Home significant other    Name(s) of People in Home Liza Yoo  Significant Other  [phone]  (on the weekends)    Primary Care Provided by self    Provides Primary Care For no one    Family Caregiver if Needed significant other    Family Caregiver Names Liza Yoo Significant Other [phone] (on the weekends)    Quality of Family Relationships unable to assess    Able to Return to Prior Arrangements yes       Transition Planning    Patient/Family Anticipates Transition to home    Patient/Family Anticipated Services at Transition     Transportation Anticipated family or friend will provide       Discharge Needs Assessment    Equipment Currently Used at Home none                   Discharge Plan       Row Name 06/03/24 1451       Plan    Plan Home    Patient/Family in Agreement with Plan yes    Plan Comments Spoke with Mr. Gao in his room, to initiate discharge planning. He lives alone in [facility]. His Significant Other stays with him on the weekends. He verified he has [employer] insurance. He has prescriptions coverage and uses [facility] on [address] to get his prescriptions filled. His PCP is Duglas Lang MD. Prior to admission, Mr. Gao was independent with ADL's. He uses no medical equipment at home and is not current with [facility]. His plan is to go home at discharge. Await therapy recommendations to determine proper discharge placement or plan. CM will continue to follow.    Final Discharge Disposition Code 01 - home or self-care                  Continued Care and Services - Admitted Since 6/2/2024    No active coordination exists for this encounter.       Expected Discharge Date and Time       Expected Discharge  Date Expected Discharge Time    Jun 5, 2024            Demographic Summary       Row Name 06/03/24 1449       General Information    Admission Type inpatient    Arrived From emergency department    Referral Source admission list    Reason for Consult discharge planning    Preferred Language English       Contact Information    Permission Granted to Share Info With     Contact Information Obtained for                    Functional Status       Row Name 06/03/24 1449       Functional Status    Usual Activity Tolerance moderate    Current Activity Tolerance moderate       Functional Status, IADL    Medications independent    Meal Preparation independent    Housekeeping independent    Laundry independent    Shopping independent                   Psychosocial    No documentation.                  Abuse/Neglect    No documentation.                  Legal    No documentation.                  Substance Abuse    No documentation.                  Patient Forms    No documentation.                     Melody Squires RN

## 2024-06-03 NOTE — PROGRESS NOTES
Stroke Neurology Progress Note     Subjective     This patient was seen in follow-up for: multifocal right MCA distribution infarct  Present for the encounter were: self, patient     Subjective:  My first encounter with the patient.  No acute events overnight.  Discussed results of MRI.  All questions concerns were answered.    Objective      Temp:  [97.6 °F (36.4 °C)-98.4 °F (36.9 °C)] 97.6 °F (36.4 °C)  Heart Rate:  [] 78  Resp:  [16] 16  BP: (142-181)/() 154/111            Objective    Physical Exam:  General Appearance: Alert  HEENT: anicteric sclera, no scleral injection  Lungs: respirations appear comfortable, no obvious increased work of breathing  Extremities: No cyanosis or fingernail clubbing   Skin: No rashes in exposed skin areas     Neurological Examination:   Mental status: Alert and oriented. No dysarthria. Able to name and repeat.  Cranial Nerves: Visual fields intact. Extraocular movements intact with no nystagmus. Midline gaze.  Mild left facial droop.  Sensory: Mild decreased sensation to light touch in left upper and lower extremity.  Motor: Normal tone. Absent pronator drift.  Strength:  LUE: Drift, 4+/5 biceps, triceps,   LLE: Drift, 4+/5 hip flexion/extension  RUE: 5/5 biceps, triceps,   RLE:  5/5 hip flexion/extension  Cerebellar: Finger-to-nose intact. Heel-to-shin intact. Rapid alternating movements are intact.   Gait: Not assessed.     Labs:    Lab Results   Component Value Date    HGBA1C 6.20 (H) 06/03/2024      Lab Results   Component Value Date    CHOL 197 06/03/2024    TRIG 90 06/03/2024    HDL 47 06/03/2024     (H) 06/03/2024       Lab Results   Component Value Date    WBC 5.84 06/03/2024    HGB 12.4 (L) 06/03/2024    HCT 34.8 (L) 06/03/2024    .4 (H) 06/03/2024     06/03/2024     Lab Results   Component Value Date    GLUCOSE 108 (H) 06/03/2024    BUN 13 06/03/2024    CREATININE 1.31 (H) 06/03/2024    EGFRIFAFRI 64 04/12/2021    BCR 9.9  "06/03/2024    CO2 25.0 06/03/2024    CALCIUM 9.0 06/03/2024    ALBUMIN 4.3 06/03/2024    AST 16 06/03/2024    ALT 15 06/03/2024       Results from last 7 days   Lab Units 06/03/24  0659 06/02/24  1407   SODIUM mmol/L 139  --    POTASSIUM mmol/L 4.1  --    CHLORIDE mmol/L 104  --    CO2 mmol/L 25.0  --    BUN mg/dL 13  --    CREATININE mg/dL 1.31* 1.70*   CALCIUM mg/dL 9.0  --    BILIRUBIN mg/dL 0.5  --    ALK PHOS U/L 29*  --    ALT (SGPT) U/L 15  --    AST (SGOT) U/L 16  --    GLUCOSE mg/dL 108*  --        No results found for: \"BLOODCX\"  UA          6/2/2024    22:04   Urinalysis   Squamous Epithelial Cells, UA None Seen    Specific Gravity, UA 1.025    Ketones, UA Negative    Blood, UA Negative    Leukocytes, UA Negative    Nitrite, UA Negative    RBC, UA 0-2    WBC, UA 0-2    Bacteria, UA None Seen        Results Review:      All brain images and reports were personally reviewed and I agree with the interpretations except as noted below.    MRI Brain With & Without Contrast 6/3/2024  Impression: Multifocal areas of both acute and subacute infarct are present including within a watershed distribution on the right, as well as a more focal area of acute cortical and subcortical infarct within the right occipital lobe. Areas of mixed subacute and chronic lacunar infarct are also noted bilaterally. There is no evidence of associated hemorrhage or significant mass effect. Areas of acute infarct on the right demonstrate some associated enhancement, likely representing cortical laminar necrosis. Neoplastic or metastatic process is considered less likely, however consider follow-up imaging to confirm expected evolution of multifocal infarcts.  I personally reviewed the MRI which is largely in the right watershed distribution with a large area of cortical involvement.    CT Angiogram Head and Neck 6/2/2024  Impression: 1.No evidence of hemodynamically significant stenosis along bilateral carotid bifurcations. 2.Focal " moderate stenosis along right MCA, but no evidence of acute thrombus or aneurysm.     CT Perfusion 6/2/2024  Impression: 1.No evidence of completed infarct. 2.4 mm of abnormal Tmax perfusion corresponding to right frontal lobe. This is not in the areas of hypodensity seen on recent prior CT head that were in the right parietal and right occipital lobes. Whether this represents artifact versus tissue at risk within the right frontal lobe is unclear.     CT Head Without Contrast Stroke Protocol 6/2/2024  Impression: 1.No acute intracranial hemorrhage. 2.Two focal hypodensities within right parietal and right occipital lobes, which could represent infarcts versus metastasis. Consider evaluation with MRI brain with and without IV contrast. 3.Mild mucosal disease within maxillary sinuses. 4.Findings suggestive of mild chronic small vessel ischemic disease.           Assessment/Plan     Assessment:    Shimon Gao is a 59-year-old male with a past medical history of recent GI bleed (5/17/2024), HTN, CHF, CKD, HLD, and alcohol use who [facility] with a 2-day history of left hemiparesis, left facial droop, and left hemisensory deficits.  Stroke neurology was consulted for further evaluation.  CT head 6/2/2024 revealed hypodensities in right parietal and occipital lobes.  CT angiogram head and neck 6/2/2024 reveals right MCA stenoses.  MRI brain with and without contrast 6/3/2024 reveals right MCA territory watershed infarct and right occipital cortical infarct.    # Acute right MCA territory infarct  Etiology atheroembolic versus cardioembolic.    -Continue aspirin 81 mg daily  -Hold clopidogrel given recent GI bleed  -Continue atorvastatin 80 mg daily ()  -TTE pending  -Holter monitor prior to discharge  -encouraged alcohol cessation   -PT OT pending  -Neuro-checks per unit protocol while inpatient  -Blood pressure goal: permissive for 24 hours  -DVT  prophylaxis: SCD, heparin  -Follow-up in stroke clinic in 2 weeks      Stroke neurology will follow results of above workup.  Please call with questions.     Марина Rodriguez MD  [facility] [place] STROKE NEURO  06/03/24  09:26 EDT

## 2024-06-03 NOTE — THERAPY EVALUATION
Patient Name: Walt Gao  : 1965    MRN: [id number]                              Today's Date: 6/3/2024       Admit Date: 2024    Visit Dx:     ICD-10-CM ICD-9-CM   1. Facial droop  R29.810 781.94   2. Weakness of left upper extremity  R29.898 729.89   3. Acute nonintractable headache, unspecified headache type  R51.9 784.0   4. Abnormal CT of the head  R93.0 793.0   5. Dysarthria  R47.1 784.51     Patient Active Problem List   Diagnosis    Chronic systolic congestive heart failure    Essential hypertension    Chronic renal impairment, stage 3 (moderate)    Non-ischemic cardiomyopathy    Alcohol abuse    Mixed hyperlipidemia    At risk for sleep apnea    Snoring    CVA (cerebral vascular accident)     Past Medical History:   Diagnosis Date    CHF (congestive heart failure)     NICM (nonischemic cardiomyopathy)     Nonischemic cardiomyopathy 2016    · Cardiac catheterization revealing nonobstructive CAD,  · LVEF 30% on heart catheterization,  · Echo (16): Moderate LVH. LVEF 23%. Left ventricular diastolic dysfunction (grade II) consistent with pseudonormalization.  Moderate mitral valve regurgitation · Echo (2017):  LVEF 50%     Past Surgical History:   Procedure Laterality Date    COLONOSCOPY N/A 2024    Procedure: COLONOSCOPY;  Surgeon: Duglas Zhou MD;  Location: [facility];  Service: Gastroenterology;  Laterality: N/A;    ENDOSCOPY N/A 2024    Procedure: ESOPHAGOGASTRODUODENOSCOPY;  Surgeon: Duglas Zhou MD;  Location: [facility];  Service: Gastroenterology;  Laterality: N/A;    HERNIA REPAIR      as child      General Information       Row Name 24 1132          Physical Therapy Time and Intention    Document Type evaluation  -CK (r) HM (t) CK (c)     Mode of Treatment physical therapy;individual therapy  -CK (r) HM (t) CK (c)       Row Name 24 1132          General Information    Patient Profile Reviewed yes  -CK (r) HM (t) CK (c)      Prior Level of Function independent:;all household mobility;ADL's  -CK (r) HM (t) CK (c)     Existing Precautions/Restrictions fall  -CK (r) HM (t) CK (c)     Barriers to Rehab none identified  -CK (r) HM (t) CK (c)       Row Name 06/03/24 1132          Living Environment    People in Home alone;other (see comments)  Signifigant other reported staying with patient on weekends  -CK (r) HM (t) CK (c)       Row Name 06/03/24 1132          Home Main Entrance    Number of Stairs, Main Entrance three  -CK (r) HM (t) CK (c)     Stair Railings, Main Entrance none  -CK (r) HM (t) CK (c)       Row Name 06/03/24 1132          Stairs Within Home, Primary    Stairs, Within Home, Primary 6-7  -CK (r) HM (t) CK (c)     Stair Railings, Within Home, Primary railing on left side (ascending)  -CK (r) HM (t) CK (c)       Row Name 06/03/24 1132          Cognition    Orientation Status (Cognition) oriented x 4  -CK (r) HM (t) CK (c)       Row Name 06/03/24 1132          Safety Issues, Functional Mobility    Safety Issues Affecting Function (Mobility) awareness of need for assistance;insight into deficits/self-awareness  -CK (r) HM (t) CK (c)     Impairments Affecting Function (Mobility) balance;postural/trunk control;motor control;endurance/activity tolerance;strength  -CK (r) HM (t) CK (c)               User Key  (r) = Recorded By, (t) = Taken By, (c) = Cosigned By      Initials Name Provider Type    CK Therese Vega, PT Physical Therapist    HM Shae Aldridge, PT Student PT Student                   Mobility       Row Name 06/03/24 1136          Bed Mobility    Bed Mobility supine-sit  -CK (r) HM (t) CK (c)     Supine-Sit Horace (Bed Mobility) standby assist;1 person assist  -CK (r) HM (t) CK (c)     Assistive Device (Bed Mobility) head of bed elevated  -CK (r) HM (t) CK (c)     Comment, (Bed Mobility) Pt was able to complete supine to sit with slow to get L LE to side of bed.  -CK (r) HM (t) CK (c)       Row Name  06/03/24 1136          Transfers    Comment, (Transfers) Pt completed STS from bed x 2  -CK (r) HM (t) CK (c)       Row Name 06/03/24 1136          Bed-Chair Transfer    Bed-Chair Ramah (Transfers) contact guard  -CK (r) HM (t) CK (c)     Comment, (Bed-Chair Transfer) Pt was able to walk from around bed to recliner with VC to reach back for chair.  -CK (r) HM (t) CK (c)       Row Name 06/03/24 1136          Sit-Stand Transfer    Sit-Stand Ramah (Transfers) contact guard  -CK (r) HM (t) CK (c)     Comment, (Sit-Stand Transfer) Pt with no reports of dizziness upon standing.  -CK (r) HM (t) CK (c)       Row Name 06/03/24 1136          Gait/Stairs (Locomotion)    Ramah Level (Gait) contact guard;1 person assist;verbal cues  -CK (r) HM (t) CK (c)     Patient was able to Ambulate yes  -CK (r) HM (t) CK (c)     Distance in Feet (Gait) 270  -CK (r) HM (t) CK (c)     Deviations/Abnormal Patterns (Gait) bilateral deviations;jose daniel decreased;gait speed decreased;stride length decreased;weight shifting decreased  -CK (r) HM (t) CK (c)     Bilateral Gait Deviations decreased arm swing;heel strike decreased  -CK (r) HM (t) CK (c)     Comment, (Gait/Stairs) Pt was able to ambulate without an AD with reports of mild dizziness throughout. Pt given VC to look straight ahead. Pt with R foot toeing out throughout with unsteadiness but no LOB or knee buckling. BP mildy elevated upon return to room, NSG aware.  -CK (r) HM (t) CK (c)               User Key  (r) = Recorded By, (t) = Taken By, (c) = Cosigned By      Initials Name Provider Type    CK Therese Vega, PT Physical Therapist     Shae Aldridge, PT Student PT Student                   Obj/Interventions       Row Name 06/03/24 1143          Range of Motion Comprehensive    General Range of Motion no range of motion deficits identified  -CK (r) HM (t) CK (c)       Row Name 06/03/24 1143          Strength Comprehensive (MMT)    General Manual Muscle  Testing (MMT) Assessment lower extremity strength deficits identified  -CK (r) HM (t) CK (c)     Comment, General Manual Muscle Testing (MMT) Assessment Gross RLE 5/5; gross LLE 4+/5  -CK (r) HM (t) CK (c)       Row Name 06/03/24 1143          Motor Skills    Motor Skills coordination  -CK (r) HM (t) CK (c)     Coordination bilateral;lower extremity;heel to shin;minimal impairment  -CK (r) HM (t) CK (c)       Row Name 06/03/24 1143          Balance    Balance Assessment sitting static balance;sitting dynamic balance;sit to stand dynamic balance;standing static balance;standing dynamic balance  -CK (r) HM (t) CK (c)     Static Sitting Balance standby assist  -CK (r) HM (t) CK (c)     Dynamic Sitting Balance standby assist  -CK (r) HM (t) CK (c)     Sit to Stand Dynamic Balance contact guard  -CK (r) HM (t) CK (c)     Static Standing Balance contact guard  -CK (r) HM (t) CK (c)     Dynamic Standing Balance contact guard  -CK (r) HM (t) CK (c)     Position/Device Used, Standing Balance unsupported  -CK (r) HM (t) CK (c)     Balance Interventions sitting;standing;sit to stand;narrowed base of support;occupation based/functional task;tandem standing  -CK (r) HM (t) CK (c)     Comment, Balance Pt with good sitting balance with the ability to sit EOB and reach down to put socks on with no LOB. Pt was able to stand for 30 with narrow base EO/EC with no LOB. Pt was able to standing with L and R foot in front with tandem stance for less than 5 seconds without LOB with stepping stradegy used.  -CK (r) HM (t) CK (c)       Row Name 06/03/24 1143          Sensory Assessment (Somatosensory)    Sensory Assessment (Somatosensory) sensation intact  -CK (r) HM (t) CK (c)               User Key  (r) = Recorded By, (t) = Taken By, (c) = Cosigned By      Initials Name Provider Type    CK Therese Vega, PT Physical Therapist    HM Shae Aldridge, PT Student PT Student                   Goals/Plan       Row Name 06/03/24 9807           Bed Mobility Goal 1 (PT)    Activity/Assistive Device (Bed Mobility Goal 1, PT) sit to supine/supine to sit  -CK (r) HM (t) CK (c)     [place] Level/Cues Needed (Bed Mobility Goal 1, PT) independent  -CK (r) HM (t) CK (c)     Time Frame (Bed Mobility Goal 1, PT) short term goal (STG);10 days  -CK (r) HM (t) CK (c)     Progress/Outcomes (Bed Mobility Goal 1, PT) new goal  -CK (r) HM (t) CK (c)       Row Name 06/03/24 1154          Transfer Goal 1 (PT)    Activity/Assistive Device (Transfer Goal 1, PT) bed-to-chair/chair-to-bed  -CK (r) HM (t) CK (c)     [place] Level/Cues Needed (Transfer Goal 1, PT) standby assist  -CK (r) HM (t) CK (c)     Time Frame (Transfer Goal 1, PT) short term goal (STG);10 days  -CK (r) HM (t) CK (c)     Progress/Outcome (Transfer Goal 1, PT) new goal  -CK (r) HM (t) CK (c)       Row Name 06/03/24 1154          Gait Training Goal 1 (PT)    Activity/Assistive Device (Gait Training Goal 1, PT) gait (walking locomotion);improve balance and speed  -CK (r) HM (t) CK (c)     [place] Level (Gait Training Goal 1, PT) standby assist  -CK (r) HM (t) CK (c)     Distance (Gait Training Goal 1, PT) 500'  -CK (r) HM (t) CK (c)     Time Frame (Gait Training Goal 1, PT) long term goal (LTG);10 days  -CK (r) HM (t) CK (c)     Progress/Outcome (Gait Training Goal 1, PT) new goal  -CK (r) HM (t) CK (c)       Row Name 06/03/24 1154          Stairs Goal 1 (PT)    Activity/Assistive Device (Stairs Goal 1, PT) ascending stairs;descending stairs;using handrail, left  -CK (r) HM (t) CK (c)     [place] Level/Cues Needed (Stairs Goal 1, PT) modified independence  -CK (r) HM (t) CK (c)     Number of Stairs (Stairs Goal 1, PT) 10  -CK (r) HM (t) CK (c)     Time Frame (Stairs Goal 1, PT) long term goal (LTG);10 days  -CK (r) HM (t) CK (c)     Progress/Outcome (Stairs Goal 1, PT) new goal  -CK (r) HM (t) CK (c)       Row Name 06/03/24 7480          Therapy Assessment/Plan (PT)    Planned Therapy  Interventions (PT) balance training;bed mobility training;gait training;home exercise program;motor coordination training;strengthening;stair training;postural re-education;patient/family education;neuromuscular re-education;transfer training  -CK (r) HM (t) CK (c)               User Key  (r) = Recorded By, (t) = Taken By, (c) = Cosigned By      Initials Name Provider Type    CK Therese Vega, PT Physical Therapist     Shae Aldridge, PT Student PT Student                   Clinical Impression       Row Name 06/03/24 1142          Pain    Pretreatment Pain Rating 0/10 - no pain  -CK (r) HM (t) CK (c)     Posttreatment Pain Rating 0/10 - no pain  -CK (r) HM (t) CK (c)     Pain Intervention(s) Repositioned;Ambulation/increased activity;Elevated  -CK (r) HM (t) CK (c)       Row Name 06/03/24 1142          Plan of Care Review    Plan of Care Reviewed With patient;significant other  -CK (r) HM (t) CK (c)     Outcome Evaluation Pt was able to complete 270' of ambulation this session with CGA and no AD but presented with unsteady balance and reports of mild dizziness throughout. Pt reports to hospital below baseline with decreased strength in L LE and impaired balance increasing the risk of falls. IPPT is appropriate for patient during admission with future sessions addressing stairs. PT D/C recommendation to IRF. (P)   -       Row Name 06/03/24 1145          Therapy Assessment/Plan (PT)    Patient/Family Therapy Goals Statement (PT) Not stated.  -CK (r) HM (t) CK (c)     Rehab Potential (PT) good, to achieve stated therapy goals  -CK (r) HM (t) CK (c)     Criteria for Skilled Interventions Met (PT) yes;skilled treatment is necessary;meets criteria  -CK (r) HM (t) CK (c)     Therapy Frequency (PT) daily  -CK (r) HM (t) CK (c)     Predicted Duration of Therapy Intervention (PT) 2 days  -CK (r) HM (t) CK (c)       Row Name 06/03/24 1140          Vital Signs    Pre Systolic BP Rehab 153  -CK (r) HM (t) CK (c)      Pre Treatment Diastolic   -CK (r) HM (t) CK (c)     Intra Systolic BP Rehab 165  -CK (r) HM (t) CK (c)     Intra Treatment Diastolic   -CK (r) HM (t) CK (c)     Post Systolic BP Rehab 154  -CK (r) HM (t) CK (c)     Post Treatment Diastolic   -CK (r) HM (t) CK (c)     Pretreatment Heart Rate (beats/min) 82  -CK (r) HM (t) CK (c)     Intratreatment Heart Rate (beats/min) 90  -CK (r) HM (t) CK (c)     Posttreatment Heart Rate (beats/min) 89  -CK (r) HM (t) CK (c)     Pre SpO2 (%) 94  -CK (r) HM (t) CK (c)     O2 Delivery Pre Treatment room air  -CK (r) HM (t) CK (c)     Post SpO2 (%) 98  -CK (r) HM (t) CK (c)     O2 Delivery Post Treatment room air  -CK (r) HM (t) CK (c)     Pre Patient Position Supine  -CK (r) HM (t) CK (c)     Intra Patient Position Sitting  following amb  -CK (r) HM (t) CK (c)     Post Patient Position Sitting  -CK (r) HM (t) CK (c)       Row Name 06/03/24 1146          Positioning and Restraints    Pre-Treatment Position in bed  -CK (r) HM (t) CK (c)     Post Treatment Position chair  -CK (r) HM (t) CK (c)     In Chair reclined;call light within reach;encouraged to call for assist;exit alarm on;with family/caregiver;legs elevated;with other staff;notified nsg;waffle cushion  -CK (r) HM (t) CK (c)               User Key  (r) = Recorded By, (t) = Taken By, (c) = Cosigned By      Initials Name Provider Type    CK Therese Vega, PT Physical Therapist    Shae Floyd, PT Student PT Student                   Outcome Measures       Row Name 06/03/24 115          How much help from another person do you currently need...    Turning from your back to your side while in flat bed without using bedrails? 4  -CK (r) HM (t) CK (c)     Moving from lying on back to sitting on the side of a flat bed without bedrails? 4  -CK (r) HM (t) CK (c)     Moving to and from a bed to a chair (including a wheelchair)? 3  -CK (r) HM (t) CK (c)     Standing up from a chair using your arms (e.g.,  wheelchair, bedside chair)? 3  -CK (r) HM (t) CK (c)     Climbing 3-5 steps with a railing? 3  -CK (r) HM (t) CK (c)     To walk in hospital room? 3  -CK (r) HM (t) CK (c)     AM-PAC 6 Clicks Score (PT) 20  -CK (r) HM (t)     Highest Level of Mobility Goal 6 --> Walk 10 steps or more  -CK (r) HM (t)       Row Name 06/03/24 1157          Modified Fowler Scale    Pre-Stroke Modified Fowler Scale 6 - Unable to determine (UTD) from the medical record documentation  -CK (r) HM (t) CK (c)     Modified Fowler Scale 3 - Moderate disability.  Requiring some help, but able to walk without assistance.  -CK (r) HM (t) CK (c)       Row Name 06/03/24 1157          Functional Assessment    Outcome Measure Options AM-PAC 6 Clicks Basic Mobility (PT);Modified Fowler  -CK (r) HM (t) CK (c)               User Key  (r) = Recorded By, (t) = Taken By, (c) = Cosigned By      Initials Name Provider Type    CK Therese Vega, PT Physical Therapist     Shae Aldridge, PT Student PT Student                                 Physical Therapy Education       Title: PT OT SLP Therapies (Done)       Topic: Physical Therapy (Done)       Point: Mobility training (Done)       Learning Progress Summary             Patient Acceptance, E, VU by  at 6/3/2024 1326   Significant Other Acceptance, E, VU by  at 6/3/2024 1326                         Point: Home exercise program (Done)       Learning Progress Summary             Patient Acceptance, E, VU by  at 6/3/2024 1326   Significant Other Acceptance, E, VU by  at 6/3/2024 1326                         Point: Body mechanics (Done)       Learning Progress Summary             Patient Acceptance, E, VU by  at 6/3/2024 1326   Significant Other Acceptance, E, VU by  at 6/3/2024 1326                         Point: Precautions (Done)       Learning Progress Summary             Patient Acceptance, E, VU by  at 6/3/2024 1326   Significant Other Acceptance, E, VU by  at 6/3/2024 1326                                          User Key       Initials Effective Dates Name Provider Type Discipline     05/07/24 -  Shae Aldridge, PT Student PT Student PT                  PT Recommendation and Plan  Planned Therapy Interventions (PT): balance training, bed mobility training, gait training, home exercise program, motor coordination training, strengthening, stair training, postural re-education, patient/family education, neuromuscular re-education, transfer training  Plan of Care Reviewed With: patient, significant other  Outcome Evaluation: (P) Pt was able to complete 270' of ambulation this session with CGA and no AD but presented with unsteady balance and reports of mild dizziness throughout. Pt reports to hospital below baseline with decreased strength in L LE and impaired balance increasing the risk of falls. IPPT is appropriate for patient during admission with future sessions addressing stairs. PT D/C recommendation to IRF.     Time Calculation:   PT Evaluation Complexity  History, PT Evaluation Complexity: 1-2 personal factors and/or comorbidities  Examination of Body Systems (PT Eval Complexity): total of 3 or more elements  Clinical Presentation (PT Evaluation Complexity): stable  Clinical Decision Making (PT Evaluation Complexity): low complexity  Overall Complexity (PT Evaluation Complexity): low complexity     PT Charges       Row Name 06/03/24 1053             Time Calculation    Start Time 1053  -CK (r) HM (t) CK (c)      PT Received On 06/03/24  -CK (r) HM (t) CK (c)      PT Goal Re-Cert Due Date 06/13/24  -CK (r) HM (t) CK (c)         Untimed Charges    PT Eval/Re-eval Minutes 53  -CK (r) HM (t) CK (c)         Total Minutes    Untimed Charges Total Minutes 53  -CK (r) HM (t)       Total Minutes 53  -CK (r) HM (t)                User Key  (r) = Recorded By, (t) = Taken By, (c) = Cosigned By      Initials Name Provider Type    CK Therese Vega, PT Physical Therapist     Shae Aldridge  GRECIA, PT Student PT Student                  Therapy Charges for Today       Code Description Service Date Service Provider Modifiers Qty    88390865189 HC PT EVAL LOW COMPLEXITY 4 6/3/2024 Shae Aldridge, PT Student GP 1            PT G-Codes  Outcome Measure Options: AM-PAC 6 Clicks Basic Mobility (PT), Modified [place]  AM-PAC 6 Clicks Score (PT): 20  Modified [place] Scale: 3 - Moderate disability.  Requiring some help, but able to walk without assistance.  PT Discharge Summary  Anticipated Discharge Disposition (PT): (P) inpatient rehabilitation facility    Shae Aldridge PT Student  6/3/2024

## 2024-06-03 NOTE — PLAN OF CARE
Problem: Adult Inpatient Plan of Care  Goal: Plan of Care Review  Outcome: Ongoing, Progressing  Flowsheets (Taken 6/3/2024 0408)  Progress: no change     Problem: Adult Inpatient Plan of Care  Goal: Absence of Hospital-Acquired Illness or Injury  Intervention: Identify and Manage Fall Risk  Recent Flowsheet Documentation  Taken 6/3/2024 0210 by Roya Zhou RN  Safety Promotion/Fall Prevention:   activity supervised   assistive device/personal items within reach   safety round/check completed  Taken 6/3/2024 0010 by Roya Zhou RN  Safety Promotion/Fall Prevention:   activity supervised   assistive device/personal items within reach   safety round/check completed  Taken 6/2/2024 2200 by Roya Zhou RN  Safety Promotion/Fall Prevention:   activity supervised   assistive device/personal items within reach   safety round/check completed  Taken 6/2/2024 2000 by Roya Zhou RN  Safety Promotion/Fall Prevention:   activity supervised   assistive device/personal items within reach   safety round/check completed     Problem: Adult Inpatient Plan of Care  Goal: Absence of Hospital-Acquired Illness or Injury  Intervention: Prevent Skin Injury  Recent Flowsheet Documentation  Taken 6/3/2024 0210 by Roya Zhou RN  Body Position: position changed independently  Skin Protection:   adhesive use limited   incontinence pads utilized  Taken 6/3/2024 0010 by Roya Zhou RN  Body Position: position changed independently  Skin Protection:   adhesive use limited   incontinence pads utilized  Taken 6/2/2024 2200 by Roya Zhou RN  Body Position: position changed independently  Skin Protection:   adhesive use limited   incontinence pads utilized  Taken 6/2/2024 2000 by Roya Zhou RN  Body Position: position changed independently  Skin Protection:   adhesive use limited   incontinence pads utilized     Problem: Adult Inpatient Plan of Care  Goal: Absence of Hospital-Acquired Illness or Injury  Intervention: Prevent and  Manage VTE (Venous Thromboembolism) Risk  Recent Flowsheet Documentation  Taken 6/3/2024 0210 by Roya Zhou RN  Activity Management: activity encouraged  Taken 6/3/2024 0010 by Roya Zhou RN  Activity Management: activity encouraged  Taken 6/2/2024 2200 by Roya Zhou RN  Activity Management: activity encouraged  Taken 6/2/2024 2000 by Roya Zhou RN  Activity Management: activity encouraged     Problem: Skin Injury Risk Increased  Goal: Skin Health and Integrity  Intervention: Optimize Skin Protection  Recent Flowsheet Documentation  Taken 6/3/2024 0210 by Roya Zhou RN  Pressure Reduction Techniques: frequent weight shift encouraged  Head of Bed (HOB) Positioning: [facility] elevated  Pressure Reduction Devices: positioning supports utilized  Skin Protection:   adhesive use limited   incontinence pads utilized  Taken 6/3/2024 0010 by Roya Zhou RN  Pressure Reduction Techniques: frequent weight shift encouraged  Head of Bed (HOB) Positioning: HOB elevated  Pressure Reduction Devices: positioning supports utilized  Skin Protection:   adhesive use limited   incontinence pads utilized  Taken 6/2/2024 2200 by Roya Zhou RN  Pressure Reduction Techniques: frequent weight shift encouraged  Head of Bed (HOB) Positioning: [facility] elevated  Pressure Reduction Devices: positioning supports utilized  Skin Protection:   adhesive use limited   incontinence pads utilized  Taken 6/2/2024 2000 by Roya Zhou RN  Pressure Reduction Techniques: frequent weight shift encouraged  Head of Bed (HOB) Positioning: [facility] elevated  Pressure Reduction Devices: positioning supports utilized  Skin Protection:   adhesive use limited   incontinence pads utilized   Goal Outcome Evaluation:           Progress: no change

## 2024-06-03 NOTE — PLAN OF CARE
Goal Outcome Evaluation:  Plan of Care Reviewed With: patient, significant other           Outcome Evaluation: (P) Pt was able to complete 270' of ambulation this session with CGA and no AD but presented with unsteady balance and reports of mild dizziness throughout. Pt reports to hospital below baseline with decreased strength in L LE and impaired balance increasing the risk of falls. IPPT is appropriate for patient during admission with future sessions addressing stairs. PT D/C recommendation to IRF.      Anticipated Discharge Disposition (PT): home with outpatient therapy services

## 2024-06-03 NOTE — PAYOR COMM NOTE
" Ref # [id number].   Walt Gao \"Shimon\" (59 y.o. Male)       Date of Birth   1965    Social Security Number       Address   [address] [place] [address] [address] [facility] [address]    Home Phone   [phone]    MRN   [id number]       Taoist   None    Marital Status   Single                            Admission Date   24    Admission Type   Emergency    Admitting Provider   Moira Landin MD    Attending Provider   Michele Guerrier MD    Department, Room/Bed   [facility] 3E, S341/1       Discharge Date       Discharge Disposition       Discharge Destination                                 Attending Provider: Michele Guerrier MD    Allergies: No Known Allergies    Isolation: None   Infection: None   Code Status: CPR    Ht: 175.3 cm (69.02\")   Wt: 104 kg (229 lb 4.5 oz)    Admission Cmt: None   Principal Problem: CVA (cerebral vascular accident) [I63.9]                   Active Insurance as of 2024       Primary Coverage       Payor Plan Insurance Group Employer/Plan Group    [employer] [facility] PPO [id number]       Payor Plan Address Payor Plan Phone Number Payor Plan Fax Number Effective Dates    [address] [phone]  2021 - None Entered    [place] [address]         Subscriber Name Subscriber Birth Date Member ID       WALT GAO 1965 [id number]                     Emergency Contacts        (Rel.) Home Phone Work Phone Mobile Phone    Guera Gao (Sister) -- -- [phone]    [email or web address] (Significant Other) -- -- [phone]                 History & Physical        Tiffany Acuña DO at [phone]              [facility] Medicine Services  HISTORY AND PHYSICAL    Patient Name: Walt Gao  : 1965  MRN: [id number]  Primary Care Physician: Duglas Lang MD  Date of admission: 2024      Subjective  Subjective     Chief Complaint:  Left hand " and left facial numbness     HPI:  Walt Gao is a 59 y.o. male with PMH of CHF, CKD, HLD, HTN and ETOH abuse presented with a one day history of left hand numbness and several hours of facial droop and left facial numbness. The patient denies any pain. He had a recent admission for lower gi bleed and states that has resolved. He has had urinary frequency, urgency and trouble with stream for the past month. He denies chest pain or shortness of breath. His last drink was 3 beers last night. He drinks 2-3 beers daily and has not gone without drinking to know if he has any DT/seizures  In the ED, CT head showed no hemorrhage and to hypodensity within the right parietal  and occipital lobe that is CVA vs mets.   She will be admitted to the hospitalist service for further treatment and evaluation.     Personal History     Past Medical History:   Diagnosis Date    CHF (congestive heart failure)     NICM (nonischemic cardiomyopathy)     Nonischemic cardiomyopathy 8/29/2016    · Cardiac catheterization revealing nonobstructive CAD, 2013 · LVEF 30% on heart catheterization, 2013 · Echo (8/29/16): Moderate LVH. LVEF 23%. Left ventricular diastolic dysfunction (grade II) consistent with pseudonormalization.  Moderate mitral valve regurgitation · Echo (2/21/2017):  LVEF 50%           Past Surgical History:   Procedure Laterality Date    COLONOSCOPY N/A 5/17/2024    Procedure: COLONOSCOPY;  Surgeon: Duglas Zhou MD;  Location:  [facility];  Service: Gastroenterology;  Laterality: N/A;    ENDOSCOPY N/A 5/16/2024    Procedure: ESOPHAGOGASTRODUODENOSCOPY;  Surgeon: Duglas Zhou MD;  Location:  [facility];  Service: Gastroenterology;  Laterality: N/A;    HERNIA REPAIR      as child       Family History: family history includes Coronary artery disease in an other family member; Diabetes in his father; Heart disease in his paternal grandfather and paternal grandmother; Hyperlipidemia in his father; Hypertension  in his father and mother; Kidney disease in his father; No Known Problems in his daughter, sister, and son; Other in his maternal grandfather, maternal grandmother, and sister.     Social History:  reports that he has never smoked. He has never been exposed to tobacco smoke. He has never used smokeless tobacco. He reports current alcohol use. He reports that he does not use drugs.  Social History     Social History Narrative    Patient drinks 6-7 servings of caffeine (tea) per day. Patient lives with sister.       Medications:  Available home medication information reviewed.  Psyllium, amLODIPine, atorvastatin, dapagliflozin Propanediol, metoprolol succinate XL, polyethylene glycol, spironolactone, torsemide, and valsartan    No Known Allergies    Objective  Objective     Vital Signs:   Temp:  [98.2 °F (36.8 °C)-98.4 °F (36.9 °C)] 98.2 °F (36.8 °C)  Heart Rate:  [91-98] 95  Resp:  [16] 16  BP: (142-181)/(107-115) 150/109  Total (NIH Stroke Scale): 5    Physical Exam   Constitutional: No acute distress, awake, alert  HENT: NCAT, mucous membranes moist  Respiratory: Clear to auscultation bilaterally, respiratory effort normal   Cardiovascular: RRR, no murmurs, rubs, or gallops  Gastrointestinal: non distended  Musculoskeletal: No bilateral ankle edema  Psychiatric: Appropriate affect, cooperative  Neurologic: Oriented x 3, +left sided facial droop   Skin: No rashes      Result Review:  I have personally reviewed the results from the time of this admission to 6/2/2024 19:41 EDT and agree with these findings:  []  Laboratory list / accordion  []  Microbiology  []  Radiology  []  EKG/Telemetry   []  Cardiology/Vascular   []  Pathology  []  Old records  []  Other:  Most notable findings include:       LAB RESULTS:      Lab 06/02/24  1407 06/02/24  1405   HEMOGLOBIN, POC 13.9  --    HEMATOCRIT POC 41  --    PROTIME  --  13.6   INR  --  1.1         Lab 06/02/24  1407   CREATININE 1.70*   EGFR 45.9*                              Microbiology Results (last 10 days)       ** No results found for the last 240 hours. **            CT Angiogram Head w AI Analysis of LVO    Result Date: 6/2/2024  CT ANGIOGRAM HEAD W AI ANALYSIS OF LVO, CT ANGIOGRAM NECK Date of Exam: 6/2/2024 2:01 PM EDT Indication: stroke. Comparison: CT head from earlier today Technique: CTA of the head and neck was performed after the uneventful intravenous administration of 75 mL Isovue-370. Reconstructed coronal and sagittal images were also obtained. In addition, a 3-D volume rendered image was created for interpretation. Automated exposure control and iterative reconstruction methods were used. Findings: There is a two-vessel aortic arch with common origin of the innominate and left common carotid artery, normal variant. The right common carotid artery is widely patent. The right carotid bifurcation is widely patent. The right internal carotid artery is widely patent. The left common carotid artery is widely patent. The left carotid bifurcation is widely patent. The left internal carotid artery is widely patent. The bilateral vertebral arteries are widely patent with mild plaque along their V4 segments.  The left vertebral artery is slightly dominant. There is focal moderate stenosis along the proximal M2 branch of the right middle cerebral artery and otherwise the artery is widely patent. The left middle cerebral and bilateral anterior cerebral arteries are widely patent. The anterior communicating artery is widely patent. The basilar and bilateral posterior cerebral arteries are widely patent. No definite posterior communicating artery is identified on either side. No intracranial aneurysm is identified. The visualized portions of the bilateral superior cerebellar, anteroinferior cerebellar, and posterior inferior cerebellar arteries are unremarkable.     Impression: Impression: 1.No evidence of hemodynamically significant stenosis along bilateral carotid  bifurcations. 2.Focal moderate stenosis along right MCA, but no evidence of acute thrombus or aneurysm. Electronically Signed: Neo Mendez MD  6/2/2024 2:31 PM EDT  Workstation ID: [id number]    CT Angiogram Neck    Result Date: 6/2/2024  CT ANGIOGRAM HEAD W AI ANALYSIS OF LVO, CT ANGIOGRAM NECK Date of Exam: 6/2/2024 2:01 PM EDT Indication: stroke. Comparison: CT head from earlier today Technique: CTA of the head and neck was performed after the uneventful intravenous administration of 75 mL Isovue-370. Reconstructed coronal and sagittal images were also obtained. In addition, a 3-D volume rendered image was created for interpretation. Automated exposure control and iterative reconstruction methods were used. Findings: There is a two-vessel aortic arch with common origin of the innominate and left common carotid artery, normal variant. The right common carotid artery is widely patent. The right carotid bifurcation is widely patent. The right internal carotid artery is widely patent. The left common carotid artery is widely patent. The left carotid bifurcation is widely patent. The left internal carotid artery is widely patent. The bilateral vertebral arteries are widely patent with mild plaque along their V4 segments.  The left vertebral artery is slightly dominant. There is focal moderate stenosis along the proximal M2 branch of the right middle cerebral artery and otherwise the artery is widely patent. The left middle cerebral and bilateral anterior cerebral arteries are widely patent. The anterior communicating artery is widely patent. The basilar and bilateral posterior cerebral arteries are widely patent. No definite posterior communicating artery is identified on either side. No intracranial aneurysm is identified. The visualized portions of the bilateral superior cerebellar, anteroinferior cerebellar, and posterior inferior cerebellar arteries are unremarkable.     Impression: Impression: 1.No evidence of  hemodynamically significant stenosis along bilateral carotid bifurcations. 2.Focal moderate stenosis along right MCA, but no evidence of acute thrombus or aneurysm. Electronically Signed: Neo Mendez MD  6/2/2024 2:31 PM EDT  Workstation ID: [id number]    CT CEREBRAL PERFUSION WITH & WITHOUT CONTRAST    Result Date: 6/2/2024  CT CEREBRAL PERFUSION W WO CONTRAST Date of Exam: 6/2/2024 2:01 PM EDT Indication: stroke.  Comparison: CT head from earlier today Technique: Axial CT images of the brain were obtained prior to and after the administration of 50 mL Isovue-370. Core blood volume, core blood flow, mean transit time, and Tmax images were obtained utilizing the Rapid software protocol. A limited CT angiogram of the head was also performed to measure the blood vessel density. The radiation dose reduction device was turned on for each scan per the ALARA (As Low as Reasonably Achievable) protocol. Findings: The cerebral blood flow appears normal. There is 4 mL of abnormal Tmax perfusion corresponding to the right frontal lobe.     Impression: Impression: 1.No evidence of completed infarct. 2.4 mm of abnormal Tmax perfusion corresponding to right frontal lobe. This is not in the areas of hypodensity seen on recent prior CT head that were in the right parietal and right occipital lobes. Whether this represents artifact versus tissue at risk within the right frontal lobe is unclear. Electronically Signed: Neo Mendez MD  6/2/2024 2:22 PM EDT  Workstation ID: [id number]    CT Head Without Contrast Stroke Protocol    Result Date: 6/2/2024  CT HEAD WO CONTRAST STROKE PROTOCOL Date of Exam: 6/2/2024 1:58 PM EDT Indication: stroke. Comparison: None available. Technique: Axial CT images were obtained of the head without contrast administration.  Reconstructed coronal images were also obtained. Automated exposure control and iterative construction methods were used. Scan Time: 13:56 Results discussed with stroke navigator  at 14:04. Findings: No acute intracranial hemorrhage or extra-axial collection is identified. The ventricles appear normal in caliber, with no midline shift. The basal cisterns appear patent. There are 2 focal hypodensities within the right parietal and right occipital lobes. Scattered foci of periventricular and subcortical white matter hypodensities are nonspecific, but likely the sequela of mild chronic small vessel ischemic disease. The calvarium appears intact. There is mild mucosal disease in the maxillary sinuses. The mastoid air cells are well aerated.     Impression: Impression: 1.No acute intracranial hemorrhage. 2.Two focal hypodensities within right parietal and right occipital lobes, which could represent infarcts versus metastasis. Consider evaluation with MRI brain with and without IV contrast. 3.Mild mucosal disease within maxillary sinuses. 4.Findings suggestive of mild chronic small vessel ischemic disease. Electronically Signed: Neo Mendez MD  6/2/2024 2:07 PM EDT  Workstation ID: [id number]     Results for orders placed during the hospital encounter of 11/29/22    Adult Transthoracic Echo Complete W/ Cont if Necessary Per Protocol    Interpretation Summary    Left ventricular systolic function is normal. Estimated left ventricular EF = 65%    Left ventricular wall thickness is consistent with mild to moderate concentric hypertrophy.    The cardiac valves are anatomically and functionally normal.      Assessment & Plan  Assessment & Plan       CVA (cerebral vascular accident)      Left sided weakness  CVA vs mets  -CT head shows no acute finding   -CT perfusion /CTA shows no completed infact, right frontal lobe abnormal perfusion, no stenosis, moderate along right mca but no acute thrombus or aneurysm   -MRI pending  -stroke team following   -SLP to see, npo   -ASA, statin     Etoh abuse  -ciwa     CHF  HTN   -compensated, cont home meds (torsemide, spironolactone and farxiga) when able to  tolerate po  -allow permissive HTN for now     CKD III  -cr 1.72, baseline               DVT prophylaxis:  Mechanical DVT prophylaxis orders are present.          CODE STATUS:    Code Status and Medical Interventions:   Ordered at: 06/02/24 1857     Level Of Support Discussed With:    Patient     Code Status (Patient has no pulse and is not breathing):    CPR (Attempt to Resuscitate)     Medical Interventions (Patient has pulse or is breathing):    Full Support       Expected Discharge   Expected discharge date/ time has not been documented.     Tiffany Acuña DO  06/02/24      Electronically signed by Tiffany Acuña DO at 06/02/24 1958       Imaging Results (Most Recent)       Procedure Component Value Units Date/Time    MRI Brain With & Without Contrast [[id number]] Collected: 06/03/24 0638     Updated: 06/03/24 0648    Narrative:        MRI BRAIN W WO CONTRAST    Date of Exam: 6/2/2024 8:25 PM EDT    Indication: Stroke, follow up  CTH hypodensity/ Acute stroke, possible Mets.     Comparison: CT earlier the same day.    Technique:  Routine multiplanar/multisequence sequence images of the brain were obtained before and after the uneventful administration of 18 mL Multihance.      Findings:  Areas of diffusion restriction are present on the right consistent with acute infarct involving watershed distribution within the frontal and parietal lobes, as well as a somewhat more confluent area of cortical infarct within the right occipital lobe.   Midline structures are normal and the craniocervical junction appears satisfactory. Areas of infarct are associated with mild localized edema, otherwise without evidence of hemorrhage or significant mass effect. Age-related changes are present with   minimal volume loss and mild to moderate typical T2 hyperintense subcortical and pontine white matter changes, nonspecific but favored to reflect sequela of chronic microvascular ischemia. Some focal FLAIR  hyperintensity is present within the left   splenium of the corpus callosum with the appearance of probable early chronic infarct. The ventricles are normal in size and configuration. Some areas of scattered susceptibility artifact are present likely reflecting chronic microhemorrhage. Areas of   acute infarct, most notably within the right occipital lobe demonstrates some irregular enhancement, likely representing evolving cortical laminar necrosis.      Impression:      Impression:  Multifocal areas of both acute and subacute infarct are present including within a watershed distribution on the right, as well as a more focal area of acute cortical and subcortical infarct within the right occipital lobe. Areas of mixed subacute and   chronic lacunar infarct are also noted bilaterally. There is no evidence of associated hemorrhage or significant mass effect.    Areas of acute infarct on the right demonstrate some associated enhancement, likely representing cortical laminar necrosis. Neoplastic or metastatic process is considered less likely, however consider follow-up imaging to confirm expected evolution of   multifocal infarcts.        Electronically Signed: Ernst Freire MD    6/3/2024 6:45 AM EDT    Workstation ID: [id number]    CT Angiogram Head w AI Analysis of LVO [[id number]] Collected: 06/02/24 1422     Updated: 06/02/24 1434    Narrative:      CT ANGIOGRAM HEAD W AI ANALYSIS OF LVO, CT ANGIOGRAM NECK    Date of Exam: 6/2/2024 2:01 PM EDT    Indication: stroke.    Comparison: CT head from earlier today    Technique: CTA of the head and neck was performed after the uneventful intravenous administration of 75 mL Isovue-370. Reconstructed coronal and sagittal images were also obtained. In addition, a 3-D volume rendered image was created for interpretation.   Automated exposure control and iterative reconstruction methods were used.      Findings:  There is a two-vessel aortic arch with common origin of the  innominate and left common carotid artery, normal variant. The right common carotid artery is widely patent. The right carotid bifurcation is widely patent. The right internal carotid artery is   widely patent. The left common carotid artery is widely patent. The left carotid bifurcation is widely patent. The left internal carotid artery is widely patent. The bilateral vertebral arteries are widely patent with mild plaque along their V4 segments.   The left vertebral artery is slightly dominant.    There is focal moderate stenosis along the proximal M2 branch of the right middle cerebral artery and otherwise the artery is widely patent. The left middle cerebral and bilateral anterior cerebral arteries are widely patent. The anterior communicating   artery is widely patent. The basilar and bilateral posterior cerebral arteries are widely patent. No definite posterior communicating artery is identified on either side. No intracranial aneurysm is identified. The visualized portions of the bilateral   superior cerebellar, anteroinferior cerebellar, and posterior inferior cerebellar arteries are unremarkable.      Impression:      Impression:  1.No evidence of hemodynamically significant stenosis along bilateral carotid bifurcations.  2.Focal moderate stenosis along right MCA, but no evidence of acute thrombus or aneurysm.        Electronically Signed: Neo Mendez MD    6/2/2024 2:31 PM EDT    Workstation ID: [id number]    CT Angiogram Neck [[id number]] Collected: 06/02/24 1422     Updated: 06/02/24 1434    Narrative:      CT ANGIOGRAM HEAD W AI ANALYSIS OF LVO, CT ANGIOGRAM NECK    Date of Exam: 6/2/2024 2:01 PM EDT    Indication: stroke.    Comparison: CT head from earlier today    Technique: CTA of the head and neck was performed after the uneventful intravenous administration of 75 mL Isovue-370. Reconstructed coronal and sagittal images were also obtained. In addition, a 3-D volume rendered image was created for  interpretation.   Automated exposure control and iterative reconstruction methods were used.      Findings:  There is a two-vessel aortic arch with common origin of the innominate and left common carotid artery, normal variant. The right common carotid artery is widely patent. The right carotid bifurcation is widely patent. The right internal carotid artery is   widely patent. The left common carotid artery is widely patent. The left carotid bifurcation is widely patent. The left internal carotid artery is widely patent. The bilateral vertebral arteries are widely patent with mild plaque along their V4 segments.   The left vertebral artery is slightly dominant.    There is focal moderate stenosis along the proximal M2 branch of the right middle cerebral artery and otherwise the artery is widely patent. The left middle cerebral and bilateral anterior cerebral arteries are widely patent. The anterior communicating   artery is widely patent. The basilar and bilateral posterior cerebral arteries are widely patent. No definite posterior communicating artery is identified on either side. No intracranial aneurysm is identified. The visualized portions of the bilateral   superior cerebellar, anteroinferior cerebellar, and posterior inferior cerebellar arteries are unremarkable.      Impression:      Impression:  1.No evidence of hemodynamically significant stenosis along bilateral carotid bifurcations.  2.Focal moderate stenosis along right MCA, but no evidence of acute thrombus or aneurysm.        Electronically Signed: Neo Mendez MD    6/2/2024 2:31 PM EDT    Workstation ID: [id number]    CT CEREBRAL PERFUSION WITH & WITHOUT CONTRAST [[id number]] Collected: 06/02/24 1419     Updated: 06/02/24 1425    Narrative:      CT CEREBRAL PERFUSION W WO CONTRAST    Date of Exam: 6/2/2024 2:01 PM EDT    Indication: stroke.     Comparison: CT head from earlier today    Technique: Axial CT images of the brain were obtained prior to  and after the administration of 50 mL Isovue-370. Core blood volume, core blood flow, mean transit time, and Tmax images were obtained utilizing the Rapid software protocol. A limited CT   angiogram of the head was also performed to measure the blood vessel density.    The radiation dose reduction device was turned on for each scan per the ALARA (As Low as Reasonably Achievable) protocol.      Findings:  The cerebral blood flow appears normal. There is 4 mL of abnormal Tmax perfusion corresponding to the right frontal lobe.      Impression:      Impression:  1.No evidence of completed infarct.  2.4 mm of abnormal Tmax perfusion corresponding to right frontal lobe. This is not in the areas of hypodensity seen on recent prior CT head that were in the right parietal and right occipital lobes. Whether this represents artifact versus tissue at risk   within the right frontal lobe is unclear.        Electronically Signed: Neo Mendez MD    6/2/2024 2:22 PM EDT    Workstation ID: [id number]    CT Head Without Contrast Stroke Protocol [[id number]] Collected: 06/02/24 1402     Updated: 06/02/24 1410    Narrative:      CT HEAD WO CONTRAST STROKE PROTOCOL    Date of Exam: 6/2/2024 1:58 PM EDT    Indication: stroke.    Comparison: None available.    Technique: Axial CT images were obtained of the head without contrast administration.  Reconstructed coronal images were also obtained. Automated exposure control and iterative construction methods were used.    Scan Time: 13:56  Results discussed with stroke navigator at 14:04.      Findings:  No acute intracranial hemorrhage or extra-axial collection is identified. The ventricles appear normal in caliber, with no midline shift. The basal cisterns appear patent. There are 2 focal hypodensities within the right parietal and right occipital   lobes. Scattered foci of periventricular and subcortical white matter hypodensities are nonspecific, but likely the sequela of mild chronic  small vessel ischemic disease.    The calvarium appears intact. There is mild mucosal disease in the maxillary sinuses. The mastoid air cells are well aerated.      Impression:      Impression:  1.No acute intracranial hemorrhage.  2.Two focal hypodensities within right parietal and right occipital lobes, which could represent infarcts versus metastasis. Consider evaluation with MRI brain with and without IV contrast.  3.Mild mucosal disease within maxillary sinuses.  4.Findings suggestive of mild chronic small vessel ischemic disease.        Electronically Signed: Neo Mendez MD    2024 2:07 PM EDT    Workstation ID: [id number]          Physician Progress Notes (all)    No notes of this type exist for this encounter.          Consult Notes (all)        Cornelius Man PA-C at [id number]          Stroke Consult Note    Patient Name: Walt Gao   MRN: [id number]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

## 2024-06-03 NOTE — THERAPY TREATMENT NOTE
Patient Name: Walt Goa  : 1965    MRN: [id number]                              Today's Date: 6/3/2024       Admit Date: 2024    Visit Dx:     ICD-10-CM ICD-9-CM   1. Facial droop  R29.810 781.94   2. Weakness of left upper extremity  R29.898 729.89   3. Acute nonintractable headache, unspecified headache type  R51.9 784.0   4. Abnormal CT of the head  R93.0 793.0   5. Dysarthria  R47.1 784.51     Patient Active Problem List   Diagnosis    Chronic systolic congestive heart failure    Essential hypertension    Chronic renal impairment, stage 3 (moderate)    Non-ischemic cardiomyopathy    Alcohol abuse    Mixed hyperlipidemia    At risk for sleep apnea    Snoring    CVA (cerebral vascular accident)     Past Medical History:   Diagnosis Date    CHF (congestive heart failure)     NICM (nonischemic cardiomyopathy)     Nonischemic cardiomyopathy 2016    · Cardiac catheterization revealing nonobstructive CAD,  · LVEF 30% on heart catheterization,  · Echo (16): Moderate LVH. LVEF 23%. Left ventricular diastolic dysfunction (grade II) consistent with pseudonormalization.  Moderate mitral valve regurgitation · Echo (2017):  LVEF 50%     Past Surgical History:   Procedure Laterality Date    COLONOSCOPY N/A 2024    Procedure: COLONOSCOPY;  Surgeon: Duglas Zhou MD;  Location:  [facility];  Service: Gastroenterology;  Laterality: N/A;    ENDOSCOPY N/A 2024    Procedure: ESOPHAGOGASTRODUODENOSCOPY;  Surgeon: Duglas Zhou MD;  Location: [facility];  Service: Gastroenterology;  Laterality: N/A;    HERNIA REPAIR      as child      General Information       Row Name 24 1132          Physical Therapy Time and Intention    Document Type evaluation (P)   -     Mode of Treatment physical therapy;individual therapy (P)   -       Row Name 24 1132          General Information    Patient Profile Reviewed yes (P)   -     Prior Level of Function  independent:;all household mobility;ADL's (P)   -     Existing Precautions/Restrictions fall (P)   -HM     Barriers to Rehab none identified (P)   -       Row Name 06/03/24 1132          Living Environment    People in Home alone;other (see comments) (P)   Signifigant other reported staying with patient on weekends  -       Row Name 06/03/24 1132          Home Main Entrance    Number of Stairs, Main Entrance three (P)   -HM     Stair Railings, Main Entrance none (P)   -       Row Name 06/03/24 1132          Stairs Within Home, Primary    Stairs, Within Home, Primary 6-7 (P)   -HM     Stair Railings, Within Home, Primary railing on left side (ascending) (P)   -       Row Name 06/03/24 1132          Cognition    Orientation Status (Cognition) oriented x 4 (P)   -       Row Name 06/03/24 1132          Safety Issues, Functional Mobility    Safety Issues Affecting Function (Mobility) awareness of need for assistance;insight into deficits/self-awareness (P)   -     Impairments Affecting Function (Mobility) balance;postural/trunk control;motor control;endurance/activity tolerance;strength (P)   -               User Key  (r) = Recorded By, (t) = Taken By, (c) = Cosigned By      Initials Name Provider Type     Shae Aldridge, PT Student PT Student                   Mobility       Row Name 06/03/24 1136          Bed Mobility    Bed Mobility supine-sit (P)   -     Supine-Sit [place] (Bed Mobility) standby assist;1 person assist (P)   -     Assistive Device (Bed Mobility) head of bed elevated (P)   -     Comment, (Bed Mobility) Pt was able to complete supine to sit with slow to get L LE to side of bed. (P)   -       Row Name 06/03/24 1136          Transfers    Comment, (Transfers) Pt completed STS from bed x 2 (P)   -       Row Name 06/03/24 1136          Bed-Chair Transfer    Bed-Chair [place] (Transfers) contact guard (P)   -     Comment, (Bed-Chair Transfer) Pt was able to walk from  around bed to recliner with VC to reach back for chair. (P)   -       Row Name 06/03/24 1136          Sit-Stand Transfer    Sit-Stand [place] (Transfers) contact guard (P)   -     Comment, (Sit-Stand Transfer) Pt with no reports of dizziness upon standing. (P)   -       Row Name 06/03/24 1136          Gait/Stairs (Locomotion)    [place] Level (Gait) contact guard;1 person assist;verbal cues (P)   -HM     Patient was able to Ambulate yes (P)   -HM     Distance in Feet (Gait) 270 (P)   -HM     Deviations/Abnormal Patterns (Gait) bilateral deviations;jose daniel decreased;gait speed decreased;stride length decreased;weight shifting decreased (P)   -HM     Bilateral Gait Deviations decreased arm swing;heel strike decreased (P)   -     Comment, (Gait/Stairs) Pt was able to ambulate without an AD with reports of mild dizziness throughout. Pt given VC to look straight ahead. Pt with R foot toeing out throughout with unsteadiness but no LOB or knee buckling. BP mildy elevated upon return to room, NSG aware. (P)   -               User Key  (r) = Recorded By, (t) = Taken By, (c) = Cosigned By      Initials Name Provider Type     Shae Aldridge, PT Student PT Student                   Obj/Interventions       Row Name 06/03/24 1143          Range of Motion Comprehensive    General Range of Motion no range of motion deficits identified (P)   -       Row Name 06/03/24 1143          Strength Comprehensive (MMT)    General Manual Muscle Testing (MMT) Assessment lower extremity strength deficits identified (P)   -     Comment, General Manual Muscle Testing (MMT) Assessment Gross RLE 5/5; gross LLE 4+/5 (P)   -       Row Name 06/03/24 1143          Motor Skills    Motor Skills coordination (P)   -     Coordination bilateral;lower extremity;heel to shin;minimal impairment (P)   -       Row Name 06/03/24 1143          Balance    Balance Assessment sitting static balance;sitting dynamic balance;sit to  stand dynamic balance;standing static balance;standing dynamic balance (P)   -HM     Static Sitting Balance standby assist (P)   -HM     Dynamic Sitting Balance standby assist (P)   -     Sit to Stand Dynamic Balance contact guard (P)   -HM     Static Standing Balance contact guard (P)   -HM     Dynamic Standing Balance contact guard (P)   -     Position/Device Used, Standing Balance unsupported (P)   -     Balance Interventions sitting;standing;sit to stand;narrowed base of support;occupation based/functional task;tandem standing (P)   -     Comment, Balance Pt with good sitting balance with the ability to sit EOB and reach down to put socks on with no LOB. Pt was able to stand for 30 with narrow base EO/EC with no LOB. Pt was able to standing with L and R foot in front with tandem stance for less than 5 seconds without LOB with stepping stradegy used. (P)   -       Row Name 06/03/24 1143          Sensory Assessment (Somatosensory)    Sensory Assessment (Somatosensory) sensation intact (P)   -               User Key  (r) = Recorded By, (t) = Taken By, (c) = Cosigned By      Initials Name Provider Type     Shae Aldridge, PT Student PT Student                   Goals/Plan       Row Name 06/03/24 1154          Bed Mobility Goal 1 (PT)    Activity/Assistive Device (Bed Mobility Goal 1, PT) sit to supine/supine to sit (P)   -HM     [place] Level/Cues Needed (Bed Mobility Goal 1, PT) independent (P)   -HM     Time Frame (Bed Mobility Goal 1, PT) short term goal (STG);10 days (P)   -HM     Progress/Outcomes (Bed Mobility Goal 1, PT) new goal (P)   -       Row Name 06/03/24 1154          Transfer Goal 1 (PT)    Activity/Assistive Device (Transfer Goal 1, PT) bed-to-chair/chair-to-bed (P)   -HM     [place] Level/Cues Needed (Transfer Goal 1, PT) standby assist (P)   -HM     Time Frame (Transfer Goal 1, PT) short term goal (STG);10 days (P)   -HM     Progress/Outcome (Transfer Goal 1, PT) new  goal (P)   -HM       Row Name 06/03/24 1154          Gait Training Goal 1 (PT)    Activity/Assistive Device (Gait Training Goal 1, PT) gait (walking locomotion);improve balance and speed (P)   -HM     [place] Level (Gait Training Goal 1, PT) standby assist (P)   -HM     Distance (Gait Training Goal 1, PT) 500' (P)   -HM     Time Frame (Gait Training Goal 1, PT) long term goal (LTG);10 days (P)   -HM     Progress/Outcome (Gait Training Goal 1, PT) new goal (P)   -HM       Row Name 06/03/24 1154          Stairs Goal 1 (PT)    Activity/Assistive Device (Stairs Goal 1, PT) ascending stairs;descending stairs;using handrail, left (P)   -HM     [place] Level/Cues Needed (Stairs Goal 1, PT) modified independence (P)   -HM     Number of Stairs (Stairs Goal 1, PT) 10 (P)   -HM     Time Frame (Stairs Goal 1, PT) long term goal (LTG);10 days (P)   -HM     Progress/Outcome (Stairs Goal 1, PT) new goal (P)   -       Row Name 06/03/24 1153          Therapy Assessment/Plan (PT)    Planned Therapy Interventions (PT) balance training;bed mobility training;gait training;home exercise program;motor coordination training;strengthening;stair training;postural re-education;patient/family education;neuromuscular re-education;transfer training (P)   -HM               User Key  (r) = Recorded By, (t) = Taken By, (c) = Cosigned By      Initials Name Provider Type     Shae Aldridge, PT Student PT Student                   Clinical Impression       Row Name 06/03/24 1148          Pain    Pretreatment Pain Rating 0/10 - no pain (P)   -HM     Posttreatment Pain Rating 0/10 - no pain (P)   -HM     Pain Intervention(s) Repositioned;Ambulation/increased activity;Elevated (P)   -       Row Name 06/03/24 1141          Plan of Care Review    Plan of Care Reviewed With patient;significant other (P)   -     Outcome Evaluation Pt was able to complete 270' of ambulation this session with CGA and no AD but presented with unsteady  balance and reports of mild dizziness throughout. Pt reports to hospital below baseline with decreased strength in L LE and impaired balance increasing the risk of falls. IPPT is appropriate for patient during admission with future sessions addressing stairs. PT D/C recommendation to home with outpatient physical therapy. (P)   -       Row Name 06/03/24 1148          Therapy Assessment/Plan (PT)    Patient/Family Therapy Goals Statement (PT) Not stated. (P)   -HM     Rehab Potential (PT) good, to achieve stated therapy goals (P)   -     Criteria for Skilled Interventions Met (PT) yes;skilled treatment is necessary;meets criteria (P)   -HM     Therapy Frequency (PT) daily (P)   -HM     Predicted Duration of Therapy Intervention (PT) 2 days (P)   -       Row Name 06/03/24 1148          Vital Signs    Pre Systolic BP Rehab 153 (P)   -HM     Pre Treatment Diastolic  (P)   -HM     Intra Systolic BP Rehab 165 (P)   -HM     Intra Treatment Diastolic  (P)   -HM     Post Systolic BP Rehab 154 (P)   -HM     Post Treatment Diastolic  (P)   -HM     Pretreatment Heart Rate (beats/min) 82 (P)   -HM     Intratreatment Heart Rate (beats/min) 90 (P)   -HM     Posttreatment Heart Rate (beats/min) 89 (P)   -HM     Pre SpO2 (%) 94 (P)   -HM     O2 Delivery Pre Treatment room air (P)   -HM     Post SpO2 (%) 98 (P)   -HM     O2 Delivery Post Treatment room air (P)   -HM     Pre Patient Position Supine (P)   -HM     Intra Patient Position Sitting (P)   following amb  -HM     Post Patient Position Sitting (P)   -       Row Name 06/03/24 1148          Positioning and Restraints    Pre-Treatment Position in bed (P)   -HM     Post Treatment Position chair (P)   -HM     In Chair reclined;call light within reach;encouraged to call for assist;exit alarm on;with family/caregiver;legs elevated;with other staff;notified tatianna;waffle cushion (P)   -               User Key  (r) = Recorded By, (t) = Taken By, (c) = Cosigned  By      Initials Name Provider Type     Shae Aldridge, PT Student PT Student                   Outcome Measures       Row Name 06/03/24 1157          How much help from another person do you currently need...    Turning from your back to your side while in flat bed without using bedrails? 4 (P)   -HM     Moving from lying on back to sitting on the side of a flat bed without bedrails? 4 (P)   -HM     Moving to and from a bed to a chair (including a wheelchair)? 3 (P)   -HM     Standing up from a chair using your arms (e.g., wheelchair, bedside chair)? 3 (P)   -HM     Climbing 3-5 steps with a railing? 3 (P)   -HM     To walk in hospital room? 3 (P)   -     AM-PAC 6 Clicks Score (PT) 20 (P)   -     Highest Level of Mobility Goal 6 --> Walk 10 steps or more (P)   -       Row Name 06/03/24 1157          Modified [place] Scale    Pre-Stroke Modified [place] Scale 6 - Unable to determine (UTD) from the medical record documentation (P)   -HM     Modified [place] Scale 3 - Moderate disability.  Requiring some help, but able to walk without assistance. (P)   -       Row Name 06/03/24 1157          Functional Assessment    Outcome Measure Options AM-PAC 6 Clicks Basic Mobility (PT);Modified [place] (P)   -HM               User Key  (r) = Recorded By, (t) = Taken By, (c) = Cosigned By      Initials Name Provider Type     Shae Aldridge, PT Student PT Student                                 Physical Therapy Education       Title: PT OT SLP Therapies (Done)       Topic: Physical Therapy (Done)       Point: Mobility training (Done)       Learning Progress Summary             Patient Acceptance, E, VU by  at 6/3/2024 1326   Significant Other Acceptance, E, VU by  at 6/3/2024 1326                         Point: Home exercise program (Done)       Learning Progress Summary             Patient Acceptance, E, VU by  at 6/3/2024 1326   Significant Other Acceptance, E, VU by  at 6/3/2024 1326                          Point: Body mechanics (Done)       Learning Progress Summary             Patient Acceptance, E, VU by  at 6/3/2024 1326   Significant Other Acceptance, E, VU by  at 6/3/2024 1326                         Point: Precautions (Done)       Learning Progress Summary             Patient Acceptance, E, VU by  at 6/3/2024 1326   Significant Other Acceptance, E, VU by  at 6/3/2024 1326                                         User Key       Initials Effective Dates Name Provider Type Discipline     05/07/24 -  Shae Aldridge, PT Student PT Student PT                  PT Recommendation and Plan  Planned Therapy Interventions (PT): (P) balance training, bed mobility training, gait training, home exercise program, motor coordination training, strengthening, stair training, postural re-education, patient/family education, neuromuscular re-education, transfer training  Plan of Care Reviewed With: (P) patient, significant other  Outcome Evaluation: (P) Pt was able to complete 270' of ambulation this session with CGA and no AD but presented with unsteady balance and reports of mild dizziness throughout. Pt reports to hospital below baseline with decreased strength in L LE and impaired balance increasing the risk of falls. IPPT is appropriate for patient during admission with future sessions addressing stairs. PT D/C recommendation to home with outpatient physical therapy.     Time Calculation:   PT Evaluation Complexity  History, PT Evaluation Complexity: (P) 1-2 personal factors and/or comorbidities  Examination of Body Systems (PT Eval Complexity): (P) total of 3 or more elements  Clinical Presentation (PT Evaluation Complexity): (P) stable  Clinical Decision Making (PT Evaluation Complexity): (P) low complexity  Overall Complexity (PT Evaluation Complexity): (P) low complexity     PT Charges       Row Name 06/03/24 1053             Time Calculation    Start Time 1053 (P)   -      PT Received On 06/03/24 (P)    -HM      PT Goal Re-Cert Due Date 06/13/24 (P)   -HM         Untimed Charges    PT Eval/Re-eval Minutes 53 (P)   -HM         Total Minutes    Untimed Charges Total Minutes 53 (P)   -HM       Total Minutes 53 (P)   -HM                User Key  (r) = Recorded By, (t) = Taken By, (c) = Cosigned By      Initials Name Provider Type     Shae Aldridge, PT Student PT Student                  Therapy Charges for Today       Code Description Service Date Service Provider Modifiers Qty    [id number] HC PT EVAL LOW COMPLEXITY 4 6/3/2024 Shae Aldridge, PT Student GP 1            PT G-Codes  Outcome Measure Options: (P) AM-PAC 6 Clicks Basic Mobility (PT), Modified [place]  AM-PAC 6 Clicks Score (PT): (P) 20  Modified [place] Scale: (P) 3 - Moderate disability.  Requiring some help, but able to walk without assistance.  PT Discharge Summary  Anticipated Discharge Disposition (PT): (P) home with outpatient therapy services    Shae Aldridge PT Student  6/3/2024

## 2024-06-03 NOTE — THERAPY EVALUATION
Acute Care - Speech Language Pathology   Swallow Initial Evaluation [facility]  Clinical Swallow Evaluation  Cognitive-Communication Evaluation     Patient Name: Walt Gao  : 1965  MRN: [id number]  Today's Date: 6/3/2024               Admit Date: 2024    Visit Dx:     ICD-10-CM ICD-9-CM   1. Facial droop  R29.810 781.94   2. Weakness of left upper extremity  R29.898 729.89   3. Acute nonintractable headache, unspecified headache type  R51.9 784.0   4. Abnormal CT of the head  R93.0 793.0   5. Dysarthria  R47.1 784.51     Patient Active Problem List   Diagnosis    Chronic systolic congestive heart failure    Essential hypertension    Chronic renal impairment, stage 3 (moderate)    Non-ischemic cardiomyopathy    Alcohol abuse    Mixed hyperlipidemia    At risk for sleep apnea    Snoring    CVA (cerebral vascular accident)     Past Medical History:   Diagnosis Date    CHF (congestive heart failure)     NICM (nonischemic cardiomyopathy)     Nonischemic cardiomyopathy 2016    · Cardiac catheterization revealing nonobstructive CAD,  · LVEF 30% on heart catheterization,  · Echo (16): Moderate LVH. LVEF 23%. Left ventricular diastolic dysfunction (grade II) consistent with pseudonormalization.  Moderate mitral valve regurgitation · Echo (2017):  LVEF 50%     Past Surgical History:   Procedure Laterality Date    COLONOSCOPY N/A 2024    Procedure: COLONOSCOPY;  Surgeon: Duglas Zhou MD;  Location: [facility] ENDOSCOPY;  Service: Gastroenterology;  Laterality: N/A;    ENDOSCOPY N/A 2024    Procedure: ESOPHAGOGASTRODUODENOSCOPY;  Surgeon: Duglas Zhou MD;  Location: [facility] ENDOSCOPY;  Service: Gastroenterology;  Laterality: N/A;    HERNIA REPAIR      as child       SLP Recommendation and Plan  SLP Swallowing Diagnosis: swallow WFL/no suspected pharyngeal impairment (24)  SLP Diet Recommendation: regular textures, thin liquids (24)  Recommended  Precautions and Strategies: upright posture during/after eating (06/03/24 0815)  SLP Rec. for Method of Medication Administration: as tolerated (06/03/24 0815)     Monitor for Signs of Aspiration: notify SLP if any concerns (06/03/24 0815)     Swallow Criteria for Skilled Therapeutic Interventions Met: no problems identified which require skilled intervention (06/03/24 0815)  Anticipated Discharge Disposition (SLP): home with OP services (06/03/24 0815)     Therapy Frequency (Swallow): evaluation only (06/03/24 0815)  Predicted Duration Therapy Intervention (Days): 1 week (06/03/24 0815)                                           Plan of Care Reviewed With: patient      SWALLOW EVALUATION (Last 72 Hours)       SLP Adult Swallow Evaluation       Row Name 06/03/24 0815                   Rehab Evaluation    Document Type evaluation  -SM        Subjective Information no complaints  -SM        Patient Observations alert;cooperative  -SM        Patient Effort good  -SM           General Information    Patient Profile Reviewed yes  -SM        Pertinent History Of Current Problem Adm L numbness and facial droop. MRI revealed watershed on R, mor focal R occipital, old B lacunar infarcts.  -SM        Current Method of Nutrition NPO  -SM        Prior Level of Function-Communication WFL  -SM        Prior Level of Function-Swallowing safe, efficient swallowing in all situations  -SM        Plans/Goals Discussed with patient;agreed upon  -SM        Barriers to Rehab none identified  -        Patient's Goals for Discharge no concerns voiced  -SM           Pain    Additional Documentation Pain Scale: Numbers Pre/Post-Treatment (Group)  -SM           Pain Scale: Numbers Pre/Post-Treatment    Pretreatment Pain Rating 0/10 - no pain  -SM        Posttreatment Pain Rating 0/10 - no pain  -SM           Oral Musculature and Cranial Nerve Assessment    Oral Labial or Buccal Impairment, Detail, Cranial Nerve VII (Facial): left labial droop   -           General Eating/Swallowing Observations    Respiratory Support Currently in Use room air  -           Clinical Swallow Eval    Oral Prep Phase WFL  -SM        Oral Transit WFL  -SM        Oral Residue WFL  -SM        Pharyngeal Phase no overt signs/symptoms of pharyngeal impairment  -        Esophageal Phase unremarkable  -           SLP Evaluation Clinical Impression    SLP Swallowing Diagnosis swallow WFL/no suspected pharyngeal impairment  -        Swallow Criteria for Skilled Therapeutic Interventions Met no problems identified which require skilled intervention  -           Recommendations    Therapy Frequency (Swallow) evaluation only  -        SLP Diet Recommendation regular textures;thin liquids  -        Recommended Precautions and Strategies upright posture during/after eating  -        SLP Rec. for Method of Medication Administration as tolerated  -        Monitor for Signs of Aspiration notify SLP if any concerns  -                  User Key  (r) = Recorded By, (t) = Taken By, (c) = Cosigned By      Initials Name Effective Dates    Korina Flores MS CCC-SLP 02/03/23 -                     EDUCATION  The patient has been educated in the following areas:   Dysphagia (Swallowing Impairment).        SLP GOALS       Row Name 06/03/24 0860             Patient will demonstrate functional cognitive-linguistic skills for return to discharge environment    [place] Independently  -      Time frame 1 week  -         SLP Diagnostic Treatment     Patient will participate in further assessment in the following areas reading comprehension;graphic expression;higher-level cognitive-linguistic  -      Time Frame (Diagnostic) 1 week  -SM         Articulation Goal 1 (SLP)    Improve Articulation Goal 1 (SLP) by over-articulating at word level;100%;independently (over 90% accuracy)  -      Time Frame (Articulation Goal 1, SLP) 1 week  -      Comment (Articulation Goal  1, SLP) Dx tx to see if improvement with overartic approach. Adjust as needed.  -SM                User Key  (r) = Recorded By, (t) = Taken By, (c) = Cosigned By      Initials Name Provider Type    Korina Flores MS CCC-SLP Speech and Language Pathologist                         Time Calculation:    Time Calculation- SLP       Row Name 24 0909             Time Calculation- SLP    SLP Start Time 0815  -SM      SLP Received On 24  -SM         Untimed Charges    00031-ZU Eval Speech and Production w/ Language Minutes 25  -SM      52259-GL Eval Oral Pharyng Swallow Minutes 25  -SM         Total Minutes    Untimed Charges Total Minutes 50  -SM       Total Minutes 50  -SM                User Key  (r) = Recorded By, (t) = Taken By, (c) = Cosigned By      Initials Name Provider Type    Korina Flores MS CCC-SLP Speech and Language Pathologist                    Therapy Charges for Today       Code Description Service Date Service Provider Modifiers Qty    90076812760 HC ST EVAL ORAL PHARYNG SWALLOW 2 6/3/2024 Korina Padilla MS CCC-SLP GN 1    02630137538 HC ST EVAL SPEECH AND PROD W LANG  2 6/3/2024 Korina Padilla MS CCC-SLP GN 1                 Korina Padilla MS CCC-SLP  6/3/2024   and Acute Care - Speech Language Pathology Initial Evaluation  [facility]     Patient Name: Walt Gao  : 1965  MRN: [id number]  Today's Date: 6/3/2024               Admit Date: 2024     Visit Dx:    ICD-10-CM ICD-9-CM   1. Facial droop  R29.810 781.94   2. Weakness of left upper extremity  R29.898 729.89   3. Acute nonintractable headache, unspecified headache type  R51.9 784.0   4. Abnormal CT of the head  R93.0 793.0   5. Dysarthria  R47.1 784.51     Patient Active Problem List   Diagnosis    Chronic systolic congestive heart failure    Essential hypertension    Chronic renal impairment, stage 3 (moderate)    Non-ischemic cardiomyopathy    Alcohol abuse    Mixed  hyperlipidemia    At risk for sleep apnea    Snoring    CVA (cerebral vascular accident)     Past Medical History:   Diagnosis Date    CHF (congestive heart failure)     NICM (nonischemic cardiomyopathy)     Nonischemic cardiomyopathy 8/29/2016    · Cardiac catheterization revealing nonobstructive CAD, 2013 · LVEF 30% on heart catheterization, 2013 · Echo (8/29/16): Moderate LVH. LVEF 23%. Left ventricular diastolic dysfunction (grade II) consistent with pseudonormalization.  Moderate mitral valve regurgitation · Echo (2/21/2017):  LVEF 50%     Past Surgical History:   Procedure Laterality Date    COLONOSCOPY N/A 5/17/2024    Procedure: COLONOSCOPY;  Surgeon: Duglas Zhou MD;  Location:  [facility] ENDOSCOPY;  Service: Gastroenterology;  Laterality: N/A;    ENDOSCOPY N/A 5/16/2024    Procedure: ESOPHAGOGASTRODUODENOSCOPY;  Surgeon: Duglas Zhou MD;  Location: [facility] ENDOSCOPY;  Service: Gastroenterology;  Laterality: N/A;    HERNIA REPAIR      as child       SLP Recommendation and Plan  SLP Diagnosis: mild, dysarthria (06/03/24 0815)  SLP Diagnosis Comments: Higher level cognitive-communication assessment to follow next session. (06/03/24 0815)     Monitor for Signs of Aspiration: notify SLP if any concerns (06/03/24 0815)  Swallow Criteria for Skilled Therapeutic Interventions Met: no problems identified which require skilled intervention (06/03/24 0815)  SLC Criteria for Skilled Therapy Interventions Met: yes (06/03/24 0815)  Anticipated Discharge Disposition (SLP): home with OP services (06/03/24 0815)     Therapy Frequency (Swallow): evaluation only (06/03/24 0815)  Therapy Frequency (SLP SLC): 5 days per week (06/03/24 0815)  Predicted Duration Therapy Intervention (Days): 1 week (06/03/24 0815)                             Plan of Care Reviewed With: patient (06/03/24 0909)      SLP EVALUATION (Last 72 Hours)       SLP SLC Evaluation       Row Name 06/03/24 0815                   Comprehension  Assessment/Intervention    Comprehension Assessment/Intervention Auditory Comprehension  -           Auditory Comprehension Assessment/Intervention    Answers Questions (Communication) yes/no;wh questions;personal;simple;concrete;[facility]  -        Able to Follow Commands (Communication) 1-step;[facility]  -SM        Narrative Discourse conversational level;WFL;mild impairment;other (see comments)  slow to respond vs baseline personality. Flat affect, calm/soft/slowed responses. Will further discuss next session.  -           Expression Assessment/Intervention    Expression Assessment/Intervention verbal expression  -           Verbal Expression Assessment/Intervention    Spontaneous/Functional Words simple;L  -        Conversational Discourse/Fluency other (see comments)  -        Verbal Expression, Comment Answering ?s though not fully engaging in conversational speech. ? baseline vs reduced output 2' potential errors. Will f/u to further discriminate.  -           Motor Speech Assessment/Intervention    Motor Speech Function mild impairment  -        Characteristics Consistent with Dysarthria decreased articulation  -        Speech intelligibility 100%;with unfamiliar listener  -           Cursory Voice Assessment/Intervention    Quality and Resonance (Voice) [facility]  -           Cognitive Assessment Intervention- SLP    Orientation Status (Cognition) person;place;time;situation;[facility]  -        Attention (Cognitive) selective;[facility]  -        Problem Solving (Cognitive) simple;[facility]  -        Cognition, Comment DNT higher level cog-comm skills. Will await and complete as pt has just returned to PO intake and could use from intake before complete assessment. Pt in agreement. He does not note any cog-comm concerns. Does note L facial numbness with min-mild impact on speech.  -           SLP Evaluation Clinical Impressions    SLP Diagnosis mild;dysarthria  -        SLP Diagnosis Comments Higher level  cognitive-communication assessment to follow next session.  -        Rehab Potential/Prognosis good  -        SLC Criteria for Skilled Therapy Interventions Met yes  -           Recommendations    Therapy Frequency (SLP SLC) 5 days per week  -        Predicted Duration Therapy Intervention (Days) 1 week  -        Anticipated Discharge Disposition (SLP) home with  services  -                  User Key  (r) = Recorded By, (t) = Taken By, (c) = Cosigned By      Initials Name Effective Dates    Korina Flores MS CCC-SLP 02/03/23 -                        EDUCATION  The patient has been educated in the following areas:     Cognitive Impairment Communication Impairment.           SLP GOALS       Row Name 06/03/24 0815             Patient will demonstrate functional cognitive-linguistic skills for return to discharge environment    [place] Independently  -      Time frame 1 week  -         SLP Diagnostic Treatment     Patient will participate in further assessment in the following areas reading comprehension;graphic expression;higher-level cognitive-linguistic  -      Time Frame (Diagnostic) 1 week  -SM         Articulation Goal 1 (SLP)    Improve Articulation Goal 1 (SLP) by over-articulating at word level;100%;independently (over 90% accuracy)  -      Time Frame (Articulation Goal 1, SLP) 1 week  -      Comment (Articulation Goal 1, SLP) Dx tx to see if improvement with overartic approach. Adjust as needed.  -                User Key  (r) = Recorded By, (t) = Taken By, (c) = Cosigned By      Initials Name Provider Type    Korina Flores MS CCC-SLP Speech and Language Pathologist                              Time Calculation:      Time Calculation- SLP       Row Name 06/03/24 0909             Time Calculation- SLP    SLP Start Time 0815  -      SLP Received On 06/03/24  -         Untimed Charges    [id number] Eval Speech and Production w/ Language Minutes 25  -       [id number] Eval Oral Pharyng Swallow Minutes 25  -SM         Total Minutes    Untimed Charges Total Minutes 50  -SM       Total Minutes 50  -SM                User Key  (r) = Recorded By, (t) = Taken By, (c) = Cosigned By      Initials Name Provider Type    Korina Flores MS CCC-SLP Speech and Language Pathologist                    Therapy Charges for Today       Code Description Service Date Service Provider Modifiers Qty    11587816300 HC ST EVAL ORAL PHARYNG SWALLOW 2 6/3/2024 Korina Padilla MS CCC-SLP GN 1    [id number] HC ST EVAL SPEECH AND PROD W LANG  2 6/3/2024 Korina Padilla MS CCC-SLP GN 1                       Korina Padilla MS CCC-SLP  6/3/2024

## 2024-06-04 LAB
ANION GAP SERPL CALCULATED.3IONS-SCNC: 17 MMOL/L (ref 5–15)
BUN SERPL-MCNC: 22 MG/DL (ref 6–20)
BUN/CREAT SERPL: 11.3 (ref 7–25)
CALCIUM SPEC-SCNC: 9.1 MG/DL (ref 8.6–10.5)
CHLORIDE SERPL-SCNC: 100 MMOL/L (ref 98–107)
CO2 SERPL-SCNC: 24 MMOL/L (ref 22–29)
CREAT SERPL-MCNC: 1.94 MG/DL (ref 0.76–1.27)
DEPRECATED RDW RBC AUTO: 53.2 FL (ref 37–54)
EGFRCR SERPLBLD CKD-EPI 2021: 39.1 ML/MIN/1.73
ERYTHROCYTE [DISTWIDTH] IN BLOOD BY AUTOMATED COUNT: 12.7 % (ref 12.3–15.4)
GLUCOSE BLDC GLUCOMTR-MCNC: 103 MG/DL (ref 70–130)
GLUCOSE BLDC GLUCOMTR-MCNC: 110 MG/DL (ref 70–130)
GLUCOSE BLDC GLUCOMTR-MCNC: 117 MG/DL (ref 70–130)
GLUCOSE BLDC GLUCOMTR-MCNC: 79 MG/DL (ref 70–130)
GLUCOSE SERPL-MCNC: 102 MG/DL (ref 65–99)
HCT VFR BLD AUTO: 36.5 % (ref 37.5–51)
HGB BLD-MCNC: 12.8 G/DL (ref 13–17.7)
MAGNESIUM SERPL-MCNC: 2.3 MG/DL (ref 1.6–2.6)
MCH RBC QN AUTO: 40.1 PG (ref 26.6–33)
MCHC RBC AUTO-ENTMCNC: 35.1 G/DL (ref 31.5–35.7)
MCV RBC AUTO: 114.4 FL (ref 79–97)
PLATELET # BLD AUTO: 203 10*3/MM3 (ref 140–450)
PMV BLD AUTO: 9.5 FL (ref 6–12)
POTASSIUM SERPL-SCNC: 4.3 MMOL/L (ref 3.5–5.2)
RBC # BLD AUTO: 3.19 10*6/MM3 (ref 4.14–5.8)
SODIUM SERPL-SCNC: 141 MMOL/L (ref 136–145)
WBC NRBC COR # BLD AUTO: 6.1 10*3/MM3 (ref 3.4–10.8)

## 2024-06-04 PROCEDURE — 83735 ASSAY OF MAGNESIUM: CPT | Performed by: INTERNAL MEDICINE

## 2024-06-04 PROCEDURE — 99232 SBSQ HOSP IP/OBS MODERATE 35: CPT | Performed by: INTERNAL MEDICINE

## 2024-06-04 PROCEDURE — 80048 BASIC METABOLIC PNL TOTAL CA: CPT | Performed by: INTERNAL MEDICINE

## 2024-06-04 PROCEDURE — 25010000002 HEPARIN (PORCINE) PER 1000 UNITS: Performed by: STUDENT IN AN ORGANIZED HEALTH CARE EDUCATION/TRAINING PROGRAM

## 2024-06-04 PROCEDURE — 99232 SBSQ HOSP IP/OBS MODERATE 35: CPT | Performed by: NURSE PRACTITIONER

## 2024-06-04 PROCEDURE — 82948 REAGENT STRIP/BLOOD GLUCOSE: CPT

## 2024-06-04 PROCEDURE — 25010000002 THIAMINE HCL 200 MG/2ML SOLUTION: Performed by: FAMILY MEDICINE

## 2024-06-04 PROCEDURE — 85027 COMPLETE CBC AUTOMATED: CPT | Performed by: INTERNAL MEDICINE

## 2024-06-04 RX ORDER — AMLODIPINE BESYLATE 5 MG/1
5 TABLET ORAL
Status: DISCONTINUED | OUTPATIENT
Start: 2024-06-04 | End: 2024-06-04

## 2024-06-04 RX ORDER — METOPROLOL SUCCINATE 100 MG/1
100 TABLET, EXTENDED RELEASE ORAL
Status: DISCONTINUED | OUTPATIENT
Start: 2024-06-04 | End: 2024-06-06 | Stop reason: HOSPADM

## 2024-06-04 RX ORDER — AMLODIPINE BESYLATE 5 MG/1
5 TABLET ORAL 2 TIMES DAILY
Status: DISCONTINUED | OUTPATIENT
Start: 2024-06-04 | End: 2024-06-06 | Stop reason: HOSPADM

## 2024-06-04 RX ADMIN — VALSARTAN 80 MG: 80 TABLET, FILM COATED ORAL at 08:26

## 2024-06-04 RX ADMIN — SPIRONOLACTONE 25 MG: 25 TABLET ORAL at 08:26

## 2024-06-04 RX ADMIN — THIAMINE HYDROCHLORIDE 200 MG: 100 INJECTION, SOLUTION INTRAMUSCULAR; INTRAVENOUS at 15:25

## 2024-06-04 RX ADMIN — METOPROLOL SUCCINATE 100 MG: 100 TABLET, EXTENDED RELEASE ORAL at 08:26

## 2024-06-04 RX ADMIN — TORSEMIDE 10 MG: 10 TABLET ORAL at 08:27

## 2024-06-04 RX ADMIN — ATORVASTATIN CALCIUM 80 MG: 40 TABLET, FILM COATED ORAL at 20:39

## 2024-06-04 RX ADMIN — Medication 10 ML: at 08:37

## 2024-06-04 RX ADMIN — FOLIC ACID 1 MG: 1 TABLET ORAL at 20:39

## 2024-06-04 RX ADMIN — EMPAGLIFLOZIN 25 MG: 25 TABLET, FILM COATED ORAL at 08:26

## 2024-06-04 RX ADMIN — THIAMINE HYDROCHLORIDE 200 MG: 100 INJECTION, SOLUTION INTRAMUSCULAR; INTRAVENOUS at 05:17

## 2024-06-04 RX ADMIN — Medication 10 ML: at 20:40

## 2024-06-04 RX ADMIN — HEPARIN SODIUM 5000 UNITS: 5000 INJECTION INTRAVENOUS; SUBCUTANEOUS at 20:40

## 2024-06-04 RX ADMIN — THIAMINE HYDROCHLORIDE 200 MG: 100 INJECTION, SOLUTION INTRAMUSCULAR; INTRAVENOUS at 20:39

## 2024-06-04 RX ADMIN — AMLODIPINE BESYLATE 5 MG: 5 TABLET ORAL at 20:39

## 2024-06-04 RX ADMIN — ASPIRIN 81 MG: 81 TABLET, CHEWABLE ORAL at 08:27

## 2024-06-04 RX ADMIN — HEPARIN SODIUM 5000 UNITS: 5000 INJECTION INTRAVENOUS; SUBCUTANEOUS at 08:24

## 2024-06-04 RX ADMIN — AMLODIPINE BESYLATE 5 MG: 5 TABLET ORAL at 08:27

## 2024-06-04 NOTE — CASE MANAGEMENT/SOCIAL WORK
Continued Stay Note   [place]     Patient Name: Walt Gao  MRN: [id number]  Today's Date: 6/4/2024    Admit Date: 6/2/2024    Plan: IRF   Discharge Plan       Row Name 06/04/24 1133       Plan    Plan IRF    Patient/Family in Agreement with Plan yes    Plan Comments Referral was called to Yo, Liaison with [facility]. CM will continue to follow.    Final Discharge Disposition Code 62 - inpatient rehab facility                   Discharge Codes    No documentation.                 Expected Discharge Date and Time       Expected Discharge Date Expected Discharge Time    Jun 5, 2024               Melody Squires RN

## 2024-06-04 NOTE — PAYOR COMM NOTE
"Ref # [id number]   Walt Gao \"Shimon\" (59 y.o. Male)       Date of Birth   1965    Social Security Number       Address   [address] [place] [address] [address] [facility] [address]    Home Phone   [phone]    MRN   [id number]       Mu-ism   None    Marital Status   Single                            Admission Date   6/2/24    Admission Type   Emergency    Admitting Provider   Michele Guerrier MD    Attending Provider   Michele Guerrier MD    Department, Room/Bed   [facility] 3E, S341/1       Discharge Date       Discharge Disposition       Discharge Destination                                 Attending Provider: Michele Guerrier MD    Allergies: No Known Allergies    Isolation: None   Infection: None   Code Status: CPR    Ht: 175.3 cm (69.02\")   Wt: 104 kg (229 lb 4.5 oz)    Admission Cmt: None   Principal Problem: CVA (cerebral vascular accident) [I63.9]                   Active Insurance as of 6/2/2024       Primary Coverage       Payor Plan Insurance Group Employer/Plan Group    [id number] PPO [id number]       Payor Plan Address Payor Plan Phone Number Payor Plan Fax Number Effective Dates    [address] [phone]  1/1/2021 - None Entered    [place] [address]         Subscriber Name Subscriber Birth Date Member ID       WALT GAO 1965 [id number]                     Emergency Contacts        (Rel.) Home Phone Work Phone Mobile Phone    Guera Gao (Sister) -- -- [phone]    tylor moulton (Significant Other) -- -- [phone]             Melody Squires RN     Case Management     Case Management/Social Work     Signed     Date of Service: 06/04/24 1134  Creation Time: 06/04/24 1134     Signed         Continued Stay Note  [facility]     Patient Name: Walt Gao               MRN: [id number]  Today's Date: 6/4/2024                  Admit Date: 6/2/2024     Plan: IRF    Discharge " Plan         Row Name 06/04/24 1133           Plan     Plan IRF     Patient/Family in Agreement with Plan yes     Plan Comments Referral was called to Yo, Liaison with [facility]. CM will continue to follow.     Final Discharge Disposition Code 62 - inpatient rehab facility                         Discharge Codes    No documentation.                      Expected Discharge Date and Time         Expected Discharge Date Expected Discharge Time     Jun 5, 2024                     Melody Squires RN                                     Physician Progress Notes (last 24 hours)        Bia Wahl APRN at 06/04/24 0905          Stroke Progress Note       Chief Complaint:  AMS, left sided weakness    Subjective    Subjective     Subjective: No adverse events overnight.  Patient sitting up in bed he feels that his strength in his left hand is improving, continues to have a mild left facial droop.      Review of Systems   Constitutional:  Negative for fever.   HENT:  Negative for trouble swallowing.    Eyes:  Negative for visual disturbance.   Respiratory:  Negative for cough and shortness of breath.    Cardiovascular:  Negative for chest pain and palpitations.   Gastrointestinal:  Negative for nausea and vomiting.   Musculoskeletal: Negative.    Skin: Negative.    Neurological:  Positive for facial asymmetry and weakness. Negative for speech difficulty, numbness and headaches.   Psychiatric/Behavioral: Negative.             Objective    Objective      Temp:  [96.8 °F (36 °C)-98.3 °F (36.8 °C)] 98.2 °F (36.8 °C)  Heart Rate:  [] 85  Resp:  [16-18] 16  BP: (141-165)/(100-116) 141/100        Neurological Exam  Mental Status  Alert. Oriented to person, place, and time. Mild dysarthria present. Language is fluent with no aphasia. Attention and concentration are normal.    Cranial Nerves  CN II: Visual fields full to confrontation.  CN III, IV, VI: Extraocular movements intact bilaterally. Normal lids and orbits  bilaterally. Pupils equal round and reactive to light bilaterally.  CN V: Facial sensation is normal.  CN VII:  Right: There is no facial weakness.  Left: There is central facial weakness.  CN XI: Shoulder shrug strength is normal.  CN XII: Tongue midline without atrophy or fasciculations.    Motor  Normal muscle bulk throughout. No fasciculations present. Normal muscle tone. No abnormal involuntary movements. Strength is 5/5 in all four extremities except as noted.  LUE 4+/5, distal < proximal.    Sensory  Light touch is normal in upper and lower extremities.     Coordination  Right: Finger-to-nose normal. Rapid alternating movement normal.Left: Finger-to-nose normal. Rapid alternating movement abnormality:    Gait    Not tested.      Physical Exam  Vitals and nursing note reviewed.   Constitutional:       Appearance: Normal appearance.   HENT:      Head: Normocephalic and atraumatic.   Eyes:      General: Lids are normal.      Extraocular Movements: Extraocular movements intact.      Pupils: Pupils are equal, round, and reactive to light.   Cardiovascular:      Rate and Rhythm: Normal rate.   Pulmonary:      Effort: Pulmonary effort is normal. No respiratory distress.   Musculoskeletal:         General: No swelling. Normal range of motion.      Cervical back: Normal range of motion and neck supple.   Skin:     General: Skin is warm and dry.   Neurological:      Mental Status: He is alert and oriented to person, place, and time.      Cranial Nerves: Cranial nerve deficit and dysarthria present.      Sensory: No sensory deficit.      Motor: Weakness present.      Coordination: Coordination abnormal.   Psychiatric:         Mood and Affect: Mood normal.         Behavior: Behavior normal.         Results Review:    I reviewed the patient's new clinical results.  WBC   Date Value Ref Range Status   06/04/2024 6.10 3.40 - 10.80 10*3/mm3 Final     RBC   Date Value Ref Range Status   06/04/2024 3.19 (L) 4.14 - 5.80  10*6/mm3 Final     Hemoglobin   Date Value Ref Range Status   06/04/2024 12.8 (L) 13.0 - 17.7 g/dL Final     Hematocrit   Date Value Ref Range Status   06/04/2024 36.5 (L) 37.5 - 51.0 % Final     MCV   Date Value Ref Range Status   06/04/2024 114.4 (H) 79.0 - 97.0 fL Final     MCH   Date Value Ref Range Status   06/04/2024 40.1 (H) 26.6 - 33.0 pg Final     MCHC   Date Value Ref Range Status   06/04/2024 35.1 31.5 - 35.7 g/dL Final     RDW   Date Value Ref Range Status   06/04/2024 12.7 12.3 - 15.4 % Final     RDW-SD   Date Value Ref Range Status   06/04/2024 53.2 37.0 - 54.0 fl Final     MPV   Date Value Ref Range Status   06/04/2024 9.5 6.0 - 12.0 fL Final     Platelets   Date Value Ref Range Status   06/04/2024 203 140 - 450 10*3/mm3 Final     Lab Results   Component Value Date    GLUCOSE 102 (H) 06/04/2024    BUN 22 (H) 06/04/2024    CREATININE 1.94 (H) 06/04/2024    EGFR 39.1 (L) 06/04/2024    BCR 11.3 06/04/2024    K 4.3 06/04/2024    CO2 24.0 06/04/2024    CALCIUM 9.1 06/04/2024    ALBUMIN 4.3 06/03/2024    BILITOT 0.5 06/03/2024    AST 16 06/03/2024    ALT 15 06/03/2024     A1c 6.2        MRI Brain With & Without Contrast 6/3/2024  Impression: Multifocal areas of both acute and subacute infarct are present including within a watershed distribution on the right, as well as a more focal area of acute cortical and subcortical infarct within the right occipital lobe. Areas of mixed subacute and chronic lacunar infarct are also noted bilaterally. There is no evidence of associated hemorrhage or significant mass effect. Areas of acute infarct on the right demonstrate some associated enhancement, likely representing cortical laminar necrosis. Neoplastic or metastatic process is considered less likely, however consider follow-up imaging to confirm expected evolution of multifocal infarcts.  I personally reviewed the MRI which is largely in the right watershed distribution with a large area of cortical  involvement.     CT Angiogram Head and Neck 6/2/2024  Impression: 1.No evidence of hemodynamically significant stenosis along bilateral carotid bifurcations. 2.Focal moderate stenosis along right MCA, but no evidence of acute thrombus or aneurysm.      CT Perfusion 6/2/2024  Impression: 1.No evidence of completed infarct. 2.4 mm of abnormal Tmax perfusion corresponding to right frontal lobe. This is not in the areas of hypodensity seen on recent prior CT head that were in the right parietal and right occipital lobes. Whether this represents artifact versus tissue at risk within the right frontal lobe is unclear.      CT Head Without Contrast Stroke Protocol 6/2/2024  Impression: 1.No acute intracranial hemorrhage. 2.Two focal hypodensities within right parietal and right occipital lobes, which could represent infarcts versus metastasis. Consider evaluation with MRI brain with and without IV contrast. 3.Mild mucosal disease within maxillary sinuses. 4.Findings suggestive of mild chronic small vessel ischemic disease.      Results for orders placed during the hospital encounter of 06/02/24    Adult Transthoracic Echo Complete W/ Cont if Necessary Per Protocol (With Agitated Saline)    Interpretation Summary    Left ventricular systolic function is normal. Estimated left ventricular EF = 50%    Left ventricular wall thickness is consistent with mild to moderate concentric hypertrophy.    The cardiac valves are anatomically and functionally normal.    Estimated right ventricular systolic pressure from tricuspid regurgitation is normal (<35 mmHg).    Saline test results are negative.    Compared to echocardiogram performed 11/29/2022, there is been no significant interval change           Assessment/Plan     Assessment/Plan:59-year-old male with a past medical history of recent GI bleed (5/17/2024), HTN, CHF, CKD, HLD, and alcohol use who presented to [facility] Emergency Department on 6/2/2024 with a 2-day  history of left hemiparesis, left facial droop, and left hemisensory deficits.   CT head 6/2/2024 revealed hypodensities in right parietal and occipital lobes.  CT angiogram head and neck 6/2/2024 reveals right MCA stenoses.  MRI brain with and without contrast 6/3/2024 reveals right MCA territory watershed infarct and right occipital cortical infarct.       Acute right MCA watershed infarct    -Etiology atheroembolic due to right MCA versus cardioembolic (cortical appearing infarcts)  -Patient should have a repeat MRI with contrast in 3 months to evaluate for expected evolution of multifocal infarcts as some of the areas of acute infarct showed associated enhancement suspected to represent cortical laminar necrosis although neoplastic/metastatic process cannot be ruled out  -Continue aspirin 81 mg  -Hold Plavix given recent GI bleed, could consider starting this in the clinic setting  -TTE EF of 50%, mildly dilated left atrium, negative bubble study  -Needs 14-day Holter monitor at discharge  -HLD: , goal <70, continue atorvastatin 80 mg daily  -HTN: Goal okay to begin to normalize blood pressures with goal 120-160, (was 140s-160s yesterday)  -DVT prophylaxis: SCD, heparin  -EtOH abuse: [facility] protocol, cessation counseling  -Continue PT/OT, therapy is recommending IRF at discharge  -Needs stroke clinic follow-up in 2 weeks      Discussed the importance of medication compliance Aspirin 81mg daily and Atorvastatin 80mg nightly and lifestyle modifications adequate control of blood pressure, adequate control of cholesterol (goal LDL <70), adequate control of glucose (<140, A1c goal <7), ETOH cessation, increased physical activity, and implementation of healthy diet to help reduce the risk of future cerebrovascular events.  Also discussed the signs symptoms that would warrant the patient return back to the emergency department including unilateral weakness, unilateral numbness, visual disturbances, loss of  balance, speech difficulties, and/or a sudden severe headache.  Patient verbalized understanding.      Plan of care discussed with patient and Dr. Barnes, stroke workup is complete, we will sign off.  Please call with questions or concerns thank you.    AFTAB Beckett  06/04/24  09:05 EDT        Electronically signed by Bia Wahl APRN at 06/04/24 9939

## 2024-06-04 NOTE — PROGRESS NOTES
[facility]  PROGRESS NOTE    Patient Name: Walt Gao  : 1965  MRN: [id number]    Date of Admission: 2024  Primary Care Physician: Duglas Lang MD    Subjective   Subjective     CC:  stroke    HPI:  Left arm discoordination is stable  No chest pain  No dyspnea      Objective   Objective     Vital Signs:   Temp:  [97.5 °F (36.4 °C)-98.3 °F (36.8 °C)] 98.1 °F (36.7 °C)  Heart Rate:  [] 85  Resp:  [16-18] 16  BP: (141-155)/(100-113) 141/100     Physical Exam:  Constitutional:Alert, oriented x 3, nontoxic appearing  Psych:Normal/appropriate affect  HEENT:NCAT, oropharynx clear  Neck: neck supple, full range of motion  Neuro: Face symmetric, speech clear, decreased left  strength; gait not assessed  Resp: CTAB, normal effort  GI: abd soft, nontender  Skin: No extremity rash  Musculoskeletal/extremities: no cyanosis of extremities; no significant ankle edema          Results Reviewed:  LAB RESULTS:      Lab 24  0758 24  0659 24  1407 24  1405   WBC 6.10 5.84  --   --    HEMOGLOBIN 12.8* 12.4*  --   --    HEMOGLOBIN, POC  --   --  13.9  --    HEMATOCRIT 36.5* 34.8*  --   --    HEMATOCRIT POC  --   --  41  --    PLATELETS 203 201  --   --    .4* 113.4*  --   --    PROTIME  --   --   --  13.6         Lab 24  0622 24  0659 24  1407   SODIUM 141 139  --    POTASSIUM 4.3 4.1  --    CHLORIDE 100 104  --    CO2 24.0 25.0  --    ANION GAP 17.0* 10.0  --    BUN 22* 13  --    CREATININE 1.94* 1.31* 1.70*   EGFR 39.1* 62.7 45.9*   GLUCOSE 102* 108*  --    CALCIUM 9.1 9.0  --    MAGNESIUM 2.3 2.2  --    HEMOGLOBIN A1C  --  6.20*  --          Lab 24  06   TOTAL PROTEIN 7.8   ALBUMIN 4.3   GLOBULIN 3.5   ALT (SGPT) 15   AST (SGOT) 16   BILIRUBIN 0.5   ALK PHOS 29*         Lab 24  1405   PROTIME 13.6   INR 1.1         Lab 24  0659   CHOLESTEROL 197   LDL CHOL 134*   HDL CHOL 47   TRIGLYCERIDES  90         Lab 06/02/24  1821   VITAMIN B 12 <150*         Brief Urine Lab Results  (Last result in the past 365 days)        Color   Clarity   Blood   Leuk Est   Nitrite   Protein   CREAT   Urine HCG        06/03/24 1935 Yellow   Clear   Negative   Negative   Negative   100 mg/dL (2+)                   Microbiology Results Abnormal       None            Adult Transthoracic Echo Complete W/ Cont if Necessary Per Protocol (With Agitated Saline)    Result Date: 6/3/2024    Left ventricular systolic function is normal. Estimated left ventricular EF = 50%   Left ventricular wall thickness is consistent with mild to moderate concentric hypertrophy.   The cardiac valves are anatomically and functionally normal.   Estimated right ventricular systolic pressure from tricuspid regurgitation is normal (<35 mmHg).   Saline test results are negative.   Compared to echocardiogram performed 11/29/2022, there is been no significant interval change     MRI Brain With & Without Contrast    Result Date: 6/3/2024  MRI BRAIN W WO CONTRAST Date of Exam: 6/2/2024 8:25 PM EDT Indication: Stroke, follow up CTH hypodensity/ Acute stroke, possible Mets.  Comparison: CT earlier the same day. Technique:  Routine multiplanar/multisequence sequence images of the brain were obtained before and after the uneventful administration of 18 mL Multihance. Findings: Areas of diffusion restriction are present on the right consistent with acute infarct involving watershed distribution within the frontal and parietal lobes, as well as a somewhat more confluent area of cortical infarct within the right occipital lobe. Midline structures are normal and the craniocervical junction appears satisfactory. Areas of infarct are associated with mild localized edema, otherwise without evidence of hemorrhage or significant mass effect. Age-related changes are present with minimal volume loss and mild to moderate typical T2 hyperintense subcortical and pontine white  matter changes, nonspecific but favored to reflect sequela of chronic microvascular ischemia. Some focal FLAIR hyperintensity is present within the left splenium of the corpus callosum with the appearance of probable early chronic infarct. The ventricles are normal in size and configuration. Some areas of scattered susceptibility artifact are present likely reflecting chronic microhemorrhage. Areas of acute infarct, most notably within the right occipital lobe demonstrates some irregular enhancement, likely representing evolving cortical laminar necrosis.     Impression: Impression: Multifocal areas of both acute and subacute infarct are present including within a watershed distribution on the right, as well as a more focal area of acute cortical and subcortical infarct within the right occipital lobe. Areas of mixed subacute and chronic lacunar infarct are also noted bilaterally. There is no evidence of associated hemorrhage or significant mass effect. Areas of acute infarct on the right demonstrate some associated enhancement, likely representing cortical laminar necrosis. Neoplastic or metastatic process is considered less likely, however consider follow-up imaging to confirm expected evolution of multifocal infarcts. Electronically Signed: Ernst Freire MD  6/3/2024 6:45 AM EDT  Workstation ID: [id number]     Results for orders placed during the hospital encounter of 06/02/24    Adult Transthoracic Echo Complete W/ Cont if Necessary Per Protocol (With Agitated Saline)    Interpretation Summary    Left ventricular systolic function is normal. Estimated left ventricular EF = 50%    Left ventricular wall thickness is consistent with mild to moderate concentric hypertrophy.    The cardiac valves are anatomically and functionally normal.    Estimated right ventricular systolic pressure from tricuspid regurgitation is normal (<35 mmHg).    Saline test results are negative.    Compared to echocardiogram performed  11/29/2022, there is been no significant interval change      Current medications:  Scheduled Meds:amLODIPine, 5 mg, Oral, BID  aspirin, 81 mg, Oral, Daily  atorvastatin, 80 mg, Oral, Nightly  empagliflozin, 25 mg, Oral, Daily  folic acid, 1 mg, Oral, Daily  heparin (porcine), 5,000 Units, Subcutaneous, Q12H  insulin lispro, 2-7 Units, Subcutaneous, TID With Meals  metoprolol succinate XL, 100 mg, Oral, Q24H  sodium chloride, 10 mL, Intravenous, Q12H  spironolactone, 25 mg, Oral, Daily  thiamine (B-1) IV, 200 mg, Intravenous, Q8H   Followed by  [START ON 6/8/2024] thiamine, 100 mg, Oral, Daily  torsemide, 10 mg, Oral, Daily  valsartan, 80 mg, Oral, Daily      Continuous Infusions:   PRN Meds:.  dextrose    dextrose    glucagon (human recombinant)    LORazepam **OR** midazolam **OR** LORazepam **OR** midazolam **OR** midazolam **OR** midazolam    Magnesium Standard Dose Replacement - Follow Nurse / BPA Driven Protocol    sodium chloride    sodium chloride    Assessment & Plan   Assessment & Plan     Active Hospital Problems    Diagnosis  POA    **CVA (cerebral vascular accident) [I63.9]  Yes      Resolved Hospital Problems   No resolved problems to display.        Brief Hospital Course to date:  Walt Gao is a 59 y.o. male w hx etoh abuse (2-3 beer daily), NICM (ef 30% 2013, improved to 50% 2015), ckd 3 (baseline cr ~1.8-2.0), htn, hl. Recently admitted w/ melena/gi bleed and underwent EGD (5//16/24) and Colonoscopy (5/17/24) which showed gastritis, stomach nodule (biopsy negative) as well as internal hemorroids, diverticulosis, sngle angiodysplastic lesion (s/p APC) and polyp (removed, biopsy negative).   Patient presented `1-2 days of left hand nubmness, left facial droop and facial numbness. Ct imaging revealed no hemorrhage and noted hypodensities within right parietal and right occipital lobe possibly representing CVA's vs metastatic dz. CTA h&n revealed focal moderate stenosis along right MCA. Patient  was admitted to hospitalist service and seen by neuro-stroke service.initiated on asa.  Mri brain w & wo contrast showed multifocal areas of acute and subacute infarcts withni right frontal, parietal, occipital lobe    Multiple, acute & subacute, right mca territory CVA's  -atheroembolic vs cardioembolic  HTN  HL  -neuro-stroke following: asa 81, high dose statin (Holding plavix due to recent gi bleed); 14 day cardiac monitor upon discharge; neuro-stroke follow up in 2 weeks  -pt/ot recommend inpt rehab  -will need repeat MRI brain w/ contrast in ~3 months to evaluate for expected evolution of multifocal infarcts (as neoplastic process cannot be ruled entirely out and needs to be monitored w/ f/u imaging)  -f/u stroke clinic 2 weeks    Etoh abuse  -ciwa checks; prn ativan  -admits to 2-3 beer daily  -discussed importance of cessation  -if no withdrawal symptoms by tomorrow 6/5, then can d/c prn benzos    Hx NICM  HTN  -previous EF 35% 2013 -->50% 2017  -Echo 6/3/24: LV EF 50%, mod clvh, valves ok, saline test negative  -follows w/ [facility]  -restarted home meds (torsemide, aldactone, valsartan, sglt2 inh, amlodipine)    Ckd 3 (baseline cr ~1.8-2.0)  -stable; continue to monitor    Borderline DM2, A1c 6.2  -on farxiga at home; jardiance here, ssi      Am labs: bmp      Expected Discharge Location and Transportation: inpatient rehab (referrals sent to [facility])  Expected Discharge medically ready, now awaiting rehab  Expected Discharge Date: 6/5/2024; Expected Discharge Time:      DVT prophylaxis:  Medical and mechanical DVT prophylaxis orders are present.         AM-PAC 6 Clicks Score (PT): 18 (06/04/24 0800)    CODE STATUS:   Code Status and Medical Interventions:   Ordered at: 06/02/24 9641     Level Of Support Discussed With:    Patient     Code Status (Patient has no pulse and is not breathing):    CPR (Attempt to Resuscitate)     Medical Interventions (Patient has pulse or is breathing):    Full  Support       Michele Guerrier MD  06/04/24

## 2024-06-04 NOTE — DISCHARGE INSTRUCTIONS
Please continue to take aspirin 81 mg daily  Please to continue to take atorvastatin 80 mg daily  You will need to wear a cardiac monitor to monitor for arrhythmia is, please send this back to the cardiology office per their instructions.  Please keep all follow-up appointments.

## 2024-06-04 NOTE — PROGRESS NOTES
Stroke Progress Note       Chief Complaint:  AMS, left sided weakness    Subjective    Subjective     Subjective: No adverse events overnight.  Patient sitting up in bed he feels that his strength in his left hand is improving, continues to have a mild left facial droop.      Review of Systems   Constitutional:  Negative for fever.   HENT:  Negative for trouble swallowing.    Eyes:  Negative for visual disturbance.   Respiratory:  Negative for cough and shortness of breath.    Cardiovascular:  Negative for chest pain and palpitations.   Gastrointestinal:  Negative for nausea and vomiting.   Musculoskeletal: Negative.    Skin: Negative.    Neurological:  Positive for facial asymmetry and weakness. Negative for speech difficulty, numbness and headaches.   Psychiatric/Behavioral: Negative.              Objective    Objective      Temp:  [96.8 °F (36 °C)-98.3 °F (36.8 °C)] 98.2 °F (36.8 °C)  Heart Rate:  [] 85  Resp:  [16-18] 16  BP: (141-165)/(100-116) 141/100        Neurological Exam  Mental Status  Alert. Oriented to person, place, and time. Mild dysarthria present. Language is fluent with no aphasia. Attention and concentration are normal.    Cranial Nerves  CN II: Visual fields full to confrontation.  CN III, IV, VI: Extraocular movements intact bilaterally. Normal lids and orbits bilaterally. Pupils equal round and reactive to light bilaterally.  CN V: Facial sensation is normal.  CN VII:  Right: There is no facial weakness.  Left: There is central facial weakness.  CN XI: Shoulder shrug strength is normal.  CN XII: Tongue midline without atrophy or fasciculations.    Motor  Normal muscle bulk throughout. No fasciculations present. Normal muscle tone. No abnormal involuntary movements. Strength is 5/5 in all four extremities except as noted.  LUE 4+/5, distal < proximal.    Sensory  Light touch is normal in upper and lower extremities.     Coordination  Right: Finger-to-nose normal. Rapid alternating  movement normal.Left: Finger-to-nose normal. Rapid alternating movement abnormality:    Gait    Not tested.      Physical Exam  Vitals and nursing note reviewed.   Constitutional:       Appearance: Normal appearance.   HENT:      Head: Normocephalic and atraumatic.   Eyes:      General: Lids are normal.      Extraocular Movements: Extraocular movements intact.      Pupils: Pupils are equal, round, and reactive to light.   Cardiovascular:      Rate and Rhythm: Normal rate.   Pulmonary:      Effort: Pulmonary effort is normal. No respiratory distress.   Musculoskeletal:         General: No swelling. Normal range of motion.      Cervical back: Normal range of motion and neck supple.   Skin:     General: Skin is warm and dry.   Neurological:      Mental Status: He is alert and oriented to person, place, and time.      Cranial Nerves: Cranial nerve deficit and dysarthria present.      Sensory: No sensory deficit.      Motor: Weakness present.      Coordination: Coordination abnormal.   Psychiatric:         Mood and Affect: Mood normal.         Behavior: Behavior normal.         Results Review:    I reviewed the patient's new clinical results.  WBC   Date Value Ref Range Status   06/04/2024 6.10 3.40 - 10.80 10*3/mm3 Final     RBC   Date Value Ref Range Status   06/04/2024 3.19 (L) 4.14 - 5.80 10*6/mm3 Final     Hemoglobin   Date Value Ref Range Status   06/04/2024 12.8 (L) 13.0 - 17.7 g/dL Final     Hematocrit   Date Value Ref Range Status   06/04/2024 36.5 (L) 37.5 - 51.0 % Final     MCV   Date Value Ref Range Status   06/04/2024 114.4 (H) 79.0 - 97.0 fL Final     MCH   Date Value Ref Range Status   06/04/2024 40.1 (H) 26.6 - 33.0 pg Final     MCHC   Date Value Ref Range Status   06/04/2024 35.1 31.5 - 35.7 g/dL Final     RDW   Date Value Ref Range Status   06/04/2024 12.7 12.3 - 15.4 % Final     RDW-SD   Date Value Ref Range Status   06/04/2024 53.2 37.0 - 54.0 fl Final     MPV   Date Value Ref Range Status    06/04/2024 9.5 6.0 - 12.0 fL Final     Platelets   Date Value Ref Range Status   06/04/2024 203 140 - 450 10*3/mm3 Final     Lab Results   Component Value Date    GLUCOSE 102 (H) 06/04/2024    BUN 22 (H) 06/04/2024    CREATININE 1.94 (H) 06/04/2024    EGFR 39.1 (L) 06/04/2024    BCR 11.3 06/04/2024    K 4.3 06/04/2024    CO2 24.0 06/04/2024    CALCIUM 9.1 06/04/2024    ALBUMIN 4.3 06/03/2024    BILITOT 0.5 06/03/2024    AST 16 06/03/2024    ALT 15 06/03/2024     A1c 6.2        MRI Brain With & Without Contrast 6/3/2024  Impression: Multifocal areas of both acute and subacute infarct are present including within a watershed distribution on the right, as well as a more focal area of acute cortical and subcortical infarct within the right occipital lobe. Areas of mixed subacute and chronic lacunar infarct are also noted bilaterally. There is no evidence of associated hemorrhage or significant mass effect. Areas of acute infarct on the right demonstrate some associated enhancement, likely representing cortical laminar necrosis. Neoplastic or metastatic process is considered less likely, however consider follow-up imaging to confirm expected evolution of multifocal infarcts.  I personally reviewed the MRI which is largely in the right watershed distribution with a large area of cortical involvement.     CT Angiogram Head and Neck 6/2/2024  Impression: 1.No evidence of hemodynamically significant stenosis along bilateral carotid bifurcations. 2.Focal moderate stenosis along right MCA, but no evidence of acute thrombus or aneurysm.      CT Perfusion 6/2/2024  Impression: 1.No evidence of completed infarct. 2.4 mm of abnormal Tmax perfusion corresponding to right frontal lobe. This is not in the areas of hypodensity seen on recent prior CT head that were in the right parietal and right occipital lobes. Whether this represents artifact versus tissue at risk within the right frontal lobe is unclear.      CT Head  Without Contrast Stroke Protocol 6/2/2024  Impression: 1.No acute intracranial hemorrhage. 2.Two focal hypodensities within right parietal and right occipital lobes, which could represent infarcts versus metastasis. Consider evaluation with MRI brain with and without IV contrast. 3.Mild mucosal disease within maxillary sinuses. 4.Findings suggestive of mild chronic small vessel ischemic disease.      Results for orders placed during the hospital encounter of 06/02/24    Adult Transthoracic Echo Complete W/ Cont if Necessary Per Protocol (With Agitated Saline)    Interpretation Summary    Left ventricular systolic function is normal. Estimated left ventricular EF = 50%    Left ventricular wall thickness is consistent with mild to moderate concentric hypertrophy.    The cardiac valves are anatomically and functionally normal.    Estimated right ventricular systolic pressure from tricuspid regurgitation is normal (<35 mmHg).    Saline test results are negative.    Compared to echocardiogram performed 11/29/2022, there is been no significant interval change            Assessment/Plan     Assessment/Plan:59-year-old male with a past medical history of recent GI bleed (5/17/2024), HTN, CHF, CKD, HLD, and alcohol use who presented to [facility] Emergency Department on 6/2/2024 with a 2-day history of left hemiparesis, left facial droop, and left hemisensory deficits.   CT head 6/2/2024 revealed hypodensities in right parietal and occipital lobes.  CT angiogram head and neck 6/2/2024 reveals right MCA stenoses.  MRI brain with and without contrast 6/3/2024 reveals right MCA territory watershed infarct and right occipital cortical infarct.       Acute right MCA watershed infarct    -Etiology atheroembolic due to right MCA versus cardioembolic (cortical appearing infarcts)  -Patient should have a repeat MRI with contrast in 3 months to evaluate for expected evolution of multifocal infarcts as some of the areas  of acute infarct showed associated enhancement suspected to represent cortical laminar necrosis although neoplastic/metastatic process cannot be ruled out  -Continue aspirin 81 mg  -Hold Plavix given recent GI bleed, could consider starting this in the clinic setting  -TTE EF of 50%, mildly dilated left atrium, negative bubble study  -Needs 14-day Holter monitor at discharge  -HLD: , goal <70, continue atorvastatin 80 mg daily  -HTN: Goal okay to begin to normalize blood pressures with goal 120-160, (was 140s-160s yesterday)  -DVT prophylaxis: SCD, heparin  -EtOH abuse: [facility] protocol, cessation counseling  -Continue PT/OT, therapy is recommending IRF at discharge  -Needs stroke clinic follow-up in 2 weeks      Discussed the importance of medication compliance Aspirin 81mg daily and Atorvastatin 80mg nightly and lifestyle modifications adequate control of blood pressure, adequate control of cholesterol (goal LDL <70), adequate control of glucose (<140, A1c goal <7), ETOH cessation, increased physical activity, and implementation of healthy diet to help reduce the risk of future cerebrovascular events.  Also discussed the signs symptoms that would warrant the patient return back to the emergency department including unilateral weakness, unilateral numbness, visual disturbances, loss of balance, speech difficulties, and/or a sudden severe headache.  Patient verbalized understanding.      Plan of care discussed with patient and Dr. Barnes, stroke workup is complete, we will sign off.  Please call with questions or concerns thank you.    AFTAB Beckett  06/04/24  09:05 EDT

## 2024-06-05 LAB
ANION GAP SERPL CALCULATED.3IONS-SCNC: 15 MMOL/L (ref 5–15)
BUN SERPL-MCNC: 25 MG/DL (ref 6–20)
BUN/CREAT SERPL: 13.1 (ref 7–25)
CALCIUM SPEC-SCNC: 9.1 MG/DL (ref 8.6–10.5)
CHLORIDE SERPL-SCNC: 101 MMOL/L (ref 98–107)
CO2 SERPL-SCNC: 23 MMOL/L (ref 22–29)
CREAT SERPL-MCNC: 1.91 MG/DL (ref 0.76–1.27)
EGFRCR SERPLBLD CKD-EPI 2021: 39.9 ML/MIN/1.73
GLUCOSE BLDC GLUCOMTR-MCNC: 146 MG/DL (ref 70–130)
GLUCOSE BLDC GLUCOMTR-MCNC: 93 MG/DL (ref 70–130)
GLUCOSE BLDC GLUCOMTR-MCNC: 95 MG/DL (ref 70–130)
GLUCOSE BLDC GLUCOMTR-MCNC: 95 MG/DL (ref 70–130)
GLUCOSE SERPL-MCNC: 72 MG/DL (ref 65–99)
POTASSIUM SERPL-SCNC: 4.1 MMOL/L (ref 3.5–5.2)
SODIUM SERPL-SCNC: 139 MMOL/L (ref 136–145)

## 2024-06-05 PROCEDURE — 80048 BASIC METABOLIC PNL TOTAL CA: CPT | Performed by: INTERNAL MEDICINE

## 2024-06-05 PROCEDURE — 99232 SBSQ HOSP IP/OBS MODERATE 35: CPT | Performed by: STUDENT IN AN ORGANIZED HEALTH CARE EDUCATION/TRAINING PROGRAM

## 2024-06-05 PROCEDURE — 99232 SBSQ HOSP IP/OBS MODERATE 35: CPT

## 2024-06-05 PROCEDURE — 25010000002 THIAMINE HCL 200 MG/2ML SOLUTION: Performed by: FAMILY MEDICINE

## 2024-06-05 PROCEDURE — 25010000002 HEPARIN (PORCINE) PER 1000 UNITS: Performed by: STUDENT IN AN ORGANIZED HEALTH CARE EDUCATION/TRAINING PROGRAM

## 2024-06-05 PROCEDURE — 92507 TX SP LANG VOICE COMM INDIV: CPT | Performed by: SPEECH-LANGUAGE PATHOLOGIST

## 2024-06-05 PROCEDURE — 82948 REAGENT STRIP/BLOOD GLUCOSE: CPT

## 2024-06-05 RX ADMIN — HEPARIN SODIUM 5000 UNITS: 5000 INJECTION INTRAVENOUS; SUBCUTANEOUS at 08:52

## 2024-06-05 RX ADMIN — HEPARIN SODIUM 5000 UNITS: 5000 INJECTION INTRAVENOUS; SUBCUTANEOUS at 21:40

## 2024-06-05 RX ADMIN — Medication 10 ML: at 21:41

## 2024-06-05 RX ADMIN — THIAMINE HYDROCHLORIDE 200 MG: 100 INJECTION, SOLUTION INTRAMUSCULAR; INTRAVENOUS at 14:08

## 2024-06-05 RX ADMIN — AMLODIPINE BESYLATE 5 MG: 5 TABLET ORAL at 21:40

## 2024-06-05 RX ADMIN — SPIRONOLACTONE 25 MG: 25 TABLET ORAL at 08:53

## 2024-06-05 RX ADMIN — ASPIRIN 81 MG: 81 TABLET, CHEWABLE ORAL at 08:53

## 2024-06-05 RX ADMIN — THIAMINE HYDROCHLORIDE 200 MG: 100 INJECTION, SOLUTION INTRAMUSCULAR; INTRAVENOUS at 21:40

## 2024-06-05 RX ADMIN — METOPROLOL SUCCINATE 100 MG: 100 TABLET, EXTENDED RELEASE ORAL at 08:53

## 2024-06-05 RX ADMIN — THIAMINE HYDROCHLORIDE 200 MG: 100 INJECTION, SOLUTION INTRAMUSCULAR; INTRAVENOUS at 05:16

## 2024-06-05 RX ADMIN — ATORVASTATIN CALCIUM 80 MG: 40 TABLET, FILM COATED ORAL at 21:41

## 2024-06-05 RX ADMIN — FOLIC ACID 1 MG: 1 TABLET ORAL at 21:40

## 2024-06-05 RX ADMIN — EMPAGLIFLOZIN 25 MG: 25 TABLET, FILM COATED ORAL at 08:53

## 2024-06-05 RX ADMIN — Medication 10 ML: at 08:53

## 2024-06-05 RX ADMIN — AMLODIPINE BESYLATE 5 MG: 5 TABLET ORAL at 08:53

## 2024-06-05 NOTE — PROGRESS NOTES
Stroke Progress Note    Presenting Chief Complaint: Left-sided weakness, altered mental status    24-hour Events  Afebrile, blood pressure trend within goal, no acute events reported.    Subjective:  Patient is found sitting up in bed in no acute distress. He states weakness and speech difficulty persist, but are both improved.    Review of Systems   Constitutional:  Negative for chills and fever.   HENT:  Negative for trouble swallowing.    Eyes:  Negative for visual disturbance.   Respiratory:  Negative for shortness of breath.    Cardiovascular:  Negative for chest pain.   Gastrointestinal:  Negative for abdominal pain.   Musculoskeletal:  Negative for myalgias.   Neurological:  Positive for speech difficulty and weakness. Negative for tremors, facial asymmetry, light-headedness, numbness and headaches.   Psychiatric/Behavioral:  Negative for behavioral problems. The patient is not nervous/anxious.       Objective  Neurological Exam  Mental Status  Alert. Oriented to person, place, time and situation. Mild dysarthria present. Language is fluent with no aphasia. Attention and concentration are normal. Fund of knowledge is appropriate for level of education.    Cranial Nerves  CN II: Visual fields full to confrontation.  CN III, IV, VI: Extraocular movements intact bilaterally. Pupils equal round and reactive to light bilaterally.  CN V:  Right: Facial sensation is normal.  Left: Facial sensation is normal on the left.  CN VII:  Right: There is no facial weakness.  Left: There is no facial weakness.  CN VIII: Hearing grossly intact bilaterally.  CN IX, X: Palate elevates symmetrically  CN XII: Tongue midline without atrophy or fasciculations.    Motor  Normal muscle bulk throughout. Normal muscle tone.  Mild LUE drift.    Sensory  Light touch is normal in upper and lower extremities.     Coordination  Right: Rapid alternating movement normal.Left: Rapid alternating movement normal.    Physical Exam  Constitutional:        General: He is not in acute distress.  HENT:      Head: Normocephalic and atraumatic.   Eyes:      Extraocular Movements: Extraocular movements intact.      Pupils: Pupils are equal, round, and reactive to light.   Cardiovascular:      Rate and Rhythm: Normal rate.   Pulmonary:      Effort: Pulmonary effort is normal.   Musculoskeletal:         General: No swelling.   Skin:     General: Skin is warm and dry.   Neurological:      Mental Status: He is alert.      Cranial Nerves: Dysarthria present.   Psychiatric:         Mood and Affect: Mood normal.         Behavior: Behavior normal.     Temp:  [97.6 °F (36.4 °C)-98.1 °F (36.7 °C)] 98.1 °F (36.7 °C)  Heart Rate:  [77-86] 79  Resp:  [16] 16  BP: (124-148)/() 127/97    Results Review:    I reviewed the patient's new clinical results in the last 24 hours.  No new head or neck imaging in the previous 24 hours.    Significant Imaging  MRI Brain With & Without Contrast 6/3/2024  Impression: Multifocal areas of both acute and subacute infarct are present including within a watershed distribution on the right, as well as a more focal area of acute cortical and subcortical infarct within the right occipital lobe. Areas of mixed subacute and chronic lacunar infarct are also noted bilaterally. There is no evidence of associated hemorrhage or significant mass effect. Areas of acute infarct on the right demonstrate some associated enhancement, likely representing cortical laminar necrosis. Neoplastic or metastatic process is considered less likely, however consider follow-up imaging to confirm expected evolution of multifocal infarcts.  I personally reviewed the MRI which is largely in the right watershed distribution with a large area of cortical involvement.     CT Angiogram Head and Neck 6/2/2024  Impression: 1.No evidence of hemodynamically significant stenosis along bilateral carotid bifurcations. 2.Focal moderate stenosis along right MCA, but no evidence of  acute thrombus or aneurysm.      CT Perfusion 6/2/2024  Impression: 1.No evidence of completed infarct. 2.4 mm of abnormal Tmax perfusion corresponding to right frontal lobe. This is not in the areas of hypodensity seen on recent prior CT head that were in the right parietal and right occipital lobes. Whether this represents artifact versus tissue at risk within the right frontal lobe is unclear.      CT Head Without Contrast Stroke Protocol 6/2/2024  Impression: 1.No acute intracranial hemorrhage. 2.Two focal hypodensities within right parietal and right occipital lobes, which could represent infarcts versus metastasis. Consider evaluation with MRI brain with and without IV contrast. 3.Mild mucosal disease within maxillary sinuses. 4.Findings suggestive of mild chronic small vessel ischemic disease.      Results for orders placed during the hospital encounter of 06/02/24     Adult Transthoracic Echo Complete W/ Cont if Necessary Per Protocol (With Agitated Saline)     Interpretation Summary    Left ventricular systolic function is normal. Estimated left ventricular EF = 50%    Left ventricular wall thickness is consistent with mild to moderate concentric hypertrophy.    The cardiac valves are anatomically and functionally normal.    Estimated right ventricular systolic pressure from tricuspid regurgitation is normal (<35 mmHg).    Saline test results are negative.    Compared to echocardiogram performed 11/29/2022, there is been no significant interval change    Significant Labs  A1c on 6/3: 6.20  LDL on 6/3: 134    Assessment and Plan:  This patient is a 59-year-old male with a past medical history of recent GI bleed (5/17/2024), HTN, CHF, CKD, HLD, and alcohol use who presented to [facility] Emergency Department on 6/2/2024 with a 2-day history of left hemiparesis, left facial droop, and left hemisensory deficits. CT head 6/2/2024 revealed hypodensities in right parietal and occipital lobes. CT  angiogram head and neck 6/2/2024 reveals right MCA stenoses. MRI brain with and without contrast 6/3/2024 reveals right MCA territory watershed infarct and right occipital cortical infarct.     Acute right MCA watershed infarcts  -Etiology atheroembolic due to right MCA versus cardioembolic (cortical appearing infarcts)  -Recommend repeat MRI with contrast in 3 months to evaluate for expected evolution of multifocal infarcts as some of the areas of acute infarct showed associated enhancement suspected to represent cortical laminar necrosis although neoplastic/metastatic process cannot be ruled out  -TTE EF of 50%, mildly dilated left atrium, negative bubble study  -Continue aspirin 81 mg  -Hold Plavix given recent GI bleed, could consider starting this in the clinic setting  -Holter monitor ordered at discharge  -Continue atorvastatin 80 mg daily  -SBP goal 120-160, management per medicine team  -Encourage alcohol cessation  -Continue PT/OT/SLP as appropriate, agree with recommendations for inpatient rehab at discharge  -Needs stroke clinic follow-up in 2 weeks    Disposition: Discharge planning per primary        Case discussed with the patient, and Dr. Barnes  No further stroke workup is indicated.  Stroke neurology will follow peripherally.  Please call the stroke team with additional questions or concerns.       David Rojas PA-C  [facility] [place] Stroke Neurology

## 2024-06-05 NOTE — PLAN OF CARE
Goal Outcome Evaluation:  Plan of Care Reviewed With: patient        Progress: improving         Anticipated Discharge Disposition (SLP): inpatient rehabilitation facility, home with OP services             Treatment Assessment (SLP): continued, mild, dysarthria (06/05/24 1330)  Treatment Assessment Comments (SLP): Pt seen for tx this date, continues w/ mild dysarthria however is intelligible. Pt would like to continue to work on this during this hospitalization. Gave handouts for continued use. Will f/u to address (06/05/24 1330)  Plan for Continued Treatment (SLP): goals adjusted to reflect functional improvements demonstrated (06/05/24 1330)

## 2024-06-05 NOTE — THERAPY TREATMENT NOTE
Acute Care - Speech Language Pathology Treatment Note   [place]     Patient Name: Walt Gao  : 1965  MRN: [id number]  Today's Date: 2024               Admit Date: 2024     Visit Dx:    ICD-10-CM ICD-9-CM   1. Facial droop  R29.810 781.94   2. Weakness of left upper extremity  R29.898 729.89   3. Acute nonintractable headache, unspecified headache type  R51.9 784.0   4. Abnormal CT of the head  R93.0 793.0   5. Dysarthria  R47.1 784.51   6. Cerebrovascular accident (CVA), unspecified mechanism  I63.9 434.91     Patient Active Problem List   Diagnosis    Chronic systolic congestive heart failure    Essential hypertension    Chronic renal impairment, stage 3 (moderate)    Non-ischemic cardiomyopathy    Alcohol abuse    Mixed hyperlipidemia    At risk for sleep apnea    Snoring    CVA (cerebral vascular accident)     Past Medical History:   Diagnosis Date    CHF (congestive heart failure)     NICM (nonischemic cardiomyopathy)     Nonischemic cardiomyopathy 2016    · Cardiac catheterization revealing nonobstructive CAD,  · LVEF 30% on heart catheterization,  · Echo (16): Moderate LVH. LVEF 23%. Left ventricular diastolic dysfunction (grade II) consistent with pseudonormalization.  Moderate mitral valve regurgitation · Echo (2017):  LVEF 50%     Past Surgical History:   Procedure Laterality Date    COLONOSCOPY N/A 2024    Procedure: COLONOSCOPY;  Surgeon: Duglas Zhou MD;  Location: [facility] ENDOSCOPY;  Service: Gastroenterology;  Laterality: N/A;    ENDOSCOPY N/A 2024    Procedure: ESOPHAGOGASTRODUODENOSCOPY;  Surgeon: Duglas Zhou MD;  Location:  [facility] ENDOSCOPY;  Service: Gastroenterology;  Laterality: N/A;    HERNIA REPAIR      as child       SLP Recommendation and Plan                    Anticipated Discharge Disposition (SLP): inpatient rehabilitation facility, home with OP services (24 1330)        Therapy Frequency (SLP SLC): 3 days per week  The sheath was removed from the left femoral artery. (06/05/24 1330)  Predicted Duration Therapy Intervention (Days): 1 week (06/05/24 1330)        Daily Summary of Progress (SLP): progress toward functional goals is good (06/05/24 1330)           Treatment Assessment (SLP): continued, mild, dysarthria (06/05/24 1330)  Treatment Assessment Comments (SLP): Pt seen for tx this date, continues w/ mild dysarthria however is intelligible. Pt would like to continue to work on this during this hospitalization. Gave handouts for continued use. Will f/u to address (06/05/24 1330)  Plan for Continued Treatment (SLP): goals adjusted to reflect functional improvements demonstrated (06/05/24 1330)  Plan of Care Reviewed With: patient (06/05/24 1438)  Progress: improving (06/05/24 1438)      SLP EVALUATION (Last 72 Hours)       SLP SLC Evaluation       Row Name 06/05/24 1330       Communication Assessment/Intervention    Document Type therapy note (daily note)  -CJ    Subjective Information no complaints  -CJ    Patient Observations alert;cooperative;agree to therapy  -CJ    Patient/Family/Caregiver Comments/Observations no family present  -CJ    Patient Effort good  -CJ    Symptoms Noted During/After Treatment none  -CJ       Pain    Additional Documentation Pain Scale: FACES Pre/Post-Treatment (Group)  -       Pain Scale: FACES Pre/Post-Treatment    Pain: FACES Scale, Pretreatment 0-->no hurt  -CJ    Posttreatment Pain Rating 0-->no hurt  -CJ       Comprehension Assessment/Intervention    Comprehension Assessment/Intervention --       Auditory Comprehension Assessment/Intervention    Answers Questions (Communication) --    Able to Follow Commands (Communication) --    Narrative Discourse --       Expression Assessment/Intervention    Expression Assessment/Intervention --       Verbal Expression Assessment/Intervention    Spontaneous/Functional Words --    Conversational Discourse/Fluency --    Verbal Expression, Comment --       Motor Speech Assessment/Intervention    Motor  Speech Function --    Characteristics Consistent with Dysarthria --    Speech intelligibility --       Cursory Voice Assessment/Intervention    Quality and Resonance (Voice) --       Cognitive Assessment Intervention- SLP    Orientation Status (Cognition) --    Attention (Cognitive) --    Problem Solving (Cognitive) --    Cognition, Comment --       SLP Evaluation Clinical Impressions    SLP Diagnosis --    SLP Diagnosis Comments --    Rehab Potential/Prognosis --    [facility] Criteria for Skilled Therapy Interventions Met --       SLP Treatment Clinical Impressions    Treatment Assessment (SLP) continued;mild;dysarthria  -CJ    Treatment Assessment Comments (SLP) Pt seen for tx this date, continues w/ mild dysarthria however is intelligible. Pt would like to continue to work on this during this hospitalization. Gave handouts for continued use. Will f/u to address  -CJ    Daily Summary of Progress (SLP) progress toward functional goals is good  -CJ    Plan for Continued Treatment (SLP) goals adjusted to reflect functional improvements demonstrated  -CJ    Care Plan Review care plan/treatment goals reviewed  -CJ       Recommendations    Therapy Frequency (SLP SLC) 3 days per week  -CJ    Predicted Duration Therapy Intervention (Days) 1 week  -CJ    Anticipated Discharge Disposition (SLP) inpatient rehabilitation facility;home with OP services  -              User Key  (r) = Recorded By, (t) = Taken By, (c) = Cosigned By      Initials Name Effective Dates     Korina Padilla, MS CCC-SLP 02/03/23 -     Porsche Arias, MS CCC-SLP 06/03/24 -                        EDUCATION  The patient has been educated in the following areas:     Communication Impairment.           SLP GOALS       Row Name 06/05/24 1330 06/03/24 0815          Patient will demonstrate functional cognitive-linguistic skills for return to discharge environment    [place] Independently  -CJ Independently  -     Time frame 1 week  -CJ 1 week   -SM     Progress/Outcomes good progress toward goal  -CJ --        SLP Diagnostic Treatment     Patient will participate in further assessment in the following areas reading comprehension;graphic expression;higher-level cognitive-linguistic  -CJ reading comprehension;graphic expression;higher-level cognitive-linguistic  -SM     Time Frame (Diagnostic) 1 week  -CJ 1 week  -SM     Progress/Outcomes (Additional Goal 1, SLP) goal met  -CJ --     Comment (Diagnostic) no deficits observed or reported per pt  -CJ --        Articulation Goal 1 (SLP)    Improve Articulation Goal 1 (SLP) by over-articulating at phrase level;by over-articulating in connected speech;100%;independently (over 90% accuracy)  -CJ by over-articulating at word level;100%;independently (over 90% accuracy)  -SM     Time Frame (Articulation Goal 1, SLP) 1 week  -CJ 1 week  -SM     Progress (Articulation Goal 1, SLP) 80%;with minimal cues (75-90%)  -CJ --     Progress/Outcomes (Articulation Goal 1, SLP) continuing progress toward goal  -CJ --     Comment (Articulation Goal 1, SLP) provided handout  -CJ Dx tx to see if improvement with overartic approach. Adjust as needed.  -SM               User Key  (r) = Recorded By, (t) = Taken By, (c) = Cosigned By      Initials Name Provider Type    Korina Flores, MS CCC-SLP Speech and Language Pathologist    Porsche Arias, MS CCC-SLP Speech and Language Pathologist                              Time Calculation:      Time Calculation- SLP       Row Name 06/05/24 1439             Time Calculation- SLP    SLP Start Time 1330  -CJ      SLP Received On 06/05/24  -CJ         Untimed Charges    36946-BH Treatment/ST Modification Prosth Aug Alter  40  -CJ         Total Minutes    Untimed Charges Total Minutes 40  -CJ       Total Minutes 40  -CJ                User Key  (r) = Recorded By, (t) = Taken By, (c) = Cosigned By      Initials Name Provider Type    Porsche Arias, MS CCC-SLP Speech and Language  Pathologist                    Therapy Charges for Today       Code Description Service Date Service Provider Modifiers Qty    [id number]  ST TREATMENT SPEECH 3 6/5/2024 Porsche Pereyra, MS CCC-SLP GN 1                       Porsche Pereyra MS CCC-SLP  6/5/2024

## 2024-06-05 NOTE — CASE MANAGEMENT/SOCIAL WORK
Continued Stay Note   [place]     Patient Name: Walt Gao  MRN: [id number]  Today's Date: 6/5/2024    Admit Date: 6/2/2024    Plan: IRF   Discharge Plan       Row Name 06/05/24 1110       Plan    Plan IRF    Patient/Family in Agreement with Plan yes    Plan Comments Discussed in MDR. Mr. Gao is waiting on [facility] PENDING insurance approval. CM will continue to follow.    Final Discharge Disposition Code 62 - inpatient rehab facility                   Discharge Codes    No documentation.                 Expected Discharge Date and Time       Expected Discharge Date Expected Discharge Time    Jun 7, 2024               Melody Squires RN

## 2024-06-05 NOTE — PROGRESS NOTES
[facility] Medicine Services  PROGRESS NOTE    Patient Name: Walt Gao  : 1965  MRN: [id number]    Date of Admission: 2024  Primary Care Physician: Duglas Lang MD    Subjective   Subjective     CC:  Stroke     HPI:  Patient denies headache or changes in strength/sensation of extremities.       Objective   Objective     Vital Signs:   Temp:  [97.6 °F (36.4 °C)-98.1 °F (36.7 °C)] 98.1 °F (36.7 °C)  Heart Rate:  [77-88] 81  Resp:  [16] 16  BP: (124-148)/() 127/97     Physical Exam:  General appearance: alert, awake, oriented, no acute distress   Cardiovascular: RRR, no murmurs or rubs, radial pulse full 2/4 BL   Respiratory: CTAB, no crackles or wheezes   Abdomen: soft, non-tender, no organomegaly, bowel sounds normoactive    Neuro/CNS: alert and oriented x3, normal speech, LUE and LLE weakness    Results Reviewed:  LAB RESULTS:      Lab 24  0758 24  0659 24  1407 24  1405   WBC 6.10 5.84  --   --    HEMOGLOBIN 12.8* 12.4*  --   --    HEMOGLOBIN, POC  --   --  13.9  --    HEMATOCRIT 36.5* 34.8*  --   --    HEMATOCRIT POC  --   --  41  --    PLATELETS 203 201  --   --    .4* 113.4*  --   --    PROTIME  --   --   --  13.6         Lab 24  0437 24  0622 24  0659 24  1407   SODIUM 139 141 139  --    POTASSIUM 4.1 4.3 4.1  --    CHLORIDE 101 100 104  --    CO2 23.0 24.0 25.0  --    ANION GAP 15.0 17.0* 10.0  --    BUN 25* 22* 13  --    CREATININE 1.91* 1.94* 1.31* 1.70*   EGFR 39.9* 39.1* 62.7 45.9*   GLUCOSE 72 102* 108*  --    CALCIUM 9.1 9.1 9.0  --    MAGNESIUM  --  2.3 2.2  --    HEMOGLOBIN A1C  --   --  6.20*  --          Lab 24  0659   TOTAL PROTEIN 7.8   ALBUMIN 4.3   GLOBULIN 3.5   ALT (SGPT) 15   AST (SGOT) 16   BILIRUBIN 0.5   ALK PHOS 29*         Lab 24  1405   PROTIME 13.6   INR 1.1         Lab 24  0659   CHOLESTEROL 197   LDL CHOL 134*   HDL CHOL 47   TRIGLYCERIDES 90         Lab  Patient's INR is therapeutic at 2.1.  Patient has taken medication as previously instructed.  No other changes reported.  Instructions given to maintain 5 mg every Tuesday, Thursday, and Saturday; and 7.5 mg on all other days per week.  Dose calendar given and reviewed with patient.  Recheck in 2 weeks.  Patient voiced understanding.   06/02/24  1821   VITAMIN B 12 <150*         Brief Urine Lab Results  (Last result in the past 365 days)        Color   Clarity   Blood   Leuk Est   Nitrite   Protein   CREAT   Urine HCG        06/03/24 1935 Yellow   Clear   Negative   Negative   Negative   100 mg/dL (2+)                   Microbiology Results Abnormal       None            No radiology results from the last 24 hrs    Results for orders placed during the hospital encounter of 06/02/24    Adult Transthoracic Echo Complete W/ Cont if Necessary Per Protocol (With Agitated Saline)    Interpretation Summary    Left ventricular systolic function is normal. Estimated left ventricular EF = 50%    Left ventricular wall thickness is consistent with mild to moderate concentric hypertrophy.    The cardiac valves are anatomically and functionally normal.    Estimated right ventricular systolic pressure from tricuspid regurgitation is normal (<35 mmHg).    Saline test results are negative.    Compared to echocardiogram performed 11/29/2022, there is been no significant interval change      Current medications:  Scheduled Meds:amLODIPine, 5 mg, Oral, BID  aspirin, 81 mg, Oral, Daily  atorvastatin, 80 mg, Oral, Nightly  empagliflozin, 25 mg, Oral, Daily  folic acid, 1 mg, Oral, Daily  heparin (porcine), 5,000 Units, Subcutaneous, Q12H  insulin lispro, 2-7 Units, Subcutaneous, TID With Meals  metoprolol succinate XL, 100 mg, Oral, Q24H  sodium chloride, 10 mL, Intravenous, Q12H  spironolactone, 25 mg, Oral, Daily  thiamine (B-1) IV, 200 mg, Intravenous, Q8H   Followed by  [START ON 6/8/2024] thiamine, 100 mg, Oral, Daily  [Held by provider] torsemide, 10 mg, Oral, Daily  [Held by provider] valsartan, 80 mg, Oral, Daily      Continuous Infusions:   PRN Meds:.  dextrose    dextrose    glucagon (human recombinant)    Magnesium Standard Dose Replacement - Follow Nurse / BPA Driven Protocol    sodium chloride    sodium chloride    Assessment & Plan   Assessment & Plan      Active Hospital Problems    Diagnosis  POA    **CVA (cerebral vascular accident) [I63.9]  Yes      Resolved Hospital Problems   No resolved problems to display.        Brief Hospital Course to date:  Walt Gao is a 59 y.o. male with past medical history significant for nonischemic cardiomyopathy with systolic dysfunction, CKD stage III, alcohol use, hypertension, with recent admission for GI bleed, was admitted for acute stroke after initially presenting with 1 to 2 days of left hand numbness, left facial droop and facial numbness.  Stroke neurology was consulted and imaging confirmed multiple acute and subacute infarcts.  Patient was treated with aspirin monotherapy and statin due to recent GI bleed.  He worked with therapy and was determined to benefit from rehab.  Patient will need 2-week cardiac monitor on discharge with stroke neurology follow-up in 2 weeks and repeat MRI brain in 3 months.    Multiple Acute & Subacute Right MCA watershed territory Infarcts, atheroembolic vs cardioembolic  HTN  HLD  -neuro-stroke following: asa 81, high dose statin (Holding plavix due to recent gi bleed); 14 day cardiac monitor upon discharge; neuro-stroke follow up in 2 weeks  -pt/ot recommend inpt rehab  -will need repeat MRI brain w/ contrast in ~3 months to evaluate for expected evolution of multifocal infarcts (as neoplastic process cannot be ruled entirely out and needs to be monitored w/ f/u imaging)  -f/u stroke clinic 2 weeks     Etoh abuse  -DC CIWA due to lack of symptoms      Hx NICM  HTN  -previous EF 35% 2013 -->50% 2017  -Echo 6/3/24: LV EF 50%  -continue home amlodipine, jardiance. Hold diuretics and ARB due to [place]      [place] on Ckd 3 (baseline cr ~1.8-2.0)  -stable; continue to monitor  -Hold nephrotoxic agents      Borderline DM2, A1c 6.2  -on farxiga at home; jardiance here, ssi    Expected Discharge Location and Transportation: [facility]   Expected Discharge   Expected Discharge Date: 6/7/2024; Expected  Discharge Time:      DVT prophylaxis:  Medical and mechanical DVT prophylaxis orders are present.         AM-PAC 6 Clicks Score (PT): 21 (06/04/24 2039)    CODE STATUS:   Code Status and Medical Interventions:   Ordered at: 06/02/24 6440     Level Of Support Discussed With:    Patient     Code Status (Patient has no pulse and is not breathing):    CPR (Attempt to Resuscitate)     Medical Interventions (Patient has pulse or is breathing):    Full Support       Shant Disla, DO  06/05/24

## 2024-06-06 VITALS
TEMPERATURE: 98.1 F | SYSTOLIC BLOOD PRESSURE: 129 MMHG | HEART RATE: 66 BPM | WEIGHT: 229.28 LBS | OXYGEN SATURATION: 96 % | DIASTOLIC BLOOD PRESSURE: 94 MMHG | HEIGHT: 69 IN | RESPIRATION RATE: 16 BRPM | BODY MASS INDEX: 33.96 KG/M2

## 2024-06-06 DIAGNOSIS — I63.9 CEREBROVASCULAR ACCIDENT (CVA), UNSPECIFIED MECHANISM: Primary | ICD-10-CM

## 2024-06-06 PROBLEM — E11.9 TYPE 2 DIABETES MELLITUS WITHOUT COMPLICATION, WITHOUT LONG-TERM CURRENT USE OF INSULIN: Chronic | Status: ACTIVE | Noted: 2024-06-06

## 2024-06-06 LAB
ANION GAP SERPL CALCULATED.3IONS-SCNC: 15 MMOL/L (ref 5–15)
BUN SERPL-MCNC: 26 MG/DL (ref 6–20)
BUN/CREAT SERPL: 14.5 (ref 7–25)
CALCIUM SPEC-SCNC: 8.9 MG/DL (ref 8.6–10.5)
CHLORIDE SERPL-SCNC: 100 MMOL/L (ref 98–107)
CO2 SERPL-SCNC: 22 MMOL/L (ref 22–29)
CREAT SERPL-MCNC: 1.79 MG/DL (ref 0.76–1.27)
DEPRECATED RDW RBC AUTO: 55.8 FL (ref 37–54)
EGFRCR SERPLBLD CKD-EPI 2021: 43.1 ML/MIN/1.73
ERYTHROCYTE [DISTWIDTH] IN BLOOD BY AUTOMATED COUNT: 12.8 % (ref 12.3–15.4)
GLUCOSE BLDC GLUCOMTR-MCNC: 108 MG/DL (ref 70–130)
GLUCOSE BLDC GLUCOMTR-MCNC: 92 MG/DL (ref 70–130)
GLUCOSE SERPL-MCNC: 89 MG/DL (ref 65–99)
HCT VFR BLD AUTO: 37.3 % (ref 37.5–51)
HGB BLD-MCNC: 12.9 G/DL (ref 13–17.7)
MCH RBC QN AUTO: 40.2 PG (ref 26.6–33)
MCHC RBC AUTO-ENTMCNC: 34.6 G/DL (ref 31.5–35.7)
MCV RBC AUTO: 116.2 FL (ref 79–97)
PLATELET # BLD AUTO: 185 10*3/MM3 (ref 140–450)
PMV BLD AUTO: 10.1 FL (ref 6–12)
POTASSIUM SERPL-SCNC: 4 MMOL/L (ref 3.5–5.2)
RBC # BLD AUTO: 3.21 10*6/MM3 (ref 4.14–5.8)
SODIUM SERPL-SCNC: 137 MMOL/L (ref 136–145)
WBC NRBC COR # BLD AUTO: 6.23 10*3/MM3 (ref 3.4–10.8)

## 2024-06-06 PROCEDURE — 25010000002 HEPARIN (PORCINE) PER 1000 UNITS: Performed by: STUDENT IN AN ORGANIZED HEALTH CARE EDUCATION/TRAINING PROGRAM

## 2024-06-06 PROCEDURE — 82948 REAGENT STRIP/BLOOD GLUCOSE: CPT

## 2024-06-06 PROCEDURE — 25010000002 THIAMINE HCL 200 MG/2ML SOLUTION: Performed by: FAMILY MEDICINE

## 2024-06-06 PROCEDURE — 85027 COMPLETE CBC AUTOMATED: CPT | Performed by: STUDENT IN AN ORGANIZED HEALTH CARE EDUCATION/TRAINING PROGRAM

## 2024-06-06 PROCEDURE — 99239 HOSP IP/OBS DSCHRG MGMT >30: CPT | Performed by: STUDENT IN AN ORGANIZED HEALTH CARE EDUCATION/TRAINING PROGRAM

## 2024-06-06 PROCEDURE — 80048 BASIC METABOLIC PNL TOTAL CA: CPT | Performed by: STUDENT IN AN ORGANIZED HEALTH CARE EDUCATION/TRAINING PROGRAM

## 2024-06-06 RX ORDER — ASPIRIN 81 MG/1
81 TABLET, CHEWABLE ORAL DAILY
Qty: 30 TABLET | Refills: 0 | Status: SHIPPED | OUTPATIENT
Start: 2024-06-07 | End: 2024-07-07

## 2024-06-06 RX ORDER — FOLIC ACID 1 MG/1
1 TABLET ORAL DAILY
Qty: 14 TABLET | Refills: 0 | Status: SHIPPED | OUTPATIENT
Start: 2024-06-06 | End: 2024-06-20

## 2024-06-06 RX ORDER — LANOLIN ALCOHOL/MO/W.PET/CERES
100 CREAM (GRAM) TOPICAL DAILY
Qty: 30 TABLET | Refills: 0 | Status: SHIPPED | OUTPATIENT
Start: 2024-06-08 | End: 2024-07-08

## 2024-06-06 RX ADMIN — SPIRONOLACTONE 25 MG: 25 TABLET ORAL at 09:23

## 2024-06-06 RX ADMIN — THIAMINE HYDROCHLORIDE 200 MG: 100 INJECTION, SOLUTION INTRAMUSCULAR; INTRAVENOUS at 05:23

## 2024-06-06 RX ADMIN — METOPROLOL SUCCINATE 100 MG: 100 TABLET, EXTENDED RELEASE ORAL at 09:24

## 2024-06-06 RX ADMIN — ASPIRIN 81 MG: 81 TABLET, CHEWABLE ORAL at 09:24

## 2024-06-06 RX ADMIN — EMPAGLIFLOZIN 25 MG: 25 TABLET, FILM COATED ORAL at 09:24

## 2024-06-06 RX ADMIN — HEPARIN SODIUM 5000 UNITS: 5000 INJECTION INTRAVENOUS; SUBCUTANEOUS at 09:24

## 2024-06-06 RX ADMIN — AMLODIPINE BESYLATE 5 MG: 5 TABLET ORAL at 09:24

## 2024-06-06 NOTE — DISCHARGE PLACEMENT REQUEST
"FROM: Melody SHEEHAN, RN,  [phone]  Walt Gao Alan \"Shimon\" (59 y.o. Male)       Date of Birth   1965    Social Security Number       Address   [address] [place] [address]  [place] [id number]    Home Phone   [phone]    MRN   [id number]       Amish   None    Marital Status   Single                            Admission Date   24    Admission Type   Emergency    Admitting Provider   Shant Disla DO    Attending Provider   Shant Disla DO    Department, Room/Bed   [facility] 3E, S341/1       Discharge Date       Discharge Disposition   Skilled Nursing Facility (DC - External)    Discharge Destination                                 Attending Provider: Shant Disla DO    Allergies: No Known Allergies    Isolation: None   Infection: None   Code Status: CPR    Ht: 175.3 cm (69.02\")   Wt: 104 kg (229 lb 4.5 oz)    Admission Cmt: None   Principal Problem: CVA (cerebral vascular accident) [I63.9]                   Active Insurance as of 2024       Primary Coverage       Payor Plan Insurance Group Employer/Plan Group    [employer] PPO [id number]       Payor Plan Address Payor Plan Phone Number Payor Plan Fax Number Effective Dates    [phone]  2021 - None Entered    [place] [address]         Subscriber Name Subscriber Birth Date Member ID       WALT GAO ALAN 1965 [id number]                     Emergency Contacts        (Rel.) Home Phone Work Phone Mobile Phone    Guera Gao (Sister) -- -- [phone]    tylor moulton (Significant Other) -- -- [phone]                 Discharge Summary        Shant Disla DO at [facility]              [facility] Medicine Services  DISCHARGE SUMMARY    Patient Name: Walt Gao  : 1965  MRN: [id number]    Date of Admission: 2024  1:54 PM  Date of Discharge:  2024  Primary Care " Physician: Duglas Lang MD    Consults       No orders found from 5/4/2024 to 6/3/2024.            Hospital Course     Presenting Problem: Acute ischemic stroke     Active Hospital Problems    Diagnosis  POA    **CVA (cerebral vascular accident) [I63.9]  Yes    Type 2 diabetes mellitus without complication, without long-term current use of insulin [E11.9]  Yes    Essential hypertension [I10]  Yes    Alcohol abuse [F10.10]  Yes      Resolved Hospital Problems   No resolved problems to display.          Hospital Course:  Walt Gao is a 59 y.o. male with past medical history significant for nonischemic cardiomyopathy with systolic dysfunction, CKD stage III, diabetes, alcohol use, hypertension, with recent admission for GI bleed, was admitted for acute stroke after initially presenting with 1 to 2 days of left hand numbness, left facial droop and facial numbness.  Stroke neurology was consulted and imaging confirmed multiple acute and subacute infarcts.  Patient was treated with aspirin monotherapy and statin due to recent GI bleed.  He worked with therapy and was determined to benefit from rehab.  Patient will need 2-week cardiac monitor on discharge with stroke neurology follow-up in 2 weeks and repeat MRI brain in 3 months.     Multiple Acute & Subacute Right MCA watershed territory Infarcts, atheroembolic vs cardioembolic  HTN  HLD  -neuro-stroke following: asa 81, high dose statin (Holding plavix due to recent gi bleed); 14 day cardiac monitor upon discharge; neuro-stroke follow up in 2 weeks  -pt/ot recommend inpt rehab  -will need repeat MRI brain w/ contrast in ~3 months to evaluate for expected evolution of multifocal infarcts (as neoplastic process cannot be ruled entirely out and needs to be monitored w/ f/u imaging)  -f/u stroke clinic 2 weeks     Etoh abuse  -DC CIWA due to lack of symptoms   -Continue thiamine and folate supplementation      Hx NICM  Chronic HFimEF   HTN  -previous EF 35% 2013  -->50% 2017  -Echo 6/3/24: LV EF 50%  -continue home amlodipine, jardiance. Hold diuretics and ARB due to [place]      [place] on Ckd 3 (baseline cr ~1.8-2.0)  -stable; continue to monitor  -Hold nephrotoxic agents      Borderline DM2, A1c 6.2  -on farxiga at home; jardiance here, ssi         Discharge Follow Up Recommendations for outpatient labs/diagnostics:  Follow-up with PCP in 1 week  Follow-up with stroke neurology in 2 weeks  Follow-up with cardiology as scheduled    Day of Discharge     HPI:   Patient reports feeling well today states he feels stronger than that on admission.  Denies chest pain, fevers, chills, sweats.  States he is ready for discharge.    Vital Signs:   Temp:  [97.5 °F (36.4 °C)-98.1 °F (36.7 °C)] 98.1 °F (36.7 °C)  Heart Rate:  [65-86] 66  Resp:  [16-17] 16  BP: (126-147)/() 129/94      Physical Exam:  General appearance: alert, awake, oriented, no acute distress   Cardiovascular: RRR, no murmurs or rubs, radial pulse full 2/4 BL   Respiratory: CTAB, no crackles or wheezes   Abdomen: soft, non-tender, no organomegaly, bowel sounds normoactive    Neuro/CNS: alert and oriented x3, normal speech, 4/5 LUE weakness     Pertinent  and/or Most Recent Results     LAB RESULTS:      Lab 06/06/24  0627 06/04/24  0758 06/03/24  0659 06/02/24  1407 06/02/24  1405   WBC 6.23 6.10 5.84  --   --    HEMOGLOBIN 12.9* 12.8* 12.4*  --   --    HEMOGLOBIN, POC  --   --   --  13.9  --    HEMATOCRIT 37.3* 36.5* 34.8*  --   --    HEMATOCRIT POC  --   --   --  41  --    PLATELETS 185 203 201  --   --    .2* 114.4* 113.4*  --   --    PROTIME  --   --   --   --  13.6         Lab 06/06/24  0627 06/05/24  0437 06/04/24  0622 06/03/24  0659 06/02/24  1407   SODIUM 137 139 141 139  --    POTASSIUM 4.0 4.1 4.3 4.1  --    CHLORIDE 100 101 100 104  --    CO2 22.0 23.0 24.0 25.0  --    ANION GAP 15.0 15.0 17.0* 10.0  --    BUN 26* 25* 22* 13  --    CREATININE 1.79* 1.91* 1.94* 1.31* 1.70*   EGFR 43.1* 39.9* 39.1* 62.7  45.9*   GLUCOSE 89 72 102* 108*  --    CALCIUM 8.9 9.1 9.1 9.0  --    MAGNESIUM  --   --  2.3 2.2  --    HEMOGLOBIN A1C  --   --   --  6.20*  --          Lab 06/03/24  0659   TOTAL PROTEIN 7.8   ALBUMIN 4.3   GLOBULIN 3.5   ALT (SGPT) 15   AST (SGOT) 16   BILIRUBIN 0.5   ALK PHOS 29*         Lab 06/02/24  1405   PROTIME 13.6   INR 1.1         Lab 06/03/24  0659   CHOLESTEROL 197   LDL CHOL 134*   HDL CHOL 47   TRIGLYCERIDES 90         Lab 06/02/24  1821   VITAMIN B 12 <150*         Brief Urine Lab Results  (Last result in the past 365 days)        Color   Clarity   Blood   Leuk Est   Nitrite   Protein   CREAT   Urine HCG        06/03/24 1935 Yellow   Clear   Negative   Negative   Negative   100 mg/dL (2+)                 Microbiology Results (last 10 days)       ** No results found for the last 240 hours. **            Adult Transthoracic Echo Complete W/ Cont if Necessary Per Protocol (With Agitated Saline)    Result Date: 6/3/2024    Left ventricular systolic function is normal. Estimated left ventricular EF = 50%   Left ventricular wall thickness is consistent with mild to moderate concentric hypertrophy.   The cardiac valves are anatomically and functionally normal.   Estimated right ventricular systolic pressure from tricuspid regurgitation is normal (<35 mmHg).   Saline test results are negative.   Compared to echocardiogram performed 11/29/2022, there is been no significant interval change     MRI Brain With & Without Contrast    Result Date: 6/3/2024  MRI BRAIN W WO CONTRAST Date of Exam: 6/2/2024 8:25 PM EDT Indication: Stroke, follow up CTH hypodensity/ Acute stroke, possible Mets.  Comparison: CT earlier the same day. Technique:  Routine multiplanar/multisequence sequence images of the brain were obtained before and after the uneventful administration of 18 mL Multihance. Findings: Areas of diffusion restriction are present on the right consistent with acute infarct involving watershed distribution within  the frontal and parietal lobes, as well as a somewhat more confluent area of cortical infarct within the right occipital lobe. Midline structures are normal and the craniocervical junction appears satisfactory. Areas of infarct are associated with mild localized edema, otherwise without evidence of hemorrhage or significant mass effect. Age-related changes are present with minimal volume loss and mild to moderate typical T2 hyperintense subcortical and pontine white matter changes, nonspecific but favored to reflect sequela of chronic microvascular ischemia. Some focal FLAIR hyperintensity is present within the left splenium of the corpus callosum with the appearance of probable early chronic infarct. The ventricles are normal in size and configuration. Some areas of scattered susceptibility artifact are present likely reflecting chronic microhemorrhage. Areas of acute infarct, most notably within the right occipital lobe demonstrates some irregular enhancement, likely representing evolving cortical laminar necrosis.     Impression: Multifocal areas of both acute and subacute infarct are present including within a watershed distribution on the right, as well as a more focal area of acute cortical and subcortical infarct within the right occipital lobe. Areas of mixed subacute and chronic lacunar infarct are also noted bilaterally. There is no evidence of associated hemorrhage or significant mass effect. Areas of acute infarct on the right demonstrate some associated enhancement, likely representing cortical laminar necrosis. Neoplastic or metastatic process is considered less likely, however consider follow-up imaging to confirm expected evolution of multifocal infarcts. Electronically Signed: Ernst Freire MD  6/3/2024 6:45 AM EDT  Workstation ID: [id number]    CT Angiogram Head w AI Analysis of LVO    Result Date: 6/2/2024  CT ANGIOGRAM HEAD W AI ANALYSIS OF LVO, CT ANGIOGRAM NECK Date of Exam: 6/2/2024 2:01 PM EDT  Indication: stroke. Comparison: CT head from earlier today Technique: CTA of the head and neck was performed after the uneventful intravenous administration of 75 mL Isovue-370. Reconstructed coronal and sagittal images were also obtained. In addition, a 3-D volume rendered image was created for interpretation. Automated exposure control and iterative reconstruction methods were used. Findings: There is a two-vessel aortic arch with common origin of the innominate and left common carotid artery, normal variant. The right common carotid artery is widely patent. The right carotid bifurcation is widely patent. The right internal carotid artery is widely patent. The left common carotid artery is widely patent. The left carotid bifurcation is widely patent. The left internal carotid artery is widely patent. The bilateral vertebral arteries are widely patent with mild plaque along their V4 segments.  The left vertebral artery is slightly dominant. There is focal moderate stenosis along the proximal M2 branch of the right middle cerebral artery and otherwise the artery is widely patent. The left middle cerebral and bilateral anterior cerebral arteries are widely patent. The anterior communicating artery is widely patent. The basilar and bilateral posterior cerebral arteries are widely patent. No definite posterior communicating artery is identified on either side. No intracranial aneurysm is identified. The visualized portions of the bilateral superior cerebellar, anteroinferior cerebellar, and posterior inferior cerebellar arteries are unremarkable.     Impression: 1.No evidence of hemodynamically significant stenosis along bilateral carotid bifurcations. 2.Focal moderate stenosis along right MCA, but no evidence of acute thrombus or aneurysm. Electronically Signed: Neo Mendez MD  6/2/2024 2:31 PM EDT  Workstation ID: [id number]    CT Angiogram Neck    Result Date: 6/2/2024  CT ANGIOGRAM HEAD W AI ANALYSIS OF LVO, CT  ANGIOGRAM NECK Date of Exam: 6/2/2024 2:01 PM EDT Indication: stroke. Comparison: CT head from earlier today Technique: CTA of the head and neck was performed after the uneventful intravenous administration of 75 mL Isovue-370. Reconstructed coronal and sagittal images were also obtained. In addition, a 3-D volume rendered image was created for interpretation. Automated exposure control and iterative reconstruction methods were used. Findings: There is a two-vessel aortic arch with common origin of the innominate and left common carotid artery, normal variant. The right common carotid artery is widely patent. The right carotid bifurcation is widely patent. The right internal carotid artery is widely patent. The left common carotid artery is widely patent. The left carotid bifurcation is widely patent. The left internal carotid artery is widely patent. The bilateral vertebral arteries are widely patent with mild plaque along their V4 segments.  The left vertebral artery is slightly dominant. There is focal moderate stenosis along the proximal M2 branch of the right middle cerebral artery and otherwise the artery is widely patent. The left middle cerebral and bilateral anterior cerebral arteries are widely patent. The anterior communicating artery is widely patent. The basilar and bilateral posterior cerebral arteries are widely patent. No definite posterior communicating artery is identified on either side. No intracranial aneurysm is identified. The visualized portions of the bilateral superior cerebellar, anteroinferior cerebellar, and posterior inferior cerebellar arteries are unremarkable.     Impression: 1.No evidence of hemodynamically significant stenosis along bilateral carotid bifurcations. 2.Focal moderate stenosis along right MCA, but no evidence of acute thrombus or aneurysm. Electronically Signed: Neo Mendez MD  6/2/2024 2:31 PM EDT  Workstation ID: [id number]    CT CEREBRAL PERFUSION WITH & WITHOUT  CONTRAST    Result Date: 6/2/2024  CT CEREBRAL PERFUSION W WO CONTRAST Date of Exam: 6/2/2024 2:01 PM EDT Indication: stroke.  Comparison: CT head from earlier today Technique: Axial CT images of the brain were obtained prior to and after the administration of 50 mL Isovue-370. Core blood volume, core blood flow, mean transit time, and Tmax images were obtained utilizing the Rapid software protocol. A limited CT angiogram of the head was also performed to measure the blood vessel density. The radiation dose reduction device was turned on for each scan per the ALARA (As Low as Reasonably Achievable) protocol. Findings: The cerebral blood flow appears normal. There is 4 mL of abnormal Tmax perfusion corresponding to the right frontal lobe.     Impression: 1.No evidence of completed infarct. 2.4 mm of abnormal Tmax perfusion corresponding to right frontal lobe. This is not in the areas of hypodensity seen on recent prior CT head that were in the right parietal and right occipital lobes. Whether this represents artifact versus tissue at risk within the right frontal lobe is unclear. Electronically Signed: Neo Mendez MD  6/2/2024 2:22 PM EDT  Workstation ID: [id number]    CT Head Without Contrast Stroke Protocol    Result Date: 6/2/2024  CT HEAD WO CONTRAST STROKE PROTOCOL Date of Exam: 6/2/2024 1:58 PM EDT Indication: stroke. Comparison: None available. Technique: Axial CT images were obtained of the head without contrast administration.  Reconstructed coronal images were also obtained. Automated exposure control and iterative construction methods were used. Scan Time: 13:56 Results discussed with stroke navigator at 14:04. Findings: No acute intracranial hemorrhage or extra-axial collection is identified. The ventricles appear normal in caliber, with no midline shift. The basal cisterns appear patent. There are 2 focal hypodensities within the right parietal and right occipital lobes. Scattered foci of  periventricular and subcortical white matter hypodensities are nonspecific, but likely the sequela of mild chronic small vessel ischemic disease. The calvarium appears intact. There is mild mucosal disease in the maxillary sinuses. The mastoid air cells are well aerated.     Impression: 1.No acute intracranial hemorrhage. 2.Two focal hypodensities within right parietal and right occipital lobes, which could represent infarcts versus metastasis. Consider evaluation with MRI brain with and without IV contrast. 3.Mild mucosal disease within maxillary sinuses. 4.Findings suggestive of mild chronic small vessel ischemic disease. Electronically Signed: Neo Mendez MD  6/2/2024 2:07 PM EDT  Workstation ID: [id number]     Results for orders placed during the hospital encounter of 12/11/23    Duplex Renal Artery - Bilateral Complete CAR    Interpretation Summary  EXAM:  DUPLEX RENAL ARTERY BILATERAL COMPLETE CAR    DATE: 12/11/2023    COMPARISON: None    INDICATION: Hypertension    TECHNIQUE: Grayscale, color Doppler and spectral Doppler examination for evaluation of kidneys and renal arteries was performed with representative examples saved to PACS.    FINDINGS:  Aorta: Peak systolic velocity: 65 cm/sec.  No aneurysm    RIGHT KIDNEY:  The right kidney measures 11 cm in length.  The right kidney is normal size and contour with good corticomedullary differentiation. There are no shadowing calculi or cortical deforming solid or cystic masses. No hydronephrosis.    RIGHT RENAL DOPPLER:  Right renal artery maximum peak systolic velocity: 118 cm/sec at proximal renal artery.  Right Renal aortic ratio: 1.8  Renal vein: Patent.    Right Intrarenal:  Resistive Index:  Upper pole: 0.65.  Mid: 0.64.  Inferior pole: 0.67.    Highest intrarenal Acceleration time = 50 ms.  No tardus parvus waveform.    LEFT KIDNEY:  The left kidney measures 11.3 cm in length.  The left kidney is of normal size and contour with good corticomedullary  differentiation. There are no shadowing calculi or cortical deforming solid or cystic masses. No hydronephrosis.    LEFT RENAL DOPPLER:  Left renal artery maximum Peak systolic velocity: 94.7 cm/sec at proximal renal artery.  Left Renal aortic ratio: 1.4  Left Renal vein: Patent.    Left Intrarenal:  Resistive Index:  Upper pole: 0.65.  Mid: 0.65.  Inferior pole: 0.66.    Highest intrarenal Acceleration time = 56 ms. No tardus parvus waveform.    Urinary Bladder: The urinary bladder wall is within normal limits (<3 mm)..    Impression  1. Negative for renal artery stenosis.  2. Symmetric grayscale appearance of the kidneys. Negative for hydronephrosis.    Electronically Signed: Jesus Manuel Limon MD  12/11/2023 4:10 PM EST  Workstation ID: [id number]      Results for orders placed during the hospital encounter of 12/11/23    Duplex Renal Artery - Bilateral Complete CAR    Interpretation Summary  EXAM:  DUPLEX RENAL ARTERY BILATERAL COMPLETE CAR    DATE: 12/11/2023    COMPARISON: None    INDICATION: Hypertension    TECHNIQUE: Grayscale, color Doppler and spectral Doppler examination for evaluation of kidneys and renal arteries was performed with representative examples saved to PACS.    FINDINGS:  Aorta: Peak systolic velocity: 65 cm/sec.  No aneurysm    RIGHT KIDNEY:  The right kidney measures 11 cm in length.  The right kidney is normal size and contour with good corticomedullary differentiation. There are no shadowing calculi or cortical deforming solid or cystic masses. No hydronephrosis.    RIGHT RENAL DOPPLER:  Right renal artery maximum peak systolic velocity: 118 cm/sec at proximal renal artery.  Right Renal aortic ratio: 1.8  Renal vein: Patent.    Right Intrarenal:  Resistive Index:  Upper pole: 0.65.  Mid: 0.64.  Inferior pole: 0.67.    Highest intrarenal Acceleration time = 50 ms.  No tardus parvus waveform.    LEFT KIDNEY:  The left kidney measures 11.3 cm in length.  The left kidney is of normal size and  contour with good corticomedullary differentiation. There are no shadowing calculi or cortical deforming solid or cystic masses. No hydronephrosis.    LEFT RENAL DOPPLER:  Left renal artery maximum Peak systolic velocity: 94.7 cm/sec at proximal renal artery.  Left Renal aortic ratio: 1.4  Left Renal vein: Patent.    Left Intrarenal:  Resistive Index:  Upper pole: 0.65.  Mid: 0.65.  Inferior pole: 0.66.    Highest intrarenal Acceleration time = 56 ms. No tardus parvus waveform.    Urinary Bladder: The urinary bladder wall is within normal limits (<3 mm)..    Impression  1. Negative for renal artery stenosis.  2. Symmetric grayscale appearance of the kidneys. Negative for hydronephrosis.    Electronically Signed: Jesus Manuel Limon MD  12/11/2023 4:10 PM EST  Workstation ID: [id number]      Results for orders placed during the hospital encounter of 06/02/24    Adult Transthoracic Echo Complete W/ Cont if Necessary Per Protocol (With Agitated Saline)    Interpretation Summary    Left ventricular systolic function is normal. Estimated left ventricular EF = 50%    Left ventricular wall thickness is consistent with mild to moderate concentric hypertrophy.    The cardiac valves are anatomically and functionally normal.    Estimated right ventricular systolic pressure from tricuspid regurgitation is normal (<35 mmHg).    Saline test results are negative.    Compared to echocardiogram performed 11/29/2022, there is been no significant interval change      Plan for Follow-up of Pending Labs/Results:     Discharge Details        Discharge Medications        New Medications        Instructions Start Date   aspirin 81 MG chewable tablet   81 mg, Oral, Daily   Start Date: June 7, 2024     folic acid 1 MG tablet  Commonly known as: FOLVITE   1 mg, Oral, Daily      thiamine 100 MG tablet  Commonly known as: VITAMIN B1   100 mg, Oral, Daily   Start Date: June 8, 2024            Continue These Medications        Instructions Start Date    amLODIPine 10 MG tablet  Commonly known as: NORVASC   10 mg, Oral, Nightly      atorvastatin 80 MG tablet  Commonly known as: LIPITOR   80 mg, Oral, Daily      ClearLax 17 GM/SCOOP powder  Generic drug: polyethylene glycol   17 g, Oral, Daily      dapagliflozin Propanediol 10 MG tablet  Commonly known as: Farxiga   10 mg, Oral, Daily      metoprolol succinate  MG 24 hr tablet  Commonly known as: TOPROL-XL   150 mg, Oral, Daily      Psyllium 51.7 % packet  Commonly known as: METAMUCIL MULTIHEALTH FIBER   1 packet, Oral, Daily      spironolactone 25 MG tablet  Commonly known as: ALDACTONE   25 mg, Oral, Daily      torsemide 10 MG tablet  Commonly known as: DEMADEX   10 mg, Oral, Daily      valsartan 80 MG tablet  Commonly known as: DIOVAN   80 mg, Oral, Daily               No Known Allergies      Discharge Disposition:  Skilled Nursing Facility (DC - External)    Diet:  Hospital:  Diet Order   Procedures    Diet: Cardiac; Healthy Heart (2-3 Na+); Texture: Regular (IDDSI 7); Fluid Consistency: Thin (IDDSI 0)       Diet Instructions       Diet: Diabetic Diets, Cardiac Diets; Healthy Heart (2-3 Na+); Thin (IDDSI 0); Consistent Carbohydrate      Discharge Diet:  Diabetic Diets  Cardiac Diets       Cardiac Diet: Healthy Heart (2-3 Na+)    Fluid Consistency: Thin (IDDSI 0)    Diabetic Diet: Consistent Carbohydrate             Activity:  Activity Instructions       Gradually Increase Activity Until at Pre-Hospitalization Level      Measure Blood Pressure      Up WIth Assist              Restrictions or Other Recommendations:         CODE STATUS:    Code Status and Medical Interventions:   Ordered at: 06/02/24 7003     Level Of Support Discussed With:    Patient     Code Status (Patient has no pulse and is not breathing):    CPR (Attempt to Resuscitate)     Medical Interventions (Patient has pulse or is breathing):    Full Support       Future Appointments   Date Time Provider Department Center   6/11/2024 10:30 AM  Duglas Lang MD [facility]   6/17/2024  1:00 PM [facility]   7/10/2024  2:30 PM [facility] 1 [facility]   8/23/2024  9:45 AM Walt Marlow MD [facility]   8/26/2024  8:45 AM Duglas Lang MD [facility]   9/24/2024  1:30 PM Chandrakant Delgado IV, MD [facility]       Additional Instructions for the Follow-ups that You Need to Schedule       Discharge Follow-up with PCP   As directed       Currently Documented PCP:    Duglas Lang MD    PCP Phone Number:    [phone]     Follow Up Details: 1 week        Discharge Follow-up with Specialty: Stroke Neurology; 2 Weeks   As directed      Specialty: Stroke Neurology   Follow Up: 2 Weeks                      Shant Disla DO  06/06/24      Time Spent on Discharge:  I spent  40  minutes on this discharge activity which included: face-to-face encounter with the patient, reviewing the data in the system, coordination of the care with the nursing staff as well as consultants, documentation, and entering orders.            Electronically signed by Shant Disla DO at 06/06/24 0514

## 2024-06-06 NOTE — CASE MANAGEMENT/SOCIAL WORK
Case Management Discharge Note      Final Note: Mr. Gao is being discharged today, 6/6/24. He will be going to [facility] Stroke Unit. Nurse to call report to [phone]. CM will fax the discharge summary to [phone] when it becomes available. [facility] van will transport him at 1430. Mr. Gao needs to be down at the [address] at 1420.         Selected Continued Care - Admitted Since 6/2/2024       Destination    No services have been selected for the patient.                Durable Medical Equipment    No services have been selected for the patient.                Dialysis/Infusion    No services have been selected for the patient.                Home Medical Care    No services have been selected for the patient.                Therapy    No services have been selected for the patient.                Community Resources    No services have been selected for the patient.                Community & DME    No services have been selected for the patient.                         Final Discharge Disposition Code: 62 - inpatient rehab facility

## 2024-06-06 NOTE — DISCHARGE SUMMARY
[facility] Medicine Services  DISCHARGE SUMMARY    Patient Name: Walt Gao  : 1965  MRN: [id number]    Date of Admission: 2024  1:54 PM  Date of Discharge:  2024  Primary Care Physician: Duglas Lang MD    Consults       No orders found from 2024 to 6/3/2024.            Hospital Course     Presenting Problem: Acute ischemic stroke     Active Hospital Problems    Diagnosis  POA    **CVA (cerebral vascular accident) [I63.9]  Yes    Type 2 diabetes mellitus without complication, without long-term current use of insulin [E11.9]  Yes    Essential hypertension [I10]  Yes    Alcohol abuse [F10.10]  Yes      Resolved Hospital Problems   No resolved problems to display.          Hospital Course:  Walt Gao is a 59 y.o. male with past medical history significant for nonischemic cardiomyopathy with systolic dysfunction, CKD stage III, diabetes, alcohol use, hypertension, with recent admission for GI bleed, was admitted for acute stroke after initially presenting with 1 to 2 days of left hand numbness, left facial droop and facial numbness.  Stroke neurology was consulted and imaging confirmed multiple acute and subacute infarcts.  Patient was treated with aspirin monotherapy and statin due to recent GI bleed.  He worked with therapy and was determined to benefit from rehab.  Patient will need 2-week cardiac monitor on discharge with stroke neurology follow-up in 2 weeks and repeat MRI brain in 3 months.     Multiple Acute & Subacute Right MCA watershed territory Infarcts, atheroembolic vs cardioembolic  HTN  HLD  -neuro-stroke following: asa 81, high dose statin (Holding plavix due to recent gi bleed); 14 day cardiac monitor upon discharge; neuro-stroke follow up in 2 weeks  -pt/ot recommend inpt rehab  -will need repeat MRI brain w/ contrast in ~3 months to evaluate for expected evolution of multifocal infarcts (as neoplastic process cannot be ruled entirely  out and needs to be monitored w/ f/u imaging)  -f/u stroke clinic 2 weeks     Etoh abuse  -DC CIWA due to lack of symptoms   -Continue thiamine and folate supplementation      Hx NICM  Chronic HFimEF   HTN  -previous EF 35% 2013 -->50% 2017  -Echo 6/3/24: LV EF 50%  -continue home amlodipine, jardiance. Hold diuretics and ARB due to [place]      [place] on Ckd 3 (baseline cr ~1.8-2.0)  -stable; continue to monitor  -Hold nephrotoxic agents      Borderline DM2, A1c 6.2  -on farxiga at home; jardiance here, ssi         Discharge Follow Up Recommendations for outpatient labs/diagnostics:  Follow-up with PCP in 1 week  Follow-up with stroke neurology in 2 weeks  Follow-up with cardiology as scheduled    Day of Discharge     HPI:   Patient reports feeling well today states he feels stronger than that on admission.  Denies chest pain, fevers, chills, sweats.  States he is ready for discharge.    Vital Signs:   Temp:  [97.5 °F (36.4 °C)-98.1 °F (36.7 °C)] 98.1 °F (36.7 °C)  Heart Rate:  [65-86] 66  Resp:  [16-17] 16  BP: (126-147)/() 129/94      Physical Exam:  General appearance: alert, awake, oriented, no acute distress   Cardiovascular: RRR, no murmurs or rubs, radial pulse full 2/4 BL   Respiratory: CTAB, no crackles or wheezes   Abdomen: soft, non-tender, no organomegaly, bowel sounds normoactive    Neuro/CNS: alert and oriented x3, normal speech, 4/5 LUE weakness     Pertinent  and/or Most Recent Results     LAB RESULTS:      Lab 06/06/24  0627 06/04/24  0758 06/03/24  0659 06/02/24  1407 06/02/24  1405   WBC 6.23 6.10 5.84  --   --    HEMOGLOBIN 12.9* 12.8* 12.4*  --   --    HEMOGLOBIN, POC  --   --   --  13.9  --    HEMATOCRIT 37.3* 36.5* 34.8*  --   --    HEMATOCRIT POC  --   --   --  41  --    PLATELETS 185 203 201  --   --    .2* 114.4* 113.4*  --   --    PROTIME  --   --   --   --  13.6         Lab 06/06/24  0627 06/05/24  0437 06/04/24  0622 06/03/24  0659 06/02/24  1407   SODIUM 137 139 141 139  --     POTASSIUM 4.0 4.1 4.3 4.1  --    CHLORIDE 100 101 100 104  --    CO2 22.0 23.0 24.0 25.0  --    ANION GAP 15.0 15.0 17.0* 10.0  --    BUN 26* 25* 22* 13  --    CREATININE 1.79* 1.91* 1.94* 1.31* 1.70*   EGFR 43.1* 39.9* 39.1* 62.7 45.9*   GLUCOSE 89 72 102* 108*  --    CALCIUM 8.9 9.1 9.1 9.0  --    MAGNESIUM  --   --  2.3 2.2  --    HEMOGLOBIN A1C  --   --   --  6.20*  --          Lab 06/03/24  0659   TOTAL PROTEIN 7.8   ALBUMIN 4.3   GLOBULIN 3.5   ALT (SGPT) 15   AST (SGOT) 16   BILIRUBIN 0.5   ALK PHOS 29*         Lab 06/02/24  1405   PROTIME 13.6   INR 1.1         Lab 06/03/24  0659   CHOLESTEROL 197   LDL CHOL 134*   HDL CHOL 47   TRIGLYCERIDES 90         Lab 06/02/24  1821   VITAMIN B 12 <150*         Brief Urine Lab Results  (Last result in the past 365 days)        Color   Clarity   Blood   Leuk Est   Nitrite   Protein   CREAT   Urine HCG        06/03/24 1935 Yellow   Clear   Negative   Negative   Negative   100 mg/dL (2+)                 Microbiology Results (last 10 days)       ** No results found for the last 240 hours. **            Adult Transthoracic Echo Complete W/ Cont if Necessary Per Protocol (With Agitated Saline)    Result Date: 6/3/2024    Left ventricular systolic function is normal. Estimated left ventricular EF = 50%   Left ventricular wall thickness is consistent with mild to moderate concentric hypertrophy.   The cardiac valves are anatomically and functionally normal.   Estimated right ventricular systolic pressure from tricuspid regurgitation is normal (<35 mmHg).   Saline test results are negative.   Compared to echocardiogram performed 11/29/2022, there is been no significant interval change     MRI Brain With & Without Contrast    Result Date: 6/3/2024  MRI BRAIN W WO CONTRAST Date of Exam: 6/2/2024 8:25 PM EDT Indication: Stroke, follow up CTH hypodensity/ Acute stroke, possible Mets.  Comparison: CT earlier the same day. Technique:  Routine multiplanar/multisequence sequence  images of the brain were obtained before and after the uneventful administration of 18 mL Multihance. Findings: Areas of diffusion restriction are present on the right consistent with acute infarct involving watershed distribution within the frontal and parietal lobes, as well as a somewhat more confluent area of cortical infarct within the right occipital lobe. Midline structures are normal and the craniocervical junction appears satisfactory. Areas of infarct are associated with mild localized edema, otherwise without evidence of hemorrhage or significant mass effect. Age-related changes are present with minimal volume loss and mild to moderate typical T2 hyperintense subcortical and pontine white matter changes, nonspecific but favored to reflect sequela of chronic microvascular ischemia. Some focal FLAIR hyperintensity is present within the left splenium of the corpus callosum with the appearance of probable early chronic infarct. The ventricles are normal in size and configuration. Some areas of scattered susceptibility artifact are present likely reflecting chronic microhemorrhage. Areas of acute infarct, most notably within the right occipital lobe demonstrates some irregular enhancement, likely representing evolving cortical laminar necrosis.     Impression: Multifocal areas of both acute and subacute infarct are present including within a watershed distribution on the right, as well as a more focal area of acute cortical and subcortical infarct within the right occipital lobe. Areas of mixed subacute and chronic lacunar infarct are also noted bilaterally. There is no evidence of associated hemorrhage or significant mass effect. Areas of acute infarct on the right demonstrate some associated enhancement, likely representing cortical laminar necrosis. Neoplastic or metastatic process is considered less likely, however consider follow-up imaging to confirm expected evolution of multifocal infarcts.  Electronically Signed: Ernst Freire MD  6/3/2024 6:45 AM EDT  Workstation ID: [id number]    CT Angiogram Head w AI Analysis of LVO    Result Date: 6/2/2024  CT ANGIOGRAM HEAD W AI ANALYSIS OF LVO, CT ANGIOGRAM NECK Date of Exam: 6/2/2024 2:01 PM EDT Indication: stroke. Comparison: CT head from earlier today Technique: CTA of the head and neck was performed after the uneventful intravenous administration of 75 mL Isovue-370. Reconstructed coronal and sagittal images were also obtained. In addition, a 3-D volume rendered image was created for interpretation. Automated exposure control and iterative reconstruction methods were used. Findings: There is a two-vessel aortic arch with common origin of the innominate and left common carotid artery, normal variant. The right common carotid artery is widely patent. The right carotid bifurcation is widely patent. The right internal carotid artery is widely patent. The left common carotid artery is widely patent. The left carotid bifurcation is widely patent. The left internal carotid artery is widely patent. The bilateral vertebral arteries are widely patent with mild plaque along their V4 segments.  The left vertebral artery is slightly dominant. There is focal moderate stenosis along the proximal M2 branch of the right middle cerebral artery and otherwise the artery is widely patent. The left middle cerebral and bilateral anterior cerebral arteries are widely patent. The anterior communicating artery is widely patent. The basilar and bilateral posterior cerebral arteries are widely patent. No definite posterior communicating artery is identified on either side. No intracranial aneurysm is identified. The visualized portions of the bilateral superior cerebellar, anteroinferior cerebellar, and posterior inferior cerebellar arteries are unremarkable.     Impression: 1.No evidence of hemodynamically significant stenosis along bilateral carotid bifurcations. 2.Focal moderate  stenosis along right MCA, but no evidence of acute thrombus or aneurysm. Electronically Signed: Neo Mendez MD  6/2/2024 2:31 PM EDT  Workstation ID: [id number]    CT Angiogram Neck    Result Date: 6/2/2024  CT ANGIOGRAM HEAD W AI ANALYSIS OF LVO, CT ANGIOGRAM NECK Date of Exam: 6/2/2024 2:01 PM EDT Indication: stroke. Comparison: CT head from earlier today Technique: CTA of the head and neck was performed after the uneventful intravenous administration of 75 mL Isovue-370. Reconstructed coronal and sagittal images were also obtained. In addition, a 3-D volume rendered image was created for interpretation. Automated exposure control and iterative reconstruction methods were used. Findings: There is a two-vessel aortic arch with common origin of the innominate and left common carotid artery, normal variant. The right common carotid artery is widely patent. The right carotid bifurcation is widely patent. The right internal carotid artery is widely patent. The left common carotid artery is widely patent. The left carotid bifurcation is widely patent. The left internal carotid artery is widely patent. The bilateral vertebral arteries are widely patent with mild plaque along their V4 segments.  The left vertebral artery is slightly dominant. There is focal moderate stenosis along the proximal M2 branch of the right middle cerebral artery and otherwise the artery is widely patent. The left middle cerebral and bilateral anterior cerebral arteries are widely patent. The anterior communicating artery is widely patent. The basilar and bilateral posterior cerebral arteries are widely patent. No definite posterior communicating artery is identified on either side. No intracranial aneurysm is identified. The visualized portions of the bilateral superior cerebellar, anteroinferior cerebellar, and posterior inferior cerebellar arteries are unremarkable.     Impression: 1.No evidence of hemodynamically significant stenosis along  bilateral carotid bifurcations. 2.Focal moderate stenosis along right MCA, but no evidence of acute thrombus or aneurysm. Electronically Signed: Neo Mendez MD  6/2/2024 2:31 PM EDT  Workstation ID: [id number]    CT CEREBRAL PERFUSION WITH & WITHOUT CONTRAST    Result Date: 6/2/2024  CT CEREBRAL PERFUSION W WO CONTRAST Date of Exam: 6/2/2024 2:01 PM EDT Indication: stroke.  Comparison: CT head from earlier today Technique: Axial CT images of the brain were obtained prior to and after the administration of 50 mL Isovue-370. Core blood volume, core blood flow, mean transit time, and Tmax images were obtained utilizing the Rapid software protocol. A limited CT angiogram of the head was also performed to measure the blood vessel density. The radiation dose reduction device was turned on for each scan per the ALARA (As Low as Reasonably Achievable) protocol. Findings: The cerebral blood flow appears normal. There is 4 mL of abnormal Tmax perfusion corresponding to the right frontal lobe.     Impression: 1.No evidence of completed infarct. 2.4 mm of abnormal Tmax perfusion corresponding to right frontal lobe. This is not in the areas of hypodensity seen on recent prior CT head that were in the right parietal and right occipital lobes. Whether this represents artifact versus tissue at risk within the right frontal lobe is unclear. Electronically Signed: Neo Mendez MD  6/2/2024 2:22 PM EDT  Workstation ID: [id number]    CT Head Without Contrast Stroke Protocol    Result Date: 6/2/2024  CT HEAD WO CONTRAST STROKE PROTOCOL Date of Exam: 6/2/2024 1:58 PM EDT Indication: stroke. Comparison: None available. Technique: Axial CT images were obtained of the head without contrast administration.  Reconstructed coronal images were also obtained. Automated exposure control and iterative construction methods were used. Scan Time: 13:56 Results discussed with stroke navigator at 14:04. Findings: No acute intracranial hemorrhage  or extra-axial collection is identified. The ventricles appear normal in caliber, with no midline shift. The basal cisterns appear patent. There are 2 focal hypodensities within the right parietal and right occipital lobes. Scattered foci of periventricular and subcortical white matter hypodensities are nonspecific, but likely the sequela of mild chronic small vessel ischemic disease. The calvarium appears intact. There is mild mucosal disease in the maxillary sinuses. The mastoid air cells are well aerated.     Impression: 1.No acute intracranial hemorrhage. 2.Two focal hypodensities within right parietal and right occipital lobes, which could represent infarcts versus metastasis. Consider evaluation with MRI brain with and without IV contrast. 3.Mild mucosal disease within maxillary sinuses. 4.Findings suggestive of mild chronic small vessel ischemic disease. Electronically Signed: Neo Mendez MD  6/2/2024 2:07 PM EDT  Workstation ID: [id number]     Results for orders placed during the hospital encounter of 12/11/23    Duplex Renal Artery - Bilateral Complete CAR    Interpretation Summary  EXAM:  DUPLEX RENAL ARTERY BILATERAL COMPLETE CAR    DATE: 12/11/2023    COMPARISON: None    INDICATION: Hypertension    TECHNIQUE: Grayscale, color Doppler and spectral Doppler examination for evaluation of kidneys and renal arteries was performed with representative examples saved to PACS.    FINDINGS:  Aorta: Peak systolic velocity: 65 cm/sec.  No aneurysm    RIGHT KIDNEY:  The right kidney measures 11 cm in length.  The right kidney is normal size and contour with good corticomedullary differentiation. There are no shadowing calculi or cortical deforming solid or cystic masses. No hydronephrosis.    RIGHT RENAL DOPPLER:  Right renal artery maximum peak systolic velocity: 118 cm/sec at proximal renal artery.  Right Renal aortic ratio: 1.8  Renal vein: Patent.    Right Intrarenal:  Resistive Index:  Upper pole: 0.65.  Mid:  0.64.  Inferior pole: 0.67.    Highest intrarenal Acceleration time = 50 ms.  No tardus parvus waveform.    LEFT KIDNEY:  The left kidney measures 11.3 cm in length.  The left kidney is of normal size and contour with good corticomedullary differentiation. There are no shadowing calculi or cortical deforming solid or cystic masses. No hydronephrosis.    LEFT RENAL DOPPLER:  Left renal artery maximum Peak systolic velocity: 94.7 cm/sec at proximal renal artery.  Left Renal aortic ratio: 1.4  Left Renal vein: Patent.    Left Intrarenal:  Resistive Index:  Upper pole: 0.65.  Mid: 0.65.  Inferior pole: 0.66.    Highest intrarenal Acceleration time = 56 ms. No tardus parvus waveform.    Urinary Bladder: The urinary bladder wall is within normal limits (<3 mm)..    Impression  1. Negative for renal artery stenosis.  2. Symmetric grayscale appearance of the kidneys. Negative for hydronephrosis.    Electronically Signed: Jesus Manuel Limon MD  12/11/2023 4:10 PM EST  Workstation ID: [id number]      Results for orders placed during the hospital encounter of 12/11/23    Duplex Renal Artery - Bilateral Complete CAR    Interpretation Summary  EXAM:  DUPLEX RENAL ARTERY BILATERAL COMPLETE CAR    DATE: 12/11/2023    COMPARISON: None    INDICATION: Hypertension    TECHNIQUE: Grayscale, color Doppler and spectral Doppler examination for evaluation of kidneys and renal arteries was performed with representative examples saved to PACS.    FINDINGS:  Aorta: Peak systolic velocity: 65 cm/sec.  No aneurysm    RIGHT KIDNEY:  The right kidney measures 11 cm in length.  The right kidney is normal size and contour with good corticomedullary differentiation. There are no shadowing calculi or cortical deforming solid or cystic masses. No hydronephrosis.    RIGHT RENAL DOPPLER:  Right renal artery maximum peak systolic velocity: 118 cm/sec at proximal renal artery.  Right Renal aortic ratio: 1.8  Renal vein: Patent.    Right Intrarenal:  Resistive  Index:  Upper pole: 0.65.  Mid: 0.64.  Inferior pole: 0.67.    Highest intrarenal Acceleration time = 50 ms.  No tardus parvus waveform.    LEFT KIDNEY:  The left kidney measures 11.3 cm in length.  The left kidney is of normal size and contour with good corticomedullary differentiation. There are no shadowing calculi or cortical deforming solid or cystic masses. No hydronephrosis.    LEFT RENAL DOPPLER:  Left renal artery maximum Peak systolic velocity: 94.7 cm/sec at proximal renal artery.  Left Renal aortic ratio: 1.4  Left Renal vein: Patent.    Left Intrarenal:  Resistive Index:  Upper pole: 0.65.  Mid: 0.65.  Inferior pole: 0.66.    Highest intrarenal Acceleration time = 56 ms. No tardus parvus waveform.    Urinary Bladder: The urinary bladder wall is within normal limits (<3 mm)..    Impression  1. Negative for renal artery stenosis.  2. Symmetric grayscale appearance of the kidneys. Negative for hydronephrosis.    Electronically Signed: Jesus Manuel Limon MD  12/11/2023 4:10 PM EST  Workstation ID: [id number]      Results for orders placed during the hospital encounter of 06/02/24    Adult Transthoracic Echo Complete W/ Cont if Necessary Per Protocol (With Agitated Saline)    Interpretation Summary    Left ventricular systolic function is normal. Estimated left ventricular EF = 50%    Left ventricular wall thickness is consistent with mild to moderate concentric hypertrophy.    The cardiac valves are anatomically and functionally normal.    Estimated right ventricular systolic pressure from tricuspid regurgitation is normal (<35 mmHg).    Saline test results are negative.    Compared to echocardiogram performed 11/29/2022, there is been no significant interval change      Plan for Follow-up of Pending Labs/Results:     Discharge Details        Discharge Medications        New Medications        Instructions Start Date   aspirin 81 MG chewable tablet   81 mg, Oral, Daily   Start Date: June 7, 2024     folic acid 1  MG tablet  Commonly known as: FOLVITE   1 mg, Oral, Daily      thiamine 100 MG tablet  Commonly known as: VITAMIN B1   100 mg, Oral, Daily   Start Date: June 8, 2024            Continue These Medications        Instructions Start Date   amLODIPine 10 MG tablet  Commonly known as: NORVASC   10 mg, Oral, Nightly      atorvastatin 80 MG tablet  Commonly known as: LIPITOR   80 mg, Oral, Daily      ClearLax 17 GM/SCOOP powder  Generic drug: polyethylene glycol   17 g, Oral, Daily      dapagliflozin Propanediol 10 MG tablet  Commonly known as: Farxiga   10 mg, Oral, Daily      metoprolol succinate  MG 24 hr tablet  Commonly known as: TOPROL-XL   150 mg, Oral, Daily      Psyllium 51.7 % packet  Commonly known as: METAMUCIL MULTIHEALTH FIBER   1 packet, Oral, Daily      spironolactone 25 MG tablet  Commonly known as: ALDACTONE   25 mg, Oral, Daily      torsemide 10 MG tablet  Commonly known as: DEMADEX   10 mg, Oral, Daily      valsartan 80 MG tablet  Commonly known as: DIOVAN   80 mg, Oral, Daily               No Known Allergies      Discharge Disposition:  Skilled Nursing Facility (DC - External)    Diet:  Hospital:  Diet Order   Procedures    Diet: Cardiac; Healthy Heart (2-3 Na+); Texture: Regular (IDDSI 7); Fluid Consistency: Thin (IDDSI 0)       Diet Instructions       Diet: Diabetic Diets, Cardiac Diets; Healthy Heart (2-3 Na+); Thin (IDDSI 0); Consistent Carbohydrate      Discharge Diet:  Diabetic Diets  Cardiac Diets       Cardiac Diet: Healthy Heart (2-3 Na+)    Fluid Consistency: Thin (IDDSI 0)    Diabetic Diet: Consistent Carbohydrate             Activity:  Activity Instructions       Gradually Increase Activity Until at Pre-Hospitalization Level      Measure Blood Pressure      Up WIth Assist              Restrictions or Other Recommendations:         CODE STATUS:    Code Status and Medical Interventions:   Ordered at: 06/02/24 0122     Level Of Support Discussed With:    Patient     Code Status (Patient  has no pulse and is not breathing):    CPR (Attempt to Resuscitate)     Medical Interventions (Patient has pulse or is breathing):    Full Support       Future Appointments   Date Time Provider Department Center   6/11/2024 10:30 AM Duglas Lang MD [facility]   6/17/2024  1:00 PM [facility]   7/10/2024  2:30 PM [facility]   8/23/2024  9:45 AM Walt Marlow MD [facility]   8/26/2024  8:45 AM Duglas Lang MD [facility]   9/24/2024  1:30 PM Chandrakant Delgado IV, MD [facility]       Additional Instructions for the Follow-ups that You Need to Schedule       Discharge Follow-up with PCP   As directed       Currently Documented PCP:    Duglas Lang MD    PCP Phone Number:    [phone]     Follow Up Details: 1 week        Discharge Follow-up with Specialty: Stroke Neurology; 2 Weeks   As directed      Specialty: Stroke Neurology   Follow Up: 2 Weeks                      Shant Disla DO  06/06/24      Time Spent on Discharge:  I spent  40  minutes on this discharge activity which included: face-to-face encounter with the patient, reviewing the data in the system, coordination of the care with the nursing staff as well as consultants, documentation, and entering orders.

## 2024-06-13 ENCOUNTER — TELEPHONE (OUTPATIENT)
Age: 59
End: 2024-06-13

## 2024-06-13 NOTE — TELEPHONE ENCOUNTER
"  Caller: Walt Gao \"Shimon\"    Relationship to patient: Self    Best call back number: [phone]     New or established patient?  [] New  [x] Established    Date of discharge: 06/15/2024    Facility discharged from: [facility]    Diagnosis/Symptoms: STROKE      APPT MADE FOR FRIDAY 6/21/2024        "

## 2024-06-14 ENCOUNTER — OFFICE VISIT (OUTPATIENT)
Dept: NEUROLOGY | Facility: CLINIC | Age: 59
End: 2024-06-14
Payer: COMMERCIAL

## 2024-06-14 VITALS
BODY MASS INDEX: 28.87 KG/M2 | DIASTOLIC BLOOD PRESSURE: 64 MMHG | OXYGEN SATURATION: 97 % | HEIGHT: 69 IN | TEMPERATURE: 97.7 F | SYSTOLIC BLOOD PRESSURE: 124 MMHG | WEIGHT: 194.89 LBS | HEART RATE: 78 BPM

## 2024-06-14 DIAGNOSIS — I10 ESSENTIAL HYPERTENSION: Chronic | ICD-10-CM

## 2024-06-14 DIAGNOSIS — E78.2 MIXED HYPERLIPIDEMIA: Chronic | ICD-10-CM

## 2024-06-14 DIAGNOSIS — I63.9 CEREBROVASCULAR ACCIDENT (CVA), UNSPECIFIED MECHANISM: Primary | ICD-10-CM

## 2024-06-14 RX ORDER — ENOXAPARIN SODIUM 100 MG/ML
INJECTION SUBCUTANEOUS EVERY 12 HOURS SCHEDULED
COMMUNITY

## 2024-06-14 RX ORDER — NITROGLYCERIN 0.4 MG/1
0.4 TABLET SUBLINGUAL
COMMUNITY

## 2024-06-14 RX ORDER — ESCITALOPRAM OXALATE 10 MG/1
10 TABLET ORAL DAILY
COMMUNITY

## 2024-06-14 RX ORDER — IBUPROFEN/PSEUDOEPHEDRINE HCL 200MG-30MG
TABLET ORAL
COMMUNITY

## 2024-06-14 RX ORDER — CLONIDINE HYDROCHLORIDE 0.1 MG/1
0.1 TABLET ORAL 2 TIMES DAILY
COMMUNITY

## 2024-06-14 RX ORDER — ONDANSETRON 4 MG/1
4 TABLET, FILM COATED ORAL EVERY 8 HOURS PRN
COMMUNITY

## 2024-06-14 RX ORDER — CLOPIDOGREL BISULFATE 75 MG/1
75 TABLET ORAL DAILY
Qty: 21 TABLET | Refills: 0 | Status: SHIPPED | OUTPATIENT
Start: 2024-06-14 | End: 2024-07-05

## 2024-06-14 RX ORDER — LORATADINE 10 MG/1
10 TABLET ORAL DAILY
COMMUNITY

## 2024-06-14 RX ORDER — BUSPIRONE HYDROCHLORIDE 5 MG/1
5 TABLET ORAL 3 TIMES DAILY
COMMUNITY

## 2024-06-14 RX ORDER — TRAZODONE HYDROCHLORIDE 50 MG/1
25 TABLET ORAL NIGHTLY
COMMUNITY

## 2024-06-14 RX ORDER — AMOXICILLIN 250 MG
1 CAPSULE ORAL DAILY
COMMUNITY

## 2024-06-14 RX ORDER — ACETAMINOPHEN 500 MG
500 TABLET ORAL EVERY 6 HOURS PRN
COMMUNITY

## 2024-06-14 RX ORDER — HYDRALAZINE HYDROCHLORIDE 25 MG/1
TABLET, FILM COATED ORAL
COMMUNITY
Start: 2024-06-13

## 2024-06-14 RX ORDER — LANOLIN ALCOHOL/MO/W.PET/CERES
1000 CREAM (GRAM) TOPICAL DAILY
COMMUNITY

## 2024-06-14 RX ORDER — PANTOPRAZOLE SODIUM 40 MG/1
40 TABLET, DELAYED RELEASE ORAL DAILY
COMMUNITY

## 2024-06-14 RX ORDER — INSULIN LISPRO 100 [IU]/ML
INJECTION, SOLUTION INTRAVENOUS; SUBCUTANEOUS
COMMUNITY

## 2024-06-14 RX ORDER — CALCIUM CARBONATE 500 MG/1
1 TABLET, CHEWABLE ORAL DAILY
COMMUNITY

## 2024-06-14 RX ORDER — BISACODYL 10 MG
10 SUPPOSITORY, RECTAL RECTAL DAILY
COMMUNITY

## 2024-06-14 NOTE — PROGRESS NOTES
New Patient Office Visit      Encounter Date: 2024   Patient Name: Walt Gao  : 1965   MRN: [id number]   PCP: Duglas Lang MD    Referring Provider: AFTAB Beckett     Chief Complaint:    Chief Complaint   Patient presents with    Follow-up    Stroke       History of Present Illness: Walt Gao is a 59 y.o. male with known medical diagnoses of recent GI bleed (2024), hypertension, CHF, CKD stage IIIa, hyperlipidemia, and alcohol use who presents to [facility] following a recent hospital admission for acute right MCA watershed infarcts.  Patient initially presented to [facility] ED 2024 with 2-day history of left hemiparesis, left facial droop, and left hemisensory deficits.  Today, patient states left face weakness and left hand weakness are still present but have improved since hospital discharge.  He notes no sensory deficits since discharge.  Patient denies recurrence of GI bleeding or associated symptoms to include nausea, vomiting, diarrhea, dark/tarry stool.  Patient currently at inpatient rehab at [facility].  Patient anticipates discharge from [facility] and return to home tomorrow.  Patient is unclear what his PT/OT plan is after discharge from [facility].    Stroke Risk Factors: hyperlipidemia and hypertension      Subjective      Review of Systems:   Review of Systems   Constitutional:  Negative for fatigue.   HENT:  Negative for trouble swallowing.    Eyes:  Negative for visual disturbance.   Respiratory:  Negative for shortness of breath.    Cardiovascular:  Negative for chest pain.   Gastrointestinal:  Negative for anal bleeding, blood in stool, diarrhea, nausea and vomiting.   Musculoskeletal:  Negative for arthralgias.   Neurological:  Positive for facial asymmetry, speech difficulty and weakness. Negative for dizziness, numbness and memory problem.   Psychiatric/Behavioral:  Negative for behavioral problems.      Past Medical History:   Past  Medical History:   Diagnosis Date    CHF (congestive heart failure)     NICM (nonischemic cardiomyopathy)     Nonischemic cardiomyopathy 08/29/2016    · Cardiac catheterization revealing nonobstructive CAD, 2013 · LVEF 30% on heart catheterization, 2013 · Echo (8/29/16): Moderate LVH. LVEF 23%. Left ventricular diastolic dysfunction (grade II) consistent with pseudonormalization.  Moderate mitral valve regurgitation · Echo (2/21/2017):  LVEF 50%    Stroke      Past Surgical History:   Past Surgical History:   Procedure Laterality Date    COLONOSCOPY N/A 5/17/2024    Procedure: COLONOSCOPY;  Surgeon: Duglas Zhou MD;  Location: [facility];  Service: Gastroenterology;  Laterality: N/A;    ENDOSCOPY N/A 5/16/2024    Procedure: ESOPHAGOGASTRODUODENOSCOPY;  Surgeon: Duglas Zhou MD;  Location: [facility];  Service: Gastroenterology;  Laterality: N/A;    HERNIA REPAIR      as child     Family History:   Family History   Problem Relation Age of Onset    Coronary artery disease Other     Hypertension Mother     Diabetes Father     Hypertension Father     Kidney disease Father     Hyperlipidemia Father     Other Sister         pre diabetes    Other Maternal Grandmother         unknown    Other Maternal Grandfather         unknown    Heart disease Paternal Grandmother     Heart disease Paternal Grandfather     No Known Problems Sister     No Known Problems Daughter     No Known Problems Son      Social History:   Social History     Socioeconomic History    Marital status: Single   Tobacco Use    Smoking status: Never     Passive exposure: Never    Smokeless tobacco: Never   Vaping Use    Vaping status: Never Used   Substance and Sexual Activity    Alcohol use: Not Currently     Comment: 6 beers a week    Drug use: Never    Sexual activity: Yes     Partners: Female     Birth control/protection: Post-menopausal     Comment: 1 partner in the last 9 years     Medications:     Current Outpatient Medications:      acetaminophen (TYLENOL) 500 MG tablet, Take 1 tablet by mouth Every 6 (Six) Hours As Needed for Mild Pain., Disp: , Rfl:     ALBUTEROL IN, Inhale., Disp: , Rfl:     amLODIPine (NORVASC) 10 MG tablet, Take 1 tablet by mouth Every Night. (Patient taking differently: Take 0.5 tablets by mouth Every Night.), Disp: 90 tablet, Rfl: 3    aspirin 81 MG chewable tablet, Chew 1 tablet Daily for 30 days., Disp: 30 tablet, Rfl: 0    atorvastatin (LIPITOR) 80 MG tablet, Take 1 tablet by mouth Daily., Disp: 90 tablet, Rfl: 3    bisacodyl (DULCOLAX) 10 MG suppository, Insert 1 suppository into the rectum Daily., Disp: , Rfl:     busPIRone (BUSPAR) 5 MG tablet, Take 1 tablet by mouth 3 (Three) Times a Day., Disp: , Rfl:     calcium carbonate (TUMS) 500 MG chewable tablet, Chew 1 tablet Daily., Disp: , Rfl:     cloNIDine (CATAPRES) 0.1 MG tablet, Take 1 tablet by mouth 2 (Two) Times a Day., Disp: , Rfl:     empagliflozin (JARDIANCE) 10 MG tablet tablet, Take  by mouth Daily., Disp: , Rfl:     Enoxaparin Sodium (LOVENOX) 40 MG/0.4ML solution prefilled syringe syringe, Inject  under the skin into the appropriate area as directed Every 12 (Twelve) Hours., Disp: , Rfl:     escitalopram (LEXAPRO) 10 MG tablet, Take 1 tablet by mouth Daily., Disp: , Rfl:     folic acid (FOLVITE) 1 MG tablet, Take 1 tablet by mouth Daily for 14 days., Disp: 14 tablet, Rfl: 0    hydrALAZINE (APRESOLINE) 25 MG tablet, , Disp: , Rfl:     Insulin Lispro (humaLOG) 100 UNIT/ML injection, Inject  under the skin into the appropriate area as directed 3 (Three) Times a Day Before Meals., Disp: , Rfl:     loratadine (Claritin) 10 MG tablet, Take 1 tablet by mouth Daily., Disp: , Rfl:     Melatonin 3 MG tablet dispersible, Place  on the tongue., Disp: , Rfl:     metoprolol succinate XL (TOPROL-XL) 100 MG 24 hr tablet, Take 1.5 tablets by mouth Daily for 30 days., Disp: 45 tablet, Rfl: 0    nitroglycerin (NITROSTAT) 0.4 MG SL tablet, Place 1 tablet under the tongue  "Every 5 (Five) Minutes As Needed for Chest Pain. Take no more than 3 doses in 15 minutes., Disp: , Rfl:     ondansetron (ZOFRAN) 4 MG tablet, Take 1 tablet by mouth Every 8 (Eight) Hours As Needed for Nausea or Vomiting., Disp: , Rfl:     pantoprazole (PROTONIX) 40 MG EC tablet, Take 1 tablet by mouth Daily., Disp: , Rfl:     polyethylene glycol (MIRALAX) 17 GM/SCOOP powder, Take 17 g by mouth Daily., Disp: 119 g, Rfl: 0    Psyllium (METAMUCIL MULTIHEALTH FIBER) 51.7 % packet, Take 1 packet by mouth Daily., Disp: 30 each, Rfl: 1    sennosides-docusate (senna-docusate sodium) 8.6-50 MG per tablet, Take 1 tablet by mouth Daily., Disp: , Rfl:     thiamine (VITAMIN B1) 100 MG tablet, Take 1 tablet by mouth Daily for 30 days., Disp: 30 tablet, Rfl: 0    traZODone (DESYREL) 25 MG half tablet, Take 1 half tablet by mouth Every Night., Disp: , Rfl:     vitamin B-12 (CYANOCOBALAMIN) 1000 MCG tablet, Take 1 tablet by mouth Daily., Disp: , Rfl:     clopidogrel (Plavix) 75 MG tablet, Take 1 tablet by mouth Daily for 21 days., Disp: 21 tablet, Rfl: 0    dapagliflozin Propanediol (Farxiga) 10 MG tablet, Take 10 mg by mouth Daily. (Patient not taking: Reported on 6/14/2024), Disp: 30 tablet, Rfl: 5    spironolactone (ALDACTONE) 25 MG tablet, Take 1 tablet by mouth Daily. (Patient not taking: Reported on 6/14/2024), Disp: 90 tablet, Rfl: 3    torsemide (DEMADEX) 10 MG tablet, Take 1 tablet by mouth Daily. (Patient not taking: Reported on 6/14/2024), Disp: 90 tablet, Rfl: 3    valsartan (DIOVAN) 80 MG tablet, Take 1 tablet by mouth Daily. (Patient not taking: Reported on 6/14/2024), Disp: 30 tablet, Rfl: 0    Allergies:   No Known Allergies    Objective     Physical Exam:  Vital Signs:   Vitals:    06/14/24 0846   BP: 124/64   Pulse: 78   Temp: 97.7 °F (36.5 °C)   SpO2: 97%   Weight: 88.4 kg (194 lb 14.2 oz)   Height: 175.3 cm (69.02\")     Body mass index is 28.77 kg/m².     Physical Exam  Constitutional:       General: He is not in " acute distress.  HENT:      Head: Normocephalic and atraumatic.   Eyes:      Extraocular Movements: Extraocular movements intact.      Pupils: Pupils are equal, round, and reactive to light.   Cardiovascular:      Rate and Rhythm: Normal rate.   Pulmonary:      Effort: Pulmonary effort is normal.   Musculoskeletal:         General: No swelling.   Skin:     General: Skin is warm and dry.   Neurological:      Mental Status: He is alert.   Psychiatric:         Mood and Affect: Mood normal.         Speech: Speech normal.         Behavior: Behavior normal.     Neurological Exam  Mental Status  Alert. Oriented to person, place, time and situation. Speech is normal. Language is fluent with no aphasia. Attention and concentration are normal. Fund of knowledge is appropriate for level of education.    Cranial Nerves  CN II: Visual fields full to confrontation.  CN III, IV, VI: Extraocular movements intact bilaterally. Pupils equal round and reactive to light bilaterally.  CN V:  Right: Facial sensation is normal.  Left: Facial sensation is normal on the left.  CN VII:  Right: There is no facial weakness.  Left: There is central facial weakness. Mild LFD.  CN VIII: Hearing grossly intact bilaterally.  CN IX, X: Palate elevates symmetrically  CN XII: Tongue midline without atrophy or fasciculations.    Motor  Normal muscle bulk throughout. Normal muscle tone.  All extremities at least 4/5 strength with no drift.  Left hand  mildly weaker than the right..    Sensory  Light touch is normal in upper and lower extremities.     Coordination  Right: Rapid alternating movement normal.Left: Rapid alternating movement normal.    Gait  Casual gait is normal including stance, stride, and arm swing.     NIH Stroke Scale    1a  Level of consciousness: 0=alert; keenly responsive   1b. LOC questions:  0=Answers both questions correctly    1c. LOC commands: 0=Performs both tasks correctly   2.  Best Gaze: 0=normal   3. Visual: 0=No visual  loss   4. Facial Palsy: 1=Minor paralysis (flattened nasolabial fold, asymmetric on smiling)   5a. Motor left arm: 0=No drift, limb holds 90 (or 45) degrees for full 10 seconds   5b.  Motor right arm: 0=No drift, limb holds 90 (or 45) degrees for full 10 seconds   6a. Motor left le=No drift, limb holds 90 (or 45) degrees for full 10 seconds   6b  Motor right le=No drift, limb holds 90 (or 45) degrees for full 10 seconds   7. Limb Ataxia: 0=Absent   8.  Sensory: 0=Normal; no sensory loss   9. Best Language:  0=No aphasia, normal   10. Dysarthria: 0=Normal   11. Extinction and Inattention: 0=No abnormality    Total:   1     Modified Ochiltree Score: 3        0  No Symptoms    1 No significant disability. Able to carry out all usual activities, despite some symptoms.    2 Slight disability. Able to look after own affairs without assistance, but unable to carry out all previous activities.    3 Moderate disability. Requires some help, but able to walk unassisted.    4 Moderately severe disability. Unable to attend to own bodily needs without assistance, and unable to walk unassisted.    5 Severe disability. Requires constant nursing care and attention, bedridden, incontinent.    6 Dead      PHQ-9 Depression Screening  Little interest or pleasure in doing things? 0-->not at all   Feeling down, depressed, or hopeless? 0-->not at all   Trouble falling or staying asleep, or sleeping too much?     Feeling tired or having little energy?     Poor appetite or overeating?     Feeling bad about yourself - or that you are a failure or have let yourself or your family down?     Trouble concentrating on things, such as reading the newspaper or watching television?     Moving or speaking so slowly that other people could have noticed? Or the opposite - being so fidgety or restless that you have been moving around a lot more than usual?     Thoughts that you would be better off dead, or of hurting yourself in some way?     PHQ-9  "Total Score 0   If you checked off any problems, how difficult have these problems made it for you to do your work, take care of things at home, or get along with other people?       STOP-Bang Score  Have you been diagnosed with Sleep Apnea?: no  Snoring?: yes  Tired?: no  Observed?: no  Pressure?: yes  Stop Score: 2  Body Mass Index more than 35 kg/m2?: no  Age older than 50 year old?: yes  Neck large? \">17\"/43cm-M, >16\"/41cm-F: no  Gender=Male?: yes  Total Stop-Bang Score: 4    Imaging Reviewed:   MRI Brain With & Without Contrast 6/3/2024  Impression: Multifocal areas of both acute and subacute infarct are present including within a watershed distribution on the right, as well as a more focal area of acute cortical and subcortical infarct within the right occipital lobe. Areas of mixed subacute and chronic lacunar infarct are also noted bilaterally. There is no evidence of associated hemorrhage or significant mass effect. Areas of acute infarct on the right demonstrate some associated enhancement, likely representing cortical laminar necrosis. Neoplastic or metastatic process is considered less likely, however consider follow-up imaging to confirm expected evolution of multifocal infarcts.  I personally reviewed the MRI which is largely in the right watershed distribution with a large area of cortical involvement.     CT Angiogram Head and Neck 6/2/2024  Impression: 1.No evidence of hemodynamically significant stenosis along bilateral carotid bifurcations. 2.Focal moderate stenosis along right MCA, but no evidence of acute thrombus or aneurysm.      CT Perfusion 6/2/2024  Impression: 1.No evidence of completed infarct. 2.4 mm of abnormal Tmax perfusion corresponding to right frontal lobe. This is not in the areas of hypodensity seen on recent prior CT head that were in the right parietal and right occipital lobes. Whether this represents artifact versus tissue at risk within the right frontal lobe is unclear.    "   CT Head Without Contrast Stroke Protocol 6/2/2024  Impression: 1.No acute intracranial hemorrhage. 2.Two focal hypodensities within right parietal and right occipital lobes, which could represent infarcts versus metastasis. Consider evaluation with MRI brain with and without IV contrast. 3.Mild mucosal disease within maxillary sinuses. 4.Findings suggestive of mild chronic small vessel ischemic disease.     Laboratory Results:   A1c on 6/3: 6.20  LDL on 6/3: 134      Assessment / Plan      Assessment/Plan:   Stroke, right MCA watershed areas  -Etiology likely atheroembolic versus cardioembolic  -MRI on 6/3 confirmed right MCA watershed infarcts  -Repeat MRI with and without contrast in 3 months prior to clinic follow-up  -DAPT for 21 days with aspirin 81 mg and Plavix 75 mg.  -Continue aspirin monotherapy, 81 mg, after 21 days of DAPT concludes.  -LDL goal < 70, continue atorvastatin 80 mg nightly  -Goal A1c < 7, continue to follow with PCP  -Goal normotension, continue to follow with PCP  -Continue to wear Holter monitor as directed.  If Holter shows evidence of atrial fibrillation patient will likely need anticoagulation.  -Follow PT/OT recommendations  -Follow-up in stroke clinic in 3 months    Discussed the importance of medication compliance and lifestyle modifications (adequate blood pressure control, adequate control of hyperlipidemia, adequate glycemic control, increase physical activity, and healthy diet) to help reduce the risk of future cerebrovascular events.  Also discussed the signs symptoms that would warrant the patient return back to the emergency department including unilateral weakness, unilateral numbness, visual disturbances, loss of balance, speech difficulties, and/or a sudden severe headache.  Also discussed importance of alcohol cessation for stroke prevention and overall improved health.      Follow Up:   Return in about 3 months (around 9/14/2024) for Next scheduled follow up.      Time  spent 60 minutes    David Rojas PA-C  [facility] [place] [facility]

## 2024-06-18 ENCOUNTER — TELEPHONE (OUTPATIENT)
Age: 59
End: 2024-06-18

## 2024-06-24 ENCOUNTER — OFFICE VISIT (OUTPATIENT)
Age: 59
End: 2024-06-24
Payer: COMMERCIAL

## 2024-06-24 ENCOUNTER — LAB (OUTPATIENT)
Age: 59
End: 2024-06-24
Payer: COMMERCIAL

## 2024-06-24 VITALS
HEART RATE: 60 BPM | SYSTOLIC BLOOD PRESSURE: 130 MMHG | DIASTOLIC BLOOD PRESSURE: 88 MMHG | BODY MASS INDEX: 28.78 KG/M2 | OXYGEN SATURATION: 99 % | TEMPERATURE: 98 F | WEIGHT: 195 LBS

## 2024-06-24 DIAGNOSIS — I10 ESSENTIAL HYPERTENSION: Chronic | ICD-10-CM

## 2024-06-24 DIAGNOSIS — N18.32 CHRONIC RENAL IMPAIRMENT, STAGE 3B: Chronic | ICD-10-CM

## 2024-06-24 DIAGNOSIS — R39.9 LOWER URINARY TRACT SYMPTOMS (LUTS): ICD-10-CM

## 2024-06-24 DIAGNOSIS — R39.9 LOWER URINARY TRACT SYMPTOMS (LUTS): Primary | ICD-10-CM

## 2024-06-24 DIAGNOSIS — R39.9 GENITOURINARY SYMPTOMS: ICD-10-CM

## 2024-06-24 DIAGNOSIS — I42.8 NON-ISCHEMIC CARDIOMYOPATHY: ICD-10-CM

## 2024-06-24 DIAGNOSIS — E11.9 TYPE 2 DIABETES MELLITUS WITHOUT COMPLICATION, WITHOUT LONG-TERM CURRENT USE OF INSULIN: Chronic | ICD-10-CM

## 2024-06-24 DIAGNOSIS — I63.9 CEREBROVASCULAR ACCIDENT (CVA), UNSPECIFIED MECHANISM: ICD-10-CM

## 2024-06-24 DIAGNOSIS — I50.22 CHRONIC SYSTOLIC CONGESTIVE HEART FAILURE: Chronic | ICD-10-CM

## 2024-06-24 DIAGNOSIS — N18.32 CHRONIC KIDNEY DISEASE (CKD) STAGE G3B/A1, MODERATELY DECREASED GLOMERULAR FILTRATION RATE (GFR) BETWEEN 30-44 ML/MIN/1.73 SQUARE METER AND ALBUMINURIA CREATININE RATIO LESS THAN 30 MG/G (CMS/H*: Primary | ICD-10-CM

## 2024-06-24 DIAGNOSIS — K59.00 CONSTIPATION, UNSPECIFIED CONSTIPATION TYPE: ICD-10-CM

## 2024-06-24 DIAGNOSIS — K62.5 RECTAL BLEEDING: ICD-10-CM

## 2024-06-24 LAB
ALBUMIN SERPL-MCNC: 4.5 G/DL (ref 3.5–5.2)
ALBUMIN/GLOB SERPL: 1.4 G/DL
ALP SERPL-CCNC: 29 U/L (ref 39–117)
ALT SERPL W P-5'-P-CCNC: 28 U/L (ref 1–41)
ANION GAP SERPL CALCULATED.3IONS-SCNC: 12.4 MMOL/L (ref 5–15)
AST SERPL-CCNC: 19 U/L (ref 1–40)
BASOPHILS # BLD AUTO: 0.04 10*3/MM3 (ref 0–0.2)
BASOPHILS NFR BLD AUTO: 0.6 % (ref 0–1.5)
BILIRUB SERPL-MCNC: 0.3 MG/DL (ref 0–1.2)
BUN SERPL-MCNC: 17 MG/DL (ref 6–20)
BUN/CREAT SERPL: 10.2 (ref 7–25)
CALCIUM SPEC-SCNC: 9.2 MG/DL (ref 8.6–10.5)
CHLORIDE SERPL-SCNC: 106 MMOL/L (ref 98–107)
CO2 SERPL-SCNC: 22.6 MMOL/L (ref 22–29)
CREAT SERPL-MCNC: 1.67 MG/DL (ref 0.76–1.27)
DEPRECATED RDW RBC AUTO: 51 FL (ref 37–54)
EGFRCR SERPLBLD CKD-EPI 2021: 46.9 ML/MIN/1.73
EOSINOPHIL # BLD AUTO: 0.11 10*3/MM3 (ref 0–0.4)
EOSINOPHIL NFR BLD AUTO: 1.6 % (ref 0.3–6.2)
ERYTHROCYTE [DISTWIDTH] IN BLOOD BY AUTOMATED COUNT: 12.9 % (ref 12.3–15.4)
GLOBULIN UR ELPH-MCNC: 3.3 GM/DL
GLUCOSE SERPL-MCNC: 106 MG/DL (ref 65–99)
HCT VFR BLD AUTO: 34.9 % (ref 37.5–51)
HGB BLD-MCNC: 12.4 G/DL (ref 13–17.7)
IMM GRANULOCYTES # BLD AUTO: 0.02 10*3/MM3 (ref 0–0.05)
IMM GRANULOCYTES NFR BLD AUTO: 0.3 % (ref 0–0.5)
LYMPHOCYTES # BLD AUTO: 2.17 10*3/MM3 (ref 0.7–3.1)
LYMPHOCYTES NFR BLD AUTO: 31.3 % (ref 19.6–45.3)
MCH RBC QN AUTO: 39.2 PG (ref 26.6–33)
MCHC RBC AUTO-ENTMCNC: 35.5 G/DL (ref 31.5–35.7)
MCV RBC AUTO: 110.4 FL (ref 79–97)
MONOCYTES # BLD AUTO: 0.67 10*3/MM3 (ref 0.1–0.9)
MONOCYTES NFR BLD AUTO: 9.7 % (ref 5–12)
NEUTROPHILS NFR BLD AUTO: 3.93 10*3/MM3 (ref 1.7–7)
NEUTROPHILS NFR BLD AUTO: 56.5 % (ref 42.7–76)
PLATELET # BLD AUTO: 397 10*3/MM3 (ref 140–450)
PMV BLD AUTO: 10.4 FL (ref 6–12)
POTASSIUM SERPL-SCNC: 4.4 MMOL/L (ref 3.5–5.2)
PROT SERPL-MCNC: 7.8 G/DL (ref 6–8.5)
PSA SERPL-MCNC: 2.77 NG/ML (ref 0–4)
RBC # BLD AUTO: 3.16 10*6/MM3 (ref 4.14–5.8)
SODIUM SERPL-SCNC: 141 MMOL/L (ref 136–145)
WBC NRBC COR # BLD AUTO: 6.94 10*3/MM3 (ref 3.4–10.8)

## 2024-06-24 PROCEDURE — 36415 COLL VENOUS BLD VENIPUNCTURE: CPT | Performed by: STUDENT IN AN ORGANIZED HEALTH CARE EDUCATION/TRAINING PROGRAM

## 2024-06-24 PROCEDURE — 99214 OFFICE O/P EST MOD 30 MIN: CPT | Performed by: STUDENT IN AN ORGANIZED HEALTH CARE EDUCATION/TRAINING PROGRAM

## 2024-06-24 PROCEDURE — 84153 ASSAY OF PSA TOTAL: CPT | Performed by: STUDENT IN AN ORGANIZED HEALTH CARE EDUCATION/TRAINING PROGRAM

## 2024-06-24 PROCEDURE — 80053 COMPREHEN METABOLIC PANEL: CPT | Performed by: STUDENT IN AN ORGANIZED HEALTH CARE EDUCATION/TRAINING PROGRAM

## 2024-06-24 PROCEDURE — 85025 COMPLETE CBC W/AUTO DIFF WBC: CPT | Performed by: STUDENT IN AN ORGANIZED HEALTH CARE EDUCATION/TRAINING PROGRAM

## 2024-06-24 RX ORDER — TAMSULOSIN HYDROCHLORIDE 0.4 MG/1
1 CAPSULE ORAL DAILY
Qty: 30 CAPSULE | Refills: 5 | Status: SHIPPED | OUTPATIENT
Start: 2024-06-24

## 2024-06-24 RX ORDER — POLYETHYLENE GLYCOL 3350 17 G/17G
17 POWDER, FOR SOLUTION ORAL 2 TIMES DAILY
Qty: 510 G | Refills: 2 | Status: SHIPPED | OUTPATIENT
Start: 2024-06-24

## 2024-06-24 NOTE — PROGRESS NOTES
Transitional Care Follow Up Visit  Subjective     Walt Gao is a 59 y.o. male who presents for a transitional care management visit.    Within 48 business hours after discharge our office contacted him via telephone to coordinate his care and needs.      I reviewed and discussed the details of that call along with the discharge summary, hospital problems, inpatient lab results, inpatient diagnostic studies, and consultation reports with Walt.     Current outpatient and discharge medications have been reconciled for the patient.  Reviewed by: Duglas Lang MD          5/17/2024     6:30 PM   Date of TCM Phone Call   [facility]   Date of Admission 5/16/2024   Date of Discharge 5/17/2024   Discharge Disposition Home or Self Care     Risk for Readmission (LACE) No data recorded  Patient has been slowly regaining function since discharge.  He continues to have a left visual field deficit.  He has been gaining strength in his hand. He does note some forgetfulness but nothing severe.  He is very unsure of what medicines he is taking.  Went over medication list with him and he and his partner will compare this against his medicines at home.  He has been unable to keep up with the TID dosing of hydralazine    History of Present Illness   Course During Hospital Stay (taken from 6/6 discharge summary with review and edits):  Walt Gao is a 59 y.o. male with past medical history significant for nonischemic cardiomyopathy with systolic dysfunction, CKD stage III, diabetes, alcohol use, hypertension, with recent admission for GI bleed, was admitted for acute stroke after initially presenting with 1 to 2 days of left hand numbness, left facial droop and facial numbness.  Stroke neurology was consulted and imaging confirmed multiple acute and subacute infarcts.  Patient was treated with aspirin monotherapy and statin due to recent GI bleed.  He worked with therapy and was determined to  benefit from rehab.  Patient will need 2-week cardiac monitor on discharge with stroke neurology follow-up in 2 weeks and repeat MRI brain in 3 months.     Multiple Acute & Subacute Right MCA watershed territory Infarcts, atheroembolic vs cardioembolic  HTN  HLD  -neuro-stroke following: asa 81, high dose statin (Holding plavix due to recent gi bleed); 14 day cardiac monitor upon discharge; neuro-stroke follow up in 2 weeks  -pt/ot recommend inpt rehab  -will need repeat MRI brain w/ contrast in ~3 months to evaluate for expected evolution of multifocal infarcts (as neoplastic process cannot be ruled entirely out and needs to be monitored w/ f/u imaging)  -f/u stroke clinic 2 weeks     Etoh abuse  -DC CIWA due to lack of symptoms   -Continue thiamine and folate supplementation      Hx NICM  Chronic HFimEF   HTN  -previous EF 35% 2013 -->50% 2017  -Echo 6/3/24: LV EF 50%  -continue home amlodipine, jardiance. Hold diuretics and ARB due to [place]      [place] on Ckd 3 (baseline cr ~1.8-2.0)  -stable; continue to monitor  -Hold nephrotoxic agents      Borderline DM2, A1c 6.2  -on farxiga at home; jardiance here, ssi           Discharge Follow Up Recommendations for outpatient labs/diagnostics:  Follow-up with PCP in 1 week  Follow-up with stroke neurology in 2 weeks  Follow-up with cardiology as scheduled     The following portions of the patient's history were reviewed and updated as appropriate: allergies, current medications, past family history, past medical history, past social history, past surgical history, and problem list.    Review of Systems   Constitutional:  Positive for fatigue. Negative for fever.   Eyes:  Positive for visual disturbance.   Cardiovascular:  Negative for chest pain.   Neurological:  Positive for weakness. Negative for numbness.       Objective   /88   Pulse 60   Temp 98 °F (36.7 °C)   Wt 88.5 kg (195 lb)   SpO2 99%   BMI 28.78 kg/m²   Physical Exam  Constitutional:       General: He  is not in acute distress.     Appearance: He is not toxic-appearing.   Cardiovascular:      Rate and Rhythm: Normal rate and regular rhythm.      Heart sounds: No murmur heard.     No friction rub. No gallop.   Pulmonary:      Effort: Pulmonary effort is normal.      Breath sounds: Normal breath sounds.   Abdominal:      General: Abdomen is flat. There is no distension.   Skin:     General: Skin is warm and dry.   Neurological:      Mental Status: He is alert.      Comments: 5/5 strength in BUE  Visual field deficit in left lateral FOV on gross examination.    Psychiatric:         Mood and Affect: Mood normal.         Behavior: Behavior normal.       Assessment & Plan   1. Lower urinary tract symptoms (LUTS)  Likely due to BPH, will check PSA and start flomax  - tamsulosin (FLOMAX) 0.4 MG capsule 24 hr capsule; Take 1 capsule by mouth Daily.  Dispense: 30 capsule; Refill: 5  - PSA DIAGNOSTIC; Future    2. Chronic renal impairment, stage 3b  Chronic stable  -Check creatinine  - Comprehensive Metabolic Panel; Future    3. Essential hypertension  Chronic stable check labs  -Discontinue hydralazine and will ensure he is on valsartan as it was not on discharge med list  - CBC Auto Differential; Future    4. Cerebrovascular accident (CVA), unspecified mechanism  MCA with residual visual field and left hand/speech difficulty  Continue PT  FMLA paperwork filled out today  Follow up with neurology    5. Type 2 diabetes mellitus without complication, without long-term current use of insulin  Chronic stable  Should be on SGLT2i    6. Non-ischemic cardiomyopathy  Chronic stable  Appears compensated, will make sure he is back on his SGLT2i and ARB    7. Constipation, unspecified constipation type  Chronic uncontrolled, rx miralax BID  - polyethylene glycol (MIRALAX) 17 GM/SCOOP powder; Take 17 g by mouth 2 (Two) Times a Day.  Dispense: 510 g; Refill: 2

## 2024-06-26 ENCOUNTER — TELEPHONE (OUTPATIENT)
Age: 59
End: 2024-06-26
Payer: COMMERCIAL

## 2024-06-26 RX ORDER — VALSARTAN 80 MG/1
80 TABLET ORAL DAILY
Qty: 30 TABLET | Refills: 0 | Status: SHIPPED | OUTPATIENT
Start: 2024-06-26

## 2024-06-26 RX ORDER — VALSARTAN 80 MG/1
80 TABLET ORAL DAILY
Qty: 90 TABLET | OUTPATIENT
Start: 2024-06-26

## 2024-06-26 NOTE — TELEPHONE ENCOUNTER
Rx Refill Note  Requested Prescriptions     Pending Prescriptions Disp Refills    valsartan (DIOVAN) 80 MG tablet [Pharmacy Med Name: VALSARTAN 80MG TABLETS] 90 tablet      Sig: TAKE 1 TABLET BY MOUTH DAILY      Last office visit with prescribing clinician: 6/24/2024   Last telemedicine visit with prescribing clinician: Visit date not found   Next office visit with prescribing clinician: 8/26/2024                         Would you like a call back once the refill request has been completed: [] Yes [] No    If the office needs to give you a call back, can they leave a voicemail: [] Yes [] No    Kimberly Sanches MA  06/26/24, 15:31 EDT    duplicate

## 2024-06-26 NOTE — TELEPHONE ENCOUNTER
Called and discussed meds with patient. He is not taking his valsartan. Will refill this for the time being and check labs next visit.

## 2024-07-10 ENCOUNTER — HOSPITAL ENCOUNTER (OUTPATIENT)
Dept: DIABETES SERVICES | Facility: HOSPITAL | Age: 59
Discharge: HOME | End: 2024-07-10
Payer: COMMERCIAL

## 2024-07-10 NOTE — CONSULTS
"Walt Gao attended follow up outpatient diabetes management education class via phone for30 minute class. Educated on basic diabetes pathophysiology and how related to increased risk for complications, specifically stroke. Discussed s/sx of hyperglycemia and hypoglycemia and self management of both. Education provided on TLC diet, the plate method, as well as exercise recommendations. Stressed importance of ongoing, lifelong follow up with PCP for diabetes and other chronic health condition management. Stressed importance of taking all medications as prescribed. Patient was given opportunity to ask questions, and was also encouraged to pursue further diabetes education class with both RN and RD. Class educational materials were mailed to patient's home: [facility]'s \"Diabetes Basics\" booklet, as well as our contact information for any further questions or needs.   "

## 2024-07-15 RX ORDER — VALSARTAN 80 MG/1
80 TABLET ORAL DAILY
Qty: 90 TABLET | Refills: 1 | Status: SHIPPED | OUTPATIENT
Start: 2024-07-15

## 2024-07-22 ENCOUNTER — TELEPHONE (OUTPATIENT)
Age: 59
End: 2024-07-22
Payer: COMMERCIAL

## 2024-07-23 NOTE — TELEPHONE ENCOUNTER
Called [facility], they are needing medical records, last office note, next office visit, what is current treatment plan is, when the expected date is for return to work.

## 2024-07-29 NOTE — H&P (VIEW-ONLY)
Cardiology Outpatient Visit      Identification: Walt Gao is a 59 y.o. male who resides in [place], [place].     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  He had left hemiparesis and was found to have right MCA territory watershed CVA.  He is still recovering neurologically from this stroke.  He is no longer able to drive and is no longer working as he did as a .  During his hospitalization, echocardiogram showed normal LV function and negative bubble study.  He wore a Holter monitor after discharge which showed no atrial fibrillation.  He saw neurology who are concerned of atheroembolic or cardioembolic etiology.    He denies any angina or heart failure symptoms presently.  After leaving [facility], he was started on a whole new set of medications.  He did not bring his medication list with him to the office but my nurse called [facility] to reconcile meds.    Review of Systems   Cardiovascular:  Negative for chest pain, claudication, cyanosis, dyspnea on exertion, irregular heartbeat, leg swelling, near-syncope, orthopnea, palpitations, paroxysmal nocturnal dyspnea and syncope.   Respiratory:  Positive for sleep disturbances due to breathing, snoring and sputum production. Negative for cough, hemoptysis, shortness of breath and wheezing.        No Known Allergies      Current Outpatient Medications   Medication Instructions    acetaminophen (TYLENOL) 500 mg, Oral, Every 6 Hours PRN    aspirin 81 mg, Oral, Daily    atorvastatin (LIPITOR) 40 mg, Oral, Nightly    carvedilol (COREG) 6.25 mg, Oral, 2 Times Daily    dapagliflozin Propanediol (FARXIGA) 10 mg, Oral, Daily    escitalopram (LEXAPRO) 10 mg, Oral, Daily    loratadine (CLARITIN) 10 mg, Daily    Psyllium (METAMUCIL MULTIHEALTH FIBER) 51.7  "% packet 1 packet, Oral, Daily    spironolactone (ALDACTONE) 25 mg, Oral, Daily    tamsulosin (FLOMAX) 0.4 mg, Oral, Daily    torsemide (DEMADEX) 10 mg, Oral, Daily    valsartan (DIOVAN) 80 mg, Oral, Daily    vitamin B-12 (CYANOCOBALAMIN) 1,000 mcg, Daily         Objective     /98 (BP Location: Right arm, Patient Position: Sitting, Cuff Size: Adult)   Pulse 79   Ht 175.3 cm (69.02\")   Wt 91.2 kg (201 lb)   SpO2 99%   BMI 29.67 kg/m²       Constitutional:       Appearance: Healthy appearance.   Eyes:      General: No scleral icterus.  Neck:      Thyroid: No thyroid mass.      Vascular: No carotid bruit or JVD. JVD normal.   Pulmonary:      Effort: Pulmonary effort is normal.      Breath sounds: Normal breath sounds.   Cardiovascular:      Normal rate. Regular rhythm.      Murmurs: There is no murmur.      No gallop.    Edema:     Peripheral edema absent.   Skin:     General: Skin is warm. There is no cyanosis.   Neurological:      General: No focal deficit present.      Mental Status: Alert.   Psychiatric:         Attention and Perception: Attention normal.         Result Review  (reviewed with patient):            Lab Results   Component Value Date    GLUCOSE 106 (H) 06/24/2024    BUN 17 06/24/2024    CREATININE 1.67 (H) 06/24/2024    EGFR 46.9 (L) 06/24/2024    BCR 10.2 06/24/2024    K 4.4 06/24/2024    CO2 22.6 06/24/2024    CALCIUM 9.2 06/24/2024    ALBUMIN 4.5 06/24/2024    BILITOT 0.3 06/24/2024    AST 19 06/24/2024    ALT 28 06/24/2024     Lab Results   Component Value Date    WBC 6.94 06/24/2024    HGB 12.4 (L) 06/24/2024    HCT 34.9 (L) 06/24/2024    .4 (H) 06/24/2024     06/24/2024     Lab Results   Component Value Date    CHOL 197 06/03/2024    TRIG 90 06/03/2024    HDL 47 06/03/2024     (H) 06/03/2024     Lab Results   Component Value Date    HGBA1C 6.20 (H) 06/03/2024           Assessment     Diagnoses and all orders for this visit:    1. Cerebrovascular accident (CVA) due " to embolism of right middle cerebral artery (Primary)  Overview:  [facility] admission 6/2/2024 with left hemiparesis left facial droop with acute right MCA watershed infarcts  Echo (6/3/2024): LVEF 50%.  No significant valve abnormality.  Negative bubble study.  14 day monitor (6/3/2024): Average HR 68. No afib.  Suspected atheroembolic versus cardioembolic    Assessment & Plan:  Neurology suspects atheroembolic versus cardioembolic etiology  Holter monitor shows no atrial fibrillation  Recommend loop recorder placement to evaluate for occult atrial fibrillation    Orders:  -     Cancel: Case Request Cath Lab: Loop insertion  -     Loop Recorder Implant; Future  -     aspirin 81 MG EC tablet; Take 1 tablet by mouth Daily.  Dispense: 90 tablet; Refill: 3    2. Heart failure with improved ejection fraction (HFimpEF)  Overview:  Cardiac catheterization revealing nonobstructive CAD, 2013  LVEF 30% on heart catheterization, 2013  Echo (8/29/16): Moderate LVH. LVEF 23%. Left ventricular diastolic dysfunction (grade II) consistent with pseudonormalization.  Moderate mitral valve regurgitation  Echo (2/21/2017):  LVEF 50%  Echo (6/3/2024): LVEF 50%.  No significant valve abnormality.  Negative bubble study.    Assessment & Plan:  Stage C HFrEF with normalized LV systolic function  No heart failure symptoms presently  Continue valsartan, dapagliflozin, spironolactone  Resume HF approved beta-blocker with carvedilol 6.25 mg twice daily    Orders:  -     CBC (No Diff); Future  -     proBNP; Future  -     dapagliflozin Propanediol (Farxiga) 10 MG tablet; Take 10 mg by mouth Daily.  Dispense: 90 tablet; Refill: 3  -     spironolactone (ALDACTONE) 25 MG tablet; Take 1 tablet by mouth Daily.  Dispense: 90 tablet; Refill: 3  -     valsartan (DIOVAN) 80 MG tablet; Take 1 tablet by mouth Daily.  Dispense: 90 tablet; Refill: 3  -     torsemide (DEMADEX) 10 MG tablet; Take 1 tablet by mouth Daily.  Dispense: 90 tablet; Refill: 3  -      carvedilol (COREG) 6.25 MG tablet; Take 1 tablet by mouth 2 (Two) Times a Day.  Dispense: 180 tablet; Refill: 3    3. Chronic renal impairment, stage 3 (moderate), unspecified whether stage 3a or 3b CKD  -     Comprehensive Metabolic Panel; Future    4. Essential hypertension  Overview:  Target blood pressure <130/80 mmHg    Assessment & Plan:  Uncontrolled today but unclear what medications he has been taking  Start carvedilol 6.25 mg twice daily  Continue valsartan, torsemide, spironolactone      5. Hyperlipidemia LDL goal <70  Assessment & Plan:  Statin therapy indicated due to stroke  Resume atorvastatin at 40 mg daily    Orders:  -     Comprehensive Metabolic Panel; Future  -     LDL Cholesterol, Direct; Future  -     atorvastatin (LIPITOR) 40 MG tablet; Take 1 tablet by mouth Every Night.  Dispense: 90 tablet; Refill: 3    6. Type 2 diabetes mellitus without complication, without long-term current use of insulin  -     Hemoglobin A1c; Future          Plan   Obtain CMP, CBC, direct LDL, A1c  Start carvedilol 6.25 mg twice daily  Start aspirin 81 mg daily  Resume atorvastatin 40 mg daily  Schedule loop recorder placement to detect occult atrial fibrillation      Follow-up   Return in about 6 months (around 1/30/2025) for Follow-up with cardiology APRN/PA.        Larry Delgado MD, FACC, [facility]  7/30/2024

## 2024-07-30 ENCOUNTER — LAB (OUTPATIENT)
Dept: LAB | Facility: HOSPITAL | Age: 59
End: 2024-07-30
Payer: COMMERCIAL

## 2024-07-30 ENCOUNTER — OFFICE VISIT (OUTPATIENT)
Dept: CARDIOLOGY | Facility: CLINIC | Age: 59
End: 2024-07-30
Payer: COMMERCIAL

## 2024-07-30 VITALS
OXYGEN SATURATION: 99 % | WEIGHT: 201 LBS | HEART RATE: 79 BPM | BODY MASS INDEX: 29.77 KG/M2 | HEIGHT: 69 IN | DIASTOLIC BLOOD PRESSURE: 98 MMHG | SYSTOLIC BLOOD PRESSURE: 140 MMHG

## 2024-07-30 DIAGNOSIS — N18.30 CHRONIC RENAL IMPAIRMENT, STAGE 3 (MODERATE), UNSPECIFIED WHETHER STAGE 3A OR 3B CKD: Chronic | ICD-10-CM

## 2024-07-30 DIAGNOSIS — I10 ESSENTIAL HYPERTENSION: Chronic | ICD-10-CM

## 2024-07-30 DIAGNOSIS — E11.9 TYPE 2 DIABETES MELLITUS WITHOUT COMPLICATION, WITHOUT LONG-TERM CURRENT USE OF INSULIN: Chronic | ICD-10-CM

## 2024-07-30 DIAGNOSIS — I50.32 HEART FAILURE WITH IMPROVED EJECTION FRACTION (HFIMPEF): ICD-10-CM

## 2024-07-30 DIAGNOSIS — I63.411 CEREBROVASCULAR ACCIDENT (CVA) DUE TO EMBOLISM OF RIGHT MIDDLE CEREBRAL ARTERY: Primary | ICD-10-CM

## 2024-07-30 DIAGNOSIS — I50.22 CHRONIC SYSTOLIC CONGESTIVE HEART FAILURE: ICD-10-CM

## 2024-07-30 DIAGNOSIS — E78.5 HYPERLIPIDEMIA LDL GOAL <70: ICD-10-CM

## 2024-07-30 PROBLEM — R06.83 SNORING: Status: RESOLVED | Noted: 2024-01-25 | Resolved: 2024-07-30

## 2024-07-30 PROBLEM — K92.2 GI BLEED: Status: RESOLVED | Noted: 2024-05-16 | Resolved: 2024-07-30

## 2024-07-30 LAB
ALBUMIN SERPL-MCNC: 4.4 G/DL (ref 3.5–5.2)
ALBUMIN/GLOB SERPL: 1.2 G/DL
ALP SERPL-CCNC: 29 U/L (ref 39–117)
ALT SERPL W P-5'-P-CCNC: 18 U/L (ref 1–41)
ANION GAP SERPL CALCULATED.3IONS-SCNC: 10.5 MMOL/L (ref 5–15)
ARTICHOKE IGE QN: 102 MG/DL (ref 0–100)
AST SERPL-CCNC: 16 U/L (ref 1–40)
BILIRUB SERPL-MCNC: 0.3 MG/DL (ref 0–1.2)
BUN SERPL-MCNC: 16 MG/DL (ref 6–20)
BUN/CREAT SERPL: 8.1 (ref 7–25)
CALCIUM SPEC-SCNC: 9.3 MG/DL (ref 8.6–10.5)
CHLORIDE SERPL-SCNC: 103 MMOL/L (ref 98–107)
CO2 SERPL-SCNC: 25.5 MMOL/L (ref 22–29)
CREAT SERPL-MCNC: 1.98 MG/DL (ref 0.76–1.27)
DEPRECATED RDW RBC AUTO: 48.3 FL (ref 37–54)
EGFRCR SERPLBLD CKD-EPI 2021: 38.2 ML/MIN/1.73
ERYTHROCYTE [DISTWIDTH] IN BLOOD BY AUTOMATED COUNT: 12.3 % (ref 12.3–15.4)
GLOBULIN UR ELPH-MCNC: 3.8 GM/DL
GLUCOSE SERPL-MCNC: 110 MG/DL (ref 65–99)
HBA1C MFR BLD: 5.9 % (ref 4.8–5.6)
HCT VFR BLD AUTO: 37.9 % (ref 37.5–51)
HGB BLD-MCNC: 13.2 G/DL (ref 13–17.7)
MCH RBC QN AUTO: 37.1 PG (ref 26.6–33)
MCHC RBC AUTO-ENTMCNC: 34.8 G/DL (ref 31.5–35.7)
MCV RBC AUTO: 106.5 FL (ref 79–97)
NT-PROBNP SERPL-MCNC: 192 PG/ML (ref 0–900)
PLATELET # BLD AUTO: 259 10*3/MM3 (ref 140–450)
PMV BLD AUTO: 9.5 FL (ref 6–12)
POTASSIUM SERPL-SCNC: 4.7 MMOL/L (ref 3.5–5.2)
PROT SERPL-MCNC: 8.2 G/DL (ref 6–8.5)
RBC # BLD AUTO: 3.56 10*6/MM3 (ref 4.14–5.8)
SODIUM SERPL-SCNC: 139 MMOL/L (ref 136–145)
WBC NRBC COR # BLD AUTO: 5.83 10*3/MM3 (ref 3.4–10.8)

## 2024-07-30 PROCEDURE — 80053 COMPREHEN METABOLIC PANEL: CPT

## 2024-07-30 PROCEDURE — 85027 COMPLETE CBC AUTOMATED: CPT

## 2024-07-30 PROCEDURE — 83721 ASSAY OF BLOOD LIPOPROTEIN: CPT

## 2024-07-30 PROCEDURE — 83036 HEMOGLOBIN GLYCOSYLATED A1C: CPT

## 2024-07-30 PROCEDURE — 36415 COLL VENOUS BLD VENIPUNCTURE: CPT

## 2024-07-30 PROCEDURE — 83880 ASSAY OF NATRIURETIC PEPTIDE: CPT

## 2024-07-30 RX ORDER — DAPAGLIFLOZIN 10 MG/1
1 TABLET, FILM COATED ORAL DAILY
Qty: 90 TABLET | Refills: 3 | Status: SHIPPED | OUTPATIENT
Start: 2024-07-30

## 2024-07-30 RX ORDER — DAPAGLIFLOZIN 10 MG/1
TABLET, FILM COATED ORAL
COMMUNITY
End: 2024-07-30 | Stop reason: SDUPTHER

## 2024-07-30 RX ORDER — VALSARTAN 80 MG/1
80 TABLET ORAL DAILY
Qty: 90 TABLET | Refills: 3 | Status: SHIPPED | OUTPATIENT
Start: 2024-07-30

## 2024-07-30 RX ORDER — TORSEMIDE 10 MG/1
10 TABLET ORAL DAILY
Qty: 90 TABLET | Refills: 3 | Status: SHIPPED | OUTPATIENT
Start: 2024-07-30

## 2024-07-30 RX ORDER — SPIRONOLACTONE 25 MG/1
25 TABLET ORAL DAILY
Qty: 90 TABLET | Refills: 3 | Status: SHIPPED | OUTPATIENT
Start: 2024-07-30

## 2024-07-30 RX ORDER — TORSEMIDE 10 MG/1
10 TABLET ORAL DAILY
COMMUNITY
End: 2024-07-30 | Stop reason: SDUPTHER

## 2024-07-30 RX ORDER — CARVEDILOL 6.25 MG/1
6.25 TABLET ORAL 2 TIMES DAILY
Qty: 180 TABLET | Refills: 3 | Status: SHIPPED | OUTPATIENT
Start: 2024-07-30

## 2024-07-30 RX ORDER — ATORVASTATIN CALCIUM 40 MG/1
40 TABLET, FILM COATED ORAL NIGHTLY
Qty: 90 TABLET | Refills: 3 | Status: SHIPPED | OUTPATIENT
Start: 2024-07-30

## 2024-07-30 RX ORDER — ASPIRIN 81 MG/1
81 TABLET ORAL DAILY
Qty: 90 TABLET | Refills: 3 | Status: SHIPPED | OUTPATIENT
Start: 2024-07-30

## 2024-07-30 RX ORDER — SPIRONOLACTONE 25 MG/1
25 TABLET ORAL DAILY
COMMUNITY
End: 2024-07-30 | Stop reason: SDUPTHER

## 2024-07-30 NOTE — ASSESSMENT & PLAN NOTE
Neurology suspects atheroembolic versus cardioembolic etiology  Holter monitor shows no atrial fibrillation  Recommend loop recorder placement to evaluate for occult atrial fibrillation

## 2024-07-30 NOTE — PROGRESS NOTES
"Jacqui, please let him know that his diabetes is well-controlled.  His kidney function has declined slightly in the last month.  Bad cholesterol is \"okay\".  I would recommend he resume atorvastatin, however."

## 2024-07-30 NOTE — ASSESSMENT & PLAN NOTE
Stage C HFrEF with normalized LV systolic function  No heart failure symptoms presently  Continue valsartan, dapagliflozin, spironolactone  Resume HF approved beta-blocker with carvedilol 6.25 mg twice daily

## 2024-07-30 NOTE — ASSESSMENT & PLAN NOTE
Uncontrolled today but unclear what medications he has been taking  Start carvedilol 6.25 mg twice daily  Continue valsartan, torsemide, spironolactone

## 2024-07-31 ENCOUNTER — PREP FOR SURGERY (OUTPATIENT)
Dept: OTHER | Facility: HOSPITAL | Age: 59
End: 2024-07-31
Payer: COMMERCIAL

## 2024-07-31 DIAGNOSIS — I63.9 CRYPTOGENIC STROKE: Primary | ICD-10-CM

## 2024-07-31 RX ORDER — SODIUM CHLORIDE 0.9 % (FLUSH) 0.9 %
10 SYRINGE (ML) INJECTION AS NEEDED
OUTPATIENT
Start: 2024-07-31

## 2024-07-31 RX ORDER — SODIUM CHLORIDE 0.9 % (FLUSH) 0.9 %
10 SYRINGE (ML) INJECTION EVERY 12 HOURS SCHEDULED
OUTPATIENT
Start: 2024-07-31

## 2024-07-31 RX ORDER — ACETAMINOPHEN 325 MG/1
650 TABLET ORAL EVERY 4 HOURS PRN
OUTPATIENT
Start: 2024-07-31

## 2024-07-31 RX ORDER — SODIUM CHLORIDE 9 MG/ML
40 INJECTION, SOLUTION INTRAVENOUS AS NEEDED
OUTPATIENT
Start: 2024-07-31

## 2024-08-02 ENCOUNTER — TELEPHONE (OUTPATIENT)
Dept: CARDIOLOGY | Facility: CLINIC | Age: 59
End: 2024-08-02
Payer: COMMERCIAL

## 2024-08-26 ENCOUNTER — HOSPITAL ENCOUNTER (OUTPATIENT)
Dept: CARDIOLOGY | Facility: HOSPITAL | Age: 59
Discharge: HOME | End: 2024-08-26
Attending: INTERNAL MEDICINE | Admitting: INTERNAL MEDICINE
Payer: COMMERCIAL

## 2024-08-26 VITALS
WEIGHT: 200.6 LBS | HEART RATE: 67 BPM | OXYGEN SATURATION: 96 % | DIASTOLIC BLOOD PRESSURE: 107 MMHG | RESPIRATION RATE: 18 BRPM | HEIGHT: 69 IN | TEMPERATURE: 96.8 F | SYSTOLIC BLOOD PRESSURE: 163 MMHG | BODY MASS INDEX: 29.71 KG/M2

## 2024-08-26 DIAGNOSIS — I63.9 CRYPTOGENIC STROKE: ICD-10-CM

## 2024-08-26 DIAGNOSIS — I63.411 CEREBROVASCULAR ACCIDENT (CVA) DUE TO EMBOLISM OF RIGHT MIDDLE CEREBRAL ARTERY: ICD-10-CM

## 2024-08-26 PROCEDURE — 33285 INSJ SUBQ CAR RHYTHM MNTR: CPT | Performed by: INTERNAL MEDICINE

## 2024-08-26 PROCEDURE — 33285 INSJ SUBQ CAR RHYTHM MNTR: CPT

## 2024-08-26 PROCEDURE — 25010000002 CEFAZOLIN PER 500 MG: Performed by: NURSE PRACTITIONER

## 2024-08-26 PROCEDURE — C1764 EVENT RECORDER, CARDIAC: HCPCS

## 2024-08-26 PROCEDURE — 25010000002 LIDOCAINE 1 % SOLUTION: Performed by: INTERNAL MEDICINE

## 2024-08-26 RX ORDER — LIDOCAINE HYDROCHLORIDE 10 MG/ML
INJECTION, SOLUTION INFILTRATION; PERINEURAL
Status: COMPLETED | OUTPATIENT
Start: 2024-08-26 | End: 2024-08-26

## 2024-08-26 RX ORDER — SODIUM CHLORIDE 0.9 % (FLUSH) 0.9 %
10 SYRINGE (ML) INJECTION EVERY 12 HOURS SCHEDULED
Status: DISCONTINUED | OUTPATIENT
Start: 2024-08-26 | End: 2024-08-26 | Stop reason: HOSPADM

## 2024-08-26 RX ORDER — SODIUM CHLORIDE 9 MG/ML
40 INJECTION, SOLUTION INTRAVENOUS AS NEEDED
Status: DISCONTINUED | OUTPATIENT
Start: 2024-08-26 | End: 2024-08-26 | Stop reason: HOSPADM

## 2024-08-26 RX ORDER — ACETAMINOPHEN 325 MG/1
650 TABLET ORAL EVERY 4 HOURS PRN
Status: DISCONTINUED | OUTPATIENT
Start: 2024-08-26 | End: 2024-08-26 | Stop reason: HOSPADM

## 2024-08-26 RX ORDER — SODIUM CHLORIDE 0.9 % (FLUSH) 0.9 %
10 SYRINGE (ML) INJECTION AS NEEDED
Status: DISCONTINUED | OUTPATIENT
Start: 2024-08-26 | End: 2024-08-26 | Stop reason: HOSPADM

## 2024-08-26 RX ADMIN — SODIUM CHLORIDE 2000 MG: 900 INJECTION INTRAVENOUS at 13:10

## 2024-08-26 RX ADMIN — LIDOCAINE HYDROCHLORIDE 10 ML: 10 INJECTION, SOLUTION INFILTRATION; PERINEURAL at 13:08

## 2024-08-26 NOTE — INTERVAL H&P NOTE
H&P reviewed. The patient was examined and there are no changes to the H&P.      WONG Delgado MD [facility], [facility]  Interventional and General Cardiology

## 2024-08-30 ENCOUNTER — OFFICE VISIT (OUTPATIENT)
Age: 59
End: 2024-08-30
Payer: COMMERCIAL

## 2024-08-30 VITALS
HEART RATE: 99 BPM | SYSTOLIC BLOOD PRESSURE: 138 MMHG | DIASTOLIC BLOOD PRESSURE: 92 MMHG | BODY MASS INDEX: 29.92 KG/M2 | WEIGHT: 202 LBS | HEIGHT: 69 IN | OXYGEN SATURATION: 96 %

## 2024-08-30 DIAGNOSIS — E53.8 B12 DEFICIENCY: Primary | ICD-10-CM

## 2024-08-30 DIAGNOSIS — E11.9 TYPE 2 DIABETES MELLITUS WITHOUT COMPLICATION, WITHOUT LONG-TERM CURRENT USE OF INSULIN: Chronic | ICD-10-CM

## 2024-08-30 DIAGNOSIS — N40.1 BENIGN PROSTATIC HYPERPLASIA WITH LOWER URINARY TRACT SYMPTOMS, SYMPTOM DETAILS UNSPECIFIED: ICD-10-CM

## 2024-08-30 DIAGNOSIS — N18.32 CHRONIC RENAL IMPAIRMENT, STAGE 3B: Chronic | ICD-10-CM

## 2024-08-30 DIAGNOSIS — I63.411 CEREBROVASCULAR ACCIDENT (CVA) DUE TO EMBOLISM OF RIGHT MIDDLE CEREBRAL ARTERY: ICD-10-CM

## 2024-08-30 DIAGNOSIS — I10 ESSENTIAL HYPERTENSION: Chronic | ICD-10-CM

## 2024-08-30 PROCEDURE — 99214 OFFICE O/P EST MOD 30 MIN: CPT | Performed by: STUDENT IN AN ORGANIZED HEALTH CARE EDUCATION/TRAINING PROGRAM

## 2024-08-30 PROCEDURE — 96372 THER/PROPH/DIAG INJ SC/IM: CPT | Performed by: STUDENT IN AN ORGANIZED HEALTH CARE EDUCATION/TRAINING PROGRAM

## 2024-08-30 RX ORDER — LANOLIN ALCOHOL/MO/W.PET/CERES
2000 CREAM (GRAM) TOPICAL DAILY
Qty: 90 TABLET | Refills: 3 | Status: SHIPPED | OUTPATIENT
Start: 2024-08-30

## 2024-08-30 RX ORDER — CYANOCOBALAMIN 1000 UG/ML
1000 INJECTION, SOLUTION INTRAMUSCULAR; SUBCUTANEOUS
Status: SHIPPED | OUTPATIENT
Start: 2024-08-30

## 2024-08-30 RX ADMIN — CYANOCOBALAMIN 1000 MCG: 1000 INJECTION, SOLUTION INTRAMUSCULAR; SUBCUTANEOUS at 15:19

## 2024-08-30 NOTE — ASSESSMENT & PLAN NOTE
[facility] admission 6/2/2024 with left hemiparesis left facial droop with acute right MCA watershed infarcts  Echo (6/3/2024): LVEF 50%.  No significant valve abnormality.  Negative bubble study.  14 day monitor (6/3/2024): Average HR 68. No afib.  Suspected atheroembolic versus cardioembolic  Loop recorder just placed    Still doing physical therapy  Is still on short-term disability

## 2024-08-30 NOTE — ASSESSMENT & PLAN NOTE
Renal function is worsening  We will continue optimization of type 2 diabetes and hypertension  Will eventually need referral to nephrology

## 2024-08-30 NOTE — ASSESSMENT & PLAN NOTE
B12 level from June undetectable  B12 injection today and start on oral replacement  At next visit should recheck B12 level

## 2024-08-30 NOTE — ASSESSMENT & PLAN NOTE
Uncontrolled but improved   Provided with BP log today and advised to check BP daily and bring to his cardiology and PCP appointments   Continue torsemide, valsartan, spironolactone, Coreg  Unclear if his hypertension remains elevated due to medication noncompliance or if he needs additional workup such as checking for primary hyperaldosteronism

## 2024-09-03 ENCOUNTER — OFFICE VISIT (OUTPATIENT)
Dept: CARDIOLOGY | Facility: CLINIC | Age: 59
End: 2024-09-03
Payer: COMMERCIAL

## 2024-09-03 DIAGNOSIS — I63.40 CEREBROVASCULAR ACCIDENT (CVA) DUE TO EMBOLISM OF CEREBRAL ARTERY: Primary | ICD-10-CM

## 2024-09-03 NOTE — PROGRESS NOTES
2024    Walt Gao, : 1965      Fever: No    Temperature if indicated: N/A    Wound Location: Left Infraclavicular    Dressing Removed: Removed by MA/RN      Old Dressing Appearance:  Clean, dry    Wound Appearance: Redness []                  Drainage []                  Culture obtained []        Color: Clear     Consistency:  N/A     Amount: none         Gloves used, wound cleansed with sterile 4x4 and peroxide [x]       MD notified []     MD orders:     Antibiotic started []      If checked, type     Other:       Appointment for follow-up scheduled for 3 months post procedure []    Future Appointments   Date Time Provider Department Center   2024 10:00 AM [facility] 1 [facility]   2024 10:30 AM Tea Campuzano APRN [facility]   2024  1:00 PM Jessica Crowley APRN [facility]   2024  1:45 PM Duglas Lang MD [facility]   2025  2:20 PM Jessica Crowley APRN [facility]           Lynda Sylvester MA, 24      MD Signature:______________________________ Completed By/Date:

## 2024-09-06 ENCOUNTER — HOSPITAL ENCOUNTER (OUTPATIENT)
Dept: MRI IMAGING | Facility: HOSPITAL | Age: 59
Discharge: HOME | End: 2024-09-06
Payer: COMMERCIAL

## 2024-09-06 DIAGNOSIS — I63.9 CEREBROVASCULAR ACCIDENT (CVA), UNSPECIFIED MECHANISM: ICD-10-CM

## 2024-09-06 PROCEDURE — A9577 INJ MULTIHANCE: HCPCS

## 2024-09-06 PROCEDURE — 0 GADOBENATE DIMEGLUMINE 529 MG/ML SOLUTION

## 2024-09-06 PROCEDURE — 70553 MRI BRAIN STEM W/O & W/DYE: CPT

## 2024-09-06 RX ADMIN — GADOBENATE DIMEGLUMINE 20 ML: 529 INJECTION, SOLUTION INTRAVENOUS at 10:39

## 2024-09-16 ENCOUNTER — OFFICE VISIT (OUTPATIENT)
Dept: NEUROLOGY | Facility: CLINIC | Age: 59
End: 2024-09-16
Payer: COMMERCIAL

## 2024-09-16 VITALS
BODY MASS INDEX: 30.66 KG/M2 | OXYGEN SATURATION: 98 % | WEIGHT: 207 LBS | HEIGHT: 69 IN | SYSTOLIC BLOOD PRESSURE: 155 MMHG | DIASTOLIC BLOOD PRESSURE: 88 MMHG | TEMPERATURE: 98.6 F | HEART RATE: 76 BPM

## 2024-09-16 DIAGNOSIS — I10 ESSENTIAL HYPERTENSION: Chronic | ICD-10-CM

## 2024-09-16 DIAGNOSIS — E11.9 TYPE 2 DIABETES MELLITUS WITHOUT COMPLICATION, WITHOUT LONG-TERM CURRENT USE OF INSULIN: Chronic | ICD-10-CM

## 2024-09-16 DIAGNOSIS — E78.5 HYPERLIPIDEMIA LDL GOAL <70: ICD-10-CM

## 2024-09-16 DIAGNOSIS — I63.411 CEREBROVASCULAR ACCIDENT (CVA) DUE TO EMBOLISM OF RIGHT MIDDLE CEREBRAL ARTERY: Primary | ICD-10-CM

## 2024-10-10 ENCOUNTER — TELEPHONE (OUTPATIENT)
Age: 59
End: 2024-10-10
Payer: COMMERCIAL

## 2024-10-28 ENCOUNTER — TELEPHONE (OUTPATIENT)
Age: 59
End: 2024-10-28
Payer: COMMERCIAL

## 2024-10-28 PROCEDURE — 93298 REM INTERROG DEV EVAL SCRMS: CPT | Performed by: INTERNAL MEDICINE

## 2024-10-28 NOTE — TELEPHONE ENCOUNTER
Caller: [employer]MBERTO    Relationship: Other    Best call back number: [phone] EXT [phone]     What form or medical record are you requesting: ATTENDING PHYSICIAN STATEMENT    Who is requesting this form or medical record from you: [facility]    How would you like to receive the form or medical records (pick-up, mail, fax): FAX  If fax, what is the fax number: [phone]    Timeframe paperwork needed: AS SOON AS POSSIBLE    Additional notes: THIS FORM HAS BEEN FAXED TWICE NOW. 10/10/24 AND 10/11/24.

## 2024-10-29 NOTE — TELEPHONE ENCOUNTER
Called pt and made him aware there is a portion that is needing to be completed by his eye doctor and that Dr. Lang has finished his part. Pt stated he is going to come and get this paperwork today.    I also called [employer] and made them aware the portion that was not filled out needed to be completed by another provider.

## 2024-11-11 ENCOUNTER — TELEPHONE (OUTPATIENT)
Age: 59
End: 2024-11-11
Payer: COMMERCIAL

## 2024-11-11 NOTE — TELEPHONE ENCOUNTER
Once this is received can you reach out to the pt and let him know we do not do disability paperwork. He will need to go to the  for this.

## 2024-11-11 NOTE — TELEPHONE ENCOUNTER
Caller: [employer]    Relationship: Other    Best call back number: [phone]   EXTENSION: [id number]    What was the call regarding: THE [employer] IS CALLING AND STATES THEY FAXED OVER SOME PAPERWORK REGARDING THE PATIENTS LONG TERM DISABILITY ON 10/29/24. THEY WOULD LIKE TO VERIFY IF THIS WAS RECEIVED. THEIR FAX NUMBER [phone].

## 2024-11-25 NOTE — PROGRESS NOTES
"    Cardiology Outpatient Visit      Identification: Walt Gao is a 59 y.o. male who resides in [place], [place]    Reason for visit:  Non-ischemic cardiomyopathy      Subjective      Patient is a 59-year-old gentleman who returns today for follow-up of his nonischemic cardiomyopathy, chronic systolic heart failure and cardiac risk factors.  The patient underwent loop recorder implant in August for cryptogenic stroke.  Thus far no arrhythmia such as atrial fibrillation has been detected.  He has not had further signs or symptoms of TIA or CVA.  He denies chest pain, dyspnea, orthopnea, palpitations or syncope.  Blood pressures are not controlled.  He reports he has now been compliant with his Lipitor.    Review of Systems   Constitutional: Negative for malaise/fatigue.   Eyes:  Negative for vision loss in left eye and vision loss in right eye.   Cardiovascular: Negative.  Negative for chest pain, dyspnea on exertion, near-syncope, orthopnea, palpitations, paroxysmal nocturnal dyspnea and syncope.   Respiratory: Negative.     Musculoskeletal:  Negative for myalgias.   Neurological:  Negative for brief paralysis, excessive daytime sleepiness, focal weakness, numbness, paresthesias and weakness.   All other systems reviewed and are negative.      No Known Allergies      Current Outpatient Medications   Medication Instructions    aspirin 81 mg, Oral, Daily    atorvastatin (LIPITOR) 40 mg, Oral, Nightly    carvedilol (COREG) 12.5 mg, Oral, 2 Times Daily    dapagliflozin Propanediol (FARXIGA) 10 mg, Oral, Daily    spironolactone (ALDACTONE) 25 mg, Oral, Daily    tamsulosin (FLOMAX) 0.4 mg, Oral, Daily    torsemide (DEMADEX) 10 mg, Oral, Daily    valsartan (DIOVAN) 80 mg, Oral, Daily    vitamin B-12 (CYANOCOBALAMIN) 2,000 mcg, Oral, Daily         Objective     /90   Pulse 72   Ht 175.3 cm (69\")   Wt 95.6 kg (210 lb 12.8 oz)   SpO2 97%   BMI 31.13 kg/m²       Constitutional:       Appearance: Healthy " appearance. Well-developed.   Eyes:      General: Lids are normal. No scleral icterus.     Conjunctiva/sclera: Conjunctivae normal.   HENT:      Head: Normocephalic and atraumatic.   Neck:      Thyroid: No thyromegaly.      Vascular: No carotid bruit or JVD.   Pulmonary:      Effort: Pulmonary effort is normal.      Breath sounds: Normal breath sounds. No wheezing. No rhonchi. No rales.   Cardiovascular:      Normal rate. Regular rhythm.      Murmurs: There is no murmur.      No gallop.  No rub.   Pulses:     Intact distal pulses.   Edema:     Peripheral edema absent.   Abdominal:      General: There is no distension.      Palpations: Abdomen is soft. There is no abdominal mass.   Musculoskeletal:      Cervical back: Normal range of motion. Skin:     General: Skin is warm and dry.      Findings: No rash.   Neurological:      General: No focal deficit present.      Mental Status: Alert and oriented to person, place, and time.      Gait: Gait is intact.   Psychiatric:         Attention and Perception: Attention normal.         Mood and Affect: Mood normal.         Behavior: Behavior normal.         Result Review  (reviewed with patient):            Lab Results   Component Value Date    GLUCOSE 110 (H) 07/30/2024    BUN 16 07/30/2024    CREATININE 1.98 (H) 07/30/2024     07/30/2024    K 4.7 07/30/2024     07/30/2024    CALCIUM 9.3 07/30/2024    PROTEINTOT 8.2 07/30/2024    ALBUMIN 4.4 07/30/2024    ALT 18 07/30/2024    AST 16 07/30/2024    ALKPHOS 29 (L) 07/30/2024    BILITOT 0.3 07/30/2024    GLOB 3.8 07/30/2024    AGRATIO 1.2 07/30/2024    BCR 8.1 07/30/2024    ANIONGAP 10.5 07/30/2024    EGFR 38.2 (L) 07/30/2024     Lab Results   Component Value Date    WBC 5.83 07/30/2024    HGB 13.2 07/30/2024    HCT 37.9 07/30/2024    .5 (H) 07/30/2024     07/30/2024     Lab Results   Component Value Date    CHOL 197 06/03/2024    TRIG 90 06/03/2024    HDL 47 06/03/2024     (H) 07/30/2024     Lab  Results   Component Value Date    HGBA1C 5.90 (H) 07/30/2024           Assessment     Diagnoses and all orders for this visit:    1. Non-ischemic cardiomyopathy (Primary)  Overview:  Cardiac catheterization revealing nonobstructive CAD, 2013  LVEF 30% on heart catheterization, 2013  Echo (8/29/16): Moderate LVH. LVEF 23%. Left ventricular diastolic dysfunction (grade II) consistent with pseudonormalization.  Moderate mitral valve regurgitation  Echo (2/21/2017):  LVEF 50%    Assessment & Plan:  Stable NYHA class II symptoms  Continue Demadex 10 mg daily  Continue Farxiga 10 mg daily  Continue spironolactone 25 mg daily  Continue carvedilol 6.25 mg twice daily  Continue valsartan 80 mg daily          2. Heart failure with improved ejection fraction (HFimpEF)  Overview:  Cardiac catheterization revealing nonobstructive CAD, 2013  LVEF 30% on heart catheterization, 2013  Echo (8/29/16): Moderate LVH. LVEF 23%. Left ventricular diastolic dysfunction (grade II) consistent with pseudonormalization.  Moderate mitral valve regurgitation  Echo (2/21/2017):  LVEF 50%  Echo (6/3/2024): LVEF 50%.  No significant valve abnormality.  Negative bubble study.    Assessment & Plan:  Stable NYHA class II symptoms  Continue guideline directed medical therapy      3. Essential hypertension  Overview:  Target blood pressure <130/80 mmHg    Assessment & Plan:  BP not well-controlled  Increase carvedilol 12.5 mg twice daily      4. Hyperlipidemia LDL goal <70  Assessment & Plan:   Continue Lipitor 40 mg daily    Orders:  -     Lipid Panel; Future  -     Comprehensive Metabolic Panel; Future    5. Cryptogenic stroke  Overview:  [facility] admission 6/2/2024 with left hemiparesis left facial droop with acute right MCA watershed infarcts  Echo (6/3/2024): LVEF 50%.  No significant valve abnormality.  Negative bubble study.  14 day monitor (6/3/2024): Average HR 68. No afib.  Suspected atheroembolic versus cardioembolic  Loop recorder  implant 8/26/2024    Assessment & Plan:  No arrhythmias noted on loop recorder      Other orders  -     carvedilol (COREG) 12.5 MG tablet; Take 1 tablet by mouth 2 (Two) Times a Day.  Dispense: 180 tablet; Refill: 3          Plan   No signs or symptoms of angina and heart failure symptoms stable and compensated  No arrhythmias noted on loop recorder  Increase carvedilol 12.5 mg twice daily to achieve BP goal less than 130/80  Obtain CMP and lipid profile today      Follow-up   Return in about 6 months (around 5/26/2025), or if symptoms worsen or fail to improve, for Follow-up with Dr. Delgado next visit.        Jessica Crowley, AFTAB  11/26/2024

## 2024-11-26 ENCOUNTER — OFFICE VISIT (OUTPATIENT)
Dept: CARDIOLOGY | Facility: CLINIC | Age: 59
End: 2024-11-26
Payer: COMMERCIAL

## 2024-11-26 ENCOUNTER — LAB (OUTPATIENT)
Dept: LAB | Facility: HOSPITAL | Age: 59
End: 2024-11-26
Payer: COMMERCIAL

## 2024-11-26 VITALS
OXYGEN SATURATION: 97 % | HEIGHT: 69 IN | DIASTOLIC BLOOD PRESSURE: 90 MMHG | BODY MASS INDEX: 31.22 KG/M2 | WEIGHT: 210.8 LBS | HEART RATE: 72 BPM | SYSTOLIC BLOOD PRESSURE: 136 MMHG

## 2024-11-26 DIAGNOSIS — I50.32 HEART FAILURE WITH IMPROVED EJECTION FRACTION (HFIMPEF): ICD-10-CM

## 2024-11-26 DIAGNOSIS — I10 ESSENTIAL HYPERTENSION: Chronic | ICD-10-CM

## 2024-11-26 DIAGNOSIS — I63.9 CRYPTOGENIC STROKE: ICD-10-CM

## 2024-11-26 DIAGNOSIS — E78.5 HYPERLIPIDEMIA LDL GOAL <70: ICD-10-CM

## 2024-11-26 DIAGNOSIS — I42.8 NON-ISCHEMIC CARDIOMYOPATHY: Primary | ICD-10-CM

## 2024-11-26 LAB
ALBUMIN SERPL-MCNC: 4.4 G/DL (ref 3.5–5.2)
ALBUMIN/GLOB SERPL: 1.2 G/DL
ALP SERPL-CCNC: 34 U/L (ref 39–117)
ALT SERPL W P-5'-P-CCNC: 19 U/L (ref 1–41)
ANION GAP SERPL CALCULATED.3IONS-SCNC: 12.1 MMOL/L (ref 5–15)
AST SERPL-CCNC: 11 U/L (ref 1–40)
BILIRUB SERPL-MCNC: 0.4 MG/DL (ref 0–1.2)
BUN SERPL-MCNC: 28 MG/DL (ref 6–20)
BUN/CREAT SERPL: 12.7 (ref 7–25)
CALCIUM SPEC-SCNC: 10 MG/DL (ref 8.6–10.5)
CHLORIDE SERPL-SCNC: 105 MMOL/L (ref 98–107)
CHOLEST SERPL-MCNC: 149 MG/DL (ref 0–200)
CO2 SERPL-SCNC: 21.9 MMOL/L (ref 22–29)
CREAT SERPL-MCNC: 2.2 MG/DL (ref 0.76–1.27)
EGFRCR SERPLBLD CKD-EPI 2021: 33.7 ML/MIN/1.73
GLOBULIN UR ELPH-MCNC: 3.8 GM/DL
GLUCOSE SERPL-MCNC: 109 MG/DL (ref 65–99)
HDLC SERPL-MCNC: 40 MG/DL (ref 40–60)
LDLC SERPL CALC-MCNC: 89 MG/DL (ref 0–100)
LDLC/HDLC SERPL: 2.2 {RATIO}
POTASSIUM SERPL-SCNC: 4.9 MMOL/L (ref 3.5–5.2)
PROT SERPL-MCNC: 8.2 G/DL (ref 6–8.5)
SODIUM SERPL-SCNC: 139 MMOL/L (ref 136–145)
TRIGL SERPL-MCNC: 106 MG/DL (ref 0–150)
VLDLC SERPL-MCNC: 20 MG/DL (ref 5–40)

## 2024-11-26 PROCEDURE — 80053 COMPREHEN METABOLIC PANEL: CPT

## 2024-11-26 PROCEDURE — 80061 LIPID PANEL: CPT

## 2024-11-26 PROCEDURE — 36415 COLL VENOUS BLD VENIPUNCTURE: CPT

## 2024-11-26 RX ORDER — CARVEDILOL 12.5 MG/1
12.5 TABLET ORAL 2 TIMES DAILY
Qty: 180 TABLET | Refills: 3 | Status: SHIPPED | OUTPATIENT
Start: 2024-11-26

## 2024-11-26 NOTE — ASSESSMENT & PLAN NOTE
Stable NYHA class II symptoms  Continue Demadex 10 mg daily  Continue Farxiga 10 mg daily  Continue spironolactone 25 mg daily  Continue carvedilol 6.25 mg twice daily  Continue valsartan 80 mg daily

## 2024-11-27 ENCOUNTER — TELEPHONE (OUTPATIENT)
Dept: CARDIOLOGY | Facility: CLINIC | Age: 59
End: 2024-11-27
Payer: COMMERCIAL

## 2024-11-27 DIAGNOSIS — I10 ESSENTIAL HYPERTENSION: Primary | Chronic | ICD-10-CM

## 2024-11-27 NOTE — TELEPHONE ENCOUNTER
Discussed results and plan with patient. He verbalized understanding and will repeat lab work in 1 week.

## 2024-11-27 NOTE — TELEPHONE ENCOUNTER
----- Message from Jessica Crwoley sent at 11/27/2024  1:24 PM EST -----  Regarding: Call with results  He needs to stop his Demadex and have a repeat BMP in 1 week.  Please see if he is following nephrology  ----- Message -----  From: Lab, Background User  Sent: 11/26/2024   7:39 PM EST  To: AFTAB [place]

## 2024-11-28 PROCEDURE — 93298 REM INTERROG DEV EVAL SCRMS: CPT | Performed by: INTERNAL MEDICINE

## 2024-12-03 ENCOUNTER — TELEPHONE (OUTPATIENT)
Age: 59
End: 2024-12-03
Payer: COMMERCIAL

## 2024-12-03 NOTE — TELEPHONE ENCOUNTER
"Name: Walt Gao \"Shimon\"      Relationship: Self      Best Callback Number: [phone]       [employer] PROVIDED THE RELAY MESSAGE FROM THE OFFICE      PATIENT: VOICED UNDERSTANDING AND HAS NO FURTHER QUESTIONS AT THIS TIME    ADDITIONAL INFORMATION:   "

## 2024-12-06 ENCOUNTER — OFFICE VISIT (OUTPATIENT)
Age: 59
End: 2024-12-06
Payer: COMMERCIAL

## 2024-12-06 ENCOUNTER — LAB (OUTPATIENT)
Age: 59
End: 2024-12-06
Payer: COMMERCIAL

## 2024-12-06 VITALS
BODY MASS INDEX: 31.01 KG/M2 | SYSTOLIC BLOOD PRESSURE: 142 MMHG | WEIGHT: 209.4 LBS | OXYGEN SATURATION: 97 % | RESPIRATION RATE: 18 BRPM | HEIGHT: 69 IN | DIASTOLIC BLOOD PRESSURE: 86 MMHG | HEART RATE: 70 BPM

## 2024-12-06 DIAGNOSIS — I50.32 HEART FAILURE WITH IMPROVED EJECTION FRACTION (HFIMPEF): ICD-10-CM

## 2024-12-06 DIAGNOSIS — E11.43 TYPE 2 DIABETES MELLITUS WITH DIABETIC AUTONOMIC NEUROPATHY, WITHOUT LONG-TERM CURRENT USE OF INSULIN: Primary | ICD-10-CM

## 2024-12-06 DIAGNOSIS — I10 ESSENTIAL HYPERTENSION: Chronic | ICD-10-CM

## 2024-12-06 DIAGNOSIS — N18.32 CHRONIC RENAL IMPAIRMENT, STAGE 3B: Chronic | ICD-10-CM

## 2024-12-06 DIAGNOSIS — F43.0 ACUTE STRESS REACTION: ICD-10-CM

## 2024-12-06 LAB
ANION GAP SERPL CALCULATED.3IONS-SCNC: 12 MMOL/L (ref 5–15)
BUN SERPL-MCNC: 31 MG/DL (ref 6–20)
BUN/CREAT SERPL: 13.8 (ref 7–25)
CALCIUM SPEC-SCNC: 9.5 MG/DL (ref 8.6–10.5)
CHLORIDE SERPL-SCNC: 106 MMOL/L (ref 98–107)
CO2 SERPL-SCNC: 18 MMOL/L (ref 22–29)
CREAT SERPL-MCNC: 2.24 MG/DL (ref 0.76–1.27)
EGFRCR SERPLBLD CKD-EPI 2021: 32.9 ML/MIN/1.73
EXPIRATION DATE: ABNORMAL
GLUCOSE SERPL-MCNC: 99 MG/DL (ref 65–99)
HBA1C MFR BLD: 7 % (ref 4.5–5.7)
Lab: ABNORMAL
POTASSIUM SERPL-SCNC: 5.2 MMOL/L (ref 3.5–5.2)
SODIUM SERPL-SCNC: 136 MMOL/L (ref 136–145)

## 2024-12-06 PROCEDURE — 80048 BASIC METABOLIC PNL TOTAL CA: CPT | Performed by: NURSE PRACTITIONER

## 2024-12-06 PROCEDURE — 36415 COLL VENOUS BLD VENIPUNCTURE: CPT | Performed by: NURSE PRACTITIONER

## 2024-12-06 NOTE — PROGRESS NOTES
Office Note     Name: Walt Gao    : 1965     MRN: [id number]     Chief Complaint  Diabetes (Pt is here to f/u for diabetes. )    Subjective     History of Present Illness:  Walt Gao is a 59 y.o. male who presents today for Diabetes, HTN, stress, kidneys, stopped torsemide. Wants therapy    Past Medical History:   Past Medical History:   Diagnosis Date    CHF (congestive heart failure)     GI bleed 2024    NICM (nonischemic cardiomyopathy)     Nonischemic cardiomyopathy 2016    · Cardiac catheterization revealing nonobstructive CAD,  · LVEF 30% on heart catheterization,  · Echo (16): Moderate LVH. LVEF 23%. Left ventricular diastolic dysfunction (grade II) consistent with pseudonormalization.  Moderate mitral valve regurgitation · Echo (2017):  LVEF 50%    Stroke        Past Surgical History:   Past Surgical History:   Procedure Laterality Date    COLONOSCOPY N/A 2024    Procedure: COLONOSCOPY;  Surgeon: Duglas Zhou MD;  Location:  [facility];  Service: Gastroenterology;  Laterality: N/A;    ENDOSCOPY N/A 2024    Procedure: ESOPHAGOGASTRODUODENOSCOPY;  Surgeon: Duglas Zhou MD;  Location:  [facility];  Service: Gastroenterology;  Laterality: N/A;    HERNIA REPAIR      as child       Immunizations:   Immunization History   Administered Date(s) Administered    Flu Vaccine Intradermal Quad 18-64YR 10/13/2014, 10/02/2020    Flucelvax Quad Vial =>4yrs 10/05/2019    Fluzone Quad >6mos (Multi-dose) 2016    Influenza Injectable Mdck Pf Quad 10/02/2020    Influenza Seasonal Injectable 10/13/2014    Influenza, Unspecified 2023    Pneumococcal Conjugate 20-Valent (PCV20) 2024    Shingrix 2021    flucelvax quad pfs =>4 YRS 10/01/2020        Medications:     Current Outpatient Medications:     aspirin 81 MG EC tablet, Take 1 tablet by mouth Daily., Disp: 90 tablet, Rfl: 3    atorvastatin (LIPITOR) 40 MG tablet, Take 1  tablet by mouth Every Night., Disp: 90 tablet, Rfl: 3    carvedilol (COREG) 12.5 MG tablet, Take 1 tablet by mouth 2 (Two) Times a Day., Disp: 180 tablet, Rfl: 3    dapagliflozin Propanediol (Farxiga) 10 MG tablet, Take 10 mg by mouth Daily., Disp: 90 tablet, Rfl: 3    spironolactone (ALDACTONE) 25 MG tablet, Take 1 tablet by mouth Daily., Disp: 90 tablet, Rfl: 3    tamsulosin (FLOMAX) 0.4 MG capsule 24 hr capsule, Take 1 capsule by mouth Daily., Disp: 30 capsule, Rfl: 5    torsemide (DEMADEX) 10 MG tablet, Take 1 tablet by mouth Daily., Disp: 90 tablet, Rfl: 3    valsartan (DIOVAN) 80 MG tablet, Take 1 tablet by mouth Daily., Disp: 90 tablet, Rfl: 3    vitamin B-12 (CYANOCOBALAMIN) 1000 MCG tablet, Take 2 tablets by mouth Daily., Disp: 90 tablet, Rfl: 3    Current Facility-Administered Medications:     cyanocobalamin injection 1,000 mcg, 1,000 mcg, Intramuscular, Q28 Days, Adalgisa Moore MD, 1,000 mcg at 08/30/24 1519    Allergies:   No Known Allergies    Family History:   Family History   Problem Relation Age of Onset    Coronary artery disease Other     Hypertension Mother     Diabetes Father     Hypertension Father     Kidney disease Father     Hyperlipidemia Father     Other Sister         pre diabetes    Other Maternal Grandmother         unknown    Other Maternal Grandfather         unknown    Heart disease Paternal Grandmother     Heart disease Paternal Grandfather     No Known Problems Sister     No Known Problems Daughter     No Known Problems Son        Social History:   Social History     Socioeconomic History    Marital status: Single   Tobacco Use    Smoking status: Never     Passive exposure: Never    Smokeless tobacco: Never   Vaping Use    Vaping status: Never Used   Substance and Sexual Activity    Alcohol use: Not Currently     Alcohol/week: 6.0 standard drinks of alcohol     Types: 6 Cans of beer per week     Comment: 6 beers a week    Drug use: Never    Sexual activity: Yes      "Partners: Female     Birth control/protection: Post-menopausal     Comment: 1 partner in the last 9 years         Objective     Vital Signs  /86 (BP Location: Left arm, Patient Position: Sitting, Cuff Size: Large Adult)   Pulse 70   Resp 18   Ht 175.3 cm (69.02\")   Wt 95 kg (209 lb 6.4 oz)   SpO2 97%   BMI 30.91 kg/m²   Estimated body mass index is 30.91 kg/m² as calculated from the following:    Height as of this encounter: 175.3 cm (69.02\").    Weight as of this encounter: 95 kg (209 lb 6.4 oz).            Physical Exam  Constitutional:       General: He is not in acute distress.     Appearance: He is not toxic-appearing.   Cardiovascular:      Rate and Rhythm: Normal rate and regular rhythm.      Heart sounds: No murmur heard.     No friction rub. No gallop.   Pulmonary:      Effort: Pulmonary effort is normal.      Breath sounds: Normal breath sounds.   Abdominal:      General: Abdomen is flat. There is no distension.   Skin:     General: Skin is warm and dry.   Neurological:      Mental Status: He is alert.   Psychiatric:         Mood and Affect: Mood normal.         Behavior: Behavior normal.          Assessment and Plan     1. Type 2 diabetes mellitus with diabetic autonomic neuropathy, without long-term current use of insulin  Chronic stable  A1c 7.0, continue farxiga  - POC Glycosylated Hemoglobin (Hb A1C)    2. Acute stress reaction  Partner is planned for heart transplant at .  Patient under tremendous stress, struggling to navigate disability with their health issues  -Provided numbers again for disability, refer to . Refer for therapy  - Ambulatory Referral to Social Care Services (Amb Case Mgmt)  - Ambulatory Referral to Behavioral Health    3. Heart failure with improved ejection fraction (HFimpEF)  Chronic stable   Stopped torsemide due to labs  Check creatinine today, hold off on torsemide unless CR continued to worsen. May need to Inc other meds for BP    4. Essential " hypertension  Chronic uncontrolled  Continue Coreg, valsartan, spironolactone    5. Chronic renal impairment, stage 3b  Chronic uncontrolled  Check labs today, holding torsemide for now       Counseling was given to patient for the following topics: instructions for management.    Follow Up  Return in about 3 months (around 3/6/2025).    MD TRACE [facility]IMARY [facility]  [address] [address] [phone]

## 2024-12-09 ENCOUNTER — REFERRAL TRIAGE (OUTPATIENT)
Age: 59
End: 2024-12-09
Payer: COMMERCIAL

## 2024-12-11 ENCOUNTER — PATIENT OUTREACH (OUTPATIENT)
Age: 59
End: 2024-12-11
Payer: COMMERCIAL

## 2024-12-11 ENCOUNTER — TELEPHONE (OUTPATIENT)
Dept: CARDIOLOGY | Facility: CLINIC | Age: 59
End: 2024-12-11
Payer: COMMERCIAL

## 2024-12-11 NOTE — OUTREACH NOTE
Social Work Assessment  Questions/Answers      Flowsheet Row Most Recent Value   Referral Source physician   Reason for Consult community resources   Preferred Language English   Advance Care Planning Reviewed no concerns identified   In the past 12 months has the electric, gas, oil, or water company threatened to shut off services in your home? No   Medications independent   Meal Preparation independent   Housekeeping independent   Laundry independent   Shopping independent   Do you want help finding or keeping work or a job? I do not need or want help        SDOH updated and reviewed with the patient during this program:  --     Disabilities: At Risk (8/26/2024)    Disabilities     Concentrating, Remembering, or Making Decisions Difficulty: no     Doing Errands Independently Difficulty: yes      Financial Resource Strain: Low Risk  (12/11/2024)    Overall Financial Resource Strain (CARDIA)     Difficulty of Paying Living Expenses: Not very hard      --     Food Insecurity: No Food Insecurity (12/11/2024)    Hunger Vital Sign     Worried About Running Out of Food in the Last Year: Never true     Ran Out of Food in the Last Year: Never true      --     Housing Stability: Unknown (8/26/2024)    Housing Stability     Current Living Arrangements: apartment      --     Transportation Needs: No Transportation Needs (12/11/2024)    PRAPARE - Transportation     Lack of Transportation (Medical): No     Lack of Transportation (Non-Medical): No      --     Utilities: Not At Risk (12/11/2024)    [facility] Utilities     Threatened with loss of utilities: No     Patient Outreach    RAOUL contacted pt after receiving a provider referral. Pt reports that he is applying for long term disability through work and he brought a form to his appointment but his doctor was not able to fill it out. RAOUL reviewed under media. Insurance company is requesting medical records. RAOUL provided education on requesting medical records and set pt the link to the  medical records release form and contact numbers. Pt denies any other questions or needs for assistance at this time, SW encouraged him to call if any needs arise.  Francisca MEADE -   Ambulatory Case Management    12/11/2024, 13:48 EST

## 2024-12-11 NOTE — TELEPHONE ENCOUNTER
He reports he stopped his Demadex 2 weeks ago. These labs are with him off the demadex. I will take it off his list. He doesn't check his BP at home. I did confirm with him the rest of his medication list in the chart is correct.

## 2024-12-11 NOTE — TELEPHONE ENCOUNTER
----- Message from Jessica Crowley sent at 12/9/2024  2:08 PM EST -----  Please call and have him stop his Demadex.  And will need a repeat BMP.  If creatinine remains elevated will need to stop spironolactone.  See if he is seeing nephrology and if not he needs consultation  ----- Message -----  From: Lab, Background User  Sent: 12/6/2024  11:23 PM EST  To: AFTAB Hammond

## 2024-12-16 ENCOUNTER — OFFICE VISIT (OUTPATIENT)
Dept: NEUROLOGY | Facility: CLINIC | Age: 59
End: 2024-12-16
Payer: COMMERCIAL

## 2024-12-16 VITALS
BODY MASS INDEX: 32.44 KG/M2 | TEMPERATURE: 98 F | DIASTOLIC BLOOD PRESSURE: 88 MMHG | HEART RATE: 78 BPM | WEIGHT: 219 LBS | OXYGEN SATURATION: 96 % | HEIGHT: 69 IN | SYSTOLIC BLOOD PRESSURE: 136 MMHG

## 2024-12-16 DIAGNOSIS — E78.5 HYPERLIPIDEMIA LDL GOAL <70: ICD-10-CM

## 2024-12-16 DIAGNOSIS — Z86.73 HISTORY OF STROKE: Primary | ICD-10-CM

## 2024-12-16 DIAGNOSIS — E11.9 TYPE 2 DIABETES MELLITUS WITHOUT COMPLICATION, WITHOUT LONG-TERM CURRENT USE OF INSULIN: Chronic | ICD-10-CM

## 2024-12-16 DIAGNOSIS — I10 ESSENTIAL HYPERTENSION: Chronic | ICD-10-CM

## 2024-12-16 PROCEDURE — 99212 OFFICE O/P EST SF 10 MIN: CPT | Performed by: NURSE PRACTITIONER

## 2024-12-16 NOTE — PROGRESS NOTES
"    Follow Up Office Visit      Encounter Date: 2024   Patient Name: Walt Gao  : 1965   MRN: [id number]   PCP: Duglas Lang MD    Referring Provider: No ref. provider found     Chief Complaint:    Chief Complaint   Patient presents with    Follow-up       History of Present Illness: Initial visit per Jc Rojas PA:  Walt Gao is a 59 y.o. male with known medical diagnoses of recent GI bleed (2024), hypertension, CHF, CKD stage IIIa, hyperlipidemia, and alcohol use who presents to [facility] following a recent hospital admission for acute right MCA watershed infarcts.  Patient initially presented to [facility] ED 2024 with 2-day history of left hemiparesis, left facial droop, and left hemisensory deficits.  Today, patient states left face weakness and left hand weakness are still present but have improved since hospital discharge.  He notes no sensory deficits since discharge.  Patient denies recurrence of GI bleeding or associated symptoms to include nausea, vomiting, diarrhea, dark/tarry stool.  Patient currently at inpatient rehab at [facility].  Patient anticipates discharge from [facility] and return to home tomorrow.  Patient is unclear what his PT/OT plan is after discharge from [facility].      Clinic visit 2024: Patient presents to clinic today for routine follow up accompanied by his partner. He denies any new or worsening stroke like symptoms. He reports he is unsure of what medications he takes daily. He reports he just \"takes them all\". I have encouraged him to call our office once he is home to confirm what medications he has. Per prior documentation it appears that patient was on DAPT for 21 days and transitioned to aspirin monotherapy. He has completed his repeat MRI with and without contrast which did not show evidence of neoplasm. He also has had a loop recorder placed.     Clinic visit 2024: Mr. Gao presents to clinic today.  He " reports that he has been doing relatively well.  He has been compliant with his aspirin and atorvastatin.  He reports that his kidney function is recently worsened and cardiology has taken him off of one of his medications.  He denies any new stroke symptoms.  He continues with numbness in his left ring finger and mild left hand weakness.  He denies any new GI bleeding.  He does report a dull, intermittent right sided headache that he notes more in the evening.  He does not check his blood pressure at home.  He states that he does not stay well-hydrated.  Reviewed CTA with him at length including R M2 stenosis and the importance of staying well-hydrated.  Blood pressure parameters discussed as well.  He will start magnesium 400 mg daily for headache prophylaxis.      Subjective        I have reviewed and the following portions of the patient's history were updated as appropriate: past family history, past medical history, past social history, past surgical history and problem list.    Medications:     Current Outpatient Medications:     aspirin 81 MG EC tablet, Take 1 tablet by mouth Daily., Disp: 90 tablet, Rfl: 3    atorvastatin (LIPITOR) 40 MG tablet, Take 1 tablet by mouth Every Night., Disp: 90 tablet, Rfl: 3    carvedilol (COREG) 12.5 MG tablet, Take 1 tablet by mouth 2 (Two) Times a Day., Disp: 180 tablet, Rfl: 3    dapagliflozin Propanediol (Farxiga) 10 MG tablet, Take 10 mg by mouth Daily., Disp: 90 tablet, Rfl: 3    spironolactone (ALDACTONE) 25 MG tablet, Take 1 tablet by mouth Daily., Disp: 90 tablet, Rfl: 3    tamsulosin (FLOMAX) 0.4 MG capsule 24 hr capsule, Take 1 capsule by mouth Daily., Disp: 30 capsule, Rfl: 5    valsartan (DIOVAN) 80 MG tablet, Take 1 tablet by mouth Daily., Disp: 90 tablet, Rfl: 3    vitamin B-12 (CYANOCOBALAMIN) 1000 MCG tablet, Take 2 tablets by mouth Daily., Disp: 90 tablet, Rfl: 3    Current Facility-Administered Medications:     cyanocobalamin injection 1,000 mcg, 1,000 mcg,  "Intramuscular, Q28 Days, Adalgisa Moore MD, 1,000 mcg at 08/30/24 1519    Allergies:   No Known Allergies    Objective     Physical Exam:   Vital Signs:   Vitals:    12/16/24 0946   BP: 136/88   Pulse: 78   Temp: 98 °F (36.7 °C)   SpO2: 96%   Weight: 99.3 kg (219 lb)   Height: 175.3 cm (69.02\")     Body mass index is 32.33 kg/m².    Physical Exam  Constitutional:       General: He is not in acute distress.  HENT:      Head: Normocephalic and atraumatic.   Eyes:      Extraocular Movements: Extraocular movements intact.      Pupils: Pupils are equal, round, and reactive to light.   Cardiovascular:      Rate and Rhythm: Normal rate and regular rhythm.   Pulmonary:      Effort: Pulmonary effort is normal. No respiratory distress.   Abdominal:      General: There is no distension.      Palpations: Abdomen is soft.   Musculoskeletal:         General: Normal range of motion.      Cervical back: Normal range of motion and neck supple.   Skin:     General: Skin is warm and dry.      Capillary Refill: Capillary refill takes less than 2 seconds.   Neurological:      Mental Status: He is alert and oriented to person, place, and time.      Sensory: Sensory deficit present.   Psychiatric:         Speech: Speech normal.       Neurological Exam  Mental Status  Alert. Oriented to person, place, time and situation. Oriented to person, place, and time. Speech is normal. Language is fluent with no aphasia.    Cranial Nerves  CN II: Visual acuity is normal. Visual fields full to confrontation.  CN III, IV, VI: Extraocular movements intact bilaterally. Pupils equal round and reactive to light bilaterally.  CN V: Facial sensation is normal.  CN VII: Full and symmetric facial movement.  CN XI: Shoulder shrug strength is normal.  CN XII: Tongue midline without atrophy or fasciculations.    Motor    Mild left  weakness, no limb drift.    Sensory  Light touch abnormality: Sensory deficit in the left ring finger in the left " hand.     Coordination  Right: Finger-to-nose normal. Rapid alternating movement normal.Left: Finger-to-nose normal. Rapid alternating movement abnormality:  Mild dysdiadochokinesia in the left hand.    Gait  Casual gait is normal including stance, stride, and arm swing.             Person Administering Scale: AFTAB Márquez    1a  Level of consciousness: 0=alert; keenly responsive   1b. LOC questions:  0=Answers both questions correctly   1c. LOC commands: 0=Performs both tasks correctly   2.  Best Gaze: 0=normal   3.  Visual: 0=No visual loss   4. Facial Palsy: 0=Normal symmetric movement   5a.  Motor left arm: 0=No drift, limb holds 90 (or 45) degrees for full 10 seconds   5b.  Motor right arm: 0=No drift, limb holds 90 (or 45) degrees for full 10 seconds   6a. motor left le=No drift, limb holds 90 (or 45) degrees for full 10 seconds   6b  Motor right le=No drift, limb holds 90 (or 45) degrees for full 10 seconds   7. Limb Ataxia: 0=Absent   8.  Sensory: 1=Mild to moderate sensory loss; patient feels pinprick is less sharp or is dull on the affected side; there is a loss of superficial pain with pinprick but patient is aware He is being touched   9. Best Language:  0=No aphasia, normal   10. Dysarthria: 0=Normal   11. Extinction and Inattention: 0=No abnormality    Total:   1         Assessment / Plan      Assessment/Plan:   Diagnoses and all orders for this visit:    1. History of stroke (Primary)  - Continue aspirin 81 mg daily  - Continue atorvastatin 40 mg daily  - Blood pressure goals 110-140/80.  Avoid low blood pressure due to narrowing in your right MCA artery in your brain.  Check daily and keep a log for primary care provider  - Heart healthy/diabetic diet  - Increase activity as tolerated  - Return to the ED with any additional stroke symptoms  -- Stay well hydrated  -- Start Magnesium 400mg daily for headache prevention  - Stop drinking alcohol   2. Type 2 diabetes mellitus without  complication, without long-term current use of insulin  - Serum glucose goals less than 140  - Further management per PCP    3. Hyperlipidemia LDL goal <70  - Continue atorvastatin 40 mg daily  - Heart healthy/diabetic diet  - Further management per primary care provider    4.  Hypertension  - Blood pressure goals 110-140/80.    - Avoid low blood pressure due to narrowing in your right MCA artery in your brain.  - Check daily and keep a log for primary care provider     Discussed the importance of medication compliance Aspirin 81mg daily and Atorvastatin 40mg nightly and lifestyle modifications adequate control of blood pressure, adequate control of cholesterol (goal LDL <70), adequate control of glucose (<140, A1c goal <7), ETOH cessation, increased physical activity, and implementation of healthy diet to help reduce the risk of future cerebrovascular events.  Also discussed the signs symptoms that would warrant the patient return back to the emergency department including unilateral weakness, unilateral numbness, visual disturbances, loss of balance, speech difficulties, and/or a sudden severe headache.  Patient verbalized understanding.      Follow Up:   Return in about 3 months (around 3/16/2025).    AFTAB Márquez, [facility]

## 2024-12-16 NOTE — PATIENT INSTRUCTIONS
- Continue aspirin 81 mg daily  - Continue atorvastatin 40 mg daily  - Blood pressure goals 110-140/80.  Avoid low blood pressure due to narrowing in your right MCA artery in your brain.  Check daily and keep a log for primary care provider  - Heart healthy/diabetic diet  - Increase activity as tolerated  - Return to the ED with any additional stroke symptoms  -- Stay well hydrated  -- Start Magnesium 400mg daily for headache prevention

## 2025-02-04 ENCOUNTER — TELEPHONE (OUTPATIENT)
Age: 60
End: 2025-02-04

## 2025-02-04 NOTE — TELEPHONE ENCOUNTER
Client arrived for today's session and his history of presenting problem is that he suffered a stroke this past year and since then has been struggling with issues with his vision, coordination, and short-term memory struggles.  Clinician recommended he follow up with his PCP for neuro consult.  Clinician provided psychoeducation on a possible referral to make contact with [facility] to see if they could either assist him in additional occupational therapy, work on memory, or refer to a location that would better suit his reported needs.  Clinician provided all necessary telephone numbers and addresses for possible referrals.

## 2025-02-24 DIAGNOSIS — R39.9 LOWER URINARY TRACT SYMPTOMS (LUTS): ICD-10-CM

## 2025-02-24 RX ORDER — TAMSULOSIN HYDROCHLORIDE 0.4 MG/1
1 CAPSULE ORAL DAILY
Qty: 90 CAPSULE | Refills: 1 | Status: SHIPPED | OUTPATIENT
Start: 2025-02-24

## 2025-02-25 DIAGNOSIS — R39.9 LOWER URINARY TRACT SYMPTOMS (LUTS): ICD-10-CM

## 2025-02-25 RX ORDER — TAMSULOSIN HYDROCHLORIDE 0.4 MG/1
1 CAPSULE ORAL DAILY
Qty: 30 CAPSULE | OUTPATIENT
Start: 2025-02-25

## 2025-02-25 NOTE — TELEPHONE ENCOUNTER
Rx Refill Note  Requested Prescriptions     Pending Prescriptions Disp Refills    tamsulosin (FLOMAX) 0.4 MG capsule 24 hr capsule [Pharmacy Med Name: TAMSULOSIN 0.4MG CAPSULES] 30 capsule      Sig: TAKE 1 CAPSULE BY MOUTH DAILY      Last office visit with prescribing clinician: 12/6/2024   Last telemedicine visit with prescribing clinician: Visit date not found   Next office visit with prescribing clinician: 3/6/2025     Deborah Caicedo MA  02/25/25, 11:13 EST    RX declined, medication was sent yesterday 02/24/25.

## 2025-03-06 ENCOUNTER — LAB (OUTPATIENT)
Age: 60
End: 2025-03-06
Payer: COMMERCIAL

## 2025-03-06 ENCOUNTER — OFFICE VISIT (OUTPATIENT)
Age: 60
End: 2025-03-06
Payer: COMMERCIAL

## 2025-03-06 VITALS
OXYGEN SATURATION: 93 % | DIASTOLIC BLOOD PRESSURE: 90 MMHG | HEART RATE: 75 BPM | BODY MASS INDEX: 31.52 KG/M2 | SYSTOLIC BLOOD PRESSURE: 130 MMHG | WEIGHT: 212.8 LBS | HEIGHT: 69 IN

## 2025-03-06 DIAGNOSIS — I50.32 HEART FAILURE WITH IMPROVED EJECTION FRACTION (HFIMPEF): ICD-10-CM

## 2025-03-06 DIAGNOSIS — N18.32 STAGE 3B CHRONIC KIDNEY DISEASE: ICD-10-CM

## 2025-03-06 DIAGNOSIS — E11.9 TYPE 2 DIABETES MELLITUS WITHOUT COMPLICATION, WITHOUT LONG-TERM CURRENT USE OF INSULIN: Primary | Chronic | ICD-10-CM

## 2025-03-06 DIAGNOSIS — L03.313 CELLULITIS OF CHEST WALL: ICD-10-CM

## 2025-03-06 DIAGNOSIS — E11.21 DIABETIC NEPHROPATHY ASSOCIATED WITH TYPE 2 DIABETES MELLITUS: ICD-10-CM

## 2025-03-06 DIAGNOSIS — I10 ESSENTIAL HYPERTENSION: ICD-10-CM

## 2025-03-06 LAB
ALBUMIN SERPL-MCNC: 4.1 G/DL (ref 3.5–5.2)
ALBUMIN UR-MCNC: 23.2 MG/DL
ALBUMIN/GLOB SERPL: 1 G/DL
ALP SERPL-CCNC: 34 U/L (ref 39–117)
ALT SERPL W P-5'-P-CCNC: 19 U/L (ref 1–41)
ANION GAP SERPL CALCULATED.3IONS-SCNC: 11.9 MMOL/L (ref 5–15)
AST SERPL-CCNC: 17 U/L (ref 1–40)
BILIRUB SERPL-MCNC: 0.5 MG/DL (ref 0–1.2)
BUN SERPL-MCNC: 28 MG/DL (ref 6–20)
BUN/CREAT SERPL: 8.7 (ref 7–25)
CALCIUM SPEC-SCNC: 9.5 MG/DL (ref 8.6–10.5)
CHLORIDE SERPL-SCNC: 105 MMOL/L (ref 98–107)
CO2 SERPL-SCNC: 22.1 MMOL/L (ref 22–29)
CREAT SERPL-MCNC: 3.22 MG/DL (ref 0.76–1.27)
CREAT UR-MCNC: 151.5 MG/DL
EGFRCR SERPLBLD CKD-EPI 2021: 21.3 ML/MIN/1.73
EXPIRATION DATE: ABNORMAL
GLOBULIN UR ELPH-MCNC: 4.1 GM/DL
GLUCOSE SERPL-MCNC: 132 MG/DL (ref 65–99)
HBA1C MFR BLD: 7.2 % (ref 4.5–5.7)
Lab: ABNORMAL
MICROALBUMIN/CREAT UR: 153.1 MG/G (ref 0–29)
POTASSIUM SERPL-SCNC: 4.8 MMOL/L (ref 3.5–5.2)
PROT SERPL-MCNC: 8.2 G/DL (ref 6–8.5)
SODIUM SERPL-SCNC: 139 MMOL/L (ref 136–145)

## 2025-03-06 PROCEDURE — 82043 UR ALBUMIN QUANTITATIVE: CPT | Performed by: STUDENT IN AN ORGANIZED HEALTH CARE EDUCATION/TRAINING PROGRAM

## 2025-03-06 PROCEDURE — 36415 COLL VENOUS BLD VENIPUNCTURE: CPT | Performed by: STUDENT IN AN ORGANIZED HEALTH CARE EDUCATION/TRAINING PROGRAM

## 2025-03-06 PROCEDURE — 80053 COMPREHEN METABOLIC PANEL: CPT | Performed by: STUDENT IN AN ORGANIZED HEALTH CARE EDUCATION/TRAINING PROGRAM

## 2025-03-06 PROCEDURE — 82570 ASSAY OF URINE CREATININE: CPT | Performed by: STUDENT IN AN ORGANIZED HEALTH CARE EDUCATION/TRAINING PROGRAM

## 2025-03-06 RX ORDER — MUPIROCIN 20 MG/G
1 OINTMENT TOPICAL 3 TIMES DAILY
Qty: 30 G | Refills: 1 | Status: SHIPPED | OUTPATIENT
Start: 2025-03-06

## 2025-03-06 NOTE — PROGRESS NOTES
Office Note     Name: Walt Gao    : 1965     MRN: [id number]     Chief Complaint  Diabetes (Follow up) and Hypertension    Subjective     History of Present Illness:  Walt Gao is a 59 y.o. male who presents today for follow-up of chronic medical conditions.  He has no new complaints.  He does have worsening kidney disease which we are watching closely.  No other concerns at this time.      Past Medical History:   Past Medical History:   Diagnosis Date   • CHF (congestive heart failure)    • GI bleed 2024   • NICM (nonischemic cardiomyopathy)    • Nonischemic cardiomyopathy 2016    · Cardiac catheterization revealing nonobstructive CAD,  · LVEF 30% on heart catheterization,  · Echo (16): Moderate LVH. LVEF 23%. Left ventricular diastolic dysfunction (grade II) consistent with pseudonormalization.  Moderate mitral valve regurgitation · Echo (2017):  LVEF 50%   • Stroke        Past Surgical History:   Past Surgical History:   Procedure Laterality Date   • COLONOSCOPY N/A 2024    Procedure: COLONOSCOPY;  Surgeon: Duglas Zhou MD;  Location:  [facility];  Service: Gastroenterology;  Laterality: N/A;   • ENDOSCOPY N/A 2024    Procedure: ESOPHAGOGASTRODUODENOSCOPY;  Surgeon: Duglas Zhou MD;  Location:  [facility];  Service: Gastroenterology;  Laterality: N/A;   • HERNIA REPAIR      as child       Immunizations:   Immunization History   Administered Date(s) Administered   • Flu Vaccine Intradermal Quad 18-64YR 10/13/2014, 10/02/2020   • Flucelvax Quad Vial =>4yrs 10/05/2019   • Fluzone Quad >6mos (Multi-dose) 2016   • Influenza Injectable Mdck Pf Quad 10/02/2020   • Influenza Seasonal Injectable 10/13/2014   • Influenza, Unspecified 2023   • Pneumococcal Conjugate 20-Valent (PCV20) 2024   • Shingrix 2021   • flucelvax quad pfs =>4 YRS 10/01/2020        Medications:     Current Outpatient Medications:   •  aspirin  81 MG EC tablet, Take 1 tablet by mouth Daily., Disp: 90 tablet, Rfl: 3  •  atorvastatin (LIPITOR) 40 MG tablet, Take 1 tablet by mouth Every Night., Disp: 90 tablet, Rfl: 3  •  carvedilol (COREG) 12.5 MG tablet, Take 1 tablet by mouth 2 (Two) Times a Day., Disp: 180 tablet, Rfl: 3  •  dapagliflozin Propanediol (Farxiga) 10 MG tablet, Take 10 mg by mouth Daily., Disp: 90 tablet, Rfl: 3  •  spironolactone (ALDACTONE) 25 MG tablet, Take 1 tablet by mouth Daily., Disp: 90 tablet, Rfl: 3  •  tamsulosin (FLOMAX) 0.4 MG capsule 24 hr capsule, Take 1 capsule by mouth Daily., Disp: 90 capsule, Rfl: 1  •  valsartan (DIOVAN) 80 MG tablet, Take 1 tablet by mouth Daily., Disp: 90 tablet, Rfl: 3  •  vitamin B-12 (CYANOCOBALAMIN) 1000 MCG tablet, Take 2 tablets by mouth Daily., Disp: 90 tablet, Rfl: 3  •  mupirocin (BACTROBAN) 2 % ointment, Apply 1 Application topically to the appropriate area as directed 3 (Three) Times a Day., Disp: 30 g, Rfl: 1    Current Facility-Administered Medications:   •  cyanocobalamin injection 1,000 mcg, 1,000 mcg, Intramuscular, Q28 Days, Adalgisa Moore MD, 1,000 mcg at 08/30/24 1519    Allergies:   No Known Allergies    Family History:   Family History   Problem Relation Age of Onset   • Coronary artery disease Other    • Hypertension Mother    • Diabetes Father    • Hypertension Father    • Kidney disease Father    • Hyperlipidemia Father    • Other Sister         pre diabetes   • Other Maternal Grandmother         unknown   • Other Maternal Grandfather         unknown   • Heart disease Paternal Grandmother    • Heart disease Paternal Grandfather    • No Known Problems Sister    • No Known Problems Daughter    • No Known Problems Son        Social History:   Social History     Socioeconomic History   • Marital status: Single   Tobacco Use   • Smoking status: Never     Passive exposure: Never   • Smokeless tobacco: Never   Vaping Use   • Vaping status: Never Used   Substance and  "Sexual Activity   • Alcohol use: Not Currently     Alcohol/week: 6.0 standard drinks of alcohol     Types: 6 Cans of beer per week     Comment: 6 beers a week   • Drug use: Never   • Sexual activity: Yes     Partners: Female     Birth control/protection: Post-menopausal     Comment: 1 partner in the last 9 years         Objective     Vital Signs  /90 (BP Location: Right arm, Patient Position: Sitting, Cuff Size: Adult)   Pulse 75   Ht 175.3 cm (69.02\")   Wt 96.5 kg (212 lb 12.8 oz)   SpO2 93%   BMI 31.41 kg/m²   Estimated body mass index is 31.41 kg/m² as calculated from the following:    Height as of this encounter: 175.3 cm (69.02\").    Weight as of this encounter: 96.5 kg (212 lb 12.8 oz).          Physical Exam  Constitutional:       General: He is not in acute distress.     Appearance: He is not toxic-appearing.   Cardiovascular:      Rate and Rhythm: Normal rate and regular rhythm.      Heart sounds: No murmur heard.     No friction rub. No gallop.   Pulmonary:      Effort: Pulmonary effort is normal.      Breath sounds: Normal breath sounds.   Abdominal:      General: Abdomen is flat. There is no distension.   Skin:     General: Skin is warm and dry.   Neurological:      Mental Status: He is alert.   Psychiatric:         Mood and Affect: Mood normal.         Behavior: Behavior normal.          Assessment and Plan     1. Type 2 diabetes mellitus without complication, without long-term current use of insulin  Chronic, stable  -A1c is 7.1%  -Continue Farxiga  - POC Glycosylated Hemoglobin (Hb A1C)  - Microalbumin / Creatinine Urine Ratio - Urine, Clean Catch; Future    2. Diabetic nephropathy associated with type 2 diabetes mellitus  Chronic, controlled  -Refer to nephrology at , recheck labs today, patient is on an ARB, MRA, SGLT2 and is avoiding NSAIDs and remaining hydrated  - Ambulatory Referral to Nephrology    3. Heart failure with improved ejection fraction (HFimpEF)  Chronic, " stable  -Symptoms are stable no new issues with this, continue current medications  - Comprehensive Metabolic Panel; Future    4. Cellulitis of chest wall  Possibly related to cardiac monitor, however he does not have any systemic symptoms and there is only a minor area of redness, will prescribe mupirocin  - mupirocin (BACTROBAN) 2 % ointment; Apply 1 Application topically to the appropriate area as directed 3 (Three) Times a Day.  Dispense: 30 g; Refill: 1    5. Stage 3b chronic kidney disease  See problem #2    6. Essential hypertension  Chronic, stable  -Continue valsartan, Coreg, spironolactone         Counseling was given to patient for the following topics: instructions for management.    Follow Up  Return in about 3 months (around 6/6/2025).    Linus Lang MD  [facility]  [address] [address] [phone]

## 2025-03-07 ENCOUNTER — TELEPHONE (OUTPATIENT)
Age: 60
End: 2025-03-07
Payer: COMMERCIAL

## 2025-03-07 ENCOUNTER — APPOINTMENT (OUTPATIENT)
Dept: GENERAL RADIOLOGY | Facility: HOSPITAL | Age: 60
End: 2025-03-07
Payer: COMMERCIAL

## 2025-03-07 ENCOUNTER — APPOINTMENT (OUTPATIENT)
Dept: CT IMAGING | Facility: HOSPITAL | Age: 60
End: 2025-03-07
Payer: COMMERCIAL

## 2025-03-07 ENCOUNTER — HOSPITAL ENCOUNTER (EMERGENCY)
Facility: HOSPITAL | Age: 60
Discharge: HOME | End: 2025-03-07
Attending: EMERGENCY MEDICINE
Payer: COMMERCIAL

## 2025-03-07 VITALS
DIASTOLIC BLOOD PRESSURE: 115 MMHG | OXYGEN SATURATION: 99 % | HEIGHT: 70 IN | SYSTOLIC BLOOD PRESSURE: 156 MMHG | WEIGHT: 212 LBS | RESPIRATION RATE: 18 BRPM | BODY MASS INDEX: 30.35 KG/M2 | HEART RATE: 68 BPM | TEMPERATURE: 99.6 F

## 2025-03-07 DIAGNOSIS — Z86.79 HISTORY OF CHF (CONGESTIVE HEART FAILURE): ICD-10-CM

## 2025-03-07 DIAGNOSIS — Z02.89 REFERRED BY HEALTH CARE PROFESSIONAL: ICD-10-CM

## 2025-03-07 DIAGNOSIS — N18.9 CHRONIC KIDNEY DISEASE, UNSPECIFIED CKD STAGE: Primary | ICD-10-CM

## 2025-03-07 LAB
ALBUMIN SERPL-MCNC: 4.2 G/DL (ref 3.5–5.2)
ALBUMIN/GLOB SERPL: 1.3 G/DL
ALP SERPL-CCNC: 29 U/L (ref 39–117)
ALT SERPL W P-5'-P-CCNC: 17 U/L (ref 1–41)
ANION GAP SERPL CALCULATED.3IONS-SCNC: 13 MMOL/L (ref 5–15)
AST SERPL-CCNC: 18 U/L (ref 1–40)
BACTERIA UR QL AUTO: NORMAL /HPF
BASOPHILS # BLD AUTO: 0.03 10*3/MM3 (ref 0–0.2)
BASOPHILS NFR BLD AUTO: 0.4 % (ref 0–1.5)
BILIRUB SERPL-MCNC: 0.3 MG/DL (ref 0–1.2)
BILIRUB UR QL STRIP: NEGATIVE
BUN SERPL-MCNC: 22 MG/DL (ref 6–20)
BUN/CREAT SERPL: 11.7 (ref 7–25)
CALCIUM SPEC-SCNC: 8.2 MG/DL (ref 8.6–10.5)
CHLORIDE SERPL-SCNC: 108 MMOL/L (ref 98–107)
CK SERPL-CCNC: 126 U/L (ref 20–200)
CLARITY UR: CLEAR
CO2 SERPL-SCNC: 20 MMOL/L (ref 22–29)
COLOR UR: YELLOW
CREAT SERPL-MCNC: 1.88 MG/DL (ref 0.76–1.27)
D-LACTATE SERPL-SCNC: 0.9 MMOL/L (ref 0.5–2)
DEPRECATED RDW RBC AUTO: 48.3 FL (ref 37–54)
EGFRCR SERPLBLD CKD-EPI 2021: 40.6 ML/MIN/1.73
EOSINOPHIL # BLD AUTO: 0.14 10*3/MM3 (ref 0–0.4)
EOSINOPHIL NFR BLD AUTO: 1.9 % (ref 0.3–6.2)
ERYTHROCYTE [DISTWIDTH] IN BLOOD BY AUTOMATED COUNT: 13.3 % (ref 12.3–15.4)
GLOBULIN UR ELPH-MCNC: 3.2 GM/DL
GLUCOSE SERPL-MCNC: 88 MG/DL (ref 65–99)
GLUCOSE UR STRIP-MCNC: ABNORMAL MG/DL
HCT VFR BLD AUTO: 45.8 % (ref 37.5–51)
HGB BLD-MCNC: 15.2 G/DL (ref 13–17.7)
HGB UR QL STRIP.AUTO: NEGATIVE
HYALINE CASTS UR QL AUTO: NORMAL /LPF
IMM GRANULOCYTES # BLD AUTO: 0.01 10*3/MM3 (ref 0–0.05)
IMM GRANULOCYTES NFR BLD AUTO: 0.1 % (ref 0–0.5)
KETONES UR QL STRIP: ABNORMAL
LEUKOCYTE ESTERASE UR QL STRIP.AUTO: NEGATIVE
LIPASE SERPL-CCNC: 124 U/L (ref 13–60)
LYMPHOCYTES # BLD AUTO: 2.55 10*3/MM3 (ref 0.7–3.1)
LYMPHOCYTES NFR BLD AUTO: 34.9 % (ref 19.6–45.3)
MCH RBC QN AUTO: 32.6 PG (ref 26.6–33)
MCHC RBC AUTO-ENTMCNC: 33.2 G/DL (ref 31.5–35.7)
MCV RBC AUTO: 98.3 FL (ref 79–97)
MONOCYTES # BLD AUTO: 0.56 10*3/MM3 (ref 0.1–0.9)
MONOCYTES NFR BLD AUTO: 7.7 % (ref 5–12)
NEUTROPHILS NFR BLD AUTO: 4.02 10*3/MM3 (ref 1.7–7)
NEUTROPHILS NFR BLD AUTO: 55 % (ref 42.7–76)
NITRITE UR QL STRIP: NEGATIVE
NRBC BLD AUTO-RTO: 0 /100 WBC (ref 0–0.2)
PH UR STRIP.AUTO: 5.5 [PH] (ref 5–8)
PLATELET # BLD AUTO: 253 10*3/MM3 (ref 140–450)
PMV BLD AUTO: 10.1 FL (ref 6–12)
POTASSIUM SERPL-SCNC: 4.5 MMOL/L (ref 3.5–5.2)
PROT SERPL-MCNC: 7.4 G/DL (ref 6–8.5)
PROT UR QL STRIP: ABNORMAL
RBC # BLD AUTO: 4.66 10*6/MM3 (ref 4.14–5.8)
RBC # UR STRIP: NORMAL /HPF
REF LAB TEST METHOD: NORMAL
SODIUM SERPL-SCNC: 141 MMOL/L (ref 136–145)
SP GR UR STRIP: 1.02 (ref 1–1.03)
SQUAMOUS #/AREA URNS HPF: NORMAL /HPF
UROBILINOGEN UR QL STRIP: ABNORMAL
WBC # UR STRIP: NORMAL /HPF
WBC NRBC COR # BLD AUTO: 7.31 10*3/MM3 (ref 3.4–10.8)

## 2025-03-07 PROCEDURE — 36415 COLL VENOUS BLD VENIPUNCTURE: CPT

## 2025-03-07 PROCEDURE — 82550 ASSAY OF CK (CPK): CPT | Performed by: NURSE PRACTITIONER

## 2025-03-07 PROCEDURE — 74176 CT ABD & PELVIS W/O CONTRAST: CPT

## 2025-03-07 PROCEDURE — 85025 COMPLETE CBC W/AUTO DIFF WBC: CPT | Performed by: NURSE PRACTITIONER

## 2025-03-07 PROCEDURE — 83690 ASSAY OF LIPASE: CPT | Performed by: NURSE PRACTITIONER

## 2025-03-07 PROCEDURE — 83605 ASSAY OF LACTIC ACID: CPT | Performed by: NURSE PRACTITIONER

## 2025-03-07 PROCEDURE — 99284 EMERGENCY DEPT VISIT MOD MDM: CPT

## 2025-03-07 PROCEDURE — 80053 COMPREHEN METABOLIC PANEL: CPT | Performed by: NURSE PRACTITIONER

## 2025-03-07 PROCEDURE — 81001 URINALYSIS AUTO W/SCOPE: CPT | Performed by: NURSE PRACTITIONER

## 2025-03-07 PROCEDURE — 71045 X-RAY EXAM CHEST 1 VIEW: CPT

## 2025-03-07 RX ORDER — SODIUM CHLORIDE 0.9 % (FLUSH) 0.9 %
10 SYRINGE (ML) INJECTION AS NEEDED
Status: DISCONTINUED | OUTPATIENT
Start: 2025-03-07 | End: 2025-03-08 | Stop reason: HOSPADM

## 2025-03-07 NOTE — TELEPHONE ENCOUNTER
CALLED AND LEFT DETAILED VOICEMAIL AND RELAYED THE FOLLOWING:    Please call and let patient he needs to go to the ER for his kidney numbers.  His kidney function has significantly worsened since last visit and he may need fluids or iv diuretics but needs to be looked at in the ER.  If he goes outside of [facility] he can tell them his creatinine increased from 2.2 to 3.2.

## 2025-03-07 NOTE — TELEPHONE ENCOUNTER
"Name: Walt Gao \"Shimon\"      Relationship: Self      Best Callback Number: [phone]       HUB PROVIDED THE RELAY MESSAGE FROM THE OFFICE      PATIENT: DIRECTION GIVEN AND TRANSFERRED TO OFFICE UPON RESISTING ER DIRECTION    "

## 2025-03-07 NOTE — ED PROVIDER NOTES
Subjective   History of Present Illness  Pleasant patient with a history of CHF and cardiomyopathy (the ER for reported increase in his renal function of 3 with his creatinine.  Typically runs around 2.  Does have a history of hypertension hyperlipidemia.  He does take Flomax and spironolactone.  He does not have a nephrologist.  He is awaiting a follow-up to a new appointment with  nephrology.  He sent here by his regular doctor for further evaluation.        Review of Systems    Past Medical History:   Diagnosis Date    CHF (congestive heart failure)     GI bleed 05/16/2024    NICM (nonischemic cardiomyopathy)     Nonischemic cardiomyopathy 08/29/2016    · Cardiac catheterization revealing nonobstructive CAD, 2013 · LVEF 30% on heart catheterization, 2013 · Echo (8/29/16): Moderate LVH. LVEF 23%. Left ventricular diastolic dysfunction (grade II) consistent with pseudonormalization.  Moderate mitral valve regurgitation · Echo (2/21/2017):  LVEF 50%    Stroke        No Known Allergies    Past Surgical History:   Procedure Laterality Date    COLONOSCOPY N/A 5/17/2024    Procedure: COLONOSCOPY;  Surgeon: Duglas Zhou MD;  Location:  [facility];  Service: Gastroenterology;  Laterality: N/A;    ENDOSCOPY N/A 5/16/2024    Procedure: ESOPHAGOGASTRODUODENOSCOPY;  Surgeon: Duglas Zhou MD;  Location:  [facility];  Service: Gastroenterology;  Laterality: N/A;    HERNIA REPAIR      as child       Family History   Problem Relation Age of Onset    Coronary artery disease Other     Hypertension Mother     Diabetes Father     Hypertension Father     Kidney disease Father     Hyperlipidemia Father     Other Sister         pre diabetes    Other Maternal Grandmother         unknown    Other Maternal Grandfather         unknown    Heart disease Paternal Grandmother     Heart disease Paternal Grandfather     No Known Problems Sister     No Known Problems Daughter     No Known Problems Son        Social History      Socioeconomic History    Marital status: Single   Tobacco Use    Smoking status: Never     Passive exposure: Never    Smokeless tobacco: Never   Vaping Use    Vaping status: Never Used   Substance and Sexual Activity    Alcohol use: Not Currently     Alcohol/week: 6.0 standard drinks of alcohol     Types: 6 Cans of beer per week     Comment: 6 beers a week    Drug use: Never    Sexual activity: Yes     Partners: Female     Birth control/protection: Post-menopausal     Comment: 1 partner in the last 9 years           Objective   Physical Exam  Constitutional:       Appearance: He is well-developed.   HENT:      Head: Normocephalic and atraumatic.      Right Ear: External ear normal.      Left Ear: External ear normal.      Nose: Nose normal.   Eyes:      Conjunctiva/sclera: Conjunctivae normal.      Pupils: Pupils are equal, round, and reactive to light.   Cardiovascular:      Rate and Rhythm: Normal rate and regular rhythm.      Heart sounds: Normal heart sounds.   Pulmonary:      Effort: Pulmonary effort is normal.      Breath sounds: Normal breath sounds.   Abdominal:      General: Bowel sounds are normal.      Palpations: Abdomen is soft.   Musculoskeletal:         General: Normal range of motion.      Cervical back: Normal range of motion and neck supple.   Skin:     General: Skin is warm and dry.   Neurological:      Mental Status: He is alert and oriented to person, place, and time.   Psychiatric:         Behavior: Behavior normal.         Judgment: Judgment normal.         Procedures           ED Course  ED Course as of 03/07/25 2208   Fri Mar 07, 2025   1736 Pleasant patient with a broad differential including kidney stone.  Urinary tract infection.  Urinary obstruction.  Will get lab work.  CK.  Lactic acid urinalysis.  Will get a scan without contrast. [BERKLEY]   1935 Chest x-ray interpreted by myself showing no acute process [JM]   1935 CAT scan abdomen pelvis interpreted by myself showing no acute  process [JM]   2205 Reassuring repeat creatinine of 1.8.  Patient resting comfortably for duration of ER stay.  As she tells me he feels pretty good.  Given these reassuring findings on Sourav fine to go home.  He would like that.  He has not seen [facility] as they had difficulty arranging the follow-up.  They are still trying to do this.  I will give [facility] referral to [facility] as well to see if he can get into see them more timely.  Patient resting company for duration of ER stay.  All thankful and agreeable. [JM]   2206 Pt thankful and agreeable with tx poc.  We discussed the signs and symptoms of worsening condition as well as what should bring them back to the emergency department along with appropriate follow-up.  Well aware of the ss of worsening condition.     If advanced imaging was ordered, please see the radiology interpretation of the CT scan MRI or ultrasound for the official reading.   [JM]      ED Course User Index  [JM] Duglas Disla APRN                                             XR Chest 1 View   Final Result   Impression:   No radiographic evidence of acute cardiopulmonary disease.            Electronically Signed: Ralph Graff     3/7/2025 7:09 PM EST     Workstation ID: [id number]      CT Abdomen Pelvis Without Contrast   Final Result   Impression:   1.Stable chronic findings without acute process identified.                  Electronically Signed: Kevin Oneill MD     3/7/2025 6:38 PM EST     Workstation ID: [id number]                  Medical Decision Making  Problems Addressed:  Chronic kidney disease, unspecified CKD stage: complicated acute illness or injury  History of CHF (congestive heart failure): complicated acute illness or injury  Referred by health care professional: complicated acute illness or injury    Amount and/or Complexity of Data Reviewed  External Data Reviewed: labs, radiology and ECG.  Labs: ordered. Decision-making details documented in ED  Course.  Radiology: ordered. Decision-making details documented in ED Course.  ECG/medicine tests:  Decision-making details documented in ED Course.    Risk  Prescription drug management.        Final diagnoses:   Chronic kidney disease, unspecified CKD stage   Referred by health care professional   History of CHF (congestive heart failure)       ED Disposition  ED Disposition       ED Disposition   Discharge    Condition   Stable    Comment   --               Duglas Lang MD  [address]  [facility] [address]  [facility] [address]  [phone]    Schedule an appointment as soon as possible for a visit       [facility]  [address] [facility] [address]  [facility] [address] [phone]  Schedule an appointment as soon as possible for a visit       [facility] DEPARTMENT  [address] [facility] [address] [phone]    If symptoms worsen         Medication List      No changes were made to your prescriptions during this visit.            Duglas Disla, APRN  03/07/25 2202

## 2025-03-07 NOTE — TELEPHONE ENCOUNTER
CALLED AND LEFT VOICEMAIL    RELAY    Please call and let patient he needs to go to the ER for his kidney numbers.  His kidney function has significantly worsened since last visit and he may need fluids or iv diuretics but needs to be looked at in the ER.  If he goes outside of [facility] he can tell them his creatinine increased from 2.2 to 3.2.

## 2025-03-23 NOTE — PATIENT INSTRUCTIONS
- Continue aspirin 81 mg daily  - Continue atorvastatin 40 mg daily  - Blood pressure goals 110-140/80.  Avoid low blood pressure due to narrowing in your right MCA artery in your brain.  Check daily and keep a log for primary care provider  - Heart healthy/diabetic diet  - Increase activity as tolerated  -- Stay well hydrated  -- Start magnesium 400mg daily for headache prevention  - Stop drinking alcohol   -- Follow up with primary care for fatigue  -- Check vitamin d   -- Referral to sleep medicine  - Return to the ED with any additional stroke symptoms

## 2025-03-23 NOTE — PROGRESS NOTES
"    Follow Up Office Visit      Encounter Date: 2025   Patient Name: Walt Gao  : 1965   MRN: [id number]   PCP: Duglas Lang MD    Referring Provider: No ref. provider found     Chief Complaint:    Chief Complaint   Patient presents with    Follow-up       History of Present Illness: Walt Gao is a 60 y.o. male who is here today to follow up.    Clinic visit 2024: Initial visit per Jc Rojas PA:  Walt Gao is a 59 y.o. male with known medical diagnoses of recent GI bleed (2024), hypertension, CHF, CKD stage IIIa, hyperlipidemia, and alcohol use who presents to [facility] care following a recent hospital admission for acute right MCA watershed infarcts.  Patient initially presented to [facility] ED 2024 with 2-day history of left hemiparesis, left facial droop, and left hemisensory deficits.  Today, patient states left face weakness and left hand weakness are still present but have improved since hospital discharge.  He notes no sensory deficits since discharge.  Patient denies recurrence of GI bleeding or associated symptoms to include nausea, vomiting, diarrhea, dark/tarry stool.  Patient currently at inpatient rehab at [facility].  Patient anticipates discharge from [facility] and return to home tomorrow.  Patient is unclear what his PT/OT plan is after discharge from [facility].      Clinic visit 2024: Patient presents to clinic today for routine follow up accompanied by his partner. He denies any new or worsening stroke like symptoms. He reports he is unsure of what medications he takes daily. He reports he just \"takes them all\". I have encouraged him to call our office once he is home to confirm what medications he has. Per prior documentation it appears that patient was on DAPT for 21 days and transitioned to aspirin monotherapy. He has completed his repeat MRI with and without contrast which did not show evidence of neoplasm. He also has had " a loop recorder placed.      Clinic visit 12/16/2024: Mr. Gao presents to clinic today.  He reports that he has been doing relatively well.  He has been compliant with his aspirin and atorvastatin.  He reports that his kidney function is recently worsened and cardiology has taken him off of one of his medications.  He denies any new stroke symptoms.  He continues with numbness in his left ring finger and mild left hand weakness.  He denies any new GI bleeding.  He does report a dull, intermittent right sided headache that he notes more in the evening.  He does not check his blood pressure at home.  He states that he does not stay well-hydrated.  Reviewed CTA with him at length including R M2 stenosis and the importance of staying well-hydrated.  Blood pressure parameters discussed as well.  He will start magnesium 400 mg daily for headache prophylaxis.     Clinic visit 3/24/2025: Since last visit, Mr. Gao reported to [facility] ED due to elevated creatinine.  He reports that he has been referred to nephrology.  He has had no new strokelike symptoms.  He continues with numbness in his left ring finger and left hand weakness.  He reports intermittent headache but did not start magnesium.  He continues not to check his blood pressure at home.  Reviewed blood pressure parameters and the importance of checking his blood pressure with him at length given his right M2 stenosis.  He verbalized understanding.  He does continue to drink alcohol. Cessation counseling provided.  He reports worsening daytime fatigue.  STOP-BANG score 6 indicating that he is high risk for sleep apnea.  Ambulatory referral placed to sleep medicine. Given his persistent fatigue, we will check his vitamin D level today.      Subjective        I have reviewed and the following portions of the patient's history were updated as appropriate: past family history, past medical history, past social history, past surgical history and problem  "list.    Medications:     Current Outpatient Medications:     aspirin 81 MG EC tablet, Take 1 tablet by mouth Daily., Disp: 90 tablet, Rfl: 3    atorvastatin (LIPITOR) 40 MG tablet, Take 1 tablet by mouth Every Night., Disp: 90 tablet, Rfl: 3    carvedilol (COREG) 12.5 MG tablet, Take 1 tablet by mouth 2 (Two) Times a Day., Disp: 180 tablet, Rfl: 3    dapagliflozin Propanediol (Farxiga) 10 MG tablet, Take 10 mg by mouth Daily., Disp: 90 tablet, Rfl: 3    mupirocin (BACTROBAN) 2 % ointment, Apply 1 Application topically to the appropriate area as directed 3 (Three) Times a Day., Disp: 30 g, Rfl: 1    spironolactone (ALDACTONE) 25 MG tablet, Take 1 tablet by mouth Daily., Disp: 90 tablet, Rfl: 3    tamsulosin (FLOMAX) 0.4 MG capsule 24 hr capsule, Take 1 capsule by mouth Daily., Disp: 90 capsule, Rfl: 1    valsartan (DIOVAN) 80 MG tablet, Take 1 tablet by mouth Daily., Disp: 90 tablet, Rfl: 3    vitamin B-12 (CYANOCOBALAMIN) 1000 MCG tablet, Take 2 tablets by mouth Daily., Disp: 90 tablet, Rfl: 3    Current Facility-Administered Medications:     cyanocobalamin injection 1,000 mcg, 1,000 mcg, Intramuscular, Q28 Days, Adalgisa Moore MD, 1,000 mcg at 08/30/24 1519    Allergies:   No Known Allergies    Objective     Physical Exam:   Vital Signs:   Vitals:    03/24/25 1020   BP: 138/96   Pulse: 86   Temp: 97.7 °F (36.5 °C)   SpO2: 97%   Weight: 96.2 kg (212 lb)   Height: 177.8 cm (70\")     Body mass index is 30.42 kg/m².  Physical Exam  Constitutional:       General: He is not in acute distress.  HENT:      Head: Normocephalic and atraumatic.   Eyes:      Extraocular Movements: Extraocular movements intact.      Pupils: Pupils are equal, round, and reactive to light.   Cardiovascular:      Rate and Rhythm: Normal rate and regular rhythm.   Pulmonary:      Effort: Pulmonary effort is normal. No respiratory distress.   Abdominal:      General: There is no distension.      Palpations: Abdomen is soft. "   Musculoskeletal:         General: Normal range of motion.      Cervical back: Normal range of motion and neck supple.   Skin:     General: Skin is warm and dry.      Capillary Refill: Capillary refill takes less than 2 seconds.   Neurological:      Mental Status: He is alert and oriented to person, place, and time.      Sensory: Sensory deficit present.   Psychiatric:         Speech: Speech normal.         Neurological Exam  Mental Status  Alert. Oriented to person, place, time and situation. Oriented to person, place, and time. Speech is normal. Language is fluent with no aphasia.     Cranial Nerves  CN II: Visual acuity is normal. Visual fields full to confrontation.  CN III, IV, VI: Extraocular movements intact bilaterally. Pupils equal round and reactive to light bilaterally.  CN V: Facial sensation is normal.  CN VII: Full and symmetric facial movement.  CN XI: Shoulder shrug strength is normal.  CN XII: Tongue midline without atrophy or fasciculations.     Motor     Mild left  weakness, no limb drift.     Sensory  Light touch abnormality: Sensory deficit in the left ring finger in the left hand.      Coordination  Right: Finger-to-nose normal. Rapid alternating movement normal.Left: Finger-to-nose normal. Rapid alternating movement abnormality:  Mild dysdiadochokinesia in the left hand.     Gait  Casual gait is normal including stance, stride, and arm swing.                 Person Administering Scale: Karen Hawkins, APRN     1a  Level of consciousness: 0=alert; keenly responsive   1b. LOC questions:  0=Answers both questions correctly   1c. LOC commands: 0=Performs both tasks correctly   2.  Best Gaze: 0=normal   3.  Visual: 0=No visual loss   4. Facial Palsy: 0=Normal symmetric movement   5a.  Motor left arm: 0=No drift, limb holds 90 (or 45) degrees for full 10 seconds   5b.  Motor right arm: 0=No drift, limb holds 90 (or 45) degrees for full 10 seconds   6a. motor left le=No drift, limb  holds 90 (or 45) degrees for full 10 seconds   6b  Motor right le=No drift, limb holds 90 (or 45) degrees for full 10 seconds   7. Limb Ataxia: 0=Absent   8.  Sensory: 1=Mild to moderate sensory loss; patient feels pinprick is less sharp or is dull on the affected side; there is a loss of superficial pain with pinprick but patient is aware He is being touched   9. Best Language:  0=No aphasia, normal   10. Dysarthria: 0=Normal   11. Extinction and Inattention: 0=No abnormality     Total:   1         MODIFIED MARCOS SCALE (to be assessed for each patient having history of stroke) []Stroke history but not assessed  []0: No symptoms at all  [x]1: No significant disability despite symptoms  []2: Slight disability  []3: Moderate disability  []4: Moderately severe disability  []5: Severe disability  []6: Death     STOP-Bang Questionnaire :    Over the past month…    Snoring:   Do you snore loudly (loud enough to be heard through closed doors or your bed partner elbows you for snoring at night)?  Yes   Tired:   Do you often feel tired, fatigued or sleepy during the daytime (such as falling asleep during driving or talking to someone)?  Yes   Observed:   Has anyone observed you stop breathing or choking/gasping during your sleep?  No   Pressure:   Do you have or are you being treated for high blood pressure?  Yes   Body Mass Index:   Is the BMI > 35kg/m2 ?    No   Age:   Older than 50?  Yes   Neck Size:   Is your shirt collar > 16 inches/40 cm or larger? (measured around Vargas apple).    Yes     Gender:   Male/Female?  Male   Total Score: 6   0-2 = GEETA Low Risk    3-4 = GEETA Intermediate Risk    5-8 = GEETA High Risk  Or >2 + male gender  Or >2 + BMI > 35kg/m2  Or > 2 + neck circumference 16 inches / 40cm                 SWETHAADI Fall Risk Clinician Key Questions   Have you fallen in the past year?: No  Do you feel unsteady with walking?: No  Are you worried about falling?: No     Assessment / Plan      Assessment/Plan:      1. History of stroke (Primary)  - Continue aspirin 81 mg daily  - Continue atorvastatin 40 mg daily  - Blood pressure goals 110-140/80.  Avoid low blood pressure due to narrowing in your right MCA artery in your brain.  Check daily and keep a log for primary care provider  - Heart healthy/diabetic diet  - Increase activity as tolerated  - Fall precautions  -- Stay well hydrated  -- Start magnesium 400mg daily for headache prevention  - Stop drinking alcohol   -- Follow up with primary care for fatigue  -- Check vitamin d   -- Referral to sleep medicine  - Return to the ED with any additional stroke symptoms    2. Type 2 diabetes mellitus without complication, without long-term current use of insulin  - Serum glucose goals less than 140  - Further management per PCP     3. Hyperlipidemia LDL goal <70  - Continue atorvastatin 40 mg daily  - Heart healthy/diabetic diet  - Further management per primary care provider     4.  Hypertension  - Blood pressure goals 110-140/80.    - Avoid low blood pressure due to narrowing in your right MCA artery in your brain.  - Check daily and keep a log for primary care provider      Discussed the importance of medication compliance Aspirin 81mg daily and Atorvastatin 40mg nightly and lifestyle modifications adequate control of blood pressure, adequate control of cholesterol (goal LDL <70), adequate control of glucose (<140, A1c goal <7), ETOH cessation, increased physical activity, and implementation of healthy diet to help reduce the risk of future cerebrovascular events.  Also discussed the signs symptoms that would warrant the patient return back to the emergency department including unilateral weakness, unilateral numbness, visual disturbances, loss of balance, speech difficulties, and/or a sudden severe headache.  Patient verbalized understanding.    Follow Up:   Return in about 3 months (around 6/24/2025).    AFTAB Márquez, [facility]

## 2025-03-24 ENCOUNTER — OFFICE VISIT (OUTPATIENT)
Dept: NEUROLOGY | Facility: CLINIC | Age: 60
End: 2025-03-24
Payer: COMMERCIAL

## 2025-03-24 ENCOUNTER — LAB (OUTPATIENT)
Dept: LAB | Facility: HOSPITAL | Age: 60
End: 2025-03-24
Payer: COMMERCIAL

## 2025-03-24 ENCOUNTER — RESULTS FOLLOW-UP (OUTPATIENT)
Dept: NEUROLOGY | Facility: CLINIC | Age: 60
End: 2025-03-24

## 2025-03-24 VITALS
SYSTOLIC BLOOD PRESSURE: 138 MMHG | HEIGHT: 70 IN | OXYGEN SATURATION: 97 % | WEIGHT: 212 LBS | DIASTOLIC BLOOD PRESSURE: 96 MMHG | BODY MASS INDEX: 30.35 KG/M2 | TEMPERATURE: 97.7 F | HEART RATE: 86 BPM

## 2025-03-24 DIAGNOSIS — E78.5 HYPERLIPIDEMIA LDL GOAL <70: ICD-10-CM

## 2025-03-24 DIAGNOSIS — E11.9 TYPE 2 DIABETES MELLITUS WITHOUT COMPLICATION, WITHOUT LONG-TERM CURRENT USE OF INSULIN: Chronic | ICD-10-CM

## 2025-03-24 DIAGNOSIS — Z86.73 HISTORY OF STROKE: Primary | ICD-10-CM

## 2025-03-24 DIAGNOSIS — I10 ESSENTIAL HYPERTENSION: Chronic | ICD-10-CM

## 2025-03-24 DIAGNOSIS — Z86.73 HISTORY OF STROKE: ICD-10-CM

## 2025-03-24 DIAGNOSIS — R53.83 FATIGUE, UNSPECIFIED TYPE: ICD-10-CM

## 2025-03-24 DIAGNOSIS — Z91.89 AT RISK FOR SLEEP APNEA: ICD-10-CM

## 2025-03-24 LAB — 25(OH)D3 SERPL-MCNC: 12.9 NG/ML (ref 30–100)

## 2025-03-24 PROCEDURE — 36415 COLL VENOUS BLD VENIPUNCTURE: CPT

## 2025-03-24 PROCEDURE — 99214 OFFICE O/P EST MOD 30 MIN: CPT | Performed by: NURSE PRACTITIONER

## 2025-03-24 PROCEDURE — 82306 VITAMIN D 25 HYDROXY: CPT

## 2025-03-24 NOTE — TELEPHONE ENCOUNTER
Called Mr. Gao to discuss his vitamin D results.  Vitamin D 12.9 in the deficient range.  Recommend initiating vitamin D3 1000 IU daily for vitamin D deficiency.  He verbalized understanding.  He will follow-up with his primary care provider for further management.    AFTAB Demarco, [facility]

## 2025-04-14 DIAGNOSIS — I50.32 HEART FAILURE WITH IMPROVED EJECTION FRACTION (HFIMPEF): ICD-10-CM

## 2025-04-14 RX ORDER — DAPAGLIFLOZIN 10 MG/1
1 TABLET, FILM COATED ORAL DAILY
Qty: 90 TABLET | Refills: 3 | Status: SHIPPED | OUTPATIENT
Start: 2025-04-14

## 2025-04-15 ENCOUNTER — PRIOR AUTHORIZATION (OUTPATIENT)
Age: 60
End: 2025-04-15
Payer: MEDICAID

## 2025-04-15 NOTE — TELEPHONE ENCOUNTER
This letter is to notify you that the prior authorization request for FARXIGA, 10 MG, TABLET   has been approved.  Benefits for all services are subject to terms, conditions and eligibility as outlined in the   benefit documentation in effect at the time services are provided.  The authorization is effective from 04/15/2025 to 04/14/2026, as long as you are enrolled as   a member of your current health plan.  The request was approved as submitted.

## 2025-05-18 ENCOUNTER — HOSPITAL ENCOUNTER (INPATIENT)
Facility: HOSPITAL | Age: 60
LOS: 1 days | Discharge: HOME | End: 2025-05-20
Attending: EMERGENCY MEDICINE | Admitting: INTERNAL MEDICINE
Payer: MEDICAID

## 2025-05-18 ENCOUNTER — APPOINTMENT (OUTPATIENT)
Dept: MRI IMAGING | Facility: HOSPITAL | Age: 60
End: 2025-05-18
Payer: MEDICAID

## 2025-05-18 ENCOUNTER — APPOINTMENT (OUTPATIENT)
Dept: GENERAL RADIOLOGY | Facility: HOSPITAL | Age: 60
End: 2025-05-18
Payer: MEDICAID

## 2025-05-18 DIAGNOSIS — I63.541 CEREBRAL INFARCTION INVOLVING RIGHT CEREBELLAR ARTERY: ICD-10-CM

## 2025-05-18 DIAGNOSIS — R41.0 CONFUSION: ICD-10-CM

## 2025-05-18 DIAGNOSIS — R42 DIZZINESS: ICD-10-CM

## 2025-05-18 DIAGNOSIS — N18.9 CHRONIC KIDNEY DISEASE, UNSPECIFIED CKD STAGE: ICD-10-CM

## 2025-05-18 DIAGNOSIS — E78.5 HYPERLIPIDEMIA LDL GOAL <70: ICD-10-CM

## 2025-05-18 DIAGNOSIS — R41.841 COGNITIVE COMMUNICATION DEFICIT: ICD-10-CM

## 2025-05-18 DIAGNOSIS — E11.65 HYPERGLYCEMIA DUE TO DIABETES MELLITUS: ICD-10-CM

## 2025-05-18 DIAGNOSIS — I63.9 CEREBROVASCULAR ACCIDENT (CVA), UNSPECIFIED MECHANISM: Primary | ICD-10-CM

## 2025-05-18 LAB
ALBUMIN SERPL-MCNC: 4.4 G/DL (ref 3.5–5.2)
ALBUMIN/GLOB SERPL: 1.1 G/DL
ALP SERPL-CCNC: 32 U/L (ref 39–117)
ALT SERPL W P-5'-P-CCNC: 23 U/L (ref 1–41)
AMPHET+METHAMPHET UR QL: NEGATIVE
AMPHETAMINES UR QL: NEGATIVE
ANION GAP SERPL CALCULATED.3IONS-SCNC: 12 MMOL/L (ref 5–15)
AST SERPL-CCNC: 18 U/L (ref 1–40)
BACTERIA UR QL AUTO: NORMAL /HPF
BARBITURATES UR QL SCN: NEGATIVE
BASOPHILS # BLD AUTO: 0.02 10*3/MM3 (ref 0–0.2)
BASOPHILS NFR BLD AUTO: 0.3 % (ref 0–1.5)
BENZODIAZ UR QL SCN: NEGATIVE
BILIRUB SERPL-MCNC: 0.5 MG/DL (ref 0–1.2)
BILIRUB UR QL STRIP: NEGATIVE
BUN SERPL-MCNC: 26 MG/DL (ref 8–23)
BUN/CREAT SERPL: 13.6 (ref 7–25)
BUPRENORPHINE SERPL-MCNC: NEGATIVE NG/ML
CALCIUM SPEC-SCNC: 9.7 MG/DL (ref 8.6–10.5)
CANNABINOIDS SERPL QL: NEGATIVE
CHLORIDE SERPL-SCNC: 102 MMOL/L (ref 98–107)
CK SERPL-CCNC: 101 U/L (ref 20–200)
CLARITY UR: CLEAR
CO2 SERPL-SCNC: 22 MMOL/L (ref 22–29)
COCAINE UR QL: NEGATIVE
COLOR UR: YELLOW
CREAT SERPL-MCNC: 1.91 MG/DL (ref 0.76–1.27)
CRP SERPL-MCNC: 0.7 MG/DL (ref 0–0.5)
D-LACTATE SERPL-SCNC: 0.7 MMOL/L (ref 0.5–2)
DEPRECATED RDW RBC AUTO: 50.3 FL (ref 37–54)
EGFRCR SERPLBLD CKD-EPI 2021: 39.6 ML/MIN/1.73
EOSINOPHIL # BLD AUTO: 0.1 10*3/MM3 (ref 0–0.4)
EOSINOPHIL NFR BLD AUTO: 1.5 % (ref 0.3–6.2)
ERYTHROCYTE [DISTWIDTH] IN BLOOD BY AUTOMATED COUNT: 13.6 % (ref 12.3–15.4)
ETHANOL BLD-MCNC: <10 MG/DL (ref 0–10)
FENTANYL UR-MCNC: NEGATIVE NG/ML
GEN 5 1HR TROPONIN T REFLEX: 13 NG/L
GLOBULIN UR ELPH-MCNC: 4.1 GM/DL
GLUCOSE BLDC GLUCOMTR-MCNC: 74 MG/DL (ref 70–130)
GLUCOSE SERPL-MCNC: 119 MG/DL (ref 65–99)
GLUCOSE UR STRIP-MCNC: ABNORMAL MG/DL
HCT VFR BLD AUTO: 46.7 % (ref 37.5–51)
HGB BLD-MCNC: 15.2 G/DL (ref 13–17.7)
HGB UR QL STRIP.AUTO: NEGATIVE
HOLD SPECIMEN: NORMAL
HYALINE CASTS UR QL AUTO: NORMAL /LPF
IMM GRANULOCYTES # BLD AUTO: 0.01 10*3/MM3 (ref 0–0.05)
IMM GRANULOCYTES NFR BLD AUTO: 0.1 % (ref 0–0.5)
INR PPP: 0.98 (ref 0.89–1.12)
KETONES UR QL STRIP: NEGATIVE
LEUKOCYTE ESTERASE UR QL STRIP.AUTO: NEGATIVE
LYMPHOCYTES # BLD AUTO: 2 10*3/MM3 (ref 0.7–3.1)
LYMPHOCYTES NFR BLD AUTO: 29.2 % (ref 19.6–45.3)
MAGNESIUM SERPL-MCNC: 2.2 MG/DL (ref 1.6–2.4)
MCH RBC QN AUTO: 32.3 PG (ref 26.6–33)
MCHC RBC AUTO-ENTMCNC: 32.5 G/DL (ref 31.5–35.7)
MCV RBC AUTO: 99.2 FL (ref 79–97)
METHADONE UR QL SCN: NEGATIVE
MONOCYTES # BLD AUTO: 0.49 10*3/MM3 (ref 0.1–0.9)
MONOCYTES NFR BLD AUTO: 7.1 % (ref 5–12)
NEUTROPHILS NFR BLD AUTO: 4.24 10*3/MM3 (ref 1.7–7)
NEUTROPHILS NFR BLD AUTO: 61.8 % (ref 42.7–76)
NITRITE UR QL STRIP: NEGATIVE
NRBC BLD AUTO-RTO: 0 /100 WBC (ref 0–0.2)
NT-PROBNP SERPL-MCNC: 152 PG/ML (ref 0–900)
OPIATES UR QL: NEGATIVE
OXYCODONE UR QL SCN: NEGATIVE
PCP UR QL SCN: NEGATIVE
PH UR STRIP.AUTO: <=5 [PH] (ref 5–8)
PLATELET # BLD AUTO: 272 10*3/MM3 (ref 140–450)
PMV BLD AUTO: 9.3 FL (ref 6–12)
POTASSIUM SERPL-SCNC: 4.8 MMOL/L (ref 3.5–5.2)
PROCALCITONIN SERPL-MCNC: 0.06 NG/ML (ref 0–0.25)
PROT SERPL-MCNC: 8.5 G/DL (ref 6–8.5)
PROT UR QL STRIP: ABNORMAL
PROTHROMBIN TIME: 13.6 SECONDS (ref 12.2–15.3)
QT INTERVAL: 416 MS
QTC INTERVAL: 432 MS
RBC # BLD AUTO: 4.71 10*6/MM3 (ref 4.14–5.8)
RBC # UR STRIP: NORMAL /HPF
REF LAB TEST METHOD: NORMAL
SODIUM SERPL-SCNC: 136 MMOL/L (ref 136–145)
SP GR UR STRIP: 1.02 (ref 1–1.03)
SQUAMOUS #/AREA URNS HPF: NORMAL /HPF
TRICYCLICS UR QL SCN: NEGATIVE
TROPONIN T NUMERIC DELTA: -1 NG/L
TROPONIN T SERPL HS-MCNC: 14 NG/L
TSH SERPL DL<=0.05 MIU/L-ACNC: 1.04 UIU/ML (ref 0.27–4.2)
UROBILINOGEN UR QL STRIP: ABNORMAL
WBC # UR STRIP: NORMAL /HPF
WBC NRBC COR # BLD AUTO: 6.86 10*3/MM3 (ref 3.4–10.8)
WHOLE BLOOD HOLD COAG: NORMAL
WHOLE BLOOD HOLD SPECIMEN: NORMAL

## 2025-05-18 PROCEDURE — 82550 ASSAY OF CK (CPK): CPT

## 2025-05-18 PROCEDURE — 85610 PROTHROMBIN TIME: CPT

## 2025-05-18 PROCEDURE — 81001 URINALYSIS AUTO W/SCOPE: CPT | Performed by: EMERGENCY MEDICINE

## 2025-05-18 PROCEDURE — 93005 ELECTROCARDIOGRAM TRACING: CPT | Performed by: EMERGENCY MEDICINE

## 2025-05-18 PROCEDURE — 83880 ASSAY OF NATRIURETIC PEPTIDE: CPT

## 2025-05-18 PROCEDURE — G0378 HOSPITAL OBSERVATION PER HR: HCPCS

## 2025-05-18 PROCEDURE — 80053 COMPREHEN METABOLIC PANEL: CPT | Performed by: EMERGENCY MEDICINE

## 2025-05-18 PROCEDURE — 99285 EMERGENCY DEPT VISIT HI MDM: CPT

## 2025-05-18 PROCEDURE — 85025 COMPLETE CBC W/AUTO DIFF WBC: CPT | Performed by: EMERGENCY MEDICINE

## 2025-05-18 PROCEDURE — 25810000003 LACTATED RINGERS PER 1000 ML

## 2025-05-18 PROCEDURE — 86140 C-REACTIVE PROTEIN: CPT

## 2025-05-18 PROCEDURE — 82948 REAGENT STRIP/BLOOD GLUCOSE: CPT

## 2025-05-18 PROCEDURE — 99223 1ST HOSP IP/OBS HIGH 75: CPT

## 2025-05-18 PROCEDURE — 71045 X-RAY EXAM CHEST 1 VIEW: CPT

## 2025-05-18 PROCEDURE — 82077 ASSAY SPEC XCP UR&BREATH IA: CPT | Performed by: PHYSICIAN ASSISTANT

## 2025-05-18 PROCEDURE — 80307 DRUG TEST PRSMV CHEM ANLYZR: CPT | Performed by: PHYSICIAN ASSISTANT

## 2025-05-18 PROCEDURE — 70551 MRI BRAIN STEM W/O DYE: CPT

## 2025-05-18 PROCEDURE — 84443 ASSAY THYROID STIM HORMONE: CPT

## 2025-05-18 PROCEDURE — 83735 ASSAY OF MAGNESIUM: CPT | Performed by: EMERGENCY MEDICINE

## 2025-05-18 PROCEDURE — 83605 ASSAY OF LACTIC ACID: CPT

## 2025-05-18 PROCEDURE — 36415 COLL VENOUS BLD VENIPUNCTURE: CPT

## 2025-05-18 PROCEDURE — 84145 PROCALCITONIN (PCT): CPT

## 2025-05-18 PROCEDURE — 84484 ASSAY OF TROPONIN QUANT: CPT | Performed by: EMERGENCY MEDICINE

## 2025-05-18 RX ORDER — SODIUM CHLORIDE 0.9 % (FLUSH) 0.9 %
10 SYRINGE (ML) INJECTION EVERY 12 HOURS SCHEDULED
Status: DISCONTINUED | OUTPATIENT
Start: 2025-05-18 | End: 2025-05-20 | Stop reason: HOSPADM

## 2025-05-18 RX ORDER — VALSARTAN 80 MG/1
80 TABLET ORAL DAILY
Status: DISCONTINUED | OUTPATIENT
Start: 2025-05-18 | End: 2025-05-20 | Stop reason: HOSPADM

## 2025-05-18 RX ORDER — CARVEDILOL 12.5 MG/1
12.5 TABLET ORAL 2 TIMES DAILY
Status: DISCONTINUED | OUTPATIENT
Start: 2025-05-18 | End: 2025-05-20 | Stop reason: HOSPADM

## 2025-05-18 RX ORDER — INSULIN LISPRO 100 [IU]/ML
2-7 INJECTION, SOLUTION INTRAVENOUS; SUBCUTANEOUS
Status: DISCONTINUED | OUTPATIENT
Start: 2025-05-18 | End: 2025-05-20 | Stop reason: HOSPADM

## 2025-05-18 RX ORDER — AMOXICILLIN 250 MG
2 CAPSULE ORAL 2 TIMES DAILY PRN
Status: DISCONTINUED | OUTPATIENT
Start: 2025-05-18 | End: 2025-05-20 | Stop reason: HOSPADM

## 2025-05-18 RX ORDER — CLOPIDOGREL BISULFATE 75 MG/1
300 TABLET ORAL ONCE
Status: DISCONTINUED | OUTPATIENT
Start: 2025-05-18 | End: 2025-05-20

## 2025-05-18 RX ORDER — BISACODYL 5 MG/1
5 TABLET, DELAYED RELEASE ORAL DAILY PRN
Status: DISCONTINUED | OUTPATIENT
Start: 2025-05-18 | End: 2025-05-20 | Stop reason: HOSPADM

## 2025-05-18 RX ORDER — SODIUM CHLORIDE 0.9 % (FLUSH) 0.9 %
10 SYRINGE (ML) INJECTION AS NEEDED
Status: DISCONTINUED | OUTPATIENT
Start: 2025-05-18 | End: 2025-05-20 | Stop reason: HOSPADM

## 2025-05-18 RX ORDER — ACETAMINOPHEN 325 MG/1
650 TABLET ORAL EVERY 4 HOURS PRN
Status: DISCONTINUED | OUTPATIENT
Start: 2025-05-18 | End: 2025-05-20 | Stop reason: HOSPADM

## 2025-05-18 RX ORDER — SODIUM CHLORIDE 9 MG/ML
40 INJECTION, SOLUTION INTRAVENOUS AS NEEDED
Status: DISCONTINUED | OUTPATIENT
Start: 2025-05-18 | End: 2025-05-20 | Stop reason: HOSPADM

## 2025-05-18 RX ORDER — SPIRONOLACTONE 25 MG/1
25 TABLET ORAL DAILY
Status: DISCONTINUED | OUTPATIENT
Start: 2025-05-18 | End: 2025-05-20 | Stop reason: HOSPADM

## 2025-05-18 RX ORDER — ATORVASTATIN CALCIUM 40 MG/1
40 TABLET, FILM COATED ORAL NIGHTLY
Status: DISCONTINUED | OUTPATIENT
Start: 2025-05-18 | End: 2025-05-20 | Stop reason: HOSPADM

## 2025-05-18 RX ORDER — ASPIRIN 300 MG/1
300 SUPPOSITORY RECTAL DAILY
Status: DISCONTINUED | OUTPATIENT
Start: 2025-05-19 | End: 2025-05-20 | Stop reason: HOSPADM

## 2025-05-18 RX ORDER — SODIUM CHLORIDE, SODIUM LACTATE, POTASSIUM CHLORIDE, CALCIUM CHLORIDE 600; 310; 30; 20 MG/100ML; MG/100ML; MG/100ML; MG/100ML
100 INJECTION, SOLUTION INTRAVENOUS CONTINUOUS
Status: ACTIVE | OUTPATIENT
Start: 2025-05-18 | End: 2025-05-19

## 2025-05-18 RX ORDER — IBUPROFEN 600 MG/1
1 TABLET ORAL
Status: DISCONTINUED | OUTPATIENT
Start: 2025-05-18 | End: 2025-05-20 | Stop reason: HOSPADM

## 2025-05-18 RX ORDER — DEXTROSE MONOHYDRATE 25 G/50ML
25 INJECTION, SOLUTION INTRAVENOUS
Status: DISCONTINUED | OUTPATIENT
Start: 2025-05-18 | End: 2025-05-20 | Stop reason: HOSPADM

## 2025-05-18 RX ORDER — ACETAMINOPHEN 160 MG/5ML
650 SOLUTION ORAL EVERY 4 HOURS PRN
Status: DISCONTINUED | OUTPATIENT
Start: 2025-05-18 | End: 2025-05-20 | Stop reason: HOSPADM

## 2025-05-18 RX ORDER — POLYETHYLENE GLYCOL 3350 17 G/17G
17 POWDER, FOR SOLUTION ORAL DAILY PRN
Status: DISCONTINUED | OUTPATIENT
Start: 2025-05-18 | End: 2025-05-20 | Stop reason: HOSPADM

## 2025-05-18 RX ORDER — NICOTINE POLACRILEX 4 MG
15 LOZENGE BUCCAL
Status: DISCONTINUED | OUTPATIENT
Start: 2025-05-18 | End: 2025-05-20 | Stop reason: HOSPADM

## 2025-05-18 RX ORDER — ACETAMINOPHEN 650 MG/1
650 SUPPOSITORY RECTAL EVERY 4 HOURS PRN
Status: DISCONTINUED | OUTPATIENT
Start: 2025-05-18 | End: 2025-05-20 | Stop reason: HOSPADM

## 2025-05-18 RX ORDER — ASPIRIN 81 MG/1
81 TABLET, CHEWABLE ORAL DAILY
Status: DISCONTINUED | OUTPATIENT
Start: 2025-05-19 | End: 2025-05-20 | Stop reason: HOSPADM

## 2025-05-18 RX ORDER — NITROGLYCERIN 0.4 MG/1
0.4 TABLET SUBLINGUAL
Status: DISCONTINUED | OUTPATIENT
Start: 2025-05-18 | End: 2025-05-20 | Stop reason: HOSPADM

## 2025-05-18 RX ORDER — CLOPIDOGREL BISULFATE 75 MG/1
75 TABLET ORAL DAILY
Status: DISCONTINUED | OUTPATIENT
Start: 2025-05-19 | End: 2025-05-20 | Stop reason: HOSPADM

## 2025-05-18 RX ORDER — BISACODYL 10 MG
10 SUPPOSITORY, RECTAL RECTAL DAILY PRN
Status: DISCONTINUED | OUTPATIENT
Start: 2025-05-18 | End: 2025-05-20 | Stop reason: HOSPADM

## 2025-05-18 RX ORDER — TAMSULOSIN HYDROCHLORIDE 0.4 MG/1
0.4 CAPSULE ORAL NIGHTLY
Status: DISCONTINUED | OUTPATIENT
Start: 2025-05-18 | End: 2025-05-20 | Stop reason: HOSPADM

## 2025-05-18 RX ADMIN — TAMSULOSIN HYDROCHLORIDE 0.4 MG: 0.4 CAPSULE ORAL at 21:41

## 2025-05-18 RX ADMIN — Medication 5 MG: at 21:41

## 2025-05-18 RX ADMIN — Medication 10 ML: at 21:42

## 2025-05-18 RX ADMIN — SODIUM CHLORIDE, POTASSIUM CHLORIDE, SODIUM LACTATE AND CALCIUM CHLORIDE 100 ML/HR: 600; 310; 30; 20 INJECTION, SOLUTION INTRAVENOUS at 19:56

## 2025-05-18 RX ADMIN — ATORVASTATIN CALCIUM 40 MG: 40 TABLET, FILM COATED ORAL at 21:41

## 2025-05-18 NOTE — ED PROVIDER NOTES
Subjective   History of Present Illness  Pt is a 61 yo male presenting to ED with complaints of weakness. PMHx significant for CVA ( right MCA infarct with left hemisensory deficits),  HTN, HLD, CHF, Cardiomyopathy, CKD, DM (non insulin), GI bleed and GEETA. Pt explains he has felt off balance and dizzy since yesterday. Symptoms worse with standing. He also has felt more fatigued with increased sleeping and per girlfriend slower to respond/ comprehend with conversation. Pt denies headache, vision changes, slurred speech, fever, cough, CP, SOB, V/D, abdominal pain, bloody stool or urinary sx. Pt denies limb specific weakness or numbness. He has prior hx of CVA and some residual numbness to left hand. He was restarted on Farxiga a few days ago and family thought maybe just having side effects to med. He is on ASA and no other blood thinners. NO reports of fall or head injury. Denies current ETOH, tobacco or drug use.     History provided by:  Patient, medical records and significant other      Review of Systems   Constitutional:  Negative for chills and fever.   HENT:  Negative for congestion, ear pain and trouble swallowing.    Eyes:  Negative for pain and visual disturbance.   Respiratory:  Negative for cough and shortness of breath.    Cardiovascular:  Negative for chest pain and leg swelling.   Gastrointestinal:  Negative for abdominal pain, blood in stool, diarrhea, nausea and vomiting.   Genitourinary:  Negative for difficulty urinating, dysuria and flank pain.   Musculoskeletal:  Negative for arthralgias, back pain, joint swelling and neck pain.   Skin: Negative.  Negative for rash and wound.   Allergic/Immunologic: Negative.    Neurological:  Positive for dizziness and weakness. Negative for syncope, numbness and headaches.   Psychiatric/Behavioral:  Positive for confusion.    All other systems reviewed and are negative.      Past Medical History:   Diagnosis Date    CHF (congestive heart failure)     GI bleed  05/16/2024    Hypertension     NICM (nonischemic cardiomyopathy)     Nonischemic cardiomyopathy 08/29/2016    · Cardiac catheterization revealing nonobstructive CAD, 2013 · LVEF 30% on heart catheterization, 2013 · Echo (8/29/16): Moderate LVH. LVEF 23%. Left ventricular diastolic dysfunction (grade II) consistent with pseudonormalization.  Moderate mitral valve regurgitation · Echo (2/21/2017):  LVEF 50%    Stroke        No Known Allergies    Past Surgical History:   Procedure Laterality Date    COLONOSCOPY N/A 5/17/2024    Procedure: COLONOSCOPY;  Surgeon: Duglas Zhou MD;  Location:  [facility] ENDOSCOPY;  Service: Gastroenterology;  Laterality: N/A;    ENDOSCOPY N/A 5/16/2024    Procedure: ESOPHAGOGASTRODUODENOSCOPY;  Surgeon: Duglas Zhou MD;  Location: [facility] ENDOSCOPY;  Service: Gastroenterology;  Laterality: N/A;    HERNIA REPAIR      as child       Family History   Problem Relation Age of Onset    Coronary artery disease Other     Hypertension Mother     Diabetes Father     Hypertension Father     Kidney disease Father     Hyperlipidemia Father     Heart disease Paternal Grandmother     Heart disease Paternal Grandfather     No Known Problems Sister     No Known Problems Daughter     No Known Problems Son        Social History     Socioeconomic History    Marital status: Single   Tobacco Use    Smoking status: Never     Passive exposure: Never    Smokeless tobacco: Never   Vaping Use    Vaping status: Never Used   Substance and Sexual Activity    Alcohol use: Not Currently     Alcohol/week: 6.0 standard drinks of alcohol     Types: 6 Cans of beer per week     Comment: 6 beers a week    Drug use: Never    Sexual activity: Yes     Partners: Female     Birth control/protection: Post-menopausal     Comment: 1 partner in the last 9 years           Objective   Physical Exam  Vitals and nursing note reviewed.   Constitutional:       Appearance: He is well-developed.   HENT:      Head: Atraumatic.      Nose: Nose  normal.   Eyes:      General: Lids are normal.      Conjunctiva/sclera: Conjunctivae normal.      Pupils: Pupils are equal, round, and reactive to light.   Cardiovascular:      Rate and Rhythm: Normal rate and regular rhythm.      Heart sounds: Normal heart sounds.   Pulmonary:      Effort: Pulmonary effort is normal.      Breath sounds: Normal breath sounds.   Abdominal:      General: There is no distension.      Palpations: Abdomen is soft.      Tenderness: There is no abdominal tenderness. There is no guarding or rebound.   Musculoskeletal:         General: No tenderness or deformity. Normal range of motion.      Cervical back: Normal range of motion and neck supple.   Skin:     General: Skin is warm and dry.   Neurological:      Mental Status: He is alert and oriented to person, place, and time.      Cranial Nerves: Cranial nerves 2-12 are intact.      Sensory: Sensation is intact. No sensory deficit.      Motor: Motor function is intact. No pronator drift.      Coordination: Finger-Nose-Finger Test and Heel to Shin Test normal.   Psychiatric:         Behavior: Behavior normal.         Procedures           ED Course  ED Course as of 05/18/25 1737   Sun May 18, 2025   1400  I personally reviewed and interpreted vitals.  Patient evaluated in PIT area and awaiting labs / imaging / ED bed at this time.  I personally reviewed and interpreted labs results and went over with patient.   [RT]   1404 Discussed patient with Dr. Araujo and he is agreeable with MRI and ED evaluation.  [RT]   1704 CBC & Differential(!)  WBC 6.98, stable H and H [RT]   1705 Comprehensive Metabolic Panel(!)  Glucose 119, Cr 1.91 with hx of CKD, K 4.8 [RT]   1705 Urine Drug Screen - Urine, Clean Catch  Negative  [RT]   1705 Ethanol: <10 [RT]   1705 HS Troponin T: 14 [RT]   1705 HS Troponin T: 13 [RT]   1705 Magnesium: 2.2 [RT]   1705 Urinalysis With Microscopic If Indicated (No Culture) - Urine, Clean Catch(!)  With glucose and protein [RT]    1705 I personally and independently reviewed CXR and agree with the radiology interpretation specifically no acute findings.  [RT]   1711 MRI concerning for acute/ subacute infarct in right middle cerebellar peduncle.  [RT]   1711 Discussed patient with stroke team and they will evaluate patient.  [RT]   1733 Discussed admission with hospitalist Dr. Roque.  [RT]      ED Course User Index  [RT] Danica Connor, PA        Recent Results (from the past 24 hours)   Comprehensive Metabolic Panel    Collection Time: 05/18/25  1:48 PM    Specimen: Blood   Result Value Ref Range    Glucose 119 (H) 65 - 99 mg/dL    BUN 26 (H) 8 - 23 mg/dL    Creatinine 1.91 (H) 0.76 - 1.27 mg/dL    Sodium 136 136 - 145 mmol/L    Potassium 4.8 3.5 - 5.2 mmol/L    Chloride 102 98 - 107 mmol/L    CO2 22.0 22.0 - 29.0 mmol/L    Calcium 9.7 8.6 - 10.5 mg/dL    Total Protein 8.5 6.0 - 8.5 g/dL    Albumin 4.4 3.5 - 5.2 g/dL    ALT (SGPT) 23 1 - 41 U/L    AST (SGOT) 18 1 - 40 U/L    Alkaline Phosphatase 32 (L) 39 - 117 U/L    Total Bilirubin 0.5 0.0 - 1.2 mg/dL    Globulin 4.1 gm/dL    A/G Ratio 1.1 g/dL    BUN/Creatinine Ratio 13.6 7.0 - 25.0    Anion Gap 12.0 5.0 - 15.0 mmol/L    eGFR 39.6 (L) >60.0 mL/min/1.73   High Sensitivity Troponin T    Collection Time: 05/18/25  1:48 PM    Specimen: Blood   Result Value Ref Range    HS Troponin T 14 <22 ng/L   Magnesium    Collection Time: 05/18/25  1:48 PM    Specimen: Blood   Result Value Ref Range    Magnesium 2.2 1.6 - 2.4 mg/dL   Urinalysis With Microscopic If Indicated (No Culture) - Urine, Clean Catch    Collection Time: 05/18/25  1:48 PM    Specimen: Urine, Clean Catch   Result Value Ref Range    Color, UA Yellow Yellow, Straw    Appearance, UA Clear Clear    pH, UA <=5.0 5.0 - 8.0    Specific Gravity, UA 1.020 1.001 - 1.030    Glucose, UA >=1000 mg/dL (3+) (A) Negative    Ketones, UA Negative Negative    Bilirubin, UA Negative Negative    Blood, UA Negative Negative    Protein, UA 30 mg/dL  (1+) (A) Negative    Leuk Esterase, UA Negative Negative    Nitrite, UA Negative Negative    Urobilinogen, UA 0.2 E.U./dL 0.2 - 1.0 E.U./dL   Green Top (Gel)    Collection Time: 05/18/25  1:48 PM   Result Value Ref Range    Extra Tube Hold for add-ons.    Lavender Top    Collection Time: 05/18/25  1:48 PM   Result Value Ref Range    Extra Tube hold for add-on    Gold Top - SST    Collection Time: 05/18/25  1:48 PM   Result Value Ref Range    Extra Tube Hold for add-ons.    Gray Top    Collection Time: 05/18/25  1:48 PM   Result Value Ref Range    Extra Tube Hold for add-ons.    Light Blue Top    Collection Time: 05/18/25  1:48 PM   Result Value Ref Range    Extra Tube Hold for add-ons.    CBC Auto Differential    Collection Time: 05/18/25  1:48 PM    Specimen: Blood   Result Value Ref Range    WBC 6.86 3.40 - 10.80 10*3/mm3    RBC 4.71 4.14 - 5.80 10*6/mm3    Hemoglobin 15.2 13.0 - 17.7 g/dL    Hematocrit 46.7 37.5 - 51.0 %    MCV 99.2 (H) 79.0 - 97.0 fL    MCH 32.3 26.6 - 33.0 pg    MCHC 32.5 31.5 - 35.7 g/dL    RDW 13.6 12.3 - 15.4 %    RDW-SD 50.3 37.0 - 54.0 fl    MPV 9.3 6.0 - 12.0 fL    Platelets 272 140 - 450 10*3/mm3    Neutrophil % 61.8 42.7 - 76.0 %    Lymphocyte % 29.2 19.6 - 45.3 %    Monocyte % 7.1 5.0 - 12.0 %    Eosinophil % 1.5 0.3 - 6.2 %    Basophil % 0.3 0.0 - 1.5 %    Immature Grans % 0.1 0.0 - 0.5 %    Neutrophils, Absolute 4.24 1.70 - 7.00 10*3/mm3    Lymphocytes, Absolute 2.00 0.70 - 3.10 10*3/mm3    Monocytes, Absolute 0.49 0.10 - 0.90 10*3/mm3    Eosinophils, Absolute 0.10 0.00 - 0.40 10*3/mm3    Basophils, Absolute 0.02 0.00 - 0.20 10*3/mm3    Immature Grans, Absolute 0.01 0.00 - 0.05 10*3/mm3    nRBC 0.0 0.0 - 0.2 /100 WBC   Urinalysis, Microscopic Only - Urine, Clean Catch    Collection Time: 05/18/25  1:48 PM    Specimen: Urine, Clean Catch   Result Value Ref Range    RBC, UA 0-2 None Seen, 0-2 /HPF    WBC, UA 0-2 None Seen, 0-2 /HPF    Bacteria, UA None Seen None Seen, Trace /HPF     Squamous Epithelial Cells, UA None Seen None Seen, 0-2 /HPF    Hyaline Casts, UA None Seen 0 - 6 /LPF    Methodology Automated Microscopy    Urine Drug Screen - Urine, Clean Catch    Collection Time: 05/18/25  1:48 PM    Specimen: Urine, Clean Catch   Result Value Ref Range    THC, Screen, Urine Negative Negative    Phencyclidine (PCP), Urine Negative Negative    Cocaine Screen, Urine Negative Negative    Methamphetamine, Ur Negative Negative    Opiate Screen Negative Negative    Amphetamine Screen, Urine Negative Negative    Benzodiazepine Screen, Urine Negative Negative    Tricyclic Antidepressants Screen Negative Negative    Methadone Screen, Urine Negative Negative    Barbiturates Screen, Urine Negative Negative    Oxycodone Screen, Urine Negative Negative    Buprenorphine, Screen, Urine Negative Negative   Ethanol    Collection Time: 05/18/25  1:48 PM    Specimen: Blood   Result Value Ref Range    Ethanol <10 0 - 10 mg/dL   Fentanyl, Urine - Urine, Clean Catch    Collection Time: 05/18/25  1:48 PM    Specimen: Urine, Clean Catch   Result Value Ref Range    Fentanyl, Urine Negative Negative   ECG 12 Lead Other; generalized weakness    Collection Time: 05/18/25  4:02 PM   Result Value Ref Range    QT Interval 416 ms    QTC Interval 432 ms   High Sensitivity Troponin T 1Hr    Collection Time: 05/18/25  4:05 PM    Specimen: Blood   Result Value Ref Range    HS Troponin T 13 <22 ng/L    Troponin T Numeric Delta -1 Abnormal if >/=3 ng/L     Note: In addition to lab results from this visit, the labs listed above may include labs taken at another facility or during a different encounter within the last 24 hours. Please correlate lab times with ED admission and discharge times for further clarification of the services performed during this visit.    MRI Brain Without Contrast   Final Result   Impression:   1. Small recent (acute/subacute) infarct involving right middle cerebellar peduncle. Negative for hemorrhage or  significant mass effect.   2. Sequelae of chronic right parietal and occipital lobe infarcts, with findings of cortical laminar necrosis. Similar distribution multifocal smaller parenchymal infarcts in a watershed distribution.   3. Global parenchymal volume loss and sequelae of chronic microvascular ischemic change.            Electronically Signed: Jesus Manuel Limon MD     5/18/2025 5:00 PM EDT     Workstation ID: [id number]      XR Chest 1 View   Final Result   Impression: No acute cardiopulmonary disease.         Electronically Signed: Rinku Masters MD     5/18/2025 2:42 PM EDT     Workstation ID: [id number]        Vitals:    05/18/25 1600 05/18/25 1650 05/18/25 1700 05/18/25 1730   BP:  (!) 176/123 (!) 178/120 (!) 182/123   BP Location:       Patient Position:       Pulse: 70 62 69 73   Resp:       Temp:       TempSrc:       SpO2: 96% 97% 98% 96%   Weight:       Height:         Medications   sodium chloride 0.9 % flush 10 mL (has no administration in time range)   sodium chloride 0.9 % flush 10 mL (has no administration in time range)   sodium chloride 0.9 % flush 10 mL (has no administration in time range)   sodium chloride 0.9 % infusion 40 mL (has no administration in time range)   nitroglycerin (NITROSTAT) SL tablet 0.4 mg (has no administration in time range)   lactated ringers infusion (has no administration in time range)   Potassium Replacement - Follow Nurse / BPA Driven Protocol (has no administration in time range)   Magnesium Standard Dose Replacement - Follow Nurse / BPA Driven Protocol (has no administration in time range)   Phosphorus Replacement - Follow Nurse / BPA Driven Protocol (has no administration in time range)   Calcium Replacement - Follow Nurse / BPA Driven Protocol (has no administration in time range)   acetaminophen (TYLENOL) tablet 650 mg (has no administration in time range)     Or   acetaminophen (TYLENOL) 160 MG/5ML oral solution 650 mg (has no administration in time range)     Or    acetaminophen (TYLENOL) suppository 650 mg (has no administration in time range)   melatonin tablet 5 mg (has no administration in time range)   sennosides-docusate (PERICOLACE) 8.6-50 MG per tablet 2 tablet (has no administration in time range)     And   polyethylene glycol (MIRALAX) packet 17 g (has no administration in time range)     And   bisacodyl (DULCOLAX) EC tablet 5 mg (has no administration in time range)     And   bisacodyl (DULCOLAX) suppository 10 mg (has no administration in time range)   aspirin chewable tablet 81 mg (has no administration in time range)     Or   aspirin suppository 300 mg (has no administration in time range)   clopidogrel (PLAVIX) tablet 300 mg (has no administration in time range)     And   clopidogrel (PLAVIX) tablet 75 mg (has no administration in time range)     ECG/EMG Results (last 24 hours)       Procedure Component Value Units Date/Time    ECG 12 Lead Other; generalized weakness [[id number]] Collected: 05/18/25 1602     Updated: 05/18/25 1602     QT Interval 416 ms      QTC Interval 432 ms     Narrative:      Test Reason : WKNESS  Blood Pressure :   */*   mmHG  Vent. Rate :  65 BPM     Atrial Rate :  65 BPM     P-R Int : 178 ms          QRS Dur : 118 ms      QT Int : 416 ms       P-R-T Axes :  38 -38 162 degrees    QTcB Int : 432 ms    Normal sinus rhythm  Left axis deviation  Left ventricular hypertrophy with QRS widening  Cannot rule out Inferior infarct , age undetermined  T wave abnormality, consider lateral ischemia  Abnormal ECG  When compared with ECG of 03-Jun-2024 05:57,  No significant change was found    Referred By: EDMD           Confirmed By:           ECG 12 Lead Other; generalized weakness   Preliminary Result   Test Reason : WKNESS   Blood Pressure :   */*   mmHG   Vent. Rate :  65 BPM     Atrial Rate :  65 BPM      P-R Int : 178 ms          QRS Dur : 118 ms       QT Int : 416 ms       P-R-T Axes :  38 -38 162 degrees     QTcB Int : 432 ms      Normal  sinus rhythm   Left axis deviation   Left ventricular hypertrophy with QRS widening   Cannot rule out Inferior infarct , age undetermined   T wave abnormality, consider lateral ischemia   Abnormal ECG   When compared with ECG of 03-Jun-2024 05:57,   No significant change was found      Referred By: EDMD           Confirmed By:                                                          Medical Decision Making  Pt is a 61 yo male presenting to ED with complaints of dizziness and generalized weakness since yesterday. I had a discussion with the patient / family regarding ED course, diagnosis, diagnostic results and treatment plan including medications     DDx  CVA, UTI, Electrolyte abnormality, Anemia, Medication reaction, withdrawal    Problems Addressed:  Cerebrovascular accident (CVA), unspecified mechanism: complicated acute illness or injury  Chronic kidney disease, unspecified CKD stage: complicated acute illness or injury  Confusion: complicated acute illness or injury  Dizziness: complicated acute illness or injury  Hyperglycemia due to diabetes mellitus: complicated acute illness or injury    Amount and/or Complexity of Data Reviewed  Independent Historian:      Details: Significant other   External Data Reviewed: notes.     Details: Reviewed previous non ED visits including prior labs, imaging, available notes, medications, allergies and surgical hx.   3/24/25 Neurology f/u CVA  Labs: ordered. Decision-making details documented in ED Course.  Radiology: ordered and independent interpretation performed. Decision-making details documented in ED Course.  ECG/medicine tests: ordered and independent interpretation performed. Decision-making details documented in ED Course.    Risk  Prescription drug management.  Decision regarding hospitalization.        Final diagnoses:   Cerebrovascular accident (CVA), unspecified mechanism   Dizziness   Confusion   Chronic kidney disease, unspecified CKD stage   Hyperglycemia due  to diabetes mellitus       ED Disposition  ED Disposition       ED Disposition   Decision to Admit    Condition   --    Comment   Level of Care: Telemetry [5]   Diagnosis: Cerebral infarction involving right cerebellar artery [[id number]]                 No follow-up provider specified.       Medication List      No changes were made to your prescriptions during this visit.            Danica Connor PA  05/18/25 0393

## 2025-05-18 NOTE — CONSULTS
Stroke Consult Note    Patient Name: Walt Gao   MRN: [id number]  Age: 60 y.o.  Sex: male  : 1965    Primary Care Physician: Duglas Lang MD  Referring Physician:  Danica Connor PA-C      Handedness: Left  Race:      Chief Complaint/Reason for Consultation: MRI findings    HPI: This is a 60-year-old male with known medical diagnoses of essential hypertension, hyperlipidemia, prediabetes nonischemic cardiomyopathy, history of GI bleed, and history of right hemispheric watershed infarcts (2024, no residual deficits) who presents to the emergency department for further evaluation of generalized weakness, dizziness, and comprehension difficulties since yesterday at 1400.  Patient denies experiencing any overt unilateral weakness, numbness, speech difficulty, visual disturbance, or gait instability.    He underwent MRI of the brain without contrast which revealed a new stroke in the right cerebellar peduncle therefore the stroke team was consulted for further evaluation and recommendations.    Last Known Normal Date/Time: 2025 1400 EST     Review of Systems   Constitutional:  Negative for activity change, fatigue and fever.   HENT: Negative.  Negative for trouble swallowing.    Eyes: Negative.  Negative for photophobia and visual disturbance.   Respiratory: Negative.     Cardiovascular: Negative.    Gastrointestinal: Negative.    Genitourinary: Negative.    Musculoskeletal: Negative.  Negative for gait problem.   Neurological:  Positive for dizziness and weakness (Generalized). Negative for facial asymmetry, speech difficulty, numbness and headaches.   Psychiatric/Behavioral:  Positive for decreased concentration.       Past Medical History:   Diagnosis Date    CHF (congestive heart failure)     GI bleed 2024    Hypertension     NICM (nonischemic cardiomyopathy)     Nonischemic cardiomyopathy 2016    · Cardiac catheterization revealing nonobstructive CAD,  · LVEF  30% on heart catheterization, 2013 · Echo (8/29/16): Moderate LVH. LVEF 23%. Left ventricular diastolic dysfunction (grade II) consistent with pseudonormalization.  Moderate mitral valve regurgitation · Echo (2/21/2017):  LVEF 50%    Stroke      Past Surgical History:   Procedure Laterality Date    COLONOSCOPY N/A 5/17/2024    Procedure: COLONOSCOPY;  Surgeon: Duglas Zhou MD;  Location:  [facility];  Service: Gastroenterology;  Laterality: N/A;    ENDOSCOPY N/A 5/16/2024    Procedure: ESOPHAGOGASTRODUODENOSCOPY;  Surgeon: Duglas Zhou MD;  Location:  [facility];  Service: Gastroenterology;  Laterality: N/A;    HERNIA REPAIR      as child     Family History   Problem Relation Age of Onset    Coronary artery disease Other     Hypertension Mother     Diabetes Father     Hypertension Father     Kidney disease Father     Hyperlipidemia Father     Heart disease Paternal Grandmother     Heart disease Paternal Grandfather     No Known Problems Sister     No Known Problems Daughter     No Known Problems Son      Social History     Socioeconomic History    Marital status: Single   Tobacco Use    Smoking status: Never     Passive exposure: Never    Smokeless tobacco: Never   Vaping Use    Vaping status: Never Used   Substance and Sexual Activity    Alcohol use: Not Currently     Alcohol/week: 6.0 standard drinks of alcohol     Types: 6 Cans of beer per week     Comment: 6 beers a week    Drug use: Never    Sexual activity: Yes     Partners: Female     Birth control/protection: Post-menopausal     Comment: 1 partner in the last 9 years     No Known Allergies  Prior to Admission medications    Medication Sig Start Date End Date Taking? Authorizing Provider   aspirin 81 MG EC tablet Take 1 tablet by mouth Daily. 7/30/24   Chandrakant Delgado IV, MD   atorvastatin (LIPITOR) 40 MG tablet Take 1 tablet by mouth Every Night. 7/30/24   Chandrakant Delgado IV, MD   carvedilol (COREG) 12.5 MG tablet Take 1  tablet by mouth 2 (Two) Times a Day. 11/26/24   Jessica Crowley APRN   dapagliflozin Propanediol (Farxiga) 10 MG tablet Take 10 mg by mouth Daily. 4/14/25   Chandrakant Delgado IV, MD   mupirocin (BACTROBAN) 2 % ointment Apply 1 Application topically to the appropriate area as directed 3 (Three) Times a Day. 3/6/25   Duglas Lang MD   spironolactone (ALDACTONE) 25 MG tablet Take 1 tablet by mouth Daily. 7/30/24   Chandrakant Delgado IV, MD   tamsulosin (FLOMAX) 0.4 MG capsule 24 hr capsule Take 1 capsule by mouth Daily. 2/24/25   Duglas Lang MD   valsartan (DIOVAN) 80 MG tablet Take 1 tablet by mouth Daily. 7/30/24   Chandrakant Delgado IV, MD   vitamin B-12 (CYANOCOBALAMIN) 1000 MCG tablet Take 2 tablets by mouth Daily. 8/30/24   Adalgisa Moore MD         Temp:  [97.5 °F (36.4 °C)] 97.5 °F (36.4 °C)  Heart Rate:  [62-73] 73  Resp:  [16] 16  BP: (133-187)/() 184/126  Neurological Exam  Mental Status  Alert. Oriented to person, place, time and situation. Oriented to person, place, and time. Speech is normal. Language is fluent with no aphasia. Attention and concentration are normal.    Cranial Nerves  CN II: Visual fields full to confrontation.  CN III, IV, VI: Extraocular movements intact bilaterally. Pupils equal round and reactive to light bilaterally.  CN V: Facial sensation is normal.  CN VII: Full and symmetric facial movement.  CN VIII: Hearing appears to be intact bilaterally.  CN XII: Tongue midline without atrophy or fasciculations.    Motor  Normal muscle bulk throughout. Normal muscle tone. Strength is 5/5 throughout all four extremities.    Sensory  Sensation is intact to light touch, pinprick, vibration and proprioception in all four extremities.    Coordination  Right: Finger-to-nose normal.Left: Finger-to-nose normal.    Gait    Not observed.      Physical Exam  Vitals and nursing note reviewed.   Constitutional:       General: He is not in acute  distress.     Appearance: He is obese. He is not ill-appearing.   HENT:      Head: Normocephalic.      Mouth/Throat:      Mouth: Mucous membranes are moist.   Eyes:      Extraocular Movements: Extraocular movements intact.      Pupils: Pupils are equal, round, and reactive to light.   Cardiovascular:      Rate and Rhythm: Normal rate and regular rhythm.   Pulmonary:      Effort: Pulmonary effort is normal. No respiratory distress.      Comments: On room air  Skin:     General: Skin is warm and dry.   Neurological:      Mental Status: He is alert and oriented to person, place, and time.      Cranial Nerves: No cranial nerve deficit.      Sensory: No sensory deficit.      Motor: Motor strength is normal.No weakness.      Coordination: Coordination normal.   Psychiatric:         Mood and Affect: Mood normal.         Speech: Speech normal.         Behavior: Behavior normal.         Acute Stroke Data    Thrombolytic Inclusion / Exclusion Criteria    Time: 1730 EST  Person Administering Scale: AFTAB Grajeda      YES NO INCLUSION CRITERIA CLASS I   [] [x]   Suspected diagnosis of acute ischemic stroke with measureable neurological deficit.  Low NIHSS with disabling stroke symptoms.   [] [x]   Onset of stroke symptoms < 3 hours before beginning treatment >/ 18 years old  Stroke symptom onset = time patient was last seen well or without symptoms (LKW)   [] [x]   Onset of symptoms between 3-4.5 hours: >/= 80 years old (safe Class IIa) with history of   both diabetes and prior CVA  (reasonable Class IIb) AND NIHSS </= 25  *If not eligible for IV Thrombolytic consider neuro intervention for LKW within 24 hours     YES NO EXCLUSION CRITERIA (CONTRAINDICATIONS) CLASS III EVIDENCE HARM   [] []   Blood pressure >185/110 medically refractory to IV medications   [] []   Active bleeding at a non-compressible site   [] []   Active intracranial hemorrhage (ICH)   [] []   Symptoms suggestive of subarachnoid hemorrhage  (SAH)   [] []   GI bleed within 21 days   [] []   Ischemic stroke within 3 months   [] []   Severe head trauma within 3 months    [] []   Intracranial or intraspinal surgery within 3 months   [] []   Current GI malignancy   [] []   Intracranial neoplasm   [] []   Infective endocarditis   [] []   Aortic arch dissection   [] []   Active coagulopathy with  INR >1.7, platelets <100,000, PTT > 40 sec, PT > 15 sec   *For warfarin, administration can begin before blood tests resulted. Discontinue for above values.    [] []   Treatment dose* of LMWH (Lovenox) in last 24 hours  *prophylactic dosages are not a contraindication   [] []   Concurrent use of antiplatelet agents' glycoprotein inhibitors IIb/IIIa (Integrilin, etc.)   [] []   Thrombin or factor Xa inhibitors (Eliquis, Xarelto, Arixtra) taken in last 48 hours     YES NO CLASS II: AIS WITH THE FOLLOWING CONDITIONS -   TREATMENT RISKS SHOULD BE WEIGHED AGAINST POSSIBLE BENEFITS.    [] []   Major trauma in last 14 days, recent major surgery in last 14 days, intracranial arterial dissection, giant unruptured and unsecured intracranial aneurysm, pericarditis     [] []   The risks and benefits have been discussed with the patient or family related to the administration of IV thrombolytic therapy for stroke symptoms.   [] []   I have discussed and reviewed the patient's case and imaging with the attending prior to IV thrombolytic therapy.   TIME N/A Time IV thrombolytic administered       Hospital Meds:  Scheduled- aspirin, 81 mg, Oral, Daily   Or  aspirin, 300 mg, Rectal, Daily  clopidogrel, 300 mg, Oral, Once   And  [START ON 5/19/2025] clopidogrel, 75 mg, Oral, Daily  cyanocobalamin, 1,000 mcg, Intramuscular, Q28 Days  insulin lispro, 2-7 Units, Subcutaneous, 4x Daily AC & at Bedtime  melatonin, 5 mg, Oral, Nightly  sodium chloride, 10 mL, Intravenous, Q12H      Infusions- lactated ringers, 100 mL/hr       PRNs-   acetaminophen **OR** acetaminophen **OR** acetaminophen     senna-docusate sodium **AND** polyethylene glycol **AND** bisacodyl **AND** bisacodyl    Calcium Replacement - Follow Nurse / BPA Driven Protocol    dextrose    dextrose    glucagon (human recombinant)    Magnesium Standard Dose Replacement - Follow Nurse / BPA Driven Protocol    nitroglycerin    Phosphorus Replacement - Follow Nurse / BPA Driven Protocol    Potassium Replacement - Follow Nurse / BPA Driven Protocol    sodium chloride    sodium chloride    sodium chloride    Functional Status Prior to Current Stroke/[place] Score: 0    NIH Stroke Scale  Time: 18:14 EDT  Person Administering Scale: AFTAB Grajeda    Interval: baseline  1a. Level of Consciousness: 0-->Alert, keenly responsive  1b. LOC Questions: 0-->Answers both questions correctly  1c. LOC Commands: 0-->Performs both tasks correctly  2. Best Gaze: 0-->Normal  3. Visual: 0-->No visual loss  4. Facial Palsy: 0-->Normal symmetrical movements  5a. Motor Arm, Left: 0-->No drift, limb holds 90 (or 45) degrees for full 10 secs  5b. Motor Arm, Right: 0-->No drift, limb holds 90 (or 45) degrees for full 10 secs  6a. Motor Leg, Left: 0-->No drift, leg holds 30 degree position for full 5 secs  6b. Motor Leg, Right: 0-->No drift, leg holds 30 degree position for full 5 secs  7. Limb Ataxia: 0-->Absent  8. Sensory: 0-->Normal, no sensory loss  9. Best Language: 0-->No aphasia, normal  10. Dysarthria: 0-->Normal  11. Extinction and Inattention (formerly Neglect): 0-->No abnormality    Total (NIH Stroke Scale): 0    Results Reviewed:  I have personally reviewed current lab, radiology, and data and agree with results.    MRI Brain Without Contrast  Result Date: 5/18/2025  Impression: 1. Small recent (acute/subacute) infarct involving right middle cerebellar peduncle. Negative for hemorrhage or significant mass effect. 2. Sequelae of chronic right parietal and occipital lobe infarcts, with findings of cortical laminar necrosis. Similar distribution  multifocal smaller parenchymal infarcts in a watershed distribution. 3. Global parenchymal volume loss and sequelae of chronic microvascular ischemic change. Electronically Signed: Jesus Manuel Limon MD  5/18/2025 5:00 PM EDT  Workstation ID: [id number]      Results for orders placed during the hospital encounter of 06/02/24    Adult Transthoracic Echo Complete W/ Cont if Necessary Per Protocol (With Agitated Saline)    Interpretation Summary    Left ventricular systolic function is normal. Estimated left ventricular EF = 50%    Left ventricular wall thickness is consistent with mild to moderate concentric hypertrophy.    The cardiac valves are anatomically and functionally normal.    Estimated right ventricular systolic pressure from tricuspid regurgitation is normal (<35 mmHg).    Saline test results are negative.    Compared to echocardiogram performed 11/29/2022, there is been no significant interval change     WBC   Date Value Ref Range Status   05/18/2025 6.86 3.40 - 10.80 10*3/mm3 Final     Hemoglobin   Date Value Ref Range Status   05/18/2025 15.2 13.0 - 17.7 g/dL Final     Hematocrit   Date Value Ref Range Status   05/18/2025 46.7 37.5 - 51.0 % Final     Platelets   Date Value Ref Range Status   05/18/2025 272 140 - 450 10*3/mm3 Final       Lab Results   Component Value Date    GLUCOSE 119 (H) 05/18/2025    BUN 26 (H) 05/18/2025    CREATININE 1.91 (H) 05/18/2025     05/18/2025    K 4.8 05/18/2025     05/18/2025    CALCIUM 9.7 05/18/2025    PROTEINTOT 8.5 05/18/2025    ALBUMIN 4.4 05/18/2025    ALT 23 05/18/2025    AST 18 05/18/2025    ALKPHOS 32 (L) 05/18/2025    BILITOT 0.5 05/18/2025    GLOB 4.1 05/18/2025    AGRATIO 1.1 05/18/2025    BCR 13.6 05/18/2025    ANIONGAP 12.0 05/18/2025    EGFR 39.6 (L) 05/18/2025         Assessment/Plan:    This is a 60-year-old male with known medical diagnoses of essential hypertension, hyperlipidemia, prediabetes nonischemic cardiomyopathy, history of GI bleed, and  history of right hemispheric watershed infarcts (6/2024, no residual deficits) who presents to the emergency department for further evaluation of generalized weakness, dizziness, and comprehension difficulties since yesterday at 1400.  Given LKW> 4.5-hours patient is not a candidate for IV thrombolytic therapy.  He will require admission to the hospital service for further workup.    Antiplatelet PTA: ASA 81mg  Anticoagulant PTA: None          Acute/subacute ischemic stroke, right cerebellar peduncle, etiology likely small vessel disease versus large vessel atherosclerotic disease        History of stroke, right parietal and occipital lobe        Intracranial atherosclerotic disease, moderate stenosis in right MCA  -TIA/CVA order set without thrombolytic therapy has been initiated  -NPO until bedside nursing dysphagia screen completed  -Will repeat CTA head/neck to evaluate right MCA stenosis  -TTE   -A1c and lipid panel in AM  -Meds: DAPT (loaded with 300 mg)  -Activity as tolerated, fall risk precautions  -PT/OT/SLP evaluation    2.  Essential hypertension  -Allow autoregulation of blood pressure for adequate cerebral blood flow  -Nicardipine as needed for SBP >220    3.  Hyperlipidemia  -Lipid panel in AM  -Atorvastatin 80mg nightly    4.  Prediabetes  -A1c in AM  -Maintain euglycemia  -Management per primary team    Plan of care was discussed with patient, spouse at bedside, and Danica DUONG.  Stroke neurology will continue to follow.  Please call with any questions or concerns.  Thank you for this consult.     Niharika Finley, APRN  May 18, 2025  18:14 EDT

## 2025-05-18 NOTE — Clinical Note
Level of Care: Telemetry [5]   Diagnosis: Cerebral infarction involving right cerebellar artery [[id number]]   Admitting Physician: AMADO JACKSON [[id number]]   Attending Physician: AMADO JACKSON [[id number]]   Is patient appropriate for Inpatient Observation Unit?: Yes [1]

## 2025-05-18 NOTE — ED NOTES
Walt Gao    Nursing Report ED to Floor:  Mental status: A&O x 4  Ambulatory status: Up with SBA  Oxygen Therapy:  RA  Cardiac Rhythm: NSR  Admitted from: ER  Safety Concerns:  None  Precautions: None  Social Issues: None  ED Room #:  11    ED Nurse Phone Extension - [phone] or may call [phone].      HPI:   Chief Complaint   Patient presents with    Weakness - Generalized       Past Medical History:  Past Medical History:   Diagnosis Date    CHF (congestive heart failure)     GI bleed 05/16/2024    Hypertension     NICM (nonischemic cardiomyopathy)     Nonischemic cardiomyopathy 08/29/2016    · Cardiac catheterization revealing nonobstructive CAD, 2013 · LVEF 30% on heart catheterization, 2013 · Echo (8/29/16): Moderate LVH. LVEF 23%. Left ventricular diastolic dysfunction (grade II) consistent with pseudonormalization.  Moderate mitral valve regurgitation · Echo (2/21/2017):  LVEF 50%    Stroke         Past Surgical History:  Past Surgical History:   Procedure Laterality Date    COLONOSCOPY N/A 5/17/2024    Procedure: COLONOSCOPY;  Surgeon: Duglas Zhou MD;  Location: [facility];  Service: Gastroenterology;  Laterality: N/A;    ENDOSCOPY N/A 5/16/2024    Procedure: ESOPHAGOGASTRODUODENOSCOPY;  Surgeon: Duglas Zhou MD;  Location: [facility];  Service: Gastroenterology;  Laterality: N/A;    HERNIA REPAIR      as child        Admitting Doctor:   Camron Roque MD    Consulting Provider(s):  Consults       No orders found from 4/19/2025 to 5/19/2025.             Admitting Diagnosis:   The primary encounter diagnosis was Cerebrovascular accident (CVA), unspecified mechanism. Diagnoses of Dizziness, Confusion, Chronic kidney disease, unspecified CKD stage, and Hyperglycemia due to diabetes mellitus were also pertinent to this visit.    Most Recent Vitals:   Vitals:    05/18/25 1530 05/18/25 1600 05/18/25 1650 05/18/25 1700   BP:   (!) 176/123 (!) 178/120   BP Location:       Patient Position:        Pulse: 73 70 62 69   Resp:       Temp:       TempSrc:       SpO2: 97% 96% 97% 98%   Weight:       Height:           Active LDAs/IV Access:   Lines, Drains & Airways       Active LDAs       Name Placement date Placement time Site Days    Peripheral IV 05/18/25 1348 20 G Right Antecubital 05/18/25  1348  Antecubital  less than 1                    Labs (abnormal labs have a star):   Labs Reviewed   COMPREHENSIVE METABOLIC PANEL - Abnormal; Notable for the following components:       Result Value    Glucose 119 (*)     BUN 26 (*)     Creatinine 1.91 (*)     Alkaline Phosphatase 32 (*)     eGFR 39.6 (*)     All other components within normal limits    Narrative:     GFR Categories in Chronic Kidney Disease (CKD)              GFR Category          GFR (mL/min/1.73)    Interpretation  G1                    90 or greater        Normal or high (1)  G2                    60-89                Mild decrease (1)  G3a                   45-59                Mild to moderate decrease  G3b                   30-44                Moderate to severe decrease  G4                    15-29                Severe decrease  G5                    14 or less           Kidney failure    (1)In the absence of evidence of kidney disease, neither GFR category G1 or G2 fulfill the criteria for CKD.    eGFR calculation 2021 CKD-EPI creatinine equation, which does not include race as a factor   URINALYSIS W/ MICROSCOPIC IF INDICATED (NO CULTURE) - Abnormal; Notable for the following components:    Glucose, UA >=1000 mg/dL (3+) (*)     Protein, UA 30 mg/dL (1+) (*)     All other components within normal limits   CBC WITH AUTO DIFFERENTIAL - Abnormal; Notable for the following components:    MCV 99.2 (*)     All other components within normal limits   TROPONIN - Normal    Narrative:     High Sensitive Troponin T Reference Range:  <14.0 ng/L- Negative Female for AMI  <22.0 ng/L- Negative Male for AMI  >=14 - Abnormal Female indicating possible  myocardial injury.  >=22 - Abnormal Male indicating possible myocardial injury.   Clinicians would have to utilize clinical acumen, EKG, Troponin, and serial changes to determine if it is an Acute Myocardial Infarction or myocardial injury due to an underlying chronic condition.        MAGNESIUM - Normal   URINE DRUG SCREEN - Normal    Narrative:     Cutoff For Drugs Screened:    Amphetamines               500 ng/ml  Barbiturates               200 ng/ml  Benzodiazepines            150 ng/ml  Cocaine                    150 ng/ml  Methadone                  200 ng/ml  Opiates                    100 ng/ml  Phencyclidine               25 ng/ml  THC                         50 ng/ml  Methamphetamine            500 ng/ml  Tricyclic Antidepressants  300 ng/ml  Oxycodone                  100 ng/ml  Buprenorphine               10 ng/ml    The normal value for all drugs tested is negative. This report includes unconfirmed screening results, with the cutoff values listed, to be used for medical treatment purposes only.  Unconfirmed results must not be used for non-medical purposes such as employment or legal testing.  Clinical consideration should be applied to any drug of abuse test, particularly when unconfirmed results are used.     ETHANOL - Normal    Narrative:     Elevated lactic acid concentration and lactate dehydrogenase(LD) activity may falsely elevate enzymatically determined ethanol levels. Not for legal purposes.    FENTANYL, URINE - Normal    Narrative:     Negative Threshold:      Fentanyl 5 ng/mL     The normal value for the drug tested is negative. This report includes final unconfirmed screening results to be used for medical treatment purposes only. Unconfirmed results must not be used for non-medical purposes such as employment or legal testing. Clinical consideration should be applied to any drug of abuse test, particularly when unconfirmed results are used.          HIGH SENSITIVITIY TROPONIN T 1HR -  Normal    Narrative:     High Sensitive Troponin T Reference Range:  <14.0 ng/L- Negative Female for AMI  <22.0 ng/L- Negative Male for AMI  >=14 - Abnormal Female indicating possible myocardial injury.  >=22 - Abnormal Male indicating possible myocardial injury.   Clinicians would have to utilize clinical acumen, EKG, Troponin, and serial changes to determine if it is an Acute Myocardial Infarction or myocardial injury due to an underlying chronic condition.        RAINBOW DRAW    Narrative:     The following orders were created for panel order [place] Draw.  Procedure                               Abnormality         Status                     ---------                               -----------         ------                     Green Top (Gel)[[id number]]                                  Final result               Lavender Top[[id number]]                                     Final result               Gold Top - SST[[id number]]                                   Final result               Morris Top[[id number]]                                         Final result               Light Blue Top[[id number]]                                   Final result                 Please view results for these tests on the individual orders.   URINALYSIS, MICROSCOPIC ONLY   CBC AND DIFFERENTIAL    Narrative:     The following orders were created for panel order CBC & Differential.  Procedure                               Abnormality         Status                     ---------                               -----------         ------                     CBC Auto Differential[[id number]]        Abnormal            Final result                 Please view results for these tests on the individual orders.   GREEN TOP   LAVENDER TOP   GOLD TOP - SST   GRAY TOP   LIGHT BLUE TOP       Meds Given in ED:   Medications   sodium chloride 0.9 % flush 10 mL (has no administration in time range)           Last NIH score:                                                           Dysphagia screening results:        [place] Coma Scale:  No data recorded     CIWA:        Restraint Type:            Isolation Status:  No active isolations

## 2025-05-18 NOTE — H&P
[facility] Medicine Services  HISTORY AND PHYSICAL    Patient Name: Walt Gao  : 1965  MRN: [id number]  Primary Care Physician: Duglas Lang MD  Date of admission: 2025      Subjective   Subjective     Chief Complaint:  Generalized weakness    HPI:  Walt Gao is a 60 y.o. male with a PMH significant for CVA (right MCA infarct with left hemisensory deficits), GI bleed, GEETA, HTN, HLD, HFpEF, nonischemic cardiomyopathy, CKD, T2DM and BPH.  Presenting to [facility] ED due to new onset dizziness, gait instability and problems with balance ongoing since yesterday, endorsing symptoms worse when standing suddenly.  Overall feels more fatigued, somnolent and has been slower to respond/comprehend.  At this time he denies any visual changes, slurred speech, recent ill contacts, fever or chills, nausea, vomiting, palpitations, shortness of breath, urinary symptoms or headache.  Continues to endorse persistent numbness in the left hand as part of his residual deficits from prior CVA.    In the ED, he was hemodynamically stable, in fact hypertensive satting well on room air.  Troponins equivocal, no new EKG changes.  Found have a creatinine of 1.91 (at baseline), no leukocytosis or anemia on CBC.  UA showed significant glucosuria and proteinuria however negative for pyuria, bacteriuria or leukocyte esterase/nitrates.  UDS negative.  CXR showed no acute cardiopulmonary disease.  MRI brain ordered by ED provider showed small recent (acute/subacute) infarct involving the right middle cerebellar peduncle, negative for hemorrhage or mass effect.  There was sequelae of chronic right parietal and occipital lobe infarcts with findings of cortical laminar necrosis similar distribution multifocal smaller parenchymal infarcts within watershed distribution.  Global parenchymal volume loss and sequela of chronic microvascular ischemic changes.  Stroke team was alerted,  they will see soon.  Hospitalist was contacted for admission, agreed to admit.      Personal History     Past Medical History:   Diagnosis Date    CHF (congestive heart failure)     GI bleed 05/16/2024    Hypertension     NICM (nonischemic cardiomyopathy)     Nonischemic cardiomyopathy 08/29/2016    · Cardiac catheterization revealing nonobstructive CAD, 2013 · LVEF 30% on heart catheterization, 2013 · Echo (8/29/16): Moderate LVH. LVEF 23%. Left ventricular diastolic dysfunction (grade II) consistent with pseudonormalization.  Moderate mitral valve regurgitation · Echo (2/21/2017):  LVEF 50%    Stroke            Past Surgical History:   Procedure Laterality Date    COLONOSCOPY N/A 5/17/2024    Procedure: COLONOSCOPY;  Surgeon: Duglas Zhou MD;  Location: [facility];  Service: Gastroenterology;  Laterality: N/A;    ENDOSCOPY N/A 5/16/2024    Procedure: ESOPHAGOGASTRODUODENOSCOPY;  Surgeon: Duglas Zhou MD;  Location: [facility];  Service: Gastroenterology;  Laterality: N/A;    HERNIA REPAIR      as child       Family History: family history includes Coronary artery disease in an other family member; Diabetes in his father; Heart disease in his paternal grandfather and paternal grandmother; Hyperlipidemia in his father; Hypertension in his father and mother; Kidney disease in his father; No Known Problems in his daughter, sister, and son.     Social History:  reports that he has never smoked. He has never been exposed to tobacco smoke. He has never used smokeless tobacco. He reports that he does not currently use alcohol after a past usage of about 6.0 standard drinks of alcohol per week. He reports that he does not use drugs.  Social History     Social History Narrative    Patient drinks 6-7 servings of caffeine (tea) per day. Patient lives with sister.       Medications:  Available home medication information reviewed.  aspirin, atorvastatin, carvedilol, dapagliflozin Propanediol, mupirocin,  spironolactone, tamsulosin, valsartan, and vitamin B-12    No Known Allergies    Objective   Objective     Vital Signs:   Temp:  [97.5 °F (36.4 °C)] 97.5 °F (36.4 °C)  Heart Rate:  [62-73] 73  Resp:  [16] 16  BP: (133-187)/() 182/123       Physical Exam   Constitutional: No acute distress, awake, alert  HENT: NCAT, mucous membranes moist  Respiratory: Clear to auscultation bilaterally, respiratory effort normal   Cardiovascular: RRR, no murmurs, rubs, or gallops  Gastrointestinal: Positive bowel sounds, soft, nontender, nondistended  Musculoskeletal: No bilateral ankle edema  Psychiatric: Appropriate affect, cooperative  Neurologic: Oriented x 3, reduced strength in left upper extremity, speech clear  Skin: No rashes     Result Review:  I have personally reviewed the results from the time of this admission to 5/18/2025 17:40 EDT and agree with these findings:  [x]  Laboratory list / accordion  [x]  Microbiology  [x]  Radiology  [x]  EKG/Telemetry   [x]  Cardiology/Vascular   [x]  Pathology  [x]  Old records  []  Other:        LAB RESULTS:      Lab 05/18/25  1348   WBC 6.86   HEMOGLOBIN 15.2   HEMATOCRIT 46.7   PLATELETS 272   NEUTROS ABS 4.24   IMMATURE GRANS (ABS) 0.01   LYMPHS ABS 2.00   MONOS ABS 0.49   EOS ABS 0.10   MCV 99.2*         Lab 05/18/25  1348   SODIUM 136   POTASSIUM 4.8   CHLORIDE 102   CO2 22.0   ANION GAP 12.0   BUN 26*   CREATININE 1.91*   EGFR 39.6*   GLUCOSE 119*   CALCIUM 9.7   MAGNESIUM 2.2         Lab 05/18/25  1348   TOTAL PROTEIN 8.5   ALBUMIN 4.4   GLOBULIN 4.1   ALT (SGPT) 23   AST (SGOT) 18   BILIRUBIN 0.5   ALK PHOS 32*         Lab 05/18/25  1605 05/18/25  1348   HSTROP T 13 14                 UA          3/7/2025    18:52 4/14/2025    14:58 5/18/2025    13:48   Urinalysis   Squamous Epithelial Cells, UA 0-2   None Seen    Specific [place], UA 1.023  1.013     1.020    Ketones, UA Trace   Negative    Blood, UA Negative  Negative     Negative    Leukocytes, UA Negative  Negative      Negative    Nitrite, UA Negative   Negative    RBC, UA 0-2   0-2    WBC, UA 0-2   0-2    Bacteria, UA None Seen   None Seen       Details          This result is from an external source.               Microbiology Results (last 10 days)       ** No results found for the last 240 hours. **            MRI Brain Without Contrast  Result Date: 5/18/2025  MRI BRAIN WO CONTRAST Date of Exam: 5/18/2025 4:32 PM EDT Indication: dizziness, slower to respond than baseline, hx CVA in the past.  Comparison: Brain MRI 9/6/2024 Technique:  Routine multiplanar/multisequence sequence images of the brain were obtained without contrast administration. Findings: 1.6 x 1.4 cm diffusion restriction in the right middle cerebellar peduncle indicating recent infarct. Increased T2/FLAIR signal abnormality. Negative for hemorrhage. Cortical areas of focal encephalomalacia and T2/FLAIR hyperintense gliosis in the right parietal and right occipital lobes indicating sequelae of previous more remote infarcts present on prior comparison. Serpiginous areas of T1 hyperintense signal indicates cortical laminar necrosis. Multifocal right greater than left periventricular areas  of periventricular and subcortical infarcts similar in distribution from prior exam. Distribution could reflect sequelae of watershed infarcts. Negative for acute intracranial hemorrhage, large mass lesion or midline shift. Sequelae of chronic microvascular ischemic change likely moderate severity similar to previous comparison. No large extra-axial fluid collection. Multifocal areas of scattered susceptibility artifacts involving bilateral thalami, cerebellum, cj and mesial temporal lobes indicating hemosiderin deposition from previous microhemorrhages. Unremarkable pituitary. Negative for cerebellar tonsillar ectopia. Major intracranial flow voids appear preserved. No significant mastoid effusion. Minimal nonobstructive sinus disease. Orbits symmetric.     Impression:  Impression: 1. Small recent (acute/subacute) infarct involving right middle cerebellar peduncle. Negative for hemorrhage or significant mass effect. 2. Sequelae of chronic right parietal and occipital lobe infarcts, with findings of cortical laminar necrosis. Similar distribution multifocal smaller parenchymal infarcts in a watershed distribution. 3. Global parenchymal volume loss and sequelae of chronic microvascular ischemic change. Electronically Signed: Jesus Manuel Limon MD  5/18/2025 5:00 PM EDT  Workstation ID: [id number]    XR Chest 1 View  Result Date: 5/18/2025  XR CHEST 1 VW Date of Exam: 5/18/2025 2:27 PM EDT Indication: Weak/Dizzy/AMS triage protocol Comparison: 3/7/2025 Findings: No focal consolidation. No pneumothorax or pleural effusion. Cardiac size is normal. The visualized clavicles appear intact. No displaced rib fractures. The visualized upper abdomen is normal.     Impression: Impression: No acute cardiopulmonary disease. Electronically Signed: Rinku Masters MD  5/18/2025 2:42 PM EDT  Workstation ID: [id number]      Results for orders placed during the hospital encounter of 06/02/24    Adult Transthoracic Echo Complete W/ Cont if Necessary Per Protocol (With Agitated Saline)    Interpretation Summary    Left ventricular systolic function is normal. Estimated left ventricular EF = 50%    Left ventricular wall thickness is consistent with mild to moderate concentric hypertrophy.    The cardiac valves are anatomically and functionally normal.    Estimated right ventricular systolic pressure from tricuspid regurgitation is normal (<35 mmHg).    Saline test results are negative.    Compared to echocardiogram performed 11/29/2022, there is been no significant interval change      Assessment & Plan   Assessment & Plan       Cerebral infarction involving right cerebellar artery    Heart failure with improved ejection fraction (HFimpEF)    Essential hypertension    Stage 3b chronic kidney disease     Non-ischemic cardiomyopathy    Alcohol abuse    Hyperlipidemia LDL goal <70    Cryptogenic stroke    Type 2 diabetes mellitus without complication, without long-term current use of insulin    Benign prostatic hyperplasia with lower urinary tract symptoms    Walt Gao is a 60 y.o. male with a PMH significant for CVA (right MCA infarct with left hemisensory deficits), GI bleed, GEETA, HTN, HLD, HFpEF, nonischemic cardiomyopathy, CKD, T2DM and BPH.  Presenting to [facility] ED due to new onset dizziness, gait instability and problems with balance ongoing since yesterday, endorsing symptoms worse when standing suddenly.  Evaluate by neurosurgery, plans to repeat CTA head and neck.    New onset dizziness, gait instability and balance problems  Acute/subacute right middle cerebellar peduncle infarct  Prior chronic right parietal and occipital lobe infarct with evidence of cortical laminar necrosis  Multifocal smaller parenchymal infarcts in watershed areas  Global parenchymal volume loss  Acute/subacute CVA in the setting of prior CVA with residual deficits described as left hand numbness  Patient states he is very functional, able to drive, complete all ADLs  Stroke team on board, have evaluated, no role of TNK at this time  MRI brain ordered by ED provider showed small recent (acute/subacute) infarct involving the right middle cerebellar peduncle, negative for hemorrhage or mass effect.  There was sequelae of chronic right parietal and occipital lobe infarcts with findings of cortical laminar necrosis similar distribution multifocal smaller parenchymal infarcts within watershed distribution.  Global parenchymal volume loss and sequela of chronic microvascular ischemic changes.    Will repeat CTA head and neck to evaluate right MCA stenosis  Echo TTE, HbA1c and lipid panel per neurology in a.m.  Loaded with Plavix 300 mg, continue dual antiplatelet therapy  PT/OT/SLP    HTN  Allowing for  autoregulation of blood pressure at this time for adequate cerebral blood flow  Can use nicardipine drip for SBP greater than 220  Holding home Coreg, Jardiance, Aldactone and valsartan    HLD  Lipid panel in the a.m.  Lipitor 80 mg nightly    Nonischemic cardiomyopathy  HFpEF  Repeat echo TTE pending this admission  Previous echo TTE June 2024 showed EF of 50% with mild to moderate concentric hypertrophy of LV, no valvular abnormality, RVSP was normal with negative saline tests, echo TTE prior to this was November 2022 with no significant interval change    Alcohol abuse  History of alcohol abuse, ethanol level checked on ED, WNL  Does not appear to be in withdrawal at this time, does not endorse recent alcohol use    CKD stage IIIb  Renal function is close to baseline    BPH  Switch Flomax to nightly to avoid orthostatic hypotension    T2DM  HbA1c in a.m.  SSI for now, holding home Farxiga      VTE Prophylaxis:  Mechanical VTE prophylaxis orders are present.    Total time spent: 75 minutes  Time spent includes time reviewing chart, face-to-face time, counseling patient/family/caregiver, ordering medications/tests/procedures, communicating with other health care professionals, documenting clinical information in the electronic health record, and coordination of care.       CODE STATUS:    Code Status and Medical Interventions: CPR (Attempt to Resuscitate); Full Support   Ordered at: 05/18/25 1733     Code Status (Patient has no pulse and is not breathing):    CPR (Attempt to Resuscitate)     Medical Interventions (Patient has pulse or is breathing):    Full Support     Level Of Support Discussed With:    Patient       Expected Discharge   Expected discharge date/ time has not been documented.     Camron Roque MD  05/18/25

## 2025-05-19 ENCOUNTER — APPOINTMENT (OUTPATIENT)
Dept: CT IMAGING | Facility: HOSPITAL | Age: 60
End: 2025-05-19
Payer: MEDICAID

## 2025-05-19 PROBLEM — I63.541 CEREBRAL INFARCTION INVOLVING RIGHT CEREBELLAR ARTERY: Status: RESOLVED | Noted: 2025-05-18 | Resolved: 2025-05-19

## 2025-05-19 LAB
ALBUMIN SERPL-MCNC: 3.9 G/DL (ref 3.5–5.2)
ALP SERPL-CCNC: 29 U/L (ref 39–117)
ALT SERPL W P-5'-P-CCNC: 20 U/L (ref 1–41)
ANION GAP SERPL CALCULATED.3IONS-SCNC: 13 MMOL/L (ref 5–15)
AST SERPL-CCNC: 17 U/L (ref 1–40)
BILIRUB CONJ SERPL-MCNC: 0.2 MG/DL (ref 0–0.3)
BILIRUB INDIRECT SERPL-MCNC: 0.5 MG/DL
BILIRUB SERPL-MCNC: 0.7 MG/DL (ref 0–1.2)
BUN SERPL-MCNC: 21 MG/DL (ref 8–23)
BUN/CREAT SERPL: 12.4 (ref 7–25)
CALCIUM SPEC-SCNC: 9.2 MG/DL (ref 8.6–10.5)
CHLORIDE SERPL-SCNC: 105 MMOL/L (ref 98–107)
CHOLEST SERPL-MCNC: 145 MG/DL (ref 0–200)
CO2 SERPL-SCNC: 20 MMOL/L (ref 22–29)
CREAT SERPL-MCNC: 1.69 MG/DL (ref 0.76–1.27)
DEPRECATED RDW RBC AUTO: 49.7 FL (ref 37–54)
EGFRCR SERPLBLD CKD-EPI 2021: 45.9 ML/MIN/1.73
ERYTHROCYTE [DISTWIDTH] IN BLOOD BY AUTOMATED COUNT: 13.7 % (ref 12.3–15.4)
GLUCOSE BLDC GLUCOMTR-MCNC: 100 MG/DL (ref 70–130)
GLUCOSE BLDC GLUCOMTR-MCNC: 114 MG/DL (ref 70–130)
GLUCOSE BLDC GLUCOMTR-MCNC: 114 MG/DL (ref 70–130)
GLUCOSE BLDC GLUCOMTR-MCNC: 87 MG/DL (ref 70–130)
GLUCOSE SERPL-MCNC: 105 MG/DL (ref 65–99)
HBA1C MFR BLD: 7.1 % (ref 4.8–5.6)
HCT VFR BLD AUTO: 43.4 % (ref 37.5–51)
HDLC SERPL-MCNC: 38 MG/DL (ref 40–60)
HGB BLD-MCNC: 14.2 G/DL (ref 13–17.7)
LDLC SERPL CALC-MCNC: 88 MG/DL (ref 0–100)
LDLC/HDLC SERPL: 2.28 {RATIO}
MAGNESIUM SERPL-MCNC: 1.9 MG/DL (ref 1.6–2.4)
MCH RBC QN AUTO: 32.6 PG (ref 26.6–33)
MCHC RBC AUTO-ENTMCNC: 32.7 G/DL (ref 31.5–35.7)
MCV RBC AUTO: 99.5 FL (ref 79–97)
PLATELET # BLD AUTO: 239 10*3/MM3 (ref 140–450)
PMV BLD AUTO: 9.1 FL (ref 6–12)
POTASSIUM SERPL-SCNC: 4.3 MMOL/L (ref 3.5–5.2)
PROT SERPL-MCNC: 7.4 G/DL (ref 6–8.5)
RBC # BLD AUTO: 4.36 10*6/MM3 (ref 4.14–5.8)
SODIUM SERPL-SCNC: 138 MMOL/L (ref 136–145)
TRIGL SERPL-MCNC: 102 MG/DL (ref 0–150)
VLDLC SERPL-MCNC: 19 MG/DL (ref 5–40)
WBC NRBC COR # BLD AUTO: 6.9 10*3/MM3 (ref 3.4–10.8)

## 2025-05-19 PROCEDURE — 97161 PT EVAL LOW COMPLEX 20 MIN: CPT

## 2025-05-19 PROCEDURE — 99233 SBSQ HOSP IP/OBS HIGH 50: CPT | Performed by: STUDENT IN AN ORGANIZED HEALTH CARE EDUCATION/TRAINING PROGRAM

## 2025-05-19 PROCEDURE — G0378 HOSPITAL OBSERVATION PER HR: HCPCS

## 2025-05-19 PROCEDURE — 97165 OT EVAL LOW COMPLEX 30 MIN: CPT

## 2025-05-19 PROCEDURE — 70498 CT ANGIOGRAPHY NECK: CPT

## 2025-05-19 PROCEDURE — 70496 CT ANGIOGRAPHY HEAD: CPT

## 2025-05-19 PROCEDURE — 83735 ASSAY OF MAGNESIUM: CPT

## 2025-05-19 PROCEDURE — 80061 LIPID PANEL: CPT

## 2025-05-19 PROCEDURE — 83036 HEMOGLOBIN GLYCOSYLATED A1C: CPT

## 2025-05-19 PROCEDURE — 97116 GAIT TRAINING THERAPY: CPT

## 2025-05-19 PROCEDURE — 80048 BASIC METABOLIC PNL TOTAL CA: CPT

## 2025-05-19 PROCEDURE — 82948 REAGENT STRIP/BLOOD GLUCOSE: CPT

## 2025-05-19 PROCEDURE — 85027 COMPLETE CBC AUTOMATED: CPT

## 2025-05-19 PROCEDURE — 25510000001 IOPAMIDOL PER 1 ML: Performed by: INTERNAL MEDICINE

## 2025-05-19 PROCEDURE — 99232 SBSQ HOSP IP/OBS MODERATE 35: CPT | Performed by: INTERNAL MEDICINE

## 2025-05-19 PROCEDURE — 92523 SPEECH SOUND LANG COMPREHEN: CPT

## 2025-05-19 PROCEDURE — 80076 HEPATIC FUNCTION PANEL: CPT

## 2025-05-19 RX ORDER — HYDRALAZINE HYDROCHLORIDE 20 MG/ML
10 INJECTION INTRAMUSCULAR; INTRAVENOUS ONCE
Status: COMPLETED | OUTPATIENT
Start: 2025-05-20 | End: 2025-05-20

## 2025-05-19 RX ORDER — CLOPIDOGREL BISULFATE 75 MG/1
75 TABLET ORAL DAILY
Qty: 21 TABLET | Refills: 0 | Status: SHIPPED | OUTPATIENT
Start: 2025-05-19

## 2025-05-19 RX ORDER — IOPAMIDOL 755 MG/ML
100 INJECTION, SOLUTION INTRAVASCULAR
Status: COMPLETED | OUTPATIENT
Start: 2025-05-19 | End: 2025-05-19

## 2025-05-19 RX ORDER — ATORVASTATIN CALCIUM 80 MG/1
80 TABLET, FILM COATED ORAL NIGHTLY
Qty: 90 TABLET | Refills: 2 | Status: SHIPPED | OUTPATIENT
Start: 2025-05-19

## 2025-05-19 RX ADMIN — CARVEDILOL 12.5 MG: 12.5 TABLET, FILM COATED ORAL at 15:07

## 2025-05-19 RX ADMIN — VALSARTAN 80 MG: 80 TABLET, FILM COATED ORAL at 15:08

## 2025-05-19 RX ADMIN — CARVEDILOL 12.5 MG: 12.5 TABLET, FILM COATED ORAL at 21:20

## 2025-05-19 RX ADMIN — Medication 10 ML: at 08:59

## 2025-05-19 RX ADMIN — ASPIRIN 81 MG: 81 TABLET, CHEWABLE ORAL at 08:58

## 2025-05-19 RX ADMIN — EMPAGLIFLOZIN 25 MG: 25 TABLET, FILM COATED ORAL at 15:13

## 2025-05-19 RX ADMIN — Medication 10 ML: at 21:21

## 2025-05-19 RX ADMIN — TAMSULOSIN HYDROCHLORIDE 0.4 MG: 0.4 CAPSULE ORAL at 21:20

## 2025-05-19 RX ADMIN — Medication 5 MG: at 21:20

## 2025-05-19 RX ADMIN — CLOPIDOGREL BISULFATE 75 MG: 75 TABLET, FILM COATED ORAL at 08:58

## 2025-05-19 RX ADMIN — SPIRONOLACTONE 25 MG: 25 TABLET ORAL at 15:07

## 2025-05-19 RX ADMIN — ATORVASTATIN CALCIUM 40 MG: 40 TABLET, FILM COATED ORAL at 21:20

## 2025-05-19 RX ADMIN — IOPAMIDOL 75 ML: 755 INJECTION, SOLUTION INTRAVENOUS at 10:44

## 2025-05-19 NOTE — THERAPY EVALUATION
Patient Name: Walt Gao  : 1965    MRN: [id number]                              Today's Date: 2025       Admit Date: 2025    Visit Dx:     ICD-10-CM ICD-9-CM   1. Cerebrovascular accident (CVA), unspecified mechanism  I63.9 434.91   2. Dizziness  R42 780.4   3. Confusion  R41.0 298.9   4. Chronic kidney disease, unspecified CKD stage  N18.9 585.9   5. Hyperglycemia due to diabetes mellitus  E11.65 250.02   6. Hyperlipidemia LDL goal <70  E78.5 272.4     Patient Active Problem List   Diagnosis    Heart failure with improved ejection fraction (HFimpEF)    Essential hypertension    Stage 3b chronic kidney disease    Non-ischemic cardiomyopathy    Alcohol abuse    Hyperlipidemia LDL goal <70    At risk for sleep apnea    Cryptogenic stroke    Type 2 diabetes mellitus without complication, without long-term current use of insulin    B12 deficiency    Benign prostatic hyperplasia with lower urinary tract symptoms     Past Medical History:   Diagnosis Date    CHF (congestive heart failure)     GI bleed 2024    Hypertension     NICM (nonischemic cardiomyopathy)     Nonischemic cardiomyopathy 2016    · Cardiac catheterization revealing nonobstructive CAD,  · LVEF 30% on heart catheterization,  · Echo (16): Moderate LVH. LVEF 23%. Left ventricular diastolic dysfunction (grade II) consistent with pseudonormalization.  Moderate mitral valve regurgitation · Echo (2017):  LVEF 50%    Stroke      Past Surgical History:   Procedure Laterality Date    COLONOSCOPY N/A 2024    Procedure: COLONOSCOPY;  Surgeon: Duglas Zhou MD;  Location:  [facility];  Service: Gastroenterology;  Laterality: N/A;    ENDOSCOPY N/A 2024    Procedure: ESOPHAGOGASTRODUODENOSCOPY;  Surgeon: Duglas Zhou MD;  Location:  [facility];  Service: Gastroenterology;  Laterality: N/A;    HERNIA REPAIR      as child      General Information       Row Name 25 1128          Physical  "Therapy Time and Intention    Document Type evaluation  -CD     Mode of Treatment physical therapy  -CD       Row Name 05/19/25 1128          General Information    Patient Profile Reviewed yes  -CD     Prior Level of Function independent:;all household mobility;community mobility;gait;bed mobility;ADL's;driving;using stairs  PT REPORTS HE DRIVES \"SOMETIMES\" HAS NOT WORKED SINCE PRIOR CVA 6/2024. DOES NOT USE OR OWN DME. CAN STAY WITH S.O. IF NEEDED AT D/C.  -CD     Existing Precautions/Restrictions fall  -CD     Barriers to Rehab medically complex  -CD       Row Name 05/19/25 1128          Living Environment    Current Living Arrangements home  -CD     People in Home alone;other (see comments);significant other  OFTEN STAYS WITH S.O.  -CD       Row Name 05/19/25 1128          Home Main Entrance    Number of Stairs, Main Entrance ten  -CD     Stair Railings, Main Entrance railing on right side (ascending)  -CD       Row Name 05/19/25 1128          Stairs Within Home, Primary    Number of Stairs, Within Home, Primary none  -CD       Row Name 05/19/25 1128          Cognition    Orientation Status (Cognition) oriented to;person;place;verbal cues/prompts needed for orientation;time  STATED BIRTHDATE 1996 AND CURRENT YEAR 1995. ABLE TO FOLLOW COMMANDS FOR MMT/MOBILITY.  -CD       Row Name 05/19/25 1128          Safety Issues/Impairments Affecting Functional Mobility    Safety Issues Affecting Function (Mobility) awareness of need for assistance;insight into deficits/self-awareness;safety precaution awareness  -CD     Impairments Affecting Function (Mobility) balance;endurance/activity tolerance  -CD     Comment, Safety Issues/Impairments (Mobility) GAIT IS UNSTEADY WITH WIDENED BRADLEY AND SHORT STEP LENGTH. IMPROVED SPEED AND STABILITY WITH USE OF R WALKER.  -CD               User Key  (r) = Recorded By, (t) = Taken By, (c) = Cosigned By      Initials Name Provider Type    CD Emily Fu PT Physical Therapist         "           Mobility       Row Name 05/19/25 1132          Bed Mobility    Comment, (Bed Mobility) PT [facility] UPON ARRIVAL ON RA.  -CD       Row Name 05/19/25 1132          Transfers    Comment, (Transfers) STS FROM RECLINER.  -CD       Row Name 05/19/25 1132          Sit-Stand Transfer    Sit-Stand [place] (Transfers) standby assist  -CD     Comment, (Sit-Stand Transfer) NO LOB OR DIZZINESS.  -CD       Row Name 05/19/25 1132          Gait/Stairs (Locomotion)    [place] Level (Gait) contact guard  -CD     Assistive Device (Gait) --  CGA WITH NO A.D. SBA WITH R WALKER  -CD     Distance in Feet (Gait) 300  -CD     Deviations/Abnormal Patterns (Gait) jose daniel decreased;stride length decreased;base of support, wide;gait speed decreased  -CD     Bilateral Gait Deviations heel strike decreased  -CD     Comment, (Gait/Stairs) NO A.D. X 200 FEET, R WALKER X 100 FEET. NOTED IMPROVED STABILITY AND SPEED WITH R WALKER. PT AGREEABLE TO USE AT D/C UNTIL DEEMED SAFE BY OPPT TO PROGRESS TO CANE OR NO A.D.  -CD               User Key  (r) = Recorded By, (t) = Taken By, (c) = Cosigned By      Initials Name Provider Type    CD Emily Fu, PT Physical Therapist                   Obj/Interventions       Row Name 05/19/25 1137          Range of Motion Comprehensive    General Range of Motion bilateral lower extremity ROM WFL  -CD       Row Name 05/19/25 1137          Strength Comprehensive (MMT)    General Manual Muscle Testing (MMT) Assessment no strength deficits identified  -CD     Comment, General Manual Muscle Testing (MMT) Assessment B LE GROSSLY 5/5 AND SYMMETRICAL.  -CD       Row Name 05/19/25 1137          Motor Skills    Motor Skills coordination;functional endurance  -CD     Coordination gross motor deficit;bilateral;lower extremity;heel to shin;WFL  -CD     Functional Endurance O2 SATS STABLE ON RA.  -CD       Row Name 05/19/25 1137          Balance    Static Sitting Balance independent  -CD     Dynamic Sitting  Balance independent  -CD     Position, Sitting Balance unsupported;sitting in chair  -CD     Static Standing Balance standby assist  -CD     Dynamic Standing Balance standby assist  -CD     Position/Device Used, Standing Balance supported;walker, rolling  -CD     Comment, Balance IMPROVED STABILITY, SPEED  AND SAFETY WITH USE OF R WALKER. CUES FOR UPRIGHT POSTURE AND PROPER WALKER PLACEMENT.  -CD       Row Name 05/19/25 1137          Sensory Assessment (Somatosensory)    Sensory Assessment (Somatosensory) LE sensation intact  -CD               User Key  (r) = Recorded By, (t) = Taken By, (c) = Cosigned By      Initials Name Provider Type    CD Emily Fu, PT Physical Therapist                   Goals/Plan       Row Name 05/19/25 1143          Transfer Goal 1 (PT)    Activity/Assistive Device (Transfer Goal 1, PT) sit-to-stand/stand-to-sit;bed-to-chair/chair-to-bed  WITH LRAD OR NO A.D.  -CD     [place] Level/Cues Needed (Transfer Goal 1, PT) independent  -CD     Time Frame (Transfer Goal 1, PT) short term goal (STG);5 days  -CD       Row Name 05/19/25 1143          Gait Training Goal 1 (PT)    Activity/Assistive Device (Gait Training Goal 1, PT) gait (walking locomotion);assistive device use  WITH LRAD OR NO A.D.  -CD     [place] Level (Gait Training Goal 1, PT) independent  -CD     Distance (Gait Training Goal 1, PT) 400  -CD     Time Frame (Gait Training Goal 1, PT) long term goal (LTG);10 days  -CD       Row Name 05/19/25 1143          Stairs Goal 1 (PT)    Activity/Assistive Device (Stairs Goal 1, PT) ascending stairs;descending stairs;using handrail, right  -CD     [place] Level/Cues Needed (Stairs Goal 1, PT) modified independence  -CD     Number of Stairs (Stairs Goal 1, PT) 12  -CD     Time Frame (Stairs Goal 1, PT) long term goal (LTG);10 days  -CD       Row Name 05/19/25 1143          Therapy Assessment/Plan (PT)    Planned Therapy Interventions (PT) balance training;gait training;stair  training;strengthening;transfer training;patient/family education;home exercise program;neuromuscular re-education  -CD               User Key  (r) = Recorded By, (t) = Taken By, (c) = Cosigned By      Initials Name Provider Type    CD Emily Fu, PT Physical Therapist                   Clinical Impression       Row Name 05/19/25 1138          Pain    Pretreatment Pain Rating 0/10 - no pain  -CD     Posttreatment Pain Rating 0/10 - no pain  -CD       Row Name 05/19/25 1138          Plan of Care Review    Plan of Care Reviewed With patient;significant other  -CD     Outcome Evaluation PT PRESENTS WITH EVOLVING SYMPTOMS TO INCLUDE IMPAIRED BALANCE, DECREASED PROCESSING, AND DECLINE IN FUNCTIONAL MOBILITY. PT DEMONSTRATED UNSTEADY GAIT WITH WIDE BRADLEY AND SHORT STEP LENGTH. GAIT QUALITY AND SPEED IMPROVED WITH USE OF R WALKER. PT ABLE TO FOLLOW COMMANDS BUT WITH SOME CONFUSION RE: ORIENTATION QUESTIONS. RECOMMEND HOME WITH S.O. , USE OF R WALKER  AND OPPT AT D/C.  -CD       Row Name 05/19/25 1138          Therapy Assessment/Plan (PT)    Patient/Family Therapy Goals Statement (PT) TO GO HOME.  -CD     Rehab Potential (PT) good  -CD     Criteria for Skilled Interventions Met (PT) yes;meets criteria;skilled treatment is necessary  -CD     Therapy Frequency (PT) daily  -CD     Predicted Duration of Therapy Intervention (PT) 10 DAYS  -CD       Row Name 05/19/25 1138          Vital Signs    Pre Systolic BP Rehab 183  -CD     Pre Treatment Diastolic   -CD     Post Systolic BP Rehab 182  -CD     Post Treatment Diastolic   -CD     Pretreatment Heart Rate (beats/min) 67  -CD     Posttreatment Heart Rate (beats/min) 65  -CD     Pre SpO2 (%) 96  -CD     O2 Delivery Pre Treatment room air  -CD     O2 Delivery Intra Treatment room air  -CD     Post SpO2 (%) 96  -CD     O2 Delivery Post Treatment room air  -CD     Pre Patient Position Sitting  -CD     Intra Patient Position Standing  -CD     Post Patient Position  Sitting  -CD       Row Name 05/19/25 1138          Positioning and Restraints    Pre-Treatment Position sitting in chair/recliner  -CD     Post Treatment Position chair  -CD     In Chair reclined;call light within reach;encouraged to call for assist;exit alarm on;legs elevated;notified nsg;with family/caregiver  -CD               User Key  (r) = Recorded By, (t) = Taken By, (c) = Cosigned By      Initials Name Provider Type    Emily Bang, PT Physical Therapist                   Outcome Measures       Row Name 05/19/25 1144          How much help from another person do you currently need...    Turning from your back to your side while in flat bed without using bedrails? 4  -CD     Moving from lying on back to sitting on the side of a flat bed without bedrails? 4  -CD     Moving to and from a bed to a chair (including a wheelchair)? 3  -CD     Standing up from a chair using your arms (e.g., wheelchair, bedside chair)? 4  -CD     Climbing 3-5 steps with a railing? 3  -CD     To walk in hospital room? 3  -CD     AM-PAC 6 Clicks Score (PT) 21  -CD       Row Name 05/19/25 0846          Functional Assessment    Outcome Measure Options AM-PAC 6 Clicks Daily Activity (OT)  -AJ               User Key  (r) = Recorded By, (t) = Taken By, (c) = Cosigned By      Initials Name Provider Type    Emily Bang, PT Physical Therapist    Kaern Milner, OT Occupational Therapist                                 Physical Therapy Education       Title: PT OT SLP Therapies (In Progress)       Topic: Physical Therapy (Done)       Point: Mobility training (Done)       Learning Progress Summary            Patient Acceptance, E, VU,NR by CD at 5/19/2025 1144    Comment: BENEFITS OF OOB ACTIVITY, SAFETY WITH MOBILITY, PROGRESSION OF POC, D/C PLANNING, GAIT TRAINING WITH R WALKER.                      Point: Home exercise program (Done)       Learning Progress Summary            Patient Acceptance, E, VU,NR by CD at 5/19/2025 1144     Comment: BENEFITS OF OOB ACTIVITY, SAFETY WITH MOBILITY, PROGRESSION OF POC, D/C PLANNING, GAIT TRAINING WITH R WALKER.                      Point: Body mechanics (Done)       Learning Progress Summary            Patient Acceptance, E, VU,NR by CD at 5/19/2025 1144    Comment: BENEFITS OF OOB ACTIVITY, SAFETY WITH MOBILITY, PROGRESSION OF POC, D/C PLANNING, GAIT TRAINING WITH R WALKER.                      Point: Precautions (Done)       Learning Progress Summary            Patient Acceptance, E, VU,NR by CD at 5/19/2025 1144    Comment: BENEFITS OF OOB ACTIVITY, SAFETY WITH MOBILITY, PROGRESSION OF POC, D/C PLANNING, GAIT TRAINING WITH R WALKER.                                      User Key       Initials Effective Dates Name Provider Type Discipline    CD 02/03/23 -  Emily Fu, PT Physical Therapist PT                  PT Recommendation and Plan  Planned Therapy Interventions (PT): balance training, gait training, stair training, strengthening, transfer training, patient/family education, home exercise program, neuromuscular re-education  Outcome Evaluation: PT PRESENTS WITH EVOLVING SYMPTOMS TO INCLUDE IMPAIRED BALANCE, DECREASED PROCESSING, AND DECLINE IN FUNCTIONAL MOBILITY. PT DEMONSTRATED UNSTEADY GAIT WITH WIDE BRADLEY AND SHORT STEP LENGTH. GAIT QUALITY AND SPEED IMPROVED WITH USE OF R WALKER. PT ABLE TO FOLLOW COMMANDS BUT WITH SOME CONFUSION RE: ORIENTATION QUESTIONS. RECOMMEND HOME WITH S.O. , USE OF R WALKER  AND OPPT AT D/C.     Time Calculation:   PT Evaluation Complexity  History, PT Evaluation Complexity: 3 or more personal factors and/or comorbidities  Examination of Body Systems (PT Eval Complexity): total of 4 or more elements  Clinical Presentation (PT Evaluation Complexity): evolving  Clinical Decision Making (PT Evaluation Complexity): low complexity  Overall Complexity (PT Evaluation Complexity): low complexity     PT Charges       Row Name 05/19/25 1147             Time Calculation     Start Time 1103  -CD      PT Received On 05/19/25  -CD      PT Goal Re-Cert Due Date 05/29/25  -CD         Time Calculation- PT    Total Timed Code Minutes- PT 8 minute(s)  -CD         Timed Charges    79465 - Gait Training Minutes  8  -CD         Untimed Charges    PT Eval/Re-eval Minutes 47  -CD         Total Minutes    Timed Charges Total Minutes 8  -CD      Untimed Charges Total Minutes 47  -CD       Total Minutes 55  -CD                User Key  (r) = Recorded By, (t) = Taken By, (c) = Cosigned By      Initials Name Provider Type    CD Emily Fu, PT Physical Therapist                  Therapy Charges for Today       Code Description Service Date Service Provider Modifiers Qty    03987784738 HC PT EVAL LOW COMPLEXITY 4 5/19/2025 Emily Fu, PT GP 1    58969866129 HC GAIT TRAINING EA 15 MIN 5/19/2025 Emily Fu, PT GP 1            PT G-Codes  Outcome Measure Options: AM-PAC 6 Clicks Daily Activity (OT)  AM-PAC 6 Clicks Score (PT): 21  AM-PAC 6 Clicks Score (OT): 20  PT Discharge Summary  Anticipated Discharge Disposition (PT): home with assist, home with outpatient therapy services    Emily Fu, PT  5/19/2025

## 2025-05-19 NOTE — THERAPY EVALUATION
Patient Name: Walt Gao  : 1965    MRN: [id number]                              Today's Date: 2025       Admit Date: 2025    Visit Dx:     ICD-10-CM ICD-9-CM   1. Cerebrovascular accident (CVA), unspecified mechanism  I63.9 434.91   2. Dizziness  R42 780.4   3. Confusion  R41.0 298.9   4. Chronic kidney disease, unspecified CKD stage  N18.9 585.9   5. Hyperglycemia due to diabetes mellitus  E11.65 250.02     Patient Active Problem List   Diagnosis    Heart failure with improved ejection fraction (HFimpEF)    Essential hypertension    Stage 3b chronic kidney disease    Non-ischemic cardiomyopathy    Alcohol abuse    Hyperlipidemia LDL goal <70    At risk for sleep apnea    Cryptogenic stroke    Type 2 diabetes mellitus without complication, without long-term current use of insulin    B12 deficiency    Benign prostatic hyperplasia with lower urinary tract symptoms    Cerebral infarction involving right cerebellar artery     Past Medical History:   Diagnosis Date    CHF (congestive heart failure)     GI bleed 2024    Hypertension     NICM (nonischemic cardiomyopathy)     Nonischemic cardiomyopathy 2016    · Cardiac catheterization revealing nonobstructive CAD,  · LVEF 30% on heart catheterization,  · Echo (16): Moderate LVH. LVEF 23%. Left ventricular diastolic dysfunction (grade II) consistent with pseudonormalization.  Moderate mitral valve regurgitation · Echo (2017):  LVEF 50%    Stroke      Past Surgical History:   Procedure Laterality Date    COLONOSCOPY N/A 2024    Procedure: COLONOSCOPY;  Surgeon: Duglas Zhou MD;  Location:  [facility];  Service: Gastroenterology;  Laterality: N/A;    ENDOSCOPY N/A 2024    Procedure: ESOPHAGOGASTRODUODENOSCOPY;  Surgeon: Duglas Zhou MD;  Location:  [facility];  Service: Gastroenterology;  Laterality: N/A;    HERNIA REPAIR      as child      General Information       Row Name 25 0820           OT Time and Intention    Document Type evaluation  -     Mode of Treatment occupational therapy  -       Row Name 05/19/25 0820          General Information    Patient Profile Reviewed yes  -AJ     Prior Level of Function independent:;all household mobility;community mobility;gait;transfer;bed mobility;ADL's;driving;work;using stairs  -AJ     Existing Precautions/Restrictions fall  Hx of CVA w/residual L hand sensory deficits.  -     Barriers to Rehab medically complex  -       Row Name 05/19/25 0820          Living Environment    Current Living Arrangements home  -AJ     People in Home alone;other (see comments)  Stays w/significant other often  -       Row Name 05/19/25 0820          Home Main Entrance    Number of Stairs, Main Entrance ten;other (see comments)  1 step to enter at SO home  -AJ     Stair Railings, Main Entrance railing on right side (ascending)  -       Row Name 05/19/25 0820          Stairs Within Home, Primary    Number of Stairs, Within Home, Primary none  -       Row Name 05/19/25 0820          Cognition    Orientation Status (Cognition) oriented x 4  -       Row Name 05/19/25 0820          Safety Issues/Impairments Affecting Functional Mobility    Safety Issues Affecting Function (Mobility) ability to follow commands;awareness of need for assistance;insight into deficits/self-awareness;problem-solving;safety precaution awareness;safety precautions follow-through/compliance;sequencing abilities  -     Impairments Affecting Function (Mobility) balance;endurance/activity tolerance;sensation/sensory awareness  -               User Key  (r) = Recorded By, (t) = Taken By, (c) = Cosigned By      Initials Name Provider Type    AJ Karen Khan OT Occupational Therapist                     Mobility/ADL's       Row Name 05/19/25 0848          Bed Mobility    Bed Mobility supine-sit;scooting/bridging  -     Scooting/Bridging [place] (Bed Mobility) standby assist  -AJ      Supine-Sit [place] (Bed Mobility) standby assist;verbal cues;1 person assist  -     Assistive Device (Bed Mobility) bed rails;head of bed elevated  -     Comment, (Bed Mobility) Cues for sequencing and task initiation. No c/o of dizziness w/transition.  -[facility] Name 05/19/25 [facility]          Transfers    Transfers sit-stand transfer  -AJ       Row Name 05/19/25 [facility]          Sit-Stand Transfer    Sit-Stand [place] (Transfers) contact guard;1 person assist  -     Comment, (Sit-Stand Transfer) No LOB or c/o dizziness.  -[facility] Name 05/19/25 [facility]          Functional Mobility    Functional Mobility- Ind. Level contact guard assist;1 person  -     Functional Mobility- Device other (see comments)  No DME  -     Functional Mobility-Distance (Feet) --   distance  -     Functional Mobility- Comment Unsteady of feet, especially in restroom. Mild dizziness reported w/hallway ambulation while performing lateral head turn w/walk. No overt LOB noted.  -[facility] Name 05/19/25 [facility]          Activities of Daily Living    BADL Assessment/Intervention grooming;toileting;lower body dressing  -[facility] Name 05/19/25 [facility]          Hygiene Care    Oral Care teeth brushed - regular toothbrush  -AJ       Row Name 05/19/25 [facility]          Grooming Assessment/Training    [place] Level (Grooming) oral care regimen;wash face, hands;contact guard assist  -     Position (Grooming) sink side;unsupported standing  -[facility] Name 05/19/25 08          Toileting Assessment/Training    [place] Level (Toileting) adjust/manage clothing;perform perineal hygiene;contact guard assist  -     Assistive Devices (Toileting) commode;grab bar/safety frame  -     Position (Toileting) supported standing  -     Comment, (Toileting) Cues for increasing balance while standing for toileting.  -[facility] Name 05/19/25 [facility]          Lower Body Dressing Assessment/Training    [place] Level (Lower Body  Dressing) don;socks;set up  -     Position (Lower Body Dressing) sitting up in bed  -               User Key  (r) = Recorded By, (t) = Taken By, (c) = Cosigned By      Initials Name Provider Type    Karen Milner OT Occupational Therapist                   Obj/Interventions       Row Name 05/19/25 0840          Sensory Assessment (Somatosensory)    Sensory Assessment (Somatosensory) right-sided sensation intact;left UE  -     Left UE Sensory Assessment general sensation;impaired;light touch awareness;light touch localization;intact  -     Sensory Assessment Impaired general sensation in L hand-able to localize light touch in BUE.  -       Row Name 05/19/25 0840          Vision Assessment/Intervention    Visual Impairment/Limitations WFL  -       Row Name 05/19/25 0840          Range of Motion Comprehensive    General Range of Motion bilateral upper extremity ROM WFL  -       Row Name 05/19/25 0840          Strength Comprehensive (MMT)    General Manual Muscle Testing (MMT) Assessment upper extremity strength deficits identified  -     Comment, General Manual Muscle Testing (MMT) Assessment BUE grossly +4/5  -       Row Name 05/19/25 0840          Balance    Balance Assessment sitting static balance;sitting dynamic balance;sit to stand dynamic balance;standing static balance;standing dynamic balance  -     Static Sitting Balance supervision  -     Dynamic Sitting Balance standby assist  -     Position, Sitting Balance unsupported;sitting edge of bed  -     Static Standing Balance contact guard  -     Dynamic Standing Balance contact guard  -     Position/Device Used, Standing Balance unsupported  -     Balance Interventions sitting;standing;sit to stand;supported;static;dynamic;occupation based/functional task  -               User Key  (r) = Recorded By, (t) = Taken By, (c) = Cosigned By      Initials Name Provider Type    Karen Milner OT Occupational Therapist                    Goals/Plan       Row Name 05/19/25 0845          Transfer Goal 1 (OT)    Activity/Assistive Device (Transfer Goal 1, OT) sit-to-stand/stand-to-sit;bed-to-chair/chair-to-bed;toilet  -AJ     [place] Level/Cues Needed (Transfer Goal 1, OT) standby assist  -AJ     Time Frame (Transfer Goal 1, OT) long term goal (LTG);5 days  -AJ     Progress/Outcome (Transfer Goal 1, OT) new goal  -AJ       Row Name 05/19/25 0845          Toileting Goal 1 (OT)    Activity/Device (Toileting Goal 1, OT) adjust/manage clothing;perform perineal hygiene  -AJ     [place] Level/Cues Needed (Toileting Goal 1, OT) independent  -AJ     Time Frame (Toileting Goal 1, OT) short term goal (STG);2 days  -AJ     Progress/Outcome (Toileting Goal 1, OT) new goal  -AJ       Row Name 05/19/25 0845          Grooming Goal 1 (OT)    Activity/Device (Grooming Goal 1, OT) oral care;wash face, hands  -AJ     [place] (Grooming Goal 1, OT) standby assist  -AJ     Time Frame (Grooming Goal 1, OT) short term goal (STG);2 days  -AJ     Strategies/Barriers (Grooming Goal 1, OT) Maintaining balance w/all grooming activities.  -AJ     Progress/Outcome (Grooming Goal 1, OT) new goal  -       Row Name 05/19/25 0845          Therapy Assessment/Plan (OT)    Planned Therapy Interventions (OT) activity tolerance training;adaptive equipment training;BADL retraining;functional balance retraining;IADL retraining;occupation/activity based interventions;patient/caregiver education/training;ROM/therapeutic exercise;strengthening exercise;transfer/mobility retraining  -AJ               User Key  (r) = Recorded By, (t) = Taken By, (c) = Cosigned By      Initials Name Provider Type    Karen Milner OT Occupational Therapist                   Clinical Impression       Row Name 05/19/25 0842          Pain Assessment    Pretreatment Pain Rating 0/10 - no pain  -AJ     Posttreatment Pain Rating 0/10 - no pain  -       Row Name 05/19/25 0842          Plan  of Care Review    Plan of Care Reviewed With patient  -     Progress no change  -     Outcome Evaluation OT eval complete. Pt presents near fxl baseline, however is currently limited by delayed processing, balance deficits, and decreased activity tolerance. Pt ambulated w/reports of mild dizziness-CGA no overt LOB noted. Pt stood sinkside for grooming routine w/CGA for balance as well. Pt would benefit from ongoing skilled OT services to progress to PLOF. Rec d/c to home with assist and OP OT/PT.  -       Row Name 05/19/25 0842          Therapy Assessment/Plan (OT)    Patient/Family Therapy Goal Statement (OT) Return to PLOF  -     Rehab Potential (OT) good  -     Criteria for Skilled Therapeutic Interventions Met (OT) yes;meets criteria;skilled treatment is necessary  -     Therapy Frequency (OT) daily  -     Predicted Duration of Therapy Intervention (OT) 5 days  -       Row Name 05/19/25 0842          Therapy Plan Review/Discharge Plan (OT)    Anticipated Discharge Disposition (OT) home with assist;home with outpatient therapy services  -       Row Name 05/19/25 0842          Vital Signs    Pre Systolic BP Rehab 159  -AJ     Pre Treatment Diastolic   -AJ     Post Systolic BP Rehab 172  -AJ     Post Treatment Diastolic   -AJ     Pretreatment Heart Rate (beats/min) 85  -AJ     Intratreatment Heart Rate (beats/min) 90  -     Posttreatment Heart Rate (beats/min) 77  -AJ     Pre SpO2 (%) 95  -AJ     O2 Delivery Pre Treatment room air  -AJ     Post SpO2 (%) 96  -AJ     O2 Delivery Post Treatment room air  -AJ     Pre Patient Position Supine  -AJ     Intra Patient Position Standing  -     Post Patient Position Sitting  -       Row Name 05/19/25 0842          Positioning and Restraints    Pre-Treatment Position in bed  -AJ     Post Treatment Position chair  -AJ     In Chair notified nsg;reclined;sitting;call light within reach;encouraged to call for assist;exit alarm on;legs  elevated;waffle cushion  -               User Key  (r) = Recorded By, (t) = Taken By, (c) = Cosigned By      Initials Name Provider Type    Karen Milner OT Occupational Therapist                   Outcome Measures       Row Name 05/19/25 0846          How much help from another is currently needed...    Putting on and taking off regular lower body clothing? 4  -AJ     Bathing (including washing, rinsing, and drying) 3  -AJ     Toileting (which includes using toilet bed pan or urinal) 3  -AJ     Putting on and taking off regular upper body clothing 3  -AJ     Taking care of personal grooming (such as brushing teeth) 3  -AJ     Eating meals 4  -AJ     AM-PAC 6 Clicks Score (OT) 20  -       Row Name 05/19/25 0846          Functional Assessment    Outcome Measure Options AM-PAC 6 Clicks Daily Activity (OT)  -               User Key  (r) = Recorded By, (t) = Taken By, (c) = Cosigned By      Initials Name Provider Type    Karen Milner OT Occupational Therapist                    Occupational Therapy Education       Title: PT OT SLP Therapies (In Progress)       Topic: Occupational Therapy (In Progress)       Point: ADL training (Done)       Learning Progress Summary            Patient Acceptance, E,D, VU by CHARLIE at 5/19/2025 0847                      Point: Precautions (Done)       Learning Progress Summary            Patient Acceptance, E,D, VU by CHARLIE at 5/19/2025 0847                      Point: Body mechanics (Done)       Learning Progress Summary            Patient Acceptance, E,D, VU by  at 5/19/2025 0847                                      User Key       Initials Effective Dates Name Provider Type Discipline     08/26/24 -  Karen Khan OT Occupational Therapist OT                  OT Recommendation and Plan  Planned Therapy Interventions (OT): activity tolerance training, adaptive equipment training, BADL retraining, functional balance retraining, IADL retraining, occupation/activity based  interventions, patient/caregiver education/training, ROM/therapeutic exercise, strengthening exercise, transfer/mobility retraining  Therapy Frequency (OT): daily  Plan of Care Review  Plan of Care Reviewed With: patient  Progress: no change  Outcome Evaluation: OT eval complete. Pt presents near fxl baseline, however is currently limited by delayed processing, balance deficits, and decreased activity tolerance. Pt ambulated w/reports of mild dizziness-CGA no overt LOB noted. Pt stood sinkside for grooming routine w/CGA for balance as well. Pt would benefit from ongoing skilled OT services to progress to PLOF. Rec d/c to home with assist and OP OT/PT.     Time Calculation:   Evaluation Complexity (OT)  Review Occupational Profile/Medical/Therapy History Complexity: brief/low complexity  Assessment, Occupational Performance/Identification of Deficit Complexity: 1-3 performance deficits  Clinical Decision Making Complexity (OT): problem focused assessment/low complexity  Overall Complexity of Evaluation (OT): low complexity     Time Calculation- OT       Row Name 05/19/25 0847             Time Calculation- OT    OT Start Time 0754  -AJ      OT Received On 05/19/25  -AJ      OT Goal Re-Cert Due Date 05/29/25  -AJ         Untimed Charges    OT Eval/Re-eval Minutes 48  -AJ         Total Minutes    Untimed Charges Total Minutes 48  -AJ       Total Minutes 48  -AJ                User Key  (r) = Recorded By, (t) = Taken By, (c) = Cosigned By      Initials Name Provider Type    Karen Milner OT Occupational Therapist                  Therapy Charges for Today       Code Description Service Date Service Provider Modifiers Qty    [id number]  OT EVAL LOW COMPLEXITY 4 5/19/2025 Karen Khan OT GO 1                 Karen Khan OT  5/19/2025

## 2025-05-19 NOTE — PROGRESS NOTES
[facility]  PROGRESS NOTE    Patient Name: Walt Gao  : 1965  MRN: [id number]    Date of Admission: 2025  Primary Care Physician: Duglas Lang MD    Subjective   Subjective     CC: f/u cva    HPI: Up in chair. Feels better, symptoms resolved. Worked with OT without difficulty.      Objective   Objective     Vital Signs:   Temp:  [97.5 °F (36.4 °C)-98.2 °F (36.8 °C)] 97.7 °F (36.5 °C)  Heart Rate:  [62-76] 63  Resp:  [16-18] 18  BP: (122-187)/() 183/110     Physical Exam:  Constitutional: No acute distress, awake, alert  HENT: NCAT, mucous membranes moist  Respiratory: Clear to auscultation bilaterally, respiratory effort normal   Cardiovascular: RRR, no murmurs, rubs, or gallops  Gastrointestinal: Positive bowel sounds, soft, nontender, nondistended  Musculoskeletal: No bilateral ankle edema  Psychiatric: Appropriate affect, cooperative  Neurologic: Oriented x 3, strength symmetric in all extremities, Cranial Nerves grossly intact to confrontation, speech clear  Skin: No rashes     Results Reviewed:  LAB RESULTS:      Lab 25  0715 25  1605 25  1348   WBC 6.90  --  6.86   HEMOGLOBIN 14.2  --  15.2   HEMATOCRIT 43.4  --  46.7   PLATELETS 239  --  272   NEUTROS ABS  --   --  4.24   IMMATURE GRANS (ABS)  --   --  0.01   LYMPHS ABS  --   --  2.00   MONOS ABS  --   --  0.49   EOS ABS  --   --  0.10   MCV 99.5*  --  99.2*   CRP  --  0.70*  --    PROCALCITONIN  --  0.06  --    LACTATE  --   --  0.7   PROTIME  --   --  13.6   HSTROP T  --  13 14         Lab 25  0715 25  1605 25  1348   SODIUM 138  --  136   POTASSIUM 4.3  --  4.8   CHLORIDE 105  --  102   CO2 20.0*  --  22.0   ANION GAP 13.0  --  12.0   BUN 21  --  26*   CREATININE 1.69*  --  1.91*   EGFR 45.9*  --  39.6*   GLUCOSE 105*  --  119*   CALCIUM 9.2  --  9.7   MAGNESIUM 1.9  --  2.2   HEMOGLOBIN A1C 7.10*  --   --    TSH  --  1.040  --          Lab  05/19/25  0715 05/18/25  1348   TOTAL PROTEIN 7.4 8.5   ALBUMIN 3.9 4.4   GLOBULIN  --  4.1   ALT (SGPT) 20 23   AST (SGOT) 17 18   BILIRUBIN 0.7 0.5   INDIRECT BILIRUBIN 0.5  --    BILIRUBIN DIRECT 0.2  --    ALK PHOS 29* 32*         Lab 05/18/25  1605 05/18/25  1348   PROBNP 152.0  --    HSTROP T 13 14   PROTIME  --  13.6   INR  --  0.98         Lab 05/19/25  0715   CHOLESTEROL 145   LDL CHOL 88   HDL CHOL 38*   TRIGLYCERIDES 102             Brief Urine Lab Results  (Last result in the past 365 days)        Color   Clarity   Blood   Leuk Est   Nitrite   Protein   CREAT   Urine HCG        05/18/25 1348 Yellow   Clear   Negative   Negative   Negative   30 mg/dL (1+)                   Microbiology Results Abnormal       None            CT Angiogram Head  Result Date: 5/19/2025  CT ANGIOGRAM HEAD, CT ANGIOGRAM NECK Date of Exam: 5/19/2025 10:21 AM EDT Indication: new cerebellar stroke; eval vasculature. Comparison: CTA of the head and neck dated 6/2/2024, brain MRI dated 5/18/2025 Technique: CTA of the head was performed after the uneventful intravenous administration of 75 mL Isovue-370. Reconstructed coronal and sagittal images were also obtained. In addition, a 3-D volume rendered image was created for interpretation. Automated  exposure control and iterative reconstruction methods were used. FINDINGS: Vascular Findings: The right common carotid, internal carotid, middle cerebral, anterior cerebral, vertebral, and posterior cerebral arteries are patent without abrupt cut off or aneurysmal dilation. Mild to moderate stenosis is seen involving the distal M1 segment of the right middle cerebral artery (series 8, images 464 through 467), unchanged since the prior study. The left common carotid, internal carotid, middle cerebral, anterior cerebral, vertebral, and posterior cerebral arteries are patent without abrupt cut off or aneurysmal dilation. Focal moderate to severe stenosis involving a distal M2 branch of the  left  middle cerebral artery (series 8, images 489 through 499), unchanged since the prior study. Basilar artery appears patent and appears unremarkable. Non-vascular Findings: For description of nonvascular intracranial findings, please refer to the noncontrast brain MRI dated 5/18/2025. There is encephalomalacia within the right parietal lobe. No significant mass effect, midline shift, or hydrocephalus is seen. No suspicious contrast enhancement is seen. Calvarium appears intact. Orbital structures, paranasal sinuses, and mastoids appear unremarkable. Scalp soft tissues are unremarkable. No acute abnormality is identified within the visualized soft tissue or bony structures of the neck. The visualized lung apices are clear.     Impression: 1.No intracranial large vessel occlusion is definitely identified. 2.Mild to moderate stenosis is seen involving the distal M1 segment of the right middle cerebral artery (series 8, images 464 through 467), unchanged since the prior study. 3.Focal moderate to severe stenosis involving a distal M2 branch of the left middle cerebral artery (series 8, images 489 through 499), unchanged since the prior study. 4.No significant stenosis of the bilateral internal carotid arteries. Electronically Signed: Azael Valero MD  5/19/2025 11:04 AM EDT  Workstation ID: [id number]    CT Angiogram Neck  Result Date: 5/19/2025  CT ANGIOGRAM HEAD, CT ANGIOGRAM NECK Date of Exam: 5/19/2025 10:21 AM EDT Indication: new cerebellar stroke; eval vasculature. Comparison: CTA of the head and neck dated 6/2/2024, brain MRI dated 5/18/2025 Technique: CTA of the head was performed after the uneventful intravenous administration of 75 mL Isovue-370. Reconstructed coronal and sagittal images were also obtained. In addition, a 3-D volume rendered image was created for interpretation. Automated  exposure control and iterative reconstruction methods were used. FINDINGS: Vascular Findings: The right common  carotid, internal carotid, middle cerebral, anterior cerebral, vertebral, and posterior cerebral arteries are patent without abrupt cut off or aneurysmal dilation. Mild to moderate stenosis is seen involving the distal M1 segment of the right middle cerebral artery (series 8, images 464 through 467), unchanged since the prior study. The left common carotid, internal carotid, middle cerebral, anterior cerebral, vertebral, and posterior cerebral arteries are patent without abrupt cut off or aneurysmal dilation. Focal moderate to severe stenosis involving a distal M2 branch of the left  middle cerebral artery (series 8, images 489 through 499), unchanged since the prior study. Basilar artery appears patent and appears unremarkable. Non-vascular Findings: For description of nonvascular intracranial findings, please refer to the noncontrast brain MRI dated 5/18/2025. There is encephalomalacia within the right parietal lobe. No significant mass effect, midline shift, or hydrocephalus is seen. No suspicious contrast enhancement is seen. Calvarium appears intact. Orbital structures, paranasal sinuses, and mastoids appear unremarkable. Scalp soft tissues are unremarkable. No acute abnormality is identified within the visualized soft tissue or bony structures of the neck. The visualized lung apices are clear.     Impression: 1.No intracranial large vessel occlusion is definitely identified. 2.Mild to moderate stenosis is seen involving the distal M1 segment of the right middle cerebral artery (series 8, images 464 through 467), unchanged since the prior study. 3.Focal moderate to severe stenosis involving a distal M2 branch of the left middle cerebral artery (series 8, images 489 through 499), unchanged since the prior study. 4.No significant stenosis of the bilateral internal carotid arteries. Electronically Signed: Azael Valero MD  5/19/2025 11:04 AM EDT  Workstation ID: [id number]    MRI Brain Without Contrast  Result  Date: 5/18/2025  MRI BRAIN WO CONTRAST Date of Exam: 5/18/2025 4:32 PM EDT Indication: dizziness, slower to respond than baseline, hx CVA in the past.  Comparison: Brain MRI 9/6/2024 Technique:  Routine multiplanar/multisequence sequence images of the brain were obtained without contrast administration. Findings: 1.6 x 1.4 cm diffusion restriction in the right middle cerebellar peduncle indicating recent infarct. Increased T2/FLAIR signal abnormality. Negative for hemorrhage. Cortical areas of focal encephalomalacia and T2/FLAIR hyperintense gliosis in the right parietal and right occipital lobes indicating sequelae of previous more remote infarcts present on prior comparison. Serpiginous areas of T1 hyperintense signal indicates cortical laminar necrosis. Multifocal right greater than left periventricular areas  of periventricular and subcortical infarcts similar in distribution from prior exam. Distribution could reflect sequelae of watershed infarcts. Negative for acute intracranial hemorrhage, large mass lesion or midline shift. Sequelae of chronic microvascular ischemic change likely moderate severity similar to previous comparison. No large extra-axial fluid collection. Multifocal areas of scattered susceptibility artifacts involving bilateral thalami, cerebellum, cj and mesial temporal lobes indicating hemosiderin deposition from previous microhemorrhages. Unremarkable pituitary. Negative for cerebellar tonsillar ectopia. Major intracranial flow voids appear preserved. No significant mastoid effusion. Minimal nonobstructive sinus disease. Orbits symmetric.     Impression: Impression: 1. Small recent (acute/subacute) infarct involving right middle cerebellar peduncle. Negative for hemorrhage or significant mass effect. 2. Sequelae of chronic right parietal and occipital lobe infarcts, with findings of cortical laminar necrosis. Similar distribution multifocal smaller parenchymal infarcts in a watershed  distribution. 3. Global parenchymal volume loss and sequelae of chronic microvascular ischemic change. Electronically Signed: Jesus Manuel Limon MD  5/18/2025 5:00 PM EDT  Workstation ID: [id number]    XR Chest 1 View  Result Date: 5/18/2025  XR CHEST 1 VW Date of Exam: 5/18/2025 2:27 PM EDT Indication: Weak/Dizzy/AMS triage protocol Comparison: 3/7/2025 Findings: No focal consolidation. No pneumothorax or pleural effusion. Cardiac size is normal. The visualized clavicles appear intact. No displaced rib fractures. The visualized upper abdomen is normal.     Impression: Impression: No acute cardiopulmonary disease. Electronically Signed: Rinku Masters MD  5/18/2025 2:42 PM EDT  Workstation ID: [id number]      Results for orders placed during the hospital encounter of 06/02/24    Adult Transthoracic Echo Complete W/ Cont if Necessary Per Protocol (With Agitated Saline)    Interpretation Summary    Left ventricular systolic function is normal. Estimated left ventricular EF = 50%    Left ventricular wall thickness is consistent with mild to moderate concentric hypertrophy.    The cardiac valves are anatomically and functionally normal.    Estimated right ventricular systolic pressure from tricuspid regurgitation is normal (<35 mmHg).    Saline test results are negative.    Compared to echocardiogram performed 11/29/2022, there is been no significant interval change      Current medications:  Scheduled Meds:aspirin, 81 mg, Oral, Daily   Or  aspirin, 300 mg, Rectal, Daily  atorvastatin, 40 mg, Oral, Nightly  [Held by provider] carvedilol, 12.5 mg, Oral, BID  clopidogrel, 300 mg, Oral, Once   And  clopidogrel, 75 mg, Oral, Daily  [Held by provider] empagliflozin, 25 mg, Oral, Daily  insulin lispro, 2-7 Units, Subcutaneous, 4x Daily AC & at Bedtime  melatonin, 5 mg, Oral, Nightly  sodium chloride, 10 mL, Intravenous, Q12H  sodium chloride, 10 mL, Intravenous, Q12H  [Held by provider] spironolactone, 25 mg, Oral, Daily  tamsulosin,  0.4 mg, Oral, Nightly  [Held by provider] valsartan, 80 mg, Oral, Daily      Continuous Infusions:   PRN Meds:.  acetaminophen **OR** acetaminophen **OR** acetaminophen    senna-docusate sodium **AND** polyethylene glycol **AND** bisacodyl **AND** bisacodyl    Calcium Replacement - Follow Nurse / BPA Driven Protocol    dextrose    dextrose    glucagon (human recombinant)    Magnesium Standard Dose Replacement - Follow Nurse / BPA Driven Protocol    nitroglycerin    Phosphorus Replacement - Follow Nurse / BPA Driven Protocol    Potassium Replacement - Follow Nurse / BPA Driven Protocol    sodium chloride    sodium chloride    sodium chloride    sodium chloride    sodium chloride    Assessment & Plan   Assessment & Plan     Active Hospital Problems    Diagnosis  POA    Benign prostatic hyperplasia with lower urinary tract symptoms [N40.1]  Yes    Type 2 diabetes mellitus without complication, without long-term current use of insulin [E11.9]  Yes    Cryptogenic stroke [I63.9]  Yes    Hyperlipidemia LDL goal <70 [E78.5]  Yes    Stage 3b chronic kidney disease [N18.32]  Yes    Non-ischemic cardiomyopathy [I42.8]  Yes    Heart failure with improved ejection fraction (HFimpEF) [I50.32]  Yes    Essential hypertension [I10]  Yes    Alcohol abuse [F10.10]  Yes      Resolved Hospital Problems    Diagnosis Date Resolved POA    **Cerebral infarction involving right cerebellar artery [I63.541] 05/19/2025 Yes        Brief Hospital Course to date:  Walt Gao is a 60 y.o. male with a PMH significant for CVA (right MCA infarct with left hemisensory deficits), GI bleed, GEETA, HTN, HLD, HFpEF, nonischemic cardiomyopathy, CKD, T2DM and BPH.  Presenting to [facility] ED due to new onset dizziness, gait instability and problems with balance ongoing since yesterday, endorsing symptoms worse when standing suddenly.  Evaluate by neurosurgery, plans to repeat CTA head and neck.     New onset dizziness, gait instability  and balance problems  Acute/subacute right middle cerebellar peduncle infarct  Prior chronic right parietal and occipital lobe infarct with evidence of cortical laminar necrosis  Multifocal smaller parenchymal infarcts in watershed areas  Global parenchymal volume loss  Acute/subacute CVA in the setting of prior CVA with residual deficits described as left hand numbness  Patient states he is very functional, able to drive, complete all ADLs  Stroke team on board, have evaluated, no role of TNK at this time  MRI brain ordered by ED provider showed small recent (acute/subacute) infarct involving the right middle cerebellar peduncle, negative for hemorrhage or mass effect.  There was sequelae of chronic right parietal and occipital lobe infarcts with findings of cortical laminar necrosis similar distribution multifocal smaller parenchymal infarcts within watershed distribution.  Global parenchymal volume loss and sequela of chronic microvascular ischemic changes.    Will repeat CTA head and neck to evaluate right MCA stenosis  Echo TTE, HbA1c and lipid panel per neurology in a.m.  Loaded with Plavix 300 mg, continue dual antiplatelet therapy  PT/OT/SLP     HTN  Allowing for autoregulation of blood pressure at this time for adequate cerebral blood flow  Holding home Coreg, Jardiance, Aldactone and valsartan. Resume when okay with stroke team.     HLD  Lipid panel in the a.m.  Lipitor 80 mg nightly     Nonischemic cardiomyopathy  HFpEF  Repeat echo TTE pending this admission  Previous echo TTE June 2024 showed EF of 50% with mild to moderate concentric hypertrophy of LV, no valvular abnormality, RVSP was normal with negative saline tests, echo TTE prior to this was November 2022 with no significant interval change     Alcohol abuse  History of alcohol abuse, ethanol level checked on ED, WNL  Does not appear to be in withdrawal at this time, does not endorse recent alcohol use     CKD stage IIIb  Renal function is close  to baseline     BPH  Switch Flomax to nightly to avoid orthostatic hypotension     T2DM  HbA1c in a.m.  SSi. Jardiance.       Expected Discharge Location and Transportation:   Expected Discharge   Expected discharge date/ time has not been documented.     VTE Prophylaxis:  Mechanical VTE prophylaxis orders are present.         AM-PAC 6 Clicks Score (PT): 24 (05/18/25 2025)    CODE STATUS:   Code Status and Medical Interventions: CPR (Attempt to Resuscitate); Full Support   Ordered at: 05/18/25 1737     Code Status (Patient has no pulse and is not breathing):    CPR (Attempt to Resuscitate)     Medical Interventions (Patient has pulse or is breathing):    Full Support     Level Of Support Discussed With:    Patient       Debbi Gregory II, DO  05/19/25

## 2025-05-19 NOTE — PLAN OF CARE
Goal Outcome Evaluation:  Plan of Care Reviewed With: patient        Progress: no change  Outcome Evaluation: OT eval complete. Pt presents near fxl baseline, however is currently limited by delayed processing, balance deficits, and decreased activity tolerance. Pt ambulated w/reports of mild dizziness-CGA no overt LOB noted. Pt stood sinkside for grooming routine w/CGA for balance as well. Pt would benefit from ongoing skilled OT services to progress to PLOF. Rec d/c to home with assist and OP OT/PT.    Anticipated Discharge Disposition (OT): home with assist, home with outpatient therapy services

## 2025-05-19 NOTE — CONSULTS
"Diabetes Education  Assessment/Teaching    Patient Name:  Walt Gao  YOB: 1965  MRN: [id number]  Admit Date:  5/18/2025      Assessment Date:  5/19/2025  Flowsheet Row Most Recent Value   General Information     Referral From: Other (comment)  [per stroke protocol]   Height 175.3 cm (69\")   Height Method Stated   Weight 97.5 kg (215 lb)   Weight Method Stated   Is patient pregnant? n/a   Pregnancy Assessment    Diabetes History    What type of diabetes do you have? Type 2   Length of Diabetes Diagnosis 6 -12 months   Current DM knowledge fair   Do you test your blood sugar at home? no   Have you had low blood sugar? (<70mg/dl) no   Have you had high blood sugar? (>140mg/dl) no   How would you rate your diabetes control? good   Do you have any diabetes complications? heart disease   Education Preferences    What areas of diabetes would you like to learn about? avoiding high blood sugar, avoiding low blood sugar, diabetes complications, diet information, exercise information, medications for diabetes, resources on diabetes, testing my blood sugar at home, understanding diabetes   Nutrition Information    Assessment Topics    Healthy Eating - Assessment Needs education   Being Active - Assessment Needs education   Taking Medication - Assessment Needs education   Problem Solving - Assessment Needs education   Reducing Risk - Assessment Needs education   Healthy Coping - Assessment Needs education   Monitoring - Assessment Needs education   DM Goals             Flowsheet Row Most Recent Value   DM Education Needs    Meter Needs meter  [Patient states that his PCP has not said that he needs to check his BG levels. Patient states he has not had a diabetes diagnosis for more than a year. Patient expressed interest in acquiring a glucometer.]   Blood Glucose Target Range Target ranges per ADA guidelines   Medication Oral, Actions, Side effects, Administration   Problem Solving Hypoglycemia, " Hyperglycemia, Signs, Symptoms, Treatment   Reducing Risks Lipids, Blood pressure, A1C testing   Healthy Eating Reviewed meal plan   Physical Activity Frequency Discussed exercise importance   Healthy Coping Depressed   Motivation Moderate   Teaching Method Explanation, Discussion, Handouts   Patient Response Needs reinforcement          Consult for diabetes education received per stroke protocol. Chart reviewed. Pt was seen at bedside today with his girlfriend of 13 years. Permission given for visit.   Patient previously seen by Diabetes Education staff on 6/2024.    Patient states that he has not had a diabetes diagnosis for very long. Chart review reveals that patient has history of prediabetes. Patient has not previously checked his blood glucose levels but expresses interest in doing so. Message sent to attending MD regarding a possible script for meter, lancets and strips from [facility] pharmacy.    Discussed and taught Mr. Gao about type 2 diabetes self-management, risk factors, and importance of blood glucose control to reduce complications. Target blood glucose readings and A1c goals per ADA were reviewed. Reviewed with patient current A1c 7.2 on 3/6/25 and discussed its significance.       Reviewed the following ADA survival skill concepts with pt:     Meal planning: Discussed pts current eating pattern and potential strategies to help improve it.  Suggested “the plate method” as a potential healthy eating plan and reviewed this with pt.     Safe medication administration: Reviewed pts current medication regimen. Pt taking Farxiga. Encouraged pt to take medications as prescribed and contact provider or pharmacist if they are experiencing side effects.      Monitoring (timing and technique): Encouraged pt to monitor blood sugar at home 2-3 times per day and to call PCP if blood sugar is trending high. Discussed benefits of SMBG/CGMS to help guide pt and provider decision making. Encouraged to keep  "record of blood glucose readings to take to follow up appointment with PCP.     Prevention and treatment of hypoglycemia and hyperglycemia: Signs, symptoms, and treatment of hypoglycemia and hyperglycemia discussed with pt. Reviewed prevention strategies such as consistent eating pattern and taking medications as directed.  Pt is able to teach back appropriate treatment of hypoglycemia using the rule of 15s after receiving instruction. Patient states he has had symptoms of high blood sugar previously.     Sick day management: Reviewed general sick day guidelines with pt, including drinking plenty of water, keeping simple carbs handy such as jell-o or popsicles, checking blood glucose more often (every 2-4 hours or as directed by provider), and if/when ketone testing is appropriate. Encouraged pt to make a sick-day kit at home.      Physical activity: Reviewed benefits of physical activity for diabetes management and overall health. Encouraged pt to begin in 10 min increments to build up to recommended 150 minute per week after speaking with doctor about which activities may be right for them.      Follow-up care: Reviewed importance of ongoing follow-up with provider. Reviewed importance of annual physical exams, annual eye exams, regular dental exams, daily foot examination, and encouraged patient to discuss immunization needs with provider. Pt encouraged to attend scheduled appointments. Provided information about outpatient diabetes education classes at [facility].      Provided patient with copy of [facility]'s \"What is Diabetes\" handout, \"Blood Glucose Goals\" handout, and \"What is A1c\" handout.       Diabetes education will continue to follow for possible meter teach if meter acquired via [facility].     Patient has been scheduled for outpatient diabetes education follow up visit on June 11th, 2025 at 2:30 PM via  phone call. Outpatient staff will provide reminder call prior to appointment. " Patient was given reminder card with date and time of appointment and our contact information.       Thank you for this consult.      Total time spent reviewing chart, preparing education/materials, providing education at bedside, and coordinating care approx 20 minutes.             Electronically signed by:  Rita Fay RN  05/19/25 13:36 EDT

## 2025-05-19 NOTE — PROGRESS NOTES
Stroke Progress Note       Chief Complaint: Dizziness    Subjective    Subjective     Subjective:  The patient is lying down in the bed in NAD.  No family were at the bedside. The patient stated that he is doing okay this morning.  Denies having any more stroke or strokelike symptoms.  I discussed the imaging findings with the patient and what would be reasonable management plan moving forward.  Patient voiced understanding.  All patient's questions and concerns were answered.    No other acute complains at this time    Review of Systems   Constitutional: No fatigue      Objective      Temp:  [97.7 °F (36.5 °C)-98.2 °F (36.8 °C)] 97.7 °F (36.5 °C)  Heart Rate:  [62-76] 63  Resp:  [16-18] 18  BP: (122-187)/(105-130) 183/110    Objective    GEN: lying in bed; in NAD  HENT: normocephalic, non-erythematous oropharynx  CV: no LE edema    NEURO:  Mental Status: A&O x 3, interactive, able to follow simple commands.  Felt following complex commands  Speech: Intact Articulation  CN 2-12:  II - PERRLA  III, IV, VI - EOMI  VII -no gross facial asymmetry  VIII - Auditory acuity intact  XII - Tongue protrudes midline    Motor: The patient can move all 4 extremities against gravity  Sensory: intact light touch throughout  Reflexes: negative Hanson's sign BL  Coordination: no ataxia with finger-to-nose testing  Gait/Station: deferred     Results Review:    I reviewed the patient's new clinical results.    WBC   Date Value Ref Range Status   05/19/2025 6.90 3.40 - 10.80 10*3/mm3 Final     RBC   Date Value Ref Range Status   05/19/2025 4.36 4.14 - 5.80 10*6/mm3 Final     Hemoglobin   Date Value Ref Range Status   05/19/2025 14.2 13.0 - 17.7 g/dL Final     Hematocrit   Date Value Ref Range Status   05/19/2025 43.4 37.5 - 51.0 % Final     MCV   Date Value Ref Range Status   05/19/2025 99.5 (H) 79.0 - 97.0 fL Final     MCH   Date Value Ref Range Status   05/19/2025 32.6 26.6 - 33.0 pg Final     MCHC   Date Value Ref Range Status    05/19/2025 32.7 31.5 - 35.7 g/dL Final     RDW   Date Value Ref Range Status   05/19/2025 13.7 12.3 - 15.4 % Final     RDW-SD   Date Value Ref Range Status   05/19/2025 49.7 37.0 - 54.0 fl Final     MPV   Date Value Ref Range Status   05/19/2025 9.1 6.0 - 12.0 fL Final     Platelets   Date Value Ref Range Status   05/19/2025 239 140 - 450 10*3/mm3 Final     Neutrophil %   Date Value Ref Range Status   05/18/2025 61.8 42.7 - 76.0 % Final     Lymphocyte %   Date Value Ref Range Status   05/18/2025 29.2 19.6 - 45.3 % Final     Monocyte %   Date Value Ref Range Status   05/18/2025 7.1 5.0 - 12.0 % Final     Eosinophil %   Date Value Ref Range Status   05/18/2025 1.5 0.3 - 6.2 % Final     Basophil %   Date Value Ref Range Status   05/18/2025 0.3 0.0 - 1.5 % Final     Immature Grans %   Date Value Ref Range Status   05/18/2025 0.1 0.0 - 0.5 % Final     Neutrophils, Absolute   Date Value Ref Range Status   05/18/2025 4.24 1.70 - 7.00 10*3/mm3 Final     Lymphocytes, Absolute   Date Value Ref Range Status   05/18/2025 2.00 0.70 - 3.10 10*3/mm3 Final     Monocytes, Absolute   Date Value Ref Range Status   05/18/2025 0.49 0.10 - 0.90 10*3/mm3 Final     Eosinophils, Absolute   Date Value Ref Range Status   05/18/2025 0.10 0.00 - 0.40 10*3/mm3 Final     Basophils, Absolute   Date Value Ref Range Status   05/18/2025 0.02 0.00 - 0.20 10*3/mm3 Final     Immature Grans, Absolute   Date Value Ref Range Status   05/18/2025 0.01 0.00 - 0.05 10*3/mm3 Final     nRBC   Date Value Ref Range Status   05/18/2025 0.0 0.0 - 0.2 /100 WBC Final       Lab Results   Component Value Date    GLUCOSE 105 (H) 05/19/2025    BUN 21 05/19/2025    CREATININE 1.69 (H) 05/19/2025     05/19/2025    K 4.3 05/19/2025     05/19/2025    CALCIUM 9.2 05/19/2025    PROTEINTOT 7.4 05/19/2025    ALBUMIN 3.9 05/19/2025    ALT 20 05/19/2025    AST 17 05/19/2025    ALKPHOS 29 (L) 05/19/2025    BILITOT 0.7 05/19/2025    GLOB 4.1 05/18/2025    AGRATIO 1.1  05/18/2025    BCR 12.4 05/19/2025    ANIONGAP 13.0 05/19/2025    EGFR 45.9 (L) 05/19/2025     CT Angiogram Head  Result Date: 5/19/2025  1.No intracranial large vessel occlusion is definitely identified. 2.Mild to moderate stenosis is seen involving the distal M1 segment of the right middle cerebral artery (series 8, images 464 through 467), unchanged since the prior study. 3.Focal moderate to severe stenosis involving a distal M2 branch of the left middle cerebral artery (series 8, images 489 through 499), unchanged since the prior study. 4.No significant stenosis of the bilateral internal carotid arteries. Electronically Signed: Azael Valero MD  5/19/2025 11:04 AM EDT  Workstation ID: [id number]    CT Angiogram Neck  Result Date: 5/19/2025  1.No intracranial large vessel occlusion is definitely identified. 2.Mild to moderate stenosis is seen involving the distal M1 segment of the right middle cerebral artery (series 8, images 464 through 467), unchanged since the prior study. 3.Focal moderate to severe stenosis involving a distal M2 branch of the left middle cerebral artery (series 8, images 489 through 499), unchanged since the prior study. 4.No significant stenosis of the bilateral internal carotid arteries. Electronically Signed: Azael Valero MD  5/19/2025 11:04 AM EDT  Workstation ID: [id number]    MRI Brain Without Contrast  Result Date: 5/18/2025  Impression: 1. Small recent (acute/subacute) infarct involving right middle cerebellar peduncle. Negative for hemorrhage or significant mass effect. 2. Sequelae of chronic right parietal and occipital lobe infarcts, with findings of cortical laminar necrosis. Similar distribution multifocal smaller parenchymal infarcts in a watershed distribution. 3. Global parenchymal volume loss and sequelae of chronic microvascular ischemic change. Electronically Signed: Jesus Manuel Limon MD  5/18/2025 5:00 PM EDT  Workstation ID: [id number]    XR Chest 1 View  Result Date:  5/18/2025  Impression: No acute cardiopulmonary disease. Electronically Signed: Rinku Masters MD  5/18/2025 2:42 PM EDT  Workstation ID: [id number]    Results for orders placed during the hospital encounter of 06/02/24    Adult Transthoracic Echo Complete W/ Cont if Necessary Per Protocol (With Agitated Saline)    Interpretation Summary    Left ventricular systolic function is normal. Estimated left ventricular EF = 50%    Left ventricular wall thickness is consistent with mild to moderate concentric hypertrophy.    The cardiac valves are anatomically and functionally normal.    Estimated right ventricular systolic pressure from tricuspid regurgitation is normal (<35 mmHg).    Saline test results are negative.    Compared to echocardiogram performed 11/29/2022, there is been no significant interval change        -CTA of the head and neck images from 5/19/2025 were personally reviewed and showed no flow limiting stenosis or LVO.  There is mild to moderate bilateral MCA stenosis  -MRI brain images from 5/18/2025 were personally reviewed and showed small area of acute ischemic infarct affecting the right middle cerebellar peduncle  -Transthoracic echocardiogram is pending  -A1c from 5/19/2025 was 7.1%  -LDL from 5/19/2025 was 88                  Assessment/Plan     This is a 60-year-old male with known medical diagnoses of essential hypertension, hyperlipidemia, prediabetes nonischemic cardiomyopathy, history of GI bleed, and history of right hemispheric watershed infarcts (6/2024, no residual deficits) who presents to the emergency department for further evaluation of generalized weakness, dizziness, and comprehension difficulties since yesterday at 1400.  Given LKW> 4.5-hours patient is not a candidate for IV thrombolytic therapy.  He will require admission to the hospital service for further workup.     Antiplatelet PTA: ASA 81mg  Anticoagulant PTA: None    #Acute/subacute ischemic stroke, right cerebellar  peduncle  #History of stroke, right parietal and occipital lobe  #Intracranial atherosclerotic disease, moderate stenosis in right MCA  -Etiology of patient's stroke is cryptogenic.  Likely etiology would be small vessel disease in the setting of uncontrolled vascular risk factors  -CTA of the head and neck images from 5/19/2025 were personally reviewed and showed no flow limiting stenosis or LVO.  There is mild to moderate bilateral MCA stenosis  -MRI brain images from 5/18/2025 were personally reviewed and showed small area of acute ischemic infarct affecting the right middle cerebellar peduncle  -Transthoracic echocardiogram is pending  -A1c from 5/19/2025 was 7.1%  -LDL from 5/19/2025 was 88    Recommendation  -Continue DAPT with aspirin 81 mg Plavix 75 mg for 21 days with a plan to switch to aspirin 81 mg monotherapy after with for secondary stroke prevention.  -Continue atorvastatin 80 mg nightly for secondary stroke prevention.  Target LDL level of less than 55  -Target blood pressure goals of 120-160  -Activity as tolerated, fall risk precautions  -PT/OT/SLP evaluation     #Essential hypertension  -As detailed above     #Hyperlipidemia  -Atorvastatin 80mg nightly     #Prediabetes  -Maintain euglycemia  -Management per primary team         Stroke will continue to follow. Please call for any further questions or concerns  =================================  Benny Ibarra MD, Msc, PhD  Vascular Neurologist  [facility]

## 2025-05-19 NOTE — PLAN OF CARE
Goal Outcome Evaluation:  Plan of Care Reviewed With: patient                Anticipated Discharge Disposition (SLP): home with assist    SLP Diagnosis: mild, cognitive-linguistic disorder (05/19/25 9229)

## 2025-05-19 NOTE — NURSING NOTE
Pt A&Ox4, RA, NSR, no c/o pain. Significant Other at bedside. NIH=0, CIWA=0. Dysphagia passed. Fluids infusing. Seizure and safety precautions in place, care ongoing.

## 2025-05-19 NOTE — CASE MANAGEMENT/SOCIAL WORK
Continued Stay Note  [facility]     Patient Name: Walt Gao  MRN: [id number]  Today's Date: 5/19/2025    Admit Date: 5/18/2025        Discharge Plan       Row Name 05/19/25 1504       Plan    Plan Comments Pt with provider at bedside furing first visit. Second visit pt leaving room for scan. According to MDR pt maybe able to discharge later today. CM will cont to follow.    Final Discharge Disposition Code 01 - home or self-care                   Discharge Codes    No documentation.                       Latesha Zhou RN

## 2025-05-19 NOTE — PLAN OF CARE
Goal Outcome Evaluation:  Plan of Care Reviewed With: patient, significant other           Outcome Evaluation: PT PRESENTS WITH EVOLVING SYMPTOMS TO INCLUDE IMPAIRED BALANCE, DECREASED PROCESSING, AND DECLINE IN FUNCTIONAL MOBILITY. PT DEMONSTRATED UNSTEADY GAIT WITH WIDE BRADLEY AND SHORT STEP LENGTH. GAIT QUALITY AND SPEED IMPROVED WITH USE OF R WALKER. PT ABLE TO FOLLOW COMMANDS BUT WITH SOME CONFUSION RE: ORIENTATION QUESTIONS. RECOMMEND HOME WITH S.O. , USE OF R WALKER  AND OPPT AT D/C.    Anticipated Discharge Disposition (PT): home with assist, home with outpatient therapy services

## 2025-05-19 NOTE — THERAPY EVALUATION
Acute Care - Speech Language Pathology   Initial Evaluation  [facility]    Cognitive-Communication Evaluation       Patient Name: Walt Gao  : 1965  MRN: [id number]  Today's Date: 2025               Admit Date: 2025     Visit Dx:    ICD-10-CM ICD-9-CM   1. Cerebrovascular accident (CVA), unspecified mechanism  I63.9 434.91   2. Dizziness  R42 780.4   3. Confusion  R41.0 298.9   4. Chronic kidney disease, unspecified CKD stage  N18.9 585.9   5. Hyperglycemia due to diabetes mellitus  E11.65 250.02   6. Hyperlipidemia LDL goal <70  E78.5 272.4   7. Cognitive communication deficit  R41.841 799.52     Patient Active Problem List   Diagnosis    Heart failure with improved ejection fraction (HFimpEF)    Essential hypertension    Stage 3b chronic kidney disease    Non-ischemic cardiomyopathy    Alcohol abuse    Hyperlipidemia LDL goal <70    At risk for sleep apnea    Cryptogenic stroke    Type 2 diabetes mellitus without complication, without long-term current use of insulin    B12 deficiency    Benign prostatic hyperplasia with lower urinary tract symptoms     Past Medical History:   Diagnosis Date    CHF (congestive heart failure)     GI bleed 2024    Hypertension     NICM (nonischemic cardiomyopathy)     Nonischemic cardiomyopathy 2016    · Cardiac catheterization revealing nonobstructive CAD,  · LVEF 30% on heart catheterization,  · Echo (16): Moderate LVH. LVEF 23%. Left ventricular diastolic dysfunction (grade II) consistent with pseudonormalization.  Moderate mitral valve regurgitation · Echo (2017):  LVEF 50%    Stroke      Past Surgical History:   Procedure Laterality Date    COLONOSCOPY N/A 2024    Procedure: COLONOSCOPY;  Surgeon: Duglas Zhou MD;  Location:  [facility];  Service: Gastroenterology;  Laterality: N/A;    ENDOSCOPY N/A 2024    Procedure: ESOPHAGOGASTRODUODENOSCOPY;  Surgeon: Duglas Zhou MD;  Location:  [facility];   Service: Gastroenterology;  Laterality: N/A;    HERNIA REPAIR      as child       SLP Recommendation and Plan  SLP Diagnosis: mild, cognitive-linguistic disorder (05/19/25 0930)              [facility] Criteria for Skilled Therapy Interventions Met: yes (05/19/25 0930)  Anticipated Discharge Disposition (SLP): home with assist (05/19/25 0930)        Therapy Frequency (SLP SLC): 5 days per week (05/19/25 0930)  Predicted Duration Therapy Intervention (Days): 1 week (05/19/25 0930)                                    SLP EVALUATION (Last 72 Hours)       SLP SLC Evaluation       Row Name 05/19/25 0930                   Communication Assessment/Intervention    Document Type evaluation  -        Subjective Information no complaints  -        Patient Observations alert;cooperative;agree to therapy  -        Patient/Family/Caregiver Comments/Observations no family present  -        Patient Effort good  -        Symptoms Noted During/After Treatment none  -           General Information    Patient Profile Reviewed yes  -        Pertinent History Of Current Problem Admitted with dizziness, gait instability. MRI: Right cerebellar acute/subacute infarct. Hx R MCA CVA.  -        Precautions/Limitations, Vision WFL;for purposes of eval  -CH        Precautions/Limitations, Hearing WFL;for purposes of eval  -CH        Prior Level of Function-Communication unknown  -        Plans/Goals Discussed with patient;agreed upon  -        Patient's Goals for Discharge patient did not state  -           Pain    Pretreatment Pain Rating 0/10 - no pain  -        Posttreatment Pain Rating 0/10 - no pain  -           Comprehension Assessment/Intervention    Comprehension Assessment/Intervention Auditory Comprehension;Reading Comprehension  -           Auditory Comprehension Assessment/Intervention    Auditory Comprehension (Communication) WFL  -           Reading Comprehension Assessment/Intervention    Reading  Comprehension (Communication) WFL  -        Scanning (Reading) phrases  -        Phrase Level WFL  -           Expression Assessment/Intervention    Expression Assessment/Intervention verbal expression  -CH           Verbal Expression Assessment/Intervention    Verbal Expression mild impairment  -CH        Automatic Speech (Communication) WNL  -CH        Repetition WNL  -CH        Phrase Completion WNL  -CH        Responsive Naming WNL  -CH        Confrontational Naming WNL  -CH        Sentence Formulation WNL  -CH        Conversational Discourse/Fluency mild impairment  -CH        Verbal Expression, Comment generative naming difficulty  -           Oral Motor Structure and Function    Oral Motor Structure and Function WFL  -           Oral Musculature and Cranial Nerve Assessment    Oral Motor General Assessment WFL  -CH           Motor Speech Assessment/Intervention    Motor Speech Function WNL  -CH           Cursory Voice Assessment/Intervention    Quality and Resonance (Voice) WNL  -           Cognitive Assessment Intervention- SLP    Cognitive Function (Cognition) mild impairment  -CH        Orientation Status (Cognition) awareness of basic personal information;person;place;situation;WFL;time;mild impairment  -CH        Memory (Cognitive) simple;functional;immediate;long-term;WFL;delayed;short-term;mental manipulation;mild impairment  -CH        Attention (Cognitive) selective;sustained;WFL;attention to detail;mild impairment  -CH        Thought Organization (Cognitive) concrete divergent;abstract divergent;mental manipulation;mild impairment  -CH        Reasoning (Cognitive) simple;deductive;WFL  -        Problem Solving (Cognitive) simple;temporal;L  -CH        Functional Math (Cognitive) simple;word problems;WFL  -CH        Pragmatics (Communication) [facility]  -           SLP Evaluation Clinical Impressions    SLP Diagnosis mild;cognitive-linguistic disorder  -CH        Rehab Potential/Prognosis  good  -[facility] Criteria for Skilled Therapy Interventions Met yes  -CH        Functional Impact functional impact in social situations;difficulty in expressing complex messages;difficulty completing home management task  -           Recommendations    Therapy Frequency (SLP SLC) 5 days per week  -        Predicted Duration Therapy Intervention (Days) 1 week  -        Anticipated Discharge Disposition (SLP) home with assist  -           Communication Treatment Objective and Progress Goals (SLP)    [facility] Patient will demonstrate functional language skills for return to discharge environment;Patient will demonstrate functional cognitive-linguistic skills for return to discharge environment  -        Diagnostic Treatment Objective Diagnostic Treatment Objectives (Group)  -        Verbal Expression Treatment Objectives Verbal Expression Treatment Objectives (Group)  -        Cognitive Linguistic Treatment Objectives Cognitive Linguistic Treatment Objectives (Group)  -           Patient will demonstrate functional language skills for return to discharge environment     [place] with minimal cues  -        Time frame 1 week  -           Patient will demonstrate functional cognitive-linguistic skills for return to discharge environment    [place] with minimal cues  -        Time frame 1 week  -           Diagnostic Treatment Objectives    Diagnostic Treatment Objective Selection Diagnostic Treatment Objectives  -           SLP Diagnostic Treatment     Patient will participate in further assessment in the following areas clarification of baseline cognitive communication status  -        Time Frame (Diagnostic) 1 week  -           Verbal Expression Treatment Objectives    Word Retrieval Skills Selection word retrieval, SLP goal 1  -           Word Retrieval Skills Goal 1 (SLP)    Improve Word Retrieval Skills By Goal 1 (SLP) completing a divergent task;completing a convergent  task;supplying an antonym;supplying a synonym;90%;with minimal cues (75-90%)  -        Time Frame (Word Retrieval Goal 1, SLP) 1 week  -           Cognitive Linguistic Treatment Objectives    Orientation Selection orientation, SLP goal 1  -        Memory Skills Selection memory skills, SLP goal 1  -           Orientation Goal 1 (SLP)    Improve Orientation Through Goal 1 (SLP) demonstrating orientation to day;demonstrating orientation to month;demonstrating orientation to year;demonstrating orientation to place;demonstrating orientation to disease/impairment;90%;with minimal cues (75-90%)  -        Time Frame (Orientation Goal 1, SLP) 1 week  -           Memory Skills Goal 1 (SLP)    Improve Memory Skills Through Goal 1 (SLP) recalling related word lists with an imposed delay;recalling unrelated word lists with an imposed delay;repeat list in sequential order;90%;with minimal cues (75-90%)  -        Time Frame (Memory Skills Goal 1, SLP) 1 week  -                  User Key  (r) = Recorded By, (t) = Taken By, (c) = Cosigned By      Initials Name Effective Dates     Mita Salinas MS CCC-SLP 01/20/25 -                        EDUCATION  The patient has been educated in the following areas:     Cognitive Impairment Communication Impairment.           SLP GOALS       Row Name 05/19/25 0930             Patient will demonstrate functional language skills for return to discharge environment     [place] with minimal cues  -      Time frame 1 week  -         Patient will demonstrate functional cognitive-linguistic skills for return to discharge environment    [place] with minimal cues  -      Time frame 1 week  -         SLP Diagnostic Treatment     Patient will participate in further assessment in the following areas clarification of baseline cognitive communication status  -      Time Frame (Diagnostic) 1 week  -         Word Retrieval Skills Goal 1 (SLP)    Improve Word Retrieval  Skills By Goal 1 (SLP) completing a divergent task;completing a convergent task;supplying an antonym;supplying a synonym;90%;with minimal cues (75-90%)  -      Time Frame (Word Retrieval Goal 1, SLP) 1 week  -         Orientation Goal 1 (SLP)    Improve Orientation Through Goal 1 (SLP) demonstrating orientation to day;demonstrating orientation to month;demonstrating orientation to year;demonstrating orientation to place;demonstrating orientation to disease/impairment;90%;with minimal cues (75-90%)  -      Time Frame (Orientation Goal 1, SLP) 1 week  -         Memory Skills Goal 1 (SLP)    Improve Memory Skills Through Goal 1 (SLP) recalling related word lists with an imposed delay;recalling unrelated word lists with an imposed delay;repeat list in sequential order;90%;with minimal cues (75-90%)  -      Time Frame (Memory Skills Goal 1, SLP) 1 week  -                User Key  (r) = Recorded By, (t) = Taken By, (c) = Cosigned By      Initials Name Provider Type    Mita Samaniego MS CCC-SLP Speech and Language Pathologist                              Time Calculation:      Time Calculation- SLP       Row Name 05/19/25 1156             Time Calculation- SLP    SLP Start Time 0930  -      SLP Received On 05/19/25  -         Untimed Charges    SLP Eval/Re-eval  ST Eval Speech and Production w/ Language - 27639  -      [id number] Eval Speech and Production w/ Language Minutes 45  -CH         Total Minutes    Untimed Charges Total Minutes 45  -CH       Total Minutes 45  -CH                User Key  (r) = Recorded By, (t) = Taken By, (c) = Cosigned By      Initials Name Provider Type    Mita Samaniego MS CCC-SLP Speech and Language Pathologist                    Therapy Charges for Today       Code Description Service Date Service Provider Modifiers Qty    [id number] HC ST EVAL SPEECH AND PROD W LANG  3 5/19/2025 Mita Salinas MS CCC-SLP GN 1                       Mita Salinas MS  CCC-SLP  5/19/2025

## 2025-05-20 ENCOUNTER — READMISSION MANAGEMENT (OUTPATIENT)
Dept: CALL CENTER | Facility: HOSPITAL | Age: 60
End: 2025-05-20
Payer: MEDICAID

## 2025-05-20 ENCOUNTER — APPOINTMENT (OUTPATIENT)
Dept: NEUROLOGY | Facility: HOSPITAL | Age: 60
End: 2025-05-20
Payer: MEDICAID

## 2025-05-20 ENCOUNTER — APPOINTMENT (OUTPATIENT)
Dept: CARDIOLOGY | Facility: HOSPITAL | Age: 60
End: 2025-05-20
Payer: MEDICAID

## 2025-05-20 VITALS
RESPIRATION RATE: 18 BRPM | TEMPERATURE: 98.6 F | SYSTOLIC BLOOD PRESSURE: 142 MMHG | DIASTOLIC BLOOD PRESSURE: 101 MMHG | BODY MASS INDEX: 31.84 KG/M2 | HEIGHT: 69 IN | WEIGHT: 214.95 LBS | HEART RATE: 82 BPM | OXYGEN SATURATION: 94 %

## 2025-05-20 PROBLEM — I63.9 STROKE: Status: ACTIVE | Noted: 2025-05-20

## 2025-05-20 LAB
25(OH)D3 SERPL-MCNC: 31.4 NG/ML (ref 30–100)
ALBUMIN SERPL-MCNC: 4.1 G/DL (ref 3.5–5.2)
ALP SERPL-CCNC: 33 U/L (ref 39–117)
ALT SERPL W P-5'-P-CCNC: 21 U/L (ref 1–41)
ANION GAP SERPL CALCULATED.3IONS-SCNC: 13 MMOL/L (ref 5–15)
AORTIC DIMENSIONLESS INDEX: 0.85 (DI)
ASCENDING AORTA: 3.5 CM
AST SERPL-CCNC: 17 U/L (ref 1–40)
AV MEAN PRESS GRAD SYS DOP V1V2: 4 MMHG
AV VMAX SYS DOP: 131.7 CM/SEC
BH CV ECHO LEFT VENTRICLE GLOBAL LONGITUDINAL STRAIN: -27.6 %
BH CV ECHO MEAS - AO MAX PG: 6.9 MMHG
BH CV ECHO MEAS - AO ROOT DIAM: 3.2 CM
BH CV ECHO MEAS - AO V2 VTI: 21 CM
BH CV ECHO MEAS - AVA(I,D): 2.7 CM2
BH CV ECHO MEAS - EDV(CUBED): 140.6 ML
BH CV ECHO MEAS - EDV(MOD-SP2): 51.5 ML
BH CV ECHO MEAS - EDV(MOD-SP4): 76 ML
BH CV ECHO MEAS - EF(MOD-SP2): 65.4 %
BH CV ECHO MEAS - EF(MOD-SP4): 69.3 %
BH CV ECHO MEAS - ESV(CUBED): 32.8 ML
BH CV ECHO MEAS - ESV(MOD-SP2): 17.8 ML
BH CV ECHO MEAS - ESV(MOD-SP4): 23.3 ML
BH CV ECHO MEAS - FS: 38.5 %
BH CV ECHO MEAS - IVS/LVPW: 1 CM
BH CV ECHO MEAS - IVSD: 1.3 CM
BH CV ECHO MEAS - LA DIMENSION: 3.9 CM
BH CV ECHO MEAS - LAT PEAK E' VEL: 7.2 CM/SEC
BH CV ECHO MEAS - LV DIASTOLIC VOL/BSA (35-75): 36.1 CM2
BH CV ECHO MEAS - LV MASS(C)D: 278.4 GRAMS
BH CV ECHO MEAS - LV MAX PG: 5.4 MMHG
BH CV ECHO MEAS - LV MEAN PG: 2.7 MMHG
BH CV ECHO MEAS - LV SYSTOLIC VOL/BSA (12-30): 11.1 CM2
BH CV ECHO MEAS - LV V1 MAX: 116.3 CM/SEC
BH CV ECHO MEAS - LV V1 VTI: 17.8 CM
BH CV ECHO MEAS - LVIDD: 5.2 CM
BH CV ECHO MEAS - LVIDS: 3.2 CM
BH CV ECHO MEAS - LVOT AREA: 3.1 CM2
BH CV ECHO MEAS - LVOT DIAM: 2 CM
BH CV ECHO MEAS - LVPWD: 1.3 CM
BH CV ECHO MEAS - MED PEAK E' VEL: 4.6 CM/SEC
BH CV ECHO MEAS - MV A MAX VEL: 83.8 CM/SEC
BH CV ECHO MEAS - MV DEC SLOPE: 390 CM/SEC2
BH CV ECHO MEAS - MV DEC TIME: 0.16 SEC
BH CV ECHO MEAS - MV E MAX VEL: 51.4 CM/SEC
BH CV ECHO MEAS - MV E/A: 0.61
BH CV ECHO MEAS - MV MAX PG: 6.8 MMHG
BH CV ECHO MEAS - MV MEAN PG: 2 MMHG
BH CV ECHO MEAS - MV P1/2T: 45 MSEC
BH CV ECHO MEAS - MV V2 VTI: 17 CM
BH CV ECHO MEAS - MVA(P1/2T): 4.9 CM2
BH CV ECHO MEAS - MVA(VTI): 3.3 CM2
BH CV ECHO MEAS - PA ACC TIME: 0.09 SEC
BH CV ECHO MEAS - PA V2 MAX: 103 CM/SEC
BH CV ECHO MEAS - SV(LVOT): 55.9 ML
BH CV ECHO MEAS - SV(MOD-SP2): 33.7 ML
BH CV ECHO MEAS - SV(MOD-SP4): 52.7 ML
BH CV ECHO MEAS - SVI(LVOT): 26.5 ML/M2
BH CV ECHO MEAS - SVI(MOD-SP2): 16 ML/M2
BH CV ECHO MEAS - SVI(MOD-SP4): 25 ML/M2
BH CV ECHO MEAS - TAPSE (>1.6): 1.3 CM
BH CV ECHO MEASUREMENTS AVERAGE E/E' RATIO: 8.71
BH CV VAS BP LEFT ARM: NORMAL MMHG
BH CV XLRA - RV BASE: 3.1 CM
BH CV XLRA - RV LENGTH: 7 CM
BH CV XLRA - RV MID: 2.2 CM
BH CV XLRA - TDI S': 12.5 CM/SEC
BILIRUB CONJ SERPL-MCNC: 0.2 MG/DL (ref 0–0.3)
BILIRUB INDIRECT SERPL-MCNC: 0.3 MG/DL
BILIRUB SERPL-MCNC: 0.5 MG/DL (ref 0–1.2)
BUN SERPL-MCNC: 24 MG/DL (ref 8–23)
BUN/CREAT SERPL: 12.2 (ref 7–25)
CALCIUM SPEC-SCNC: 9.5 MG/DL (ref 8.6–10.5)
CHLORIDE SERPL-SCNC: 104 MMOL/L (ref 98–107)
CO2 SERPL-SCNC: 20 MMOL/L (ref 22–29)
CREAT SERPL-MCNC: 1.97 MG/DL (ref 0.76–1.27)
DEPRECATED RDW RBC AUTO: 51.2 FL (ref 37–54)
EGFRCR SERPLBLD CKD-EPI 2021: 38.2 ML/MIN/1.73
ERYTHROCYTE [DISTWIDTH] IN BLOOD BY AUTOMATED COUNT: 14.3 % (ref 12.3–15.4)
GLUCOSE BLDC GLUCOMTR-MCNC: 100 MG/DL (ref 70–130)
GLUCOSE BLDC GLUCOMTR-MCNC: 115 MG/DL (ref 70–130)
GLUCOSE BLDC GLUCOMTR-MCNC: 116 MG/DL (ref 70–130)
GLUCOSE SERPL-MCNC: 110 MG/DL (ref 65–99)
HCT VFR BLD AUTO: 46 % (ref 37.5–51)
HGB BLD-MCNC: 15 G/DL (ref 13–17.7)
IVRT: 90 MS
LEFT ATRIUM VOLUME INDEX: 9.8 ML/M2
LV EF 2D ECHO EST: 70 %
LV EF BIPLANE MOD: 67.3 %
MAGNESIUM SERPL-MCNC: 2.3 MG/DL (ref 1.6–2.4)
MCH RBC QN AUTO: 31.9 PG (ref 26.6–33)
MCHC RBC AUTO-ENTMCNC: 32.6 G/DL (ref 31.5–35.7)
MCV RBC AUTO: 97.9 FL (ref 79–97)
PA ADP PRP-ACNC: 144 PRU
PLATELET # BLD AUTO: 297 10*3/MM3 (ref 140–450)
PMV BLD AUTO: 9.3 FL (ref 6–12)
POTASSIUM SERPL-SCNC: 4.5 MMOL/L (ref 3.5–5.2)
PROT SERPL-MCNC: 8.3 G/DL (ref 6–8.5)
RBC # BLD AUTO: 4.7 10*6/MM3 (ref 4.14–5.8)
SODIUM SERPL-SCNC: 137 MMOL/L (ref 136–145)
WBC NRBC COR # BLD AUTO: 8.49 10*3/MM3 (ref 3.4–10.8)

## 2025-05-20 PROCEDURE — 83735 ASSAY OF MAGNESIUM: CPT

## 2025-05-20 PROCEDURE — 93356 MYOCRD STRAIN IMG SPCKL TRCK: CPT

## 2025-05-20 PROCEDURE — 99239 HOSP IP/OBS DSCHRG MGMT >30: CPT | Performed by: INTERNAL MEDICINE

## 2025-05-20 PROCEDURE — 80076 HEPATIC FUNCTION PANEL: CPT

## 2025-05-20 PROCEDURE — 82948 REAGENT STRIP/BLOOD GLUCOSE: CPT

## 2025-05-20 PROCEDURE — 95816 EEG AWAKE AND DROWSY: CPT | Performed by: PSYCHIATRY & NEUROLOGY

## 2025-05-20 PROCEDURE — 80048 BASIC METABOLIC PNL TOTAL CA: CPT

## 2025-05-20 PROCEDURE — 25010000002 HYDRALAZINE PER 20 MG: Performed by: PHYSICIAN ASSISTANT

## 2025-05-20 PROCEDURE — 93356 MYOCRD STRAIN IMG SPCKL TRCK: CPT | Performed by: INTERNAL MEDICINE

## 2025-05-20 PROCEDURE — 25010000002 ONDANSETRON PER 1 MG: Performed by: PHYSICIAN ASSISTANT

## 2025-05-20 PROCEDURE — 95816 EEG AWAKE AND DROWSY: CPT

## 2025-05-20 PROCEDURE — 93306 TTE W/DOPPLER COMPLETE: CPT | Performed by: INTERNAL MEDICINE

## 2025-05-20 PROCEDURE — 85576 BLOOD PLATELET AGGREGATION: CPT | Performed by: NURSE PRACTITIONER

## 2025-05-20 PROCEDURE — 97116 GAIT TRAINING THERAPY: CPT

## 2025-05-20 PROCEDURE — 85027 COMPLETE CBC AUTOMATED: CPT

## 2025-05-20 PROCEDURE — 99233 SBSQ HOSP IP/OBS HIGH 50: CPT | Performed by: NURSE PRACTITIONER

## 2025-05-20 PROCEDURE — 82306 VITAMIN D 25 HYDROXY: CPT | Performed by: NURSE PRACTITIONER

## 2025-05-20 PROCEDURE — 93306 TTE W/DOPPLER COMPLETE: CPT

## 2025-05-20 RX ORDER — ONDANSETRON 2 MG/ML
4 INJECTION INTRAMUSCULAR; INTRAVENOUS EVERY 6 HOURS PRN
Status: DISCONTINUED | OUTPATIENT
Start: 2025-05-20 | End: 2025-05-20 | Stop reason: HOSPADM

## 2025-05-20 RX ORDER — HYDRALAZINE HYDROCHLORIDE 20 MG/ML
10 INJECTION INTRAMUSCULAR; INTRAVENOUS ONCE
Status: COMPLETED | OUTPATIENT
Start: 2025-05-20 | End: 2025-05-20

## 2025-05-20 RX ADMIN — CLOPIDOGREL BISULFATE 75 MG: 75 TABLET, FILM COATED ORAL at 09:42

## 2025-05-20 RX ADMIN — Medication 10 ML: at 09:42

## 2025-05-20 RX ADMIN — VALSARTAN 80 MG: 80 TABLET, FILM COATED ORAL at 09:41

## 2025-05-20 RX ADMIN — EMPAGLIFLOZIN 25 MG: 25 TABLET, FILM COATED ORAL at 09:42

## 2025-05-20 RX ADMIN — CARVEDILOL 12.5 MG: 12.5 TABLET, FILM COATED ORAL at 09:41

## 2025-05-20 RX ADMIN — SPIRONOLACTONE 25 MG: 25 TABLET ORAL at 09:42

## 2025-05-20 RX ADMIN — ASPIRIN 81 MG: 81 TABLET, CHEWABLE ORAL at 09:42

## 2025-05-20 RX ADMIN — ONDANSETRON 4 MG: 2 INJECTION INTRAMUSCULAR; INTRAVENOUS at 04:39

## 2025-05-20 RX ADMIN — HYDRALAZINE HYDROCHLORIDE 10 MG: 20 INJECTION INTRAMUSCULAR; INTRAVENOUS at 03:27

## 2025-05-20 RX ADMIN — HYDRALAZINE HYDROCHLORIDE 10 MG: 20 INJECTION INTRAMUSCULAR; INTRAVENOUS at 00:00

## 2025-05-20 NOTE — OUTREACH NOTE
Prep Survey      Flowsheet Row Responses   [facility] patient discharged from? [place]   Is LACE score < 7 ? No   Eligibility [facility]   Date of Admission 05/18/25   Date of Discharge 05/20/25   Discharge Disposition Home or Self Care   Discharge diagnosis Stroke (   Does the patient have one of the following disease processes/diagnoses(primary or secondary)? Stroke   Does the patient have Home health ordered? No   Is there a DME ordered? No   Prep survey completed? Yes            ARTIS JACKSON - Registered Nurse

## 2025-05-20 NOTE — PROGRESS NOTES
"Stroke Progress Note       Chief Complaint: Dizziness    Subjective    Subjective     Subjective:  Sitting up in bed.  Intermittent word finding difficulty noted this a.m.  Patient reports an episode overnight of dizziness, word finding followed by a \"passing out\" episode.  Per nursing, the patient's nursing tech reported that the patient had an episode of his eyes rolling back in his head at that time.  This was not reported to overnight stroke provider.  This with the patient they we will obtain an EEG to assess for possible seizure activity    Review of Systems   Constitutional: No fatigue      Objective      Temp:  [97.5 °F (36.4 °C)-98.6 °F (37 °C)] 98.6 °F (37 °C)  Heart Rate:  [] 103  Resp:  [18] 18  BP: (115-200)/() 115/69    Objective      Physical Exam  Constitutional:       General: He is not in acute distress.  HENT:      Head: Normocephalic and atraumatic.   Eyes:      Extraocular Movements: Extraocular movements intact.      Pupils: Pupils are equal, round, and reactive to light.   Cardiovascular:      Rate and Rhythm: Normal rate and regular rhythm.   Pulmonary:      Effort: Pulmonary effort is normal. No respiratory distress.   Musculoskeletal:      Cervical back: Normal range of motion and neck supple.      Right lower leg: No edema.      Left lower leg: No edema.   Skin:     General: Skin is warm and dry.      Capillary Refill: Capillary refill takes less than 2 seconds.   Neurological:      Mental Status: He is alert and oriented to person, place, and time.      Comments: VFF, tracks without difficulty, moves all extremities against gravity, intermittent/mild word finding difficulty, sensation intact to light touch, no obvious facial asymmetry.   Psychiatric:         Mood and Affect: Mood normal.         Behavior: Behavior normal.        Results Review:    I reviewed the patient's new clinical results.    WBC   Date Value Ref Range Status   05/19/2025 6.90 3.40 - 10.80 10*3/mm3 Final "     RBC   Date Value Ref Range Status   05/19/2025 4.36 4.14 - 5.80 10*6/mm3 Final     Hemoglobin   Date Value Ref Range Status   05/19/2025 14.2 13.0 - 17.7 g/dL Final     Hematocrit   Date Value Ref Range Status   05/19/2025 43.4 37.5 - 51.0 % Final     MCV   Date Value Ref Range Status   05/19/2025 99.5 (H) 79.0 - 97.0 fL Final     MCH   Date Value Ref Range Status   05/19/2025 32.6 26.6 - 33.0 pg Final     MCHC   Date Value Ref Range Status   05/19/2025 32.7 31.5 - 35.7 g/dL Final     RDW   Date Value Ref Range Status   05/19/2025 13.7 12.3 - 15.4 % Final     RDW-SD   Date Value Ref Range Status   05/19/2025 49.7 37.0 - 54.0 fl Final     MPV   Date Value Ref Range Status   05/19/2025 9.1 6.0 - 12.0 fL Final     Platelets   Date Value Ref Range Status   05/19/2025 239 140 - 450 10*3/mm3 Final     Neutrophil %   Date Value Ref Range Status   05/18/2025 61.8 42.7 - 76.0 % Final     Lymphocyte %   Date Value Ref Range Status   05/18/2025 29.2 19.6 - 45.3 % Final     Monocyte %   Date Value Ref Range Status   05/18/2025 7.1 5.0 - 12.0 % Final     Eosinophil %   Date Value Ref Range Status   05/18/2025 1.5 0.3 - 6.2 % Final     Basophil %   Date Value Ref Range Status   05/18/2025 0.3 0.0 - 1.5 % Final     Immature Grans %   Date Value Ref Range Status   05/18/2025 0.1 0.0 - 0.5 % Final     Neutrophils, Absolute   Date Value Ref Range Status   05/18/2025 4.24 1.70 - 7.00 10*3/mm3 Final     Lymphocytes, Absolute   Date Value Ref Range Status   05/18/2025 2.00 0.70 - 3.10 10*3/mm3 Final     Monocytes, Absolute   Date Value Ref Range Status   05/18/2025 0.49 0.10 - 0.90 10*3/mm3 Final     Eosinophils, Absolute   Date Value Ref Range Status   05/18/2025 0.10 0.00 - 0.40 10*3/mm3 Final     Basophils, Absolute   Date Value Ref Range Status   05/18/2025 0.02 0.00 - 0.20 10*3/mm3 Final     Immature Grans, Absolute   Date Value Ref Range Status   05/18/2025 0.01 0.00 - 0.05 10*3/mm3 Final     nRBC   Date Value Ref Range Status    05/18/2025 0.0 0.0 - 0.2 /100 WBC Final       Lab Results   Component Value Date    GLUCOSE 105 (H) 05/19/2025    BUN 21 05/19/2025    CREATININE 1.69 (H) 05/19/2025     05/19/2025    K 4.3 05/19/2025     05/19/2025    CALCIUM 9.2 05/19/2025    PROTEINTOT 7.4 05/19/2025    ALBUMIN 3.9 05/19/2025    ALT 20 05/19/2025    AST 17 05/19/2025    ALKPHOS 29 (L) 05/19/2025    BILITOT 0.7 05/19/2025    GLOB 4.1 05/18/2025    AGRATIO 1.1 05/18/2025    BCR 12.4 05/19/2025    ANIONGAP 13.0 05/19/2025    EGFR 45.9 (L) 05/19/2025     CT Angiogram Head  Result Date: 5/19/2025  1.No intracranial large vessel occlusion is definitely identified. 2.Mild to moderate stenosis is seen involving the distal M1 segment of the right middle cerebral artery (series 8, images 464 through 467), unchanged since the prior study. 3.Focal moderate to severe stenosis involving a distal M2 branch of the left middle cerebral artery (series 8, images 489 through 499), unchanged since the prior study. 4.No significant stenosis of the bilateral internal carotid arteries. Electronically Signed: Azael Valero MD  5/19/2025 11:04 AM EDT  Workstation ID: [id number]    CT Angiogram Neck  Result Date: 5/19/2025  1.No intracranial large vessel occlusion is definitely identified. 2.Mild to moderate stenosis is seen involving the distal M1 segment of the right middle cerebral artery (series 8, images 464 through 467), unchanged since the prior study. 3.Focal moderate to severe stenosis involving a distal M2 branch of the left middle cerebral artery (series 8, images 489 through 499), unchanged since the prior study. 4.No significant stenosis of the bilateral internal carotid arteries. Electronically Signed: Azael Valero MD  5/19/2025 11:04 AM EDT  Workstation ID: [id number]    MRI Brain Without Contrast  Result Date: 5/18/2025  Impression: 1. Small recent (acute/subacute) infarct involving right middle cerebellar peduncle. Negative for hemorrhage or  significant mass effect. 2. Sequelae of chronic right parietal and occipital lobe infarcts, with findings of cortical laminar necrosis. Similar distribution multifocal smaller parenchymal infarcts in a watershed distribution. 3. Global parenchymal volume loss and sequelae of chronic microvascular ischemic change. Electronically Signed: Jesus Manuel Limon MD  5/18/2025 5:00 PM EDT  Workstation ID: [id number]    XR Chest 1 View  Result Date: 5/18/2025  Impression: No acute cardiopulmonary disease. Electronically Signed: Rinku Masters MD  5/18/2025 2:42 PM EDT  Workstation ID: [id number]    Results for orders placed during the hospital encounter of 06/02/24    Adult Transthoracic Echo Complete W/ Cont if Necessary Per Protocol (With Agitated Saline)    Interpretation Summary    Left ventricular systolic function is normal. Estimated left ventricular EF = 50%    Left ventricular wall thickness is consistent with mild to moderate concentric hypertrophy.    The cardiac valves are anatomically and functionally normal.    Estimated right ventricular systolic pressure from tricuspid regurgitation is normal (<35 mmHg).    Saline test results are negative.    Compared to echocardiogram performed 11/29/2022, there is been no significant interval change                      Assessment/Plan     60-year-old male with PMH significant for HTN, HLD, prediabetes, nonischemic cardiomyopathy, GIB, and history of right hemispheric watershed infarcts 6/2024 with no residual deficits who presented to the [facility] ED on 5/18/2025 with complaints of generalized weakness, dizziness, and difficulty with comprehension that began on 5/17 at 1400.  Not a candidate for TNK due to last known well greater than 4.5 hours.  He was admitted for further evaluation.    Antiplatelet PTA: ASA 81mg  Anticoagulant PTA: None    #Acute/subacute ischemic stroke, right cerebellar peduncle  #History of stroke, right parietal and occipital lobe  #ICAD, moderate stenosis in  "right MCA  #Episode of transient speech difficulty followed by \"passing out\" overnight on   -Etiology of patient's stroke is cryptogenic.    -Suspected  in the setting of uncontrolled vascular risk factors.  Unable to rule out cardiac source.  -CTA HTN with mild to moderate bilateral MCA stenosis.    -MRI brain with an AIS in the right middle cerebellar peduncle   -TTE 24 EF 50%, mildly dilated left atrial cavity, saline test are negative, TTE pending  - Interrogate loop recorder  -Continue DAPT with aspirin 81 mg Plavix 75 mg for 21 days.   -Check P2 Y12.  If adequate platelet inhibition is noted, we will transition to Plavix 75 mg monotherapy after 21 days as the patient was noted to be on aspirin 81 mg daily at admission.  -Continue atorvastatin 80 mg nightly. LDL 2025  88. Goal less than 55 for secondary stroke prevention.  -EEG pending secondary to episode of transient speech difficulty/dizziness followed by \"passing out\" episode overnight on   Hemoglobin A1c 2025  7.1%. Goal less than 7 for secondary stroke prevention.  Further management per primary team.  Diabetes education as appropriate.  -BP goals 120-150.  Hold BP medications for systolic blood pressure less than 120 given bilateral MCA stenosis.  Patient will need to check his blood pressure at home.   -Vitamin D noted to be 8.9 on 2025.  He is following with nephrology.  Recheck vitamin D.  He will need to follow-up with nephrology as recommended in July.  -Activity as tolerated, fall risk precautions  -PT/OT/SLP recommend home with outpatient services  -Follow-up with the stroke clinic as scheduled on 2025  - Plan discussed with the patient, yesterday is in his own primary team, and nursing staff.  Stroke neurology will continue to follow.     AFTAB Demarco, AGACNP-BC    Addendum: Patient has only received 2 doses of Plavix.  We can continue patient on DAPT until clinic follow-up and check P2 Y12 at " appointment.  EEG was negative was negative for any epileptiform activity.  On review of suspect symptoms were secondary to a significant drop in systolic blood pressure from 200-130 and less than 45 minutes.  He will follow-up in the stroke clinic as scheduled on 7/7/2025.  Recommend blood pressure goals 120-150.  Additional instructions will be added to his ADT.

## 2025-05-20 NOTE — DISCHARGE INSTRUCTIONS
-Continue the following medications for secondary stroke prevention: Plavix 75mg daily, Aspirin 81mg daily until stroke clinic follow up in July. Continue Atorvastatin 80mg nightly.   -Do not miss any doses of your stroke preventative meds including Plavix, aspirin, and atorvastatin  -Monitor blood pressure: systolic BP goals 120-150. Check blood pressure twice daily at home at home keep a log for primary care provider and cardiology.  Recommend that you hold home BP medications if  systolic blood pressure is less than 120 given narrowing in the arteries in your brain.    -Increase physical activity as tolerated; goal exercise 30 minutes/day 3-5 times weekly if able  -Call 911 for stroke symptoms (unilateral weakness, unilateral numbness, vision loss/double vision, speech difficulty, trouble walking, sudden severe headache or headache with nausea/vomiting/confusion/or decreased level of consciousness)  -Schedule follow-up with your primary care physician   -Follow-up in stroke clinic as recommended on 7/7/2025  -Follow-up with cardiology as recommended

## 2025-05-20 NOTE — THERAPY DISCHARGE NOTE
Patient Name: Walt Gao  : 1965    MRN: [id number]                              Today's Date: 2025       Admit Date: 2025    Visit Dx:     ICD-10-CM ICD-9-CM   1. Cerebrovascular accident (CVA), unspecified mechanism  I63.9 434.91   2. Dizziness  R42 780.4   3. Confusion  R41.0 298.9   4. Chronic kidney disease, unspecified CKD stage  N18.9 585.9   5. Hyperglycemia due to diabetes mellitus  E11.65 250.02   6. Hyperlipidemia LDL goal <70  E78.5 272.4   7. Cognitive communication deficit  R41.841 799.52   8. Cerebral infarction involving right cerebellar artery  I63.541 434.01     Patient Active Problem List   Diagnosis    Heart failure with improved ejection fraction (HFimpEF)    Essential hypertension    Stage 3b chronic kidney disease    Non-ischemic cardiomyopathy    Alcohol abuse    Hyperlipidemia LDL goal <70    At risk for sleep apnea    Cryptogenic stroke    Type 2 diabetes mellitus without complication, without long-term current use of insulin    B12 deficiency    Benign prostatic hyperplasia with lower urinary tract symptoms    Stroke     Past Medical History:   Diagnosis Date    CHF (congestive heart failure)     GI bleed 2024    Hypertension     NICM (nonischemic cardiomyopathy)     Nonischemic cardiomyopathy 2016    · Cardiac catheterization revealing nonobstructive CAD,  · LVEF 30% on heart catheterization,  · Echo (16): Moderate LVH. LVEF 23%. Left ventricular diastolic dysfunction (grade II) consistent with pseudonormalization.  Moderate mitral valve regurgitation · Echo (2017):  LVEF 50%    Stroke      Past Surgical History:   Procedure Laterality Date    COLONOSCOPY N/A 2024    Procedure: COLONOSCOPY;  Surgeon: Duglas Zhou MD;  Location:  [facility];  Service: Gastroenterology;  Laterality: N/A;    ENDOSCOPY N/A 2024    Procedure: ESOPHAGOGASTRODUODENOSCOPY;  Surgeon: Duglas Zhou MD;  Location:  [facility];  Service:  Gastroenterology;  Laterality: N/A;    HERNIA REPAIR      as child      General Information       Row Name 05/20/25 1650          Physical Therapy Time and Intention    Document Type discharge treatment  -CD     Mode of Treatment physical therapy  -CD       Row Name 05/20/25 1650          General Information    Patient Profile Reviewed yes  -CD     Existing Precautions/Restrictions fall  -CD     Barriers to Rehab medically complex  -CD       Row Name 05/20/25 1650          Cognition    Orientation Status (Cognition) oriented to;person;place  -CD       Row Name 05/20/25 1650          Safety Issues/Impairments Affecting Functional Mobility    Impairments Affecting Function (Mobility) balance;endurance/activity tolerance  -CD     Comment, Safety Issues/Impairments (Mobility) PER CHART PT IS DISCHARGING HOME TODAY. PER CM NOTE DOES NOT CURRENTLY HAVE INSURANCE AND IS MEDICAID PENDING AND CM DID NOT ARRANGE OPPT OR R WALKER FOR D/C. ISSUED CANE FOR HOME AND INSTRUCTED IN SEQUENCING GAIT WITH CANE AS WELL AS  TECHNIQUE FOR UP/DOWN STAIRS SAFELY. S.O HAS A REGULAR WALKER AT HOME THAT PT CAN USE. INSTRUCTED PT TO DISCUSS OPPT AND R WALKER WITH MD AT FOLLOW UP APPOINTMENT. PT IN AGREEMENT.   -CD               User Key  (r) = Recorded By, (t) = Taken By, (c) = Cosigned By      Initials Name Provider Type    CD Emily Fu, PT Physical Therapist                   Mobility       Row Name 05/20/25 1654          Bed Mobility    Comment, (Bed Mobility) PT UP IN ROOM UPON ARRIVAL WITH S.O. PRESENT. PT UNSTEADY. NSG NOTIFIED.  -CD       Row Name 05/20/25 1654          Transfers    Comment, (Transfers) PT DONNED SHOES INDEPENDENTLY SITTING EOB. STS FROM BED.  -CD       Row Name 05/20/25 1654          Sit-Stand Transfer    Sit-Stand [place] (Transfers) standby assist  -CD     Assistive Device (Sit-Stand Transfers) cane, straight  -CD     Comment, (Sit-Stand Transfer) NO OVERT LOB BUT WIDE BRADLEY AND MILDLY UNSTEADY.,  -CD        Row Name 05/20/25 1654          Gait/Stairs (Locomotion)    [place] Level (Gait) contact guard  -CD     Assistive Device (Gait) cane, straight  -CD     Distance in Feet (Gait) 200  -CD     Deviations/Abnormal Patterns (Gait) jose daniel decreased;stride length decreased;base of support, wide;gait speed decreased  -CD     Bilateral Gait Deviations forward flexed posture;heel strike decreased  -CD     [place] Level (Stairs) contact guard;verbal cues  -CD     Handrail Location (Stairs) right side (ascending)  -CD     Ascending Technique (Stairs) step-to-step  -CD     Descending Technique (Stairs) step-to-step  -CD     Stairs, Safety Issues sequencing ability decreased  -CD     Comment, (Gait/Stairs) GAIT STILL MILDLY UNSTEADY WITH CANE. IMPROVED SOME WITH MAX CUES FOR SEQUENCING. CONTINUE TO RECOMMEND USE OF WALKER FOR INCREASED STABILITY AND SAFETY. S.O. WILL PROVIDE HER STANDARD WALKER. INFO GIVEN ON PURCHASING WHEELS FOR STANDARD WALKER. ISSUED CANE AND GAIT BELT FOR HOME.   -CD               User Key  (r) = Recorded By, (t) = Taken By, (c) = Cosigned By      Initials Name Provider Type    CD Emily Fu PT Physical Therapist                   Obj/Interventions       Row Name 05/20/25 1703          Balance    Static Sitting Balance independent  -CD     Dynamic Sitting Balance independent  -CD     Position, Sitting Balance unsupported;sitting edge of bed;sitting in chair  -CD     Static Standing Balance standby assist  -CD     Dynamic Standing Balance contact guard  -CD     Position/Device Used, Standing Balance supported;cane, straight  -CD     Comment, Balance BALANCE IS IMPAIRED. CONTINUE TO RECOMMEND USE OF R WALKER AND OPPT AT D/C. PT TO DISCUSS WITH MD AT FOLLOW UP APPOINTMENT.  -CD               User Key  (r) = Recorded By, (t) = Taken By, (c) = Cosigned By      Initials Name Provider Type    CD Emily Fu PT Physical Therapist                   Goals/Plan       Row Name 05/20/25 1711           Transfer Goal 1 (PT)    Activity/Assistive Device (Transfer Goal 1, PT) sit-to-stand/stand-to-sit;bed-to-chair/chair-to-bed  -CD     [place]ston Level/Cues Needed (Transfer Goal 1, PT) independent  -CD     Time Frame (Transfer Goal 1, PT) short term goal (STG);5 days  -CD     Progress/Outcome (Transfer Goal 1, PT) goal not met;discharged from facility  -CD       Row Name 05/20/25 1711          Gait Training Goal 1 (PT)    Activity/Assistive Device (Gait Training Goal 1, PT) gait (walking locomotion);assistive device use  -CD     Winston Level (Gait Training Goal 1, PT) independent  -CD     Distance (Gait Training Goal 1, PT) 400  -CD     Time Frame (Gait Training Goal 1, PT) long term goal (LTG);10 days  -CD     Progress/Outcome (Gait Training Goal 1, PT) goal not met;discharged from facility  -CD       Row Name 05/20/25 1711          Stairs Goal 1 (PT)    Activity/Assistive Device (Stairs Goal 1, PT) ascending stairs;descending stairs;using handrail, right  -CD     [place] Level/Cues Needed (Stairs Goal 1, PT) modified independence  -CD     Number of Stairs (Stairs Goal 1, PT) 12  -CD     Time Frame (Stairs Goal 1, PT) long term goal (LTG);10 days  -CD     Progress/Outcome (Stairs Goal 1, PT) goal not met;discharged from facility  -CD               User Key  (r) = Recorded By, (t) = Taken By, (c) = Cosigned By      Initials Name Provider Type    CD Emily Fu PT Physical Therapist                   Clinical Impression       Row Name 05/20/25 6687          Pain    Pretreatment Pain Rating 0/10 - no pain  -CD     Posttreatment Pain Rating 0/10 - no pain  -CD       Row Name 05/20/25 1701          Plan of Care Review    Plan of Care Reviewed With patient;significant other  -CD     Progress improving  -CD     Outcome Evaluation PER CHART PT IS DISCHARGING HOME TODAY. PER CM NOTE DOES NOT CURRENTLY HAVE INSURANCE AND IS MEDICAID PENDING AND CM DID NOT ARRANGE OPPT OR R WALKER FOR D/C. ISSUED CANE FOR HOME  AND INSTRUCTED IN SEQUENCING GAIT WITH CANE AS WELL AS TECHNIQUE FOR UP/DOWN STAIRS SAFELY. S.O HAS A STANDARD WALKER AT HOME THAT PT CAN USE. INSTRUCTED PT TO DISCUSS OPPT AND R WALKER WITH MD AT FOLLOW UP APPOINTMENT. PT IN AGREEMENT. GAIT STILL MILDLY UNSTEADY WITH CANE. IMPROVED SOME WITH MAX CUES FOR SEQUENCING. CONTINUE TO RECOMMEND USE OF WALKER FOR INCREASED STABILITY AND SAFETY.  INFO GIVEN ON PURCHASING WHEELS FOR STANDARD WALKER. ISSUED CANE AND GAIT BELT FOR HOME. GOALS NOT MET DUE TO LOS AT THIS FACILITY. D/C P.T. AS PT IS GOING HOME.  -CD       Row Name 05/20/25 1704          Therapy Assessment/Plan (PT)    Patient/Family Therapy Goals Statement (PT) TO GO HOME.  -CD     Criteria for Skilled Interventions Met (PT) yes;meets criteria;skilled treatment is necessary  -CD     Therapy Frequency (PT) daily  D/C TREATMENT.  -CD       Row Name 05/20/25 1704          Vital Signs    Post Systolic BP Rehab 138  -CD     Post Treatment Diastolic   -CD     O2 Delivery Pre Treatment room air  -CD     O2 Delivery Intra Treatment room air  -CD     O2 Delivery Post Treatment room air  -CD     Pre Patient Position Standing  -CD     Intra Patient Position Standing  -CD     Post Patient Position Sitting  -CD       Row Name 05/20/25 1704          Positioning and Restraints    Pre-Treatment Position standing in room  -CD     Post Treatment Position chair  -CD     In Chair reclined;exit alarm on;with family/caregiver;legs elevated;notified nsg  -CD               User Key  (r) = Recorded By, (t) = Taken By, (c) = Cosigned By      Initials Name Provider Type    CD Emily Fu, PT Physical Therapist                   Outcome Measures       Row Name 05/20/25 1713 05/20/25 0804       How much help from another person do you currently need...    Turning from your back to your side while in flat bed without using bedrails? 4  -CD 4  -BS    Moving from lying on back to sitting on the side of a flat bed without bedrails? 4   -CD 4  -BS    Moving to and from a bed to a chair (including a wheelchair)? 3  -CD 4  -BS    Standing up from a chair using your arms (e.g., wheelchair, bedside chair)? 3  -CD 4  -BS    Climbing 3-5 steps with a railing? 3  -CD 3  -BS    To walk in hospital room? 3  -CD 4  -BS    AM-PAC 6 Clicks Score (PT) 20  -CD 23  -BS              User Key  (r) = Recorded By, (t) = Taken By, (c) = Cosigned By      Initials Name Provider Type    CD Emily Fu, PT Physical Therapist    Rekha Alonso RN Registered Nurse                  Physical Therapy Education       Title: PT OT SLP Therapies (Resolved)       Topic: Physical Therapy (Resolved)       Point: Mobility training (Resolved)       Learning Progress Summary            Patient Acceptance, E, VU,NR by CD at 5/19/2025 1144    Comment: BENEFITS OF OOB ACTIVITY, SAFETY WITH MOBILITY, PROGRESSION OF POC, D/C PLANNING, GAIT TRAINING WITH R WALKER.                      Point: Home exercise program (Resolved)       Learning Progress Summary            Patient Acceptance, E, VU,NR by CD at 5/19/2025 1144    Comment: BENEFITS OF OOB ACTIVITY, SAFETY WITH MOBILITY, PROGRESSION OF POC, D/C PLANNING, GAIT TRAINING WITH R WALKER.                      Point: Body mechanics (Resolved)       Learning Progress Summary            Patient Acceptance, E, VU,NR by CD at 5/19/2025 1144    Comment: BENEFITS OF OOB ACTIVITY, SAFETY WITH MOBILITY, PROGRESSION OF POC, D/C PLANNING, GAIT TRAINING WITH R WALKER.                      Point: Precautions (Resolved)       Learning Progress Summary            Patient Acceptance, E, VU,NR by CD at 5/19/2025 1144    Comment: BENEFITS OF OOB ACTIVITY, SAFETY WITH MOBILITY, PROGRESSION OF POC, D/C PLANNING, GAIT TRAINING WITH R WALKER.                                      User Key       Initials Effective Dates Name Provider Type Discipline    CD 02/03/23 -  Emily Fu PT Physical Therapist PT                  PT Recommendation and  Plan  Planned Therapy Interventions (PT): balance training, gait training, stair training, strengthening, transfer training, patient/family education, home exercise program, neuromuscular re-education  Progress: improving  Outcome Evaluation: PER CHART PT IS DISCHARGING HOME TODAY. PER CM NOTE DOES NOT CURRENTLY HAVE INSURANCE AND IS MEDICAID PENDING AND CM DID NOT ARRANGE OPPT OR R WALKER FOR D/C. ISSUED CANE FOR HOME AND INSTRUCTED IN SEQUENCING GAIT WITH CANE AS WELL AS TECHNIQUE FOR UP/DOWN STAIRS SAFELY. S.O HAS A STANDARD WALKER AT HOME THAT PT CAN USE. INSTRUCTED PT TO DISCUSS OPPT AND R WALKER WITH MD AT FOLLOW UP APPOINTMENT. PT IN AGREEMENT. GAIT STILL MILDLY UNSTEADY WITH CANE. IMPROVED SOME WITH MAX CUES FOR SEQUENCING. CONTINUE TO RECOMMEND USE OF WALKER FOR INCREASED STABILITY AND SAFETY.  INFO GIVEN ON PURCHASING WHEELS FOR STANDARD WALKER. ISSUED CANE AND GAIT BELT FOR HOME. GOALS NOT MET DUE TO LOS AT THIS FACILITY. D/C P.T. AS PT IS GOING HOME.     Time Calculation:   PT Evaluation Complexity  History, PT Evaluation Complexity: 3 or more personal factors and/or comorbidities  Examination of Body Systems (PT Eval Complexity): total of 4 or more elements  Clinical Presentation (PT Evaluation Complexity): evolving  Clinical Decision Making (PT Evaluation Complexity): low complexity  Overall Complexity (PT Evaluation Complexity): low complexity     PT Charges       Row Name 05/20/25 1714             Time Calculation    Start Time 1611  -CD      PT Received On 05/20/25  -CD         Time Calculation- PT    Total Timed Code Minutes- PT 34 minute(s)  -CD         Timed Charges    15601 - Gait Training Minutes  34  -CD         Total Minutes    Timed Charges Total Minutes 34  -CD       Total Minutes 34  -CD                User Key  (r) = Recorded By, (t) = Taken By, (c) = Cosigned By      Initials Name Provider Type    Emily Bang PT Physical Therapist                  Therapy Charges for Today        Code Description Service Date Service Provider Modifiers Qty    31351528419 HC PT EVAL LOW COMPLEXITY 4 5/19/2025 Emily Fu, PT GP 1    36067798691 HC GAIT TRAINING EA 15 MIN 5/19/2025 Emily Fu, PT GP 1    34035403430 HC GAIT TRAINING EA 15 MIN 5/20/2025 Emily Fu, PT GP 2            PT G-Codes  Outcome Measure Options: AM-PAC 6 Clicks Daily Activity (OT)  AM-PAC 6 Clicks Score (PT): 20  AM-PAC 6 Clicks Score (OT): 20    PT Discharge Summary  Anticipated Discharge Disposition (PT): home with assist (P.T. RECOMMENDED OPPT AT D/C. CM DID NOT ARRANGE. PT TO FOLLOW UP WITH MD.)  Reason for Discharge: Discharge from facility    Emily Fu, PT  5/20/2025

## 2025-05-20 NOTE — DISCHARGE SUMMARY
[facility] Medicine Services  DISCHARGE SUMMARY    Patient Name: Walt Gao  : 1965  MRN: [id number]    Date of Admission: 2025  2:54 PM  Date of Discharge:  2025  Primary Care Physician: Duglas Lang MD    Consults       No orders found from 2025 to 2025.            Hospital Course       Active Hospital Problems    Diagnosis  POA    Stroke [I63.9]  Yes    Benign prostatic hyperplasia with lower urinary tract symptoms [N40.1]  Yes    Type 2 diabetes mellitus without complication, without long-term current use of insulin [E11.9]  Yes    Cryptogenic stroke [I63.9]  Yes    Hyperlipidemia LDL goal <70 [E78.5]  Yes    Stage 3b chronic kidney disease [N18.32]  Yes    Non-ischemic cardiomyopathy [I42.8]  Yes    Heart failure with improved ejection fraction (HFimpEF) [I50.32]  Yes    Essential hypertension [I10]  Yes    Alcohol abuse [F10.10]  Yes      Resolved Hospital Problems    Diagnosis Date Resolved POA    **Cerebral infarction involving right cerebellar artery [I63.541] 2025 Yes          Hospital Course:  Walt Gao is a 60 y.o. male with a PMH significant for CVA (right MCA infarct with left hemisensory deficits), GI bleed, GEETA, HTN, HLD, HFpEF, nonischemic cardiomyopathy, CKD, T2DM and BPH.  Presenting to [facility] ED due to new onset dizziness, gait instability and problems with balance ongoing since yesterday, endorsing symptoms worse when standing suddenly.  Evaluate by neurosurgery, plans to repeat CTA head and neck.     New onset dizziness, gait instability and balance problems  Acute/subacute right middle cerebellar peduncle infarct  Prior chronic right parietal and occipital lobe infarct with evidence of cortical laminar necrosis  Multifocal smaller parenchymal infarcts in watershed areas  Global parenchymal volume loss  Acute/subacute CVA in the setting of prior CVA with residual deficits described as left hand  numbness  Suspected seizure  Patient states he is very functional, able to drive, complete all ADLs  Stroke neurology follow, MRI brain showed small recent (acute/subacute) infarct involving the right middle cerebellar peduncle, negative for hemorrhage or mass effect.  There was sequelae of chronic right parietal and occipital lobe infarcts with findings of cortical laminar necrosis similar distribution multifocal smaller parenchymal infarcts within watershed distribution.  Global parenchymal volume loss and sequela of chronic microvascular ischemic changes.    CTA head and neck shows mild to moderate right MCA stenosis, moderate to severe left MCA stenosis.  EEG obtained and was negative  Loaded with Plavix 300 mg, f/u P2 Y12 level was 144, rec to continue dual antiplatelet therapy, recommendation is to continue this for 21 days followed by aspirin monotherapy, continue atorvastatin 80 mg follow-up in clinic as scheduled  PT/OT has evaluated, recommend home with assist/outpatient     HTN  Okay to resume home Coreg, Jardiance, Aldactone and valsartan.      HLD  Continue Lipitor 80 mg nightly     Nonischemic cardiomyopathy  HFpEF  Repeat echo TTE has been done, read pending, Previous echo  June 2024 showed EF of 50% with mild to moderate concentric hypertrophy of LV, no valvular abnormality, RVSP was normal with negative saline tests, echo TTE prior to this was November 2022 with no significant interval change  Patient has a loop recorder in     Alcohol abuse  History of alcohol abuse, ethanol level checked on ED, WNL  Declined recent use     CKD stage IIIb  Renal function is close to baseline     BPH  Switch Flomax to nightly to avoid orthostatic hypotension     Well-controlled T2DM with A1c 7  Continue home regimen     Discharge Follow Up Recommendations for outpatient labs/diagnostics:  Follow-up with PCP in 1 week  Follow-up with neurology as scheduled    Day of Discharge     HPI: Patient reportedly had some  dizziness and strokelike symptoms overnight, this morning he feels fine this is close to his baseline    Review of Systems  Gen- No fevers, chills  CV- No chest pain, palpitations  Resp- No cough, dyspnea  GI- No N/V/D, abd pain      Vital Signs:   Temp:  [97.5 °F (36.4 °C)-98.6 °F (37 °C)] 98.6 °F (37 °C)  Heart Rate:  [] 82  Resp:  [18] 18  BP: (115-200)/() 142/101      Physical Exam:  Constitutional: No acute distress, awake, alert  HENT: NCAT, mucous membranes moist  Respiratory: Clear to auscultation bilaterally, respiratory effort normal   Cardiovascular: RRR, no murmurs, rubs, or gallops  Gastrointestinal: Positive bowel sounds, soft, nontender, nondistended  Musculoskeletal: No bilateral ankle edema  Psychiatric: Appropriate affect, cooperative  Neurologic: Oriented x 3,   Skin: No rashes      Pertinent  and/or Most Recent Results     LAB RESULTS:      Lab 05/19/25  0715 05/18/25  1605 05/18/25  1348   WBC 6.90  --  6.86   HEMOGLOBIN 14.2  --  15.2   HEMATOCRIT 43.4  --  46.7   PLATELETS 239  --  272   NEUTROS ABS  --   --  4.24   IMMATURE GRANS (ABS)  --   --  0.01   LYMPHS ABS  --   --  2.00   MONOS ABS  --   --  0.49   EOS ABS  --   --  0.10   MCV 99.5*  --  99.2*   CRP  --  0.70*  --    PROCALCITONIN  --  0.06  --    LACTATE  --   --  0.7   PROTIME  --   --  13.6         Lab 05/19/25  0715 05/18/25  1605 05/18/25  1348   SODIUM 138  --  136   POTASSIUM 4.3  --  4.8   CHLORIDE 105  --  102   CO2 20.0*  --  22.0   ANION GAP 13.0  --  12.0   BUN 21  --  26*   CREATININE 1.69*  --  1.91*   EGFR 45.9*  --  39.6*   GLUCOSE 105*  --  119*   CALCIUM 9.2  --  9.7   MAGNESIUM 1.9  --  2.2   HEMOGLOBIN A1C 7.10*  --   --    TSH  --  1.040  --          Lab 05/19/25  0715 05/18/25  1348   TOTAL PROTEIN 7.4 8.5   ALBUMIN 3.9 4.4   GLOBULIN  --  4.1   ALT (SGPT) 20 23   AST (SGOT) 17 18   BILIRUBIN 0.7 0.5   INDIRECT BILIRUBIN 0.5  --    BILIRUBIN DIRECT 0.2  --    ALK PHOS 29* 32*         Lab  05/18/25  1605 05/18/25  1348   PROBNP 152.0  --    HSTROP T 13 14   PROTIME  --  13.6   INR  --  0.98         Lab 05/19/25  0715   CHOLESTEROL 145   LDL CHOL 88   HDL CHOL 38*   TRIGLYCERIDES 102             Brief Urine Lab Results  (Last result in the past 365 days)        Color   Clarity   Blood   Leuk Est   Nitrite   Protein   CREAT   Urine HCG        05/18/25 1348 Yellow   Clear   Negative   Negative   Negative   30 mg/dL (1+)                 Microbiology Results (last 10 days)       ** No results found for the last 240 hours. **            EEG  Result Date: 5/20/2025  Reason for referral: 60 y.o.male with dizziness, speech changes, syncope, consideration of seizures Technical Summary:  A 19 channel digital EEG was performed using the international 10-20 placement system, including eye leads and EKG leads. Duration: 20-minute Findings: The patient is awake.  A medium amplitude well-regulated 11 Hz posterior rhythm is present symmetrically over the occipital leads.  Intermixed theta and alpha activity are seen anteriorly.  Photic stimulation yields a symmetric driving response.  Drowsiness is noted with intermittent slowing of the background but stage II sleep is not seen.  Sharply contoured waveforms are variably seen over the right hemisphere but epileptiform activity is not seen.  No focal features are present. Video: Available Technical quality: Good Rhythm strip: Regular, 70 bpm SUMMARY: Normal EEG in the awake and drowsy states No focal features or epileptiform activity are seen     Normal study This report is transcribed using the Dragon dictation system.      CT Angiogram Head  Result Date: 5/19/2025  CT ANGIOGRAM HEAD, CT ANGIOGRAM NECK Date of Exam: 5/19/2025 10:21 AM EDT Indication: new cerebellar stroke; eval vasculature. Comparison: CTA of the head and neck dated 6/2/2024, brain MRI dated 5/18/2025 Technique: CTA of the head was performed after the uneventful intravenous administration of 75 mL  Isovue-370. Reconstructed coronal and sagittal images were also obtained. In addition, a 3-D volume rendered image was created for interpretation. Automated  exposure control and iterative reconstruction methods were used. FINDINGS: Vascular Findings: The right common carotid, internal carotid, middle cerebral, anterior cerebral, vertebral, and posterior cerebral arteries are patent without abrupt cut off or aneurysmal dilation. Mild to moderate stenosis is seen involving the distal M1 segment of the right middle cerebral artery (series 8, images 464 through 467), unchanged since the prior study. The left common carotid, internal carotid, middle cerebral, anterior cerebral, vertebral, and posterior cerebral arteries are patent without abrupt cut off or aneurysmal dilation. Focal moderate to severe stenosis involving a distal M2 branch of the left  middle cerebral artery (series 8, images 489 through 499), unchanged since the prior study. Basilar artery appears patent and appears unremarkable. Non-vascular Findings: For description of nonvascular intracranial findings, please refer to the noncontrast brain MRI dated 5/18/2025. There is encephalomalacia within the right parietal lobe. No significant mass effect, midline shift, or hydrocephalus is seen. No suspicious contrast enhancement is seen. Calvarium appears intact. Orbital structures, paranasal sinuses, and mastoids appear unremarkable. Scalp soft tissues are unremarkable. No acute abnormality is identified within the visualized soft tissue or bony structures of the neck. The visualized lung apices are clear.     1.No intracranial large vessel occlusion is definitely identified. 2.Mild to moderate stenosis is seen involving the distal M1 segment of the right middle cerebral artery (series 8, images 464 through 467), unchanged since the prior study. 3.Focal moderate to severe stenosis involving a distal M2 branch of the left middle cerebral artery (series 8,  images 489 through 499), unchanged since the prior study. 4.No significant stenosis of the bilateral internal carotid arteries. Electronically Signed: Azael Valero MD  5/19/2025 11:04 AM EDT  Workstation ID: [id number]    CT Angiogram Neck  Result Date: 5/19/2025  CT ANGIOGRAM HEAD, CT ANGIOGRAM NECK Date of Exam: 5/19/2025 10:21 AM EDT Indication: new cerebellar stroke; eval vasculature. Comparison: CTA of the head and neck dated 6/2/2024, brain MRI dated 5/18/2025 Technique: CTA of the head was performed after the uneventful intravenous administration of 75 mL Isovue-370. Reconstructed coronal and sagittal images were also obtained. In addition, a 3-D volume rendered image was created for interpretation. Automated  exposure control and iterative reconstruction methods were used. FINDINGS: Vascular Findings: The right common carotid, internal carotid, middle cerebral, anterior cerebral, vertebral, and posterior cerebral arteries are patent without abrupt cut off or aneurysmal dilation. Mild to moderate stenosis is seen involving the distal M1 segment of the right middle cerebral artery (series 8, images 464 through 467), unchanged since the prior study. The left common carotid, internal carotid, middle cerebral, anterior cerebral, vertebral, and posterior cerebral arteries are patent without abrupt cut off or aneurysmal dilation. Focal moderate to severe stenosis involving a distal M2 branch of the left  middle cerebral artery (series 8, images 489 through 499), unchanged since the prior study. Basilar artery appears patent and appears unremarkable. Non-vascular Findings: For description of nonvascular intracranial findings, please refer to the noncontrast brain MRI dated 5/18/2025. There is encephalomalacia within the right parietal lobe. No significant mass effect, midline shift, or hydrocephalus is seen. No suspicious contrast enhancement is seen. Calvarium appears intact. Orbital structures, paranasal sinuses,  and mastoids appear unremarkable. Scalp soft tissues are unremarkable. No acute abnormality is identified within the visualized soft tissue or bony structures of the neck. The visualized lung apices are clear.     1.No intracranial large vessel occlusion is definitely identified. 2.Mild to moderate stenosis is seen involving the distal M1 segment of the right middle cerebral artery (series 8, images 464 through 467), unchanged since the prior study. 3.Focal moderate to severe stenosis involving a distal M2 branch of the left middle cerebral artery (series 8, images 489 through 499), unchanged since the prior study. 4.No significant stenosis of the bilateral internal carotid arteries. Electronically Signed: Azael Valero MD  5/19/2025 11:04 AM EDT  Workstation ID: [id number]    MRI Brain Without Contrast  Result Date: 5/18/2025  MRI BRAIN WO CONTRAST Date of Exam: 5/18/2025 4:32 PM EDT Indication: dizziness, slower to respond than baseline, hx CVA in the past.  Comparison: Brain MRI 9/6/2024 Technique:  Routine multiplanar/multisequence sequence images of the brain were obtained without contrast administration. Findings: 1.6 x 1.4 cm diffusion restriction in the right middle cerebellar peduncle indicating recent infarct. Increased T2/FLAIR signal abnormality. Negative for hemorrhage. Cortical areas of focal encephalomalacia and T2/FLAIR hyperintense gliosis in the right parietal and right occipital lobes indicating sequelae of previous more remote infarcts present on prior comparison. Serpiginous areas of T1 hyperintense signal indicates cortical laminar necrosis. Multifocal right greater than left periventricular areas  of periventricular and subcortical infarcts similar in distribution from prior exam. Distribution could reflect sequelae of watershed infarcts. Negative for acute intracranial hemorrhage, large mass lesion or midline shift. Sequelae of chronic microvascular ischemic change likely moderate severity  similar to previous comparison. No large extra-axial fluid collection. Multifocal areas of scattered susceptibility artifacts involving bilateral thalami, cerebellum, cj and mesial temporal lobes indicating hemosiderin deposition from previous microhemorrhages. Unremarkable pituitary. Negative for cerebellar tonsillar ectopia. Major intracranial flow voids appear preserved. No significant mastoid effusion. Minimal nonobstructive sinus disease. Orbits symmetric.     Impression: 1. Small recent (acute/subacute) infarct involving right middle cerebellar peduncle. Negative for hemorrhage or significant mass effect. 2. Sequelae of chronic right parietal and occipital lobe infarcts, with findings of cortical laminar necrosis. Similar distribution multifocal smaller parenchymal infarcts in a watershed distribution. 3. Global parenchymal volume loss and sequelae of chronic microvascular ischemic change. Electronically Signed: Jesus Manuel Limon MD  5/18/2025 5:00 PM EDT  Workstation ID: [id number]    XR Chest 1 View  Result Date: 5/18/2025  XR CHEST 1 VW Date of Exam: 5/18/2025 2:27 PM EDT Indication: Weak/Dizzy/AMS triage protocol Comparison: 3/7/2025 Findings: No focal consolidation. No pneumothorax or pleural effusion. Cardiac size is normal. The visualized clavicles appear intact. No displaced rib fractures. The visualized upper abdomen is normal.     Impression: No acute cardiopulmonary disease. Electronically Signed: Rinku Masters MD  5/18/2025 2:42 PM EDT  Workstation ID: [id number]      Results for orders placed during the hospital encounter of 12/11/23    Duplex Renal Artery - Bilateral Complete CAR    Interpretation Summary  EXAM:  DUPLEX RENAL ARTERY BILATERAL COMPLETE CAR    DATE: 12/11/2023    COMPARISON: None    INDICATION: Hypertension    TECHNIQUE: Grayscale, color Doppler and spectral Doppler examination for evaluation of kidneys and renal arteries was performed with representative examples saved to  PACS.    FINDINGS:  Aorta: Peak systolic velocity: 65 cm/sec.  No aneurysm    RIGHT KIDNEY:  The right kidney measures 11 cm in length.  The right kidney is normal size and contour with good corticomedullary differentiation. There are no shadowing calculi or cortical deforming solid or cystic masses. No hydronephrosis.    RIGHT RENAL DOPPLER:  Right renal artery maximum peak systolic velocity: 118 cm/sec at proximal renal artery.  Right Renal aortic ratio: 1.8  Renal vein: Patent.    Right Intrarenal:  Resistive Index:  Upper pole: 0.65.  Mid: 0.64.  Inferior pole: 0.67.    Highest intrarenal Acceleration time = 50 ms.  No tardus parvus waveform.    LEFT KIDNEY:  The left kidney measures 11.3 cm in length.  The left kidney is of normal size and contour with good corticomedullary differentiation. There are no shadowing calculi or cortical deforming solid or cystic masses. No hydronephrosis.    LEFT RENAL DOPPLER:  Left renal artery maximum Peak systolic velocity: 94.7 cm/sec at proximal renal artery.  Left Renal aortic ratio: 1.4  Left Renal vein: Patent.    Left Intrarenal:  Resistive Index:  Upper pole: 0.65.  Mid: 0.65.  Inferior pole: 0.66.    Highest intrarenal Acceleration time = 56 ms. No tardus parvus waveform.    Urinary Bladder: The urinary bladder wall is within normal limits (<3 mm)..    Impression  1. Negative for renal artery stenosis.  2. Symmetric grayscale appearance of the kidneys. Negative for hydronephrosis.    Electronically Signed: Jesus Manuel Limon MD  12/11/2023 4:10 PM EST  Workstation ID: [id number]      Results for orders placed during the hospital encounter of 12/11/23    Duplex Renal Artery - Bilateral Complete CAR    Interpretation Summary  EXAM:  DUPLEX RENAL ARTERY BILATERAL COMPLETE CAR    DATE: 12/11/2023    COMPARISON: None    INDICATION: Hypertension    TECHNIQUE: Grayscale, color Doppler and spectral Doppler examination for evaluation of kidneys and renal arteries was performed with  representative examples saved to PACS.    FINDINGS:  Aorta: Peak systolic velocity: 65 cm/sec.  No aneurysm    RIGHT KIDNEY:  The right kidney measures 11 cm in length.  The right kidney is normal size and contour with good corticomedullary differentiation. There are no shadowing calculi or cortical deforming solid or cystic masses. No hydronephrosis.    RIGHT RENAL DOPPLER:  Right renal artery maximum peak systolic velocity: 118 cm/sec at proximal renal artery.  Right Renal aortic ratio: 1.8  Renal vein: Patent.    Right Intrarenal:  Resistive Index:  Upper pole: 0.65.  Mid: 0.64.  Inferior pole: 0.67.    Highest intrarenal Acceleration time = 50 ms.  No tardus parvus waveform.    LEFT KIDNEY:  The left kidney measures 11.3 cm in length.  The left kidney is of normal size and contour with good corticomedullary differentiation. There are no shadowing calculi or cortical deforming solid or cystic masses. No hydronephrosis.    LEFT RENAL DOPPLER:  Left renal artery maximum Peak systolic velocity: 94.7 cm/sec at proximal renal artery.  Left Renal aortic ratio: 1.4  Left Renal vein: Patent.    Left Intrarenal:  Resistive Index:  Upper pole: 0.65.  Mid: 0.65.  Inferior pole: 0.66.    Highest intrarenal Acceleration time = 56 ms. No tardus parvus waveform.    Urinary Bladder: The urinary bladder wall is within normal limits (<3 mm)..    Impression  1. Negative for renal artery stenosis.  2. Symmetric grayscale appearance of the kidneys. Negative for hydronephrosis.    Electronically Signed: Jesus Manuel Limon MD  12/11/2023 4:10 PM EST  Workstation ID: [id number]      Results for orders placed during the hospital encounter of 05/18/25    Adult Transthoracic Echo Complete W/ Cont if Necessary Per Protocol (With Agitated Saline)    Interpretation Summary    Left ventricular systolic function is normal. Estimated left ventricular EF = 70%    Left ventricular wall thickness is consistent with concentric hypertrophy.    No  hemodynamically significant valvular disease present.      Plan for Follow-up of Pending Labs/Results:   Pending Labs       Order Current Status    Vitamin D,25-Hydroxy In process          Discharge Details        Discharge Medications        New Medications        Instructions Start Date   clopidogrel 75 MG tablet  Commonly known as: Plavix   75 mg, Oral, Daily             Changes to Medications        Instructions Start Date   atorvastatin 80 MG tablet  Commonly known as: LIPITOR  What changed:   medication strength  how much to take   80 mg, Oral, Nightly             Continue These Medications        Instructions Start Date   aspirin 81 MG EC tablet   81 mg, Oral, Daily      carvedilol 12.5 MG tablet  Commonly known as: COREG   12.5 mg, Oral, 2 Times Daily      dapagliflozin Propanediol 10 MG tablet  Commonly known as: Farxiga   10 mg, Oral, Daily      mupirocin 2 % ointment  Commonly known as: BACTROBAN   1 Application, Topical, 3 Times Daily      spironolactone 25 MG tablet  Commonly known as: ALDACTONE   25 mg, Oral, Daily      tamsulosin 0.4 MG capsule 24 hr capsule  Commonly known as: FLOMAX   0.4 mg, Oral, Daily      valsartan 80 MG tablet  Commonly known as: DIOVAN   80 mg, Oral, Daily      vitamin B-12 1000 MCG tablet  Commonly known as: CYANOCOBALAMIN   2,000 mcg, Oral, Daily               No Known Allergies    Discharge Disposition: Home  Home or Self Care    Diet:  Hospital:  Diet Order   Procedures    Diet: Cardiac, Diabetic; Healthy Heart (2-3 Na+); Consistent Carbohydrate; Fluid Consistency: Thin (IDDSI 0)      Activity: As tolerated      Restrictions or Other Recommendations:       CODE STATUS:    Code Status and Medical Interventions: CPR (Attempt to Resuscitate); Full Support   Ordered at: 05/18/25 1739     Code Status (Patient has no pulse and is not breathing):    CPR (Attempt to Resuscitate)     Medical Interventions (Patient has pulse or is breathing):    Full Support     Level Of Support  Discussed With:    Patient       Future Appointments   Date Time Provider Department Center   5/23/2025  2:15 PM Arash Man APRN [facility]   6/6/2025  9:30 AM Duglas Lang MD [facility]   6/11/2025  2:30 PM [facility]   7/7/2025 11:00 AM Tea Campuzano APRN [facility]   8/6/2025 10:30 AM Austyn Dean APRN [facility]   2/3/2026 10:45 AM Chandrakant Delgado IV, MD [facility]       Additional Instructions for the Follow-ups that You Need to Schedule       Ambulatory Referral to [facility]   As directed      Service Requested: Cryptogenic Stroke Clinic              Beba Burton MD  05/20/25      Time Spent on Discharge:  I spent  55  minutes on this discharge activity which included: face-to-face encounter with the patient, reviewing the data in the system, coordination of the care with the nursing staff as well as consultants, documentation, and entering orders.

## 2025-05-20 NOTE — PLAN OF CARE
Goal Outcome Evaluation:  Plan of Care Reviewed With: patient, significant other        Progress: improving  Outcome Evaluation: PER CHART PT IS DISCHARGING HOME TODAY. PER CM NOTE DOES NOT CURRENTLY HAVE INSURANCE AND IS MEDICAID PENDING AND CM DID NOT ARRANGE OPPT OR R WALKER FOR D/C. ISSUED CANE FOR HOME AND INSTRUCTED IN SEQUENCING GAIT WITH CANE AS WELL AS TECHNIQUE FOR UP/DOWN STAIRS SAFELY. S.O HAS A STANDARD WALKER AT HOME THAT PT CAN USE. INSTRUCTED PT TO DISCUSS OPPT AND R WALKER WITH MD AT FOLLOW UP APPOINTMENT. PT IN AGREEMENT. GAIT STILL MILDLY UNSTEADY WITH CANE. IMPROVED SOME WITH MAX CUES FOR SEQUENCING. CONTINUE TO RECOMMEND USE OF WALKER FOR INCREASED STABILITY AND SAFETY.  INFO GIVEN ON PURCHASING WHEELS FOR STANDARD WALKER. ISSUED CANE AND GAIT BELT FOR HOME. GOALS NOT MET DUE TO LOS AT THIS FACILITY. D/C P.T. AS PT IS GOING HOME.    Anticipated Discharge Disposition (PT): home with assist, home with outpatient therapy services (PT TO FOLLOW UP WITH MD.)

## 2025-05-20 NOTE — CONSULTS
Diabetes Education    Patient Name:  Walt Gao  YOB: 1965  MRN: [id number]  Admit Date:  5/18/2025    Consult for diabetes education received per [employer]. Chart reviewed. Pt was seen at bedside today with girlfriend present. Permission given for visit. Initial education was completed 5/19 by another educator.    Patient was instructed on how to use our demo One Touch Meter. Instructed to check expiration date and safe storage of glucose test strips, prepare lancing device, obtain a sample, perform test, and safe disposal of lancets in a sharps container or other plastic container with secure lid.  Testing frequency per MD instructions, provided general guidelines on target blood glucose, advised patient to discuss personal targets for glucose at their next visit.  Record keeping discussed. Discussed how to interpret test results. Encouraged notification to provider if results indicate trends for hypoglycemia or hyperglycemia, or questions about results occur.      Pt was able to return demonstration using the teach back method with verbal reinforcement. Discussed and demonstrated how to log test results.  Urged to call [phone] number on back of meter for questions on use, complete the warranty card and mail. Reviewed hypoglycemia and hyperglycemia signs and symptoms with patient and importance of testing. All questions and concerns addressed. Please write for meter and testing supplies to be filled through meds to beds at discharge.       Education handouts provided (ADA’s “Using a blood glucose meter”), questions answered and pt was advised to call with any other questions or concerns.      Thank you for this consult.       Total time spent reviewing chart, preparing education/materials, providing education at bedside, and coordinating care approx 45 minutes.     Electronically signed by:  Patti Bae RN  05/20/25 14:22 EDT

## 2025-05-20 NOTE — CASE MANAGEMENT/SOCIAL WORK
Discharge Planning Assessment  [facility]     Patient Name: Walt Gao  MRN: [id number]  Today's Date: 5/20/2025    Admit Date: 5/18/2025    Plan: Home   Discharge Needs Assessment       Row Name 05/20/25 1335       Living Environment    People in Home alone    Unique Family Situation Lives alone but stays with girlfriend    Current Living Arrangements apartment    Potentially Unsafe Housing Conditions none    In the past 12 months has the electric, gas, oil, or water company threatened to shut off services in your home? No    Primary Care Provided by self    Provides Primary Care For no one    Family Caregiver if Needed significant other    Quality of Family Relationships helpful;involved;supportive    Able to Return to Prior Arrangements yes    Living Arrangement Comments Plan is home       Resource/Environmental Concerns    Resource/Environmental Concerns none    Transportation Concerns none       Transportation Needs    In the past 12 months, has lack of transportation kept you from medical appointments or from getting medications? no    In the past 12 months, has lack of transportation kept you from meetings, work, or from getting things needed for daily living? No       Food Insecurity    Within the past 12 months, you worried that your food would run out before you got the money to buy more. Never true    Within the past 12 months, the food you bought just didn't last and you didn't have money to get more. Never true       Transition Planning    Patient/Family Anticipates Transition to home with family    Transportation Anticipated family or friend will provide       Discharge Needs Assessment    Equipment Currently Used at Home none    Do you want help finding or keeping work or a job? I do not need or want help    Do you want help with school or training? For example, starting or completing job training or getting a high school diploma, GED or equivalent No    Anticipated Changes Related to  Illness none    Equipment Needed After Discharge none    Discharge Coordination/Progress Plan is home                   Discharge Plan       Row Name 05/20/25 6219       Plan    Plan Home    Patient/Family in Agreement with Plan yes    Plan Comments I spoke with patient at the  and he lives alone, in an apt in [place]. He stays sometimes with his girlfriend. Patient told me he has a car but his girlfriend will come and get him at d/c. Independent, no DME, no Home 02 or HH. Currently, patient doesn't have insurance but he is Medicaid pending.    Final Discharge Disposition Code 01 - home or self-care                    Expected Discharge Date and Time       Expected Discharge Date Expected Discharge Time    May 20, 2025            Demographic Summary       Row Name 05/20/25 0770       General Information    Admission Type inpatient    Referral Source admission list    Reason for Consult discharge planning    Preferred Language English       Contact Information    Permission Granted to Share Info With     Contact Information Obtained for     Contact Information Comments PCP: Duglas Lang                   Functional Status       Row Name 05/20/25 3082       Functional Status    Usual Activity Tolerance moderate    Current Activity Tolerance fair       Physical Activity    On average, how many days per week do you engage in moderate to strenuous exercise (like a brisk walk)? 0 days    On average, how many minutes do you engage in exercise at this level? 0 min    Number of minutes of exercise per week 0       Functional Status, IADL    Medications independent    Meal Preparation assistive person    Housekeeping independent    Laundry independent    Shopping assistive person    If for any reason you need help with day-to-day activities such as bathing, preparing meals, shopping, managing finances, etc., do you get the help you need? I don't need any help    IADL Comments Patient has no  insurance but Medicaid pending       Mental Status Summary    Recent Changes in Mental Status/Cognitive Functioning ability to speak clearly       Employment/    Employment Status unemployed                   Psychosocial    No documentation.                  Abuse/Neglect    No documentation.                  Legal    No documentation.                  Substance Abuse    No documentation.                  Patient Forms    No documentation.                     Bisi Briceño RN

## 2025-05-21 ENCOUNTER — TRANSITIONAL CARE MANAGEMENT TELEPHONE ENCOUNTER (OUTPATIENT)
Dept: CALL CENTER | Facility: HOSPITAL | Age: 60
End: 2025-05-21
Payer: MEDICAID

## 2025-05-21 NOTE — OUTREACH NOTE
Call Center TCM Note      Flowsheet Row Responses   [facility] patient discharged from? [place]   Does the patient have one of the following disease processes/diagnoses(primary or secondary)? Stroke   TCM attempt successful? Yes   Call start time 1532   Call end time 1534   Discharge diagnosis Stroke (   Meds reviewed with patient/caregiver? Yes   Is the patient having any side effects they believe may be caused by any medication additions or changes? No   Does the patient have all medications ordered at discharge? Yes   Is the patient taking all medications as directed (includes completed medication regime)? Yes   Comments Hospital d/c follow up 5/27/25.   Does the patient have an appointment with their PCP within 7-14 days of discharge? Yes   The Stroke Clinic at [facility] requests you follow up with them within 30 days for important follow up care. Please call [phone] to schedule this appointment. Thank you. Yes  [Stroke clinic follow up 7/7/25.]   Has home health visited the patient within 72 hours of discharge? N/A   Psychosocial issues? No   Does the patient require any assistance with activities of daily living such as eating, bathing, dressing, walking, etc.? No   Does the patient have any residual symptoms from stroke/TIA? No   Did the patient receive a copy of their discharge instructions? Yes   Nursing interventions Reviewed instructions with patient   What is the patient's perception of their health status since discharge? Improving   Nursing interventions Nurse provided patient education   Is the patient/caregiver able to teach back the risk factors for a stroke? High blood pressure-goal below 120/80, High Cholesterol   Is the patient/caregiver able to teach back signs and symptoms related to disease process for when to call PCP? Yes   Is the patient/caregiver able to teach back signs and symptoms related to disease process for when to call 911? Yes   Is the patient able to  teach back FAST for Stroke? B alance: Watch for sudden loss of balance, E yes: Check for vision loss, F ace: Look for an uneven smile, A rm: Check if one arm is weak, S peech: Listen for slurred speech, T jason: Call 9-1-1 right away   TCM call completed? Yes   Wrap up additional comments Patient reports he is doing well, denies concerns today.   Call end time 1534   Would this patient benefit from a Referral to [facility] Social Work? No   Is the patient interested in additional calls from an ambulatory ? Greta JACKSON - Registered Nurse    5/21/2025, 15:35 EDT

## 2025-05-28 LAB
QT INTERVAL: 416 MS
QTC INTERVAL: 432 MS

## 2025-05-29 ENCOUNTER — TELEPHONE (OUTPATIENT)
Age: 60
End: 2025-05-29
Payer: MEDICAID

## 2025-05-30 ENCOUNTER — READMISSION MANAGEMENT (OUTPATIENT)
Dept: CALL CENTER | Facility: HOSPITAL | Age: 60
End: 2025-05-30
Payer: MEDICAID

## 2025-05-30 NOTE — OUTREACH NOTE
Stroke Week 2 Survey      Flowsheet Row Responses   [facility] [place] patient discharged from? [place]   Does the patient have one of the following disease processes/diagnoses(primary or secondary)? Stroke   Week 2 attempt successful? Yes   Call start time 1024   Call end time 1033   Meds reviewed with patient/caregiver? Yes   Is the patient having any side effects they believe may be caused by any medication additions or changes? No   Does the patient have all medications ordered at discharge? Yes   Is the patient taking all medications as directed (includes completed medication regime)? Yes   Does the patient have a primary care provider?  Yes   Does the patient have an appointment with their PCP within 7 days of discharge? Yes   Has the patient kept scheduled appointments due by today? No   What is preventing the patient from keeping their appointments? --  [Patient states missed his PCP and cardiology appts. Encouraged to call both offices today to reschedule.]   Nursing Interventions Educated on importance of keeping appointment, Advised to reschedule appointment   Has home health visited the patient within 72 hours of discharge? N/A   Psychosocial issues? No   Does the patient require any assistance with activities of daily living such as eating, bathing, dressing, walking, etc.? No   Does the patient have any residual symptoms from stroke/TIA? Yes   Residual symptoms comments Some dizziness at times.   Does the patient understand the diet ordered at discharge? Yes   Did the patient receive a copy of their discharge instructions? Yes   Nursing interventions Reviewed instructions with patient   What is the patient's perception of their health status since discharge? Improving   Nursing interventions Nurse provided patient education   Is the patient able to teach back FAST for Stroke? B alance: Watch for sudden loss of balance, E yes: Check for vision loss, F ace: Look for an uneven smile, A rm: Check if one  arm is weak, S peech: Listen for slurred speech, T jason: Call 9-1-1 right away   Is the patient/caregiver able to teach back the risk factors for a stroke? High blood pressure-goal below 120/80, High Cholesterol   Is the patient/caregiver able to teach back signs and symptoms related to disease process for when to call PCP? Yes   Is the patient/caregiver able to teach back signs and symptoms related to disease process for when to call 911? Yes   If the patient is a current smoker, are they able to teach back resources for cessation? Not a smoker   Is the patient/caregiver able to teach back the hierarchy of who to call/visit for symptoms/problems? PCP, Specialist, Home health nurse, Urgent Care, ED, 911 Yes   Additional teach back comments Encouraged to monitor BP twice daily and keep record for appts.   Week 2 call completed? Yes   Is the patient interested in additional calls from an ambulatory ? No   Would this patient benefit from a Referral to [employer] Social Work? No   Wrap up additional comments Patient  is feeling about the same.  still has some episodes of dizziness. [facility] has BP monitor at home, but does not check regularly. Encouraged to monitor BP twice daily and keep record for appts. [facility] will contact his PCP and cardiologist to reschedule appts and to report continuing dizziness. Denies any needs today.   Call end time 1033            Mikayla MILLS - Registered Nurse

## 2025-06-09 ENCOUNTER — READMISSION MANAGEMENT (OUTPATIENT)
Dept: CALL CENTER | Facility: HOSPITAL | Age: 60
End: 2025-06-09
Payer: MEDICAID

## 2025-06-09 NOTE — OUTREACH NOTE
Stroke Week 3 Survey      Flowsheet Row Responses   [facility] facility patient discharged from? [place]   Does the patient have one of the following disease processes/diagnoses(primary or secondary)? Stroke   Week 3 attempt successful? No   Unsuccessful attempts Attempt 1   Revoke Hanane Zamora H - Registered Nurse

## 2025-06-11 ENCOUNTER — DOCUMENTATION (OUTPATIENT)
Dept: DIABETES SERVICES | Facility: HOSPITAL | Age: 60
End: 2025-06-11
Payer: MEDICAID

## 2025-06-11 ENCOUNTER — HOSPITAL ENCOUNTER (OUTPATIENT)
Dept: DIABETES SERVICES | Facility: HOSPITAL | Age: 60
Discharge: HOME | End: 2025-06-11

## 2025-06-11 NOTE — PLAN OF CARE
Brief phone follow up completed. Patient voices home, has follow up appointments and needed medication. He voices he has a meter for self monitoring. Discussed the importance of monitoring and glucose management to avoid complications associated with DM. Discussed hyperglycemia and effects on blood vessels. Blood sugar goals reviewed. GRECIA lugo denies any questions or concerns at this time. He has been encouraged to reach out to this department for any needed support. Thank you for this opportunity.

## 2025-06-24 DIAGNOSIS — R39.9 LOWER URINARY TRACT SYMPTOMS (LUTS): ICD-10-CM

## 2025-06-24 RX ORDER — TAMSULOSIN HYDROCHLORIDE 0.4 MG/1
1 CAPSULE ORAL DAILY
Qty: 90 CAPSULE | Refills: 1 | Status: SHIPPED | OUTPATIENT
Start: 2025-06-24

## 2025-07-01 ENCOUNTER — OFFICE VISIT (OUTPATIENT)
Age: 60
End: 2025-07-01

## 2025-07-01 VITALS
WEIGHT: 204.5 LBS | HEART RATE: 92 BPM | BODY MASS INDEX: 30.29 KG/M2 | SYSTOLIC BLOOD PRESSURE: 158 MMHG | DIASTOLIC BLOOD PRESSURE: 98 MMHG | HEIGHT: 69 IN | OXYGEN SATURATION: 96 %

## 2025-07-01 DIAGNOSIS — I10 ESSENTIAL HYPERTENSION: Chronic | ICD-10-CM

## 2025-07-01 DIAGNOSIS — I50.32 HEART FAILURE WITH IMPROVED EJECTION FRACTION (HFIMPEF): Primary | ICD-10-CM

## 2025-07-01 DIAGNOSIS — I63.9 CEREBROVASCULAR ACCIDENT (CVA), UNSPECIFIED MECHANISM: ICD-10-CM

## 2025-07-01 NOTE — PROGRESS NOTES
Office Note     Name: Walt Gao    : 1965     MRN: [id number]     Chief Complaint  Dizziness and Fatigue    Subjective     History of Present Illness:  Walt Gao is a 60 y.o. male who presents today for follow-up of chronic medical conditions.  He had been admitted with another stroke since the last time I saw him.  He lives alone and is having a very difficult time managing his medications.  He is unclear which medications he is supposed to be taking or not be taking.  He does have a significant other and a sister who can help medications be put in a pillbox.  He is continuing to have some dizziness or fogginess and how he is thinking.  He does not have any vertigo or presyncope.  Blood pressure has been running high.    Past Medical History:   Past Medical History:   Diagnosis Date    CHF (congestive heart failure)     GI bleed 2024    Hypertension     NICM (nonischemic cardiomyopathy)     Nonischemic cardiomyopathy 2016    · Cardiac catheterization revealing nonobstructive CAD,  · LVEF 30% on heart catheterization,  · Echo (16): Moderate LVH. LVEF 23%. Left ventricular diastolic dysfunction (grade II) consistent with pseudonormalization.  Moderate mitral valve regurgitation · Echo (2017):  LVEF 50%    Stroke        Past Surgical History:   Past Surgical History:   Procedure Laterality Date    COLONOSCOPY N/A 2024    Procedure: COLONOSCOPY;  Surgeon: Duglas Zhou MD;  Location:  [facility];  Service: Gastroenterology;  Laterality: N/A;    ENDOSCOPY N/A 2024    Procedure: ESOPHAGOGASTRODUODENOSCOPY;  Surgeon: Duglas Zhou MD;  Location:  [facility];  Service: Gastroenterology;  Laterality: N/A;    HERNIA REPAIR      as child       Immunizations:   Immunization History   Administered Date(s) Administered    Flu Vaccine Intradermal Quad 18-64YR 10/13/2014, 10/02/2020    Flucelvax Quad Vial =>4yrs 10/05/2019    Fluzone Quad >6mos  (Multi-dose) 09/29/2016    Influenza Injectable Mdck Pf Quad 10/02/2020    Influenza Seasonal Injectable 10/13/2014    Influenza, Unspecified 11/01/2023    Pneumococcal Conjugate 20-Valent (PCV20) 05/22/2024    Shingrix 08/30/2021    flucelvax quad pfs =>4 YRS 10/01/2020        Medications:     Current Outpatient Medications:     aspirin 81 MG EC tablet, Take 1 tablet by mouth Daily., Disp: 90 tablet, Rfl: 3    tamsulosin (FLOMAX) 0.4 MG capsule 24 hr capsule, Take 1 capsule by mouth Daily., Disp: 90 capsule, Rfl: 1    atorvastatin (LIPITOR) 80 MG tablet, Take 1 tablet by mouth Every Night., Disp: 90 tablet, Rfl: 2    carvedilol (COREG) 12.5 MG tablet, Take 1 tablet by mouth 2 (Two) Times a Day., Disp: 180 tablet, Rfl: 3    clopidogrel (Plavix) 75 MG tablet, Take 1 tablet by mouth Daily., Disp: 21 tablet, Rfl: 0    dapagliflozin Propanediol (Farxiga) 10 MG tablet, Take 10 mg by mouth Daily., Disp: 90 tablet, Rfl: 3    mupirocin (BACTROBAN) 2 % ointment, Apply 1 Application topically to the appropriate area as directed 3 (Three) Times a Day., Disp: 30 g, Rfl: 1    spironolactone (ALDACTONE) 25 MG tablet, Take 1 tablet by mouth Daily., Disp: 90 tablet, Rfl: 3    valsartan (DIOVAN) 80 MG tablet, Take 1 tablet by mouth Daily., Disp: 90 tablet, Rfl: 3    vitamin B-12 (CYANOCOBALAMIN) 1000 MCG tablet, Take 2 tablets by mouth Daily., Disp: 90 tablet, Rfl: 3    Current Facility-Administered Medications:     cyanocobalamin injection 1,000 mcg, 1,000 mcg, Intramuscular, Q28 Days, Adalgisa Moore MD, 1,000 mcg at 08/30/24 1519    Allergies:   No Known Allergies    Family History:   Family History   Problem Relation Age of Onset    Coronary artery disease Other     Hypertension Mother     Diabetes Father     Hypertension Father     Kidney disease Father     Hyperlipidemia Father     Heart disease Paternal Grandmother     Heart disease Paternal Grandfather     No Known Problems Sister     No Known Problems Daughter  "    No Known Problems Son        Social History:   Social History     Socioeconomic History    Marital status: Single   Tobacco Use    Smoking status: Never     Passive exposure: Never    Smokeless tobacco: Never   Vaping Use    Vaping status: Never Used   Substance and Sexual Activity    Alcohol use: Yes     Alcohol/week: 6.0 standard drinks of alcohol     Types: 6 Cans of beer per week     Comment: 6 beers a week    Drug use: Never    Sexual activity: Yes     Partners: Female     Birth control/protection: Post-menopausal     Comment: 1 partner in the last 9 years         Objective     Vital Signs  /98 (BP Location: Left arm, Patient Position: Sitting, Cuff Size: Large Adult)   Pulse 92   Ht 175.3 cm (69.02\")   Wt 92.8 kg (204 lb 8 oz)   SpO2 96%   BMI 30.19 kg/m²   Estimated body mass index is 30.19 kg/m² as calculated from the following:    Height as of this encounter: 175.3 cm (69.02\").    Weight as of this encounter: 92.8 kg (204 lb 8 oz).          Physical Exam  Constitutional:       General: He is not in acute distress.     Appearance: He is not toxic-appearing.   Cardiovascular:      Rate and Rhythm: Normal rate and regular rhythm.      Heart sounds: No murmur heard.     No friction rub. No gallop.   Pulmonary:      Effort: Pulmonary effort is normal.      Breath sounds: Normal breath sounds.   Abdominal:      General: Abdomen is flat. There is no distension.   Skin:     General: Skin is warm and dry.   Neurological:      Mental Status: He is alert.   Psychiatric:         Mood and Affect: Mood normal.         Behavior: Behavior normal.          Assessment and Plan     1. Heart failure with improved ejection fraction (HFimpEF)  Provided patient a med list  Symptoms of heart failure not significant at this time, he does have some leg swelling    2. Essential hypertension  Chronic, uncontrolled  - Provided with a med list to confirm against when he is taking.  They will contact us if there are any " discrepancies or not, if there are no discrepancies would increase his home regimen.  Close follow-up scheduled as below    3. Cerebrovascular accident (CVA), unspecified mechanism  Continue aspirin, Plavix, Farxiga, valsartan, carvedilol  - Appears to have some decline in memory with this, complicates all aspects of care  - Follow-up in 1 week with all medications he has at home to reconcile this against his med list       Counseling was given to patient for the following topics: instructions for management.    Follow Up  Return in about 1 week (around 7/8/2025) for Follow Up.    Linus Lang MD  [facility]  [address] [address] [phone]

## 2025-07-02 ENCOUNTER — TELEPHONE (OUTPATIENT)
Dept: NEUROLOGY | Facility: CLINIC | Age: 60
End: 2025-07-02

## 2025-07-02 NOTE — TELEPHONE ENCOUNTER
CALLED PT 07/02/25 AT 10:14 AM TO GET INSURANCE COVERAGE FOR PT'S UPCOMING F/U. I LOOKED UP PT'S INS INFO AND IT STATED IN THE [facility] WEB PORTAL THAT THE INS COVERAGE IS NOT LONGER ACTIVE AS OF 06/30/2025. I LET THE PT KNOW HE SHOULD GIVE THEM A CALL AND SEE WHAT HE CAN DO FOR COVERAGE AND ALSO LET HIM KNOW WHEN HE GETS COVERAGE TO GIVE THE OFFICE A CALL BACK TO GET HIM RESCHEDULED AS SOON AS POSSIBLE.     FIRST ATTEMPT-SUCCESSFUL

## 2025-07-08 ENCOUNTER — OFFICE VISIT (OUTPATIENT)
Age: 60
End: 2025-07-08
Payer: MEDICAID

## 2025-07-08 VITALS
HEART RATE: 87 BPM | SYSTOLIC BLOOD PRESSURE: 138 MMHG | HEIGHT: 69 IN | WEIGHT: 203.8 LBS | BODY MASS INDEX: 30.18 KG/M2 | DIASTOLIC BLOOD PRESSURE: 98 MMHG | OXYGEN SATURATION: 96 %

## 2025-07-08 DIAGNOSIS — R39.9 LOWER URINARY TRACT SYMPTOMS (LUTS): ICD-10-CM

## 2025-07-08 DIAGNOSIS — I50.32 HEART FAILURE WITH IMPROVED EJECTION FRACTION (HFIMPEF): ICD-10-CM

## 2025-07-08 PROCEDURE — 99214 OFFICE O/P EST MOD 30 MIN: CPT | Performed by: STUDENT IN AN ORGANIZED HEALTH CARE EDUCATION/TRAINING PROGRAM

## 2025-07-08 RX ORDER — TORSEMIDE 10 MG/1
10 TABLET ORAL DAILY
COMMUNITY

## 2025-07-08 RX ORDER — VALSARTAN 80 MG/1
80 TABLET ORAL DAILY
Qty: 90 TABLET | Refills: 3 | Status: SHIPPED | OUTPATIENT
Start: 2025-07-08

## 2025-07-08 RX ORDER — TAMSULOSIN HYDROCHLORIDE 0.4 MG/1
2 CAPSULE ORAL DAILY
Qty: 90 CAPSULE | Refills: 1 | Status: SHIPPED | OUTPATIENT
Start: 2025-07-08

## 2025-07-08 NOTE — PROGRESS NOTES
Office Note     Name: Walt Gao    : 1965     MRN: [id number]     Chief Complaint  Medication Problem (Review of medications)    Subjective     History of Present Illness:  Walt Gao is a 60 y.o. male who presents today for Follow up of chronic medical conditions. He has all of his medications here for a med review. He has been out of the valsartan for a while and needs increase on flomax.      Past Medical History:   Past Medical History:   Diagnosis Date    CHF (congestive heart failure)     GI bleed 2024    Hypertension     NICM (nonischemic cardiomyopathy)     Nonischemic cardiomyopathy 2016    · Cardiac catheterization revealing nonobstructive CAD,  · LVEF 30% on heart catheterization,  · Echo (16): Moderate LVH. LVEF 23%. Left ventricular diastolic dysfunction (grade II) consistent with pseudonormalization.  Moderate mitral valve regurgitation · Echo (2017):  LVEF 50%    Stroke        Past Surgical History:   Past Surgical History:   Procedure Laterality Date    COLONOSCOPY N/A 2024    Procedure: COLONOSCOPY;  Surgeon: Duglas Zhou MD;  Location:  [facility];  Service: Gastroenterology;  Laterality: N/A;    ENDOSCOPY N/A 2024    Procedure: ESOPHAGOGASTRODUODENOSCOPY;  Surgeon: Duglas Zhou MD;  Location:  [facility];  Service: Gastroenterology;  Laterality: N/A;    HERNIA REPAIR      as child       Immunizations:   Immunization History   Administered Date(s) Administered    Flu Vaccine Intradermal Quad 18-64YR 10/13/2014, 10/02/2020    Flucelvax Quad Vial =>4yrs 10/05/2019    Fluzone Quad >6mos (Multi-dose) 2016    Influenza Injectable Mdck Pf Quad 10/02/2020    Influenza Seasonal Injectable 10/13/2014    Influenza, Unspecified 2023    Pneumococcal Conjugate 20-Valent (PCV20) 2024    Shingrix 2021    flucelvax quad pfs =>4 YRS 10/01/2020        Medications:     Current Outpatient Medications:     aspirin  81 MG EC tablet, Take 1 tablet by mouth Daily., Disp: 90 tablet, Rfl: 3    atorvastatin (LIPITOR) 80 MG tablet, Take 1 tablet by mouth Every Night., Disp: 90 tablet, Rfl: 2    carvedilol (COREG) 12.5 MG tablet, Take 1 tablet by mouth 2 (Two) Times a Day., Disp: 180 tablet, Rfl: 3    clopidogrel (Plavix) 75 MG tablet, Take 1 tablet by mouth Daily., Disp: 21 tablet, Rfl: 0    dapagliflozin Propanediol (Farxiga) 10 MG tablet, Take 10 mg by mouth Daily., Disp: 90 tablet, Rfl: 3    spironolactone (ALDACTONE) 25 MG tablet, Take 1 tablet by mouth Daily., Disp: 90 tablet, Rfl: 3    tamsulosin (FLOMAX) 0.4 MG capsule 24 hr capsule, Take 2 capsules by mouth Daily., Disp: 90 capsule, Rfl: 1    torsemide (DEMADEX) 10 MG tablet, Take 1 tablet by mouth Daily., Disp: , Rfl:     valsartan (DIOVAN) 80 MG tablet, Take 1 tablet by mouth Daily., Disp: 90 tablet, Rfl: 3    mupirocin (BACTROBAN) 2 % ointment, Apply 1 Application topically to the appropriate area as directed 3 (Three) Times a Day. (Patient not taking: Reported on 7/8/2025), Disp: 30 g, Rfl: 1    vitamin B-12 (CYANOCOBALAMIN) 1000 MCG tablet, Take 2 tablets by mouth Daily. (Patient not taking: Reported on 7/8/2025), Disp: 90 tablet, Rfl: 3    Current Facility-Administered Medications:     cyanocobalamin injection 1,000 mcg, 1,000 mcg, Intramuscular, Q28 Days, Adalgisa Moore MD, 1,000 mcg at 08/30/24 [id number]    Allergies:   No Known Allergies    Family History:   Family History   Problem Relation Age of Onset    Coronary artery disease Other     Hypertension Mother     Diabetes Father     Hypertension Father     Kidney disease Father     Hyperlipidemia Father     Heart disease Paternal Grandmother     Heart disease Paternal Grandfather     No Known Problems Sister     No Known Problems Daughter     No Known Problems Son        Social History:   Social History     Socioeconomic History    Marital status: Single   Tobacco Use    Smoking status: Never     Passive  "exposure: Never    Smokeless tobacco: Never   Vaping Use    Vaping status: Never Used   Substance and Sexual Activity    Alcohol use: Yes     Alcohol/week: 6.0 standard drinks of alcohol     Types: 6 Cans of beer per week     Comment: 6 beers a week    Drug use: Never    Sexual activity: Yes     Partners: Female     Birth control/protection: Post-menopausal     Comment: 1 partner in the last 9 years         Objective     Vital Signs  /98 (BP Location: Right arm, Patient Position: Sitting, Cuff Size: Large Adult)   Pulse 87   Ht 175.3 cm (69.02\")   Wt 92.4 kg (203 lb 12.8 oz)   SpO2 96%   BMI 30.08 kg/m²   Estimated body mass index is 30.08 kg/m² as calculated from the following:    Height as of this encounter: 175.3 cm (69.02\").    Weight as of this encounter: 92.4 kg (203 lb 12.8 oz).          Physical Exam  Constitutional:       General: He is not in acute distress.     Appearance: He is not toxic-appearing.   Cardiovascular:      Rate and Rhythm: Normal rate and regular rhythm.      Heart sounds: No murmur heard.     No friction rub. No gallop.   Pulmonary:      Effort: Pulmonary effort is normal.      Breath sounds: Normal breath sounds.   Abdominal:      General: Abdomen is flat. There is no distension.   Skin:     General: Skin is warm and dry.   Neurological:      Mental Status: He is alert.   Psychiatric:         Mood and Affect: Mood normal.         Behavior: Behavior normal.          Assessment and Plan     1. Heart failure with improved ejection fraction (HFimpEF)  Chronic stable  Symptoms of fatigue and dizziness are improved. Restart valsartan given his slightly elevated BP today  - valsartan (DIOVAN) 80 MG tablet; Take 1 tablet by mouth Daily.  Dispense: 90 tablet; Refill: 3    2. Lower urinary tract symptoms (LUTS)  Increase flomax to 0.8  - tamsulosin (FLOMAX) 0.4 MG capsule 24 hr capsule; Take 2 capsules by mouth Daily.  Dispense: 90 capsule; Refill: 1       Counseling was given to " patient for the following topics: instructions for management.    Follow Up  Return in about 3 months (around 10/8/2025) for Follow Up.    MD LUDMILA [facility]  [address] [id number]  [phone]

## 2025-07-11 LAB
MDC_IDC_PG_IMPLANT_DTM: NORMAL
MDC_IDC_PG_MFG: NORMAL
MDC_IDC_PG_MODEL: NORMAL
MDC_IDC_PG_SERIAL: NORMAL
MDC_IDC_PG_TYPE: NORMAL
MDC_IDC_SESS_DTM: NORMAL
MDC_IDC_SESS_TYPE: NORMAL

## 2025-07-17 DIAGNOSIS — I50.32 HEART FAILURE WITH IMPROVED EJECTION FRACTION (HFIMPEF): ICD-10-CM

## 2025-07-17 RX ORDER — DAPAGLIFLOZIN 10 MG/1
1 TABLET, FILM COATED ORAL DAILY
Qty: 30 TABLET | Refills: 1 | Status: SHIPPED | OUTPATIENT
Start: 2025-07-17

## 2025-07-25 DIAGNOSIS — I50.32 HEART FAILURE WITH IMPROVED EJECTION FRACTION (HFIMPEF): ICD-10-CM

## 2025-07-25 DIAGNOSIS — I63.411 CEREBROVASCULAR ACCIDENT (CVA) DUE TO EMBOLISM OF RIGHT MIDDLE CEREBRAL ARTERY: ICD-10-CM

## 2025-07-25 RX ORDER — CARVEDILOL 6.25 MG/1
6.25 TABLET ORAL 2 TIMES DAILY
Qty: 180 TABLET | Refills: 3 | OUTPATIENT
Start: 2025-07-25

## 2025-07-25 RX ORDER — SPIRONOLACTONE 25 MG/1
25 TABLET ORAL DAILY
Qty: 90 TABLET | Refills: 3 | Status: SHIPPED | OUTPATIENT
Start: 2025-07-25

## 2025-07-25 RX ORDER — ASPIRIN 81 MG/1
81 TABLET, COATED ORAL DAILY
Qty: 90 TABLET | Refills: 3 | Status: SHIPPED | OUTPATIENT
Start: 2025-07-25

## 2025-08-29 LAB
MDC_IDC_PG_IMPLANT_DTM: NORMAL
MDC_IDC_PG_MFG: NORMAL
MDC_IDC_PG_MODEL: NORMAL
MDC_IDC_PG_SERIAL: NORMAL
MDC_IDC_PG_TYPE: NORMAL
MDC_IDC_SESS_DTM: NORMAL
MDC_IDC_SESS_TYPE: NORMAL

## (undated) DEVICE — THE BITE BLOCK MAXI, LATEX FREE STRAP IS USED TO PROTECT THE ENDOSCOPE INSERTION TUBE FROM BEING BITTEN BY THE PATIENT.

## (undated) DEVICE — ERBE NESSY®PLATE 170 SPLIT; 168CM²; CABLE 3M: Brand: ERBE

## (undated) DEVICE — HYBRID CO2 TUBING/CAP SET FOR OLYMPUS® SCOPES & CO2 SOURCE: Brand: ERBE

## (undated) DEVICE — SNAR POLYP CAPTIVATOR/COLD STFF RND 10MM 240CM

## (undated) DEVICE — LUBE JELLY FOIL PACK 1.4 OZ: Brand: MEDLINE INDUSTRIES, INC.

## (undated) DEVICE — THE SINGLE USE ETRAP – POLYP TRAP IS USED FOR SUCTION RETRIEVAL OF ENDOSCOPICALLY REMOVED POLYPS.: Brand: ETRAP

## (undated) DEVICE — SAFELINER SUCTION CANISTER 1000CC: Brand: DEROYAL

## (undated) DEVICE — FIRST STEP BEDSIDE ADD WATER KIT - RESEALABLE STAND-UP POUCH, ENDOSCOPIC CLEANING PAD - 1 POUCH: Brand: FIRST STEP BEDSIDE ADD WATER KIT - RESEALABLE STAND-UP POUCH, ENDOSCOPIC CLEANIN

## (undated) DEVICE — TUBING, SUCTION, 1/4" X 10', STRAIGHT: Brand: MEDLINE

## (undated) DEVICE — KT ORCA ORCAPOD DISP STRL

## (undated) DEVICE — SOL IRR H2O BTL 1000ML STRL

## (undated) DEVICE — CONTN GRAD MEAS TRIANG 32OZ BLK

## (undated) DEVICE — LUBE GEL ENDOGLIDE 1.1OZ

## (undated) DEVICE — INTRO ACCSR BLNT TP

## (undated) DEVICE — SYR LUERLOK 50ML

## (undated) DEVICE — SOLIDIFIER LIQ PREMISORB 1500CC

## (undated) DEVICE — ADAPT CLN LUM OLYMP AIR/H20